# Patient Record
Sex: MALE | Race: BLACK OR AFRICAN AMERICAN | NOT HISPANIC OR LATINO | Employment: OTHER | URBAN - METROPOLITAN AREA
[De-identification: names, ages, dates, MRNs, and addresses within clinical notes are randomized per-mention and may not be internally consistent; named-entity substitution may affect disease eponyms.]

---

## 2017-01-13 ENCOUNTER — HOSPITAL ENCOUNTER (OUTPATIENT)
Dept: RADIOLOGY | Facility: OTHER | Age: 77
Discharge: HOME | End: 2017-01-13
Attending: ANESTHESIOLOGY
Payer: MEDICARE

## 2017-01-13 ENCOUNTER — OFFICE VISIT (OUTPATIENT)
Dept: PAIN MEDICINE | Facility: CLINIC | Age: 77
End: 2017-01-13
Attending: ANESTHESIOLOGY
Payer: MEDICARE

## 2017-01-13 VITALS
HEART RATE: 66 BPM | DIASTOLIC BLOOD PRESSURE: 68 MMHG | HEIGHT: 69 IN | WEIGHT: 200.63 LBS | BODY MASS INDEX: 29.71 KG/M2 | SYSTOLIC BLOOD PRESSURE: 126 MMHG | RESPIRATION RATE: 18 BRPM | TEMPERATURE: 97 F

## 2017-01-13 DIAGNOSIS — M25.562 CHRONIC PAIN OF BOTH KNEES: ICD-10-CM

## 2017-01-13 DIAGNOSIS — G89.29 CHRONIC PAIN OF BOTH KNEES: ICD-10-CM

## 2017-01-13 DIAGNOSIS — M25.561 CHRONIC PAIN OF BOTH KNEES: ICD-10-CM

## 2017-01-13 DIAGNOSIS — M17.0 PRIMARY OSTEOARTHRITIS OF BOTH KNEES: ICD-10-CM

## 2017-01-13 DIAGNOSIS — M17.0 PRIMARY OSTEOARTHRITIS OF BOTH KNEES: Primary | ICD-10-CM

## 2017-01-13 PROCEDURE — 1159F MED LIST DOCD IN RCRD: CPT | Mod: S$GLB,,, | Performed by: ANESTHESIOLOGY

## 2017-01-13 PROCEDURE — 99999 PR PBB SHADOW E&M-EST. PATIENT-LVL III: CPT | Mod: PBBFAC,,, | Performed by: ANESTHESIOLOGY

## 2017-01-13 PROCEDURE — 3074F SYST BP LT 130 MM HG: CPT | Mod: S$GLB,,, | Performed by: ANESTHESIOLOGY

## 2017-01-13 PROCEDURE — 99214 OFFICE O/P EST MOD 30 MIN: CPT | Mod: GC,S$GLB,, | Performed by: ANESTHESIOLOGY

## 2017-01-13 PROCEDURE — 1157F ADVNC CARE PLAN IN RCRD: CPT | Mod: S$GLB,,, | Performed by: ANESTHESIOLOGY

## 2017-01-13 PROCEDURE — 3078F DIAST BP <80 MM HG: CPT | Mod: S$GLB,,, | Performed by: ANESTHESIOLOGY

## 2017-01-13 PROCEDURE — 1160F RVW MEDS BY RX/DR IN RCRD: CPT | Mod: S$GLB,,, | Performed by: ANESTHESIOLOGY

## 2017-01-13 PROCEDURE — 1125F AMNT PAIN NOTED PAIN PRSNT: CPT | Mod: S$GLB,,, | Performed by: ANESTHESIOLOGY

## 2017-01-13 PROCEDURE — 73562 X-RAY EXAM OF KNEE 3: CPT | Mod: 26,RT,, | Performed by: RADIOLOGY

## 2017-01-13 PROCEDURE — 73562 X-RAY EXAM OF KNEE 3: CPT | Mod: 26,LT,, | Performed by: RADIOLOGY

## 2017-01-13 PROCEDURE — 73562 X-RAY EXAM OF KNEE 3: CPT | Mod: 50,TC

## 2017-01-13 NOTE — PROGRESS NOTES
Subjective:       Patient ID: Frandy Mccarthy Jr. is a 76 y.o. male     Chief Complaint: Low-back Pain (3 week follow up) and Knee Pain (new pain)    HPI:     Mr. Mccarthy is a 75 yo male who presents today with Low back pain, leg pain, and knee pain. He has a long-standing history of low back pain, s/p lumbar surgery x3. His current pain has been present since his surgery in April 2013 and is the worst in his bilateral knees. His pain is described in detail below.      Interval History (4/9/2014):  He returns to the office today with his wife. They are both upset because he was not able to start physical therapy. They report that they were told the visits were not authorized so he could not be scheduled. No change in the location or quality of his pain since his most recent visit.    Interval history (6/2/2014):  He returns to the office today again with his wife. He reports improved pain control with physical therapy and with the TENS unit. However, the TENS unit came from the physical therapy unit and was not an in network therefore they had to return at grossly would be charged several $100. They would like for us to order them a TENS unit to have in the house.    Interval history (9/18/14):  Patient returns to clinic for further treatment options of his LBP and bilateral lower extremity pain. Describes pain as LBP that radiates to bilateral lower extremities on the lateral thigh to the knee. Rarely the pain will extend down to the calf. Pain is worse with activity/walking. Reports 5/10 constant dull achy pain that is 10/10 at its worst. Patient reports that he has completed physical therapy and has been using the TENs unit. The TENs unit only provides minimal and temporary relief. Celebrex and anaprox were dc'd by the patients cardiologist.    Interval history (11/18/14):  Mr. Mccarthy presents to clinic s/p left and right L3-5 MB RFA. Reports 80% relief of previous sxs, 100% relilef of leg pain. Continues to c/o  pain b/l in low back, however this is located inferior to his flank.  This pain is worse with prolonged standing.  He does not continue to do PT exercises at home. Continues to take Gabapentin.  Pt has been seen in the clinic before by Dr. Carlson, however pt is new to me.     Interval History (10/05/2016):  PmHx lumbar surgery x3, previous RFA of MB L3-L5 with good results presents today with recurrent back pain radiating to the mid anterior thigh.  Pain onset has been gradual over the last few months, aching, worse on right, relieved by rest.  Associated symptoms include complaints of weakness.  Pain is constant, 10/10 at worst severity and 3/10 at least.      No current PT or exercise regimen (limited by pain).  He is not currently taking any anticoagulation other than ASA 81 mg daily.  Current pain medications include tramadol and percocet managed by [facility].  His gabapentin was recently decreased by endocrinology to 300 mg tid.      Interval History (12/8/2016):  He returns today for follow up.  He reports that the RFA was helpful for his back pain.  He is now having a different pain.  This pain is a shooting pain from his buttocks into his feet bilaterally.  This is associated with numbness and tingling.  No saddle anesthesia, bladder or bowel incontinence.    Interval History (1/13/2017):  He returns today for follow up.  He reports that the caudal MARCOS has been helpful for his back pain, today 3/10, but especially for the radicular symptoms. Now, he reports he has constant, BL knee pain that is worse with walking and stairs and decreases with sitting or lying down. He reports his legs occasionally feel week, but he denies falls. He reports that wearing a compression brace helps, but he feels that the ones he's tried are too tight. He would like to try one that properly fits him.   He does report after lying down for a bit a night, both of his calves have tingling.     Physical Therapy: He completed a 12 week  course last year; however, this made his pain worse.     Non-pharmacologic Treatment: Sitting makes his pain better.      · TENS? Not tried.     Pain Medications:       · Currently taking: Tramadol 50 mg 3 times daily as needed (helpful); gabapentin 300 mg 3 times daily, Cymbalta 30 mg BID      · Has tried in the past: Percocet 5/325 mg, Celebrex 200 mg daily. Naproxen 550 mg twice daily.     · Has not tried: TCAs, SSRIs     Blood thinners: None     Interventional Therapies:   · Bilateral LMBB: Positive  · Right L3-5 RFA 10/2014: Excellent relief until summer 2016  · Left L3-5 RFA 10/2014: Excellent relief until summer 2016  · Right L3-5 Cooled RFA 10/2016:  Good relief of back pain  · Left L3-5 Cooled RFA 11/2016:  Good relief of back pain  · Caudal MARCOS 12/20/16; good relief of radicular symptoms with good relief of back pain     Relevant Surgeries: L5-S1 fusion in 2005 followed by a subsequent L3-5 fusion in 2012 revised in 2013 with a complication of a postoperative seroma in 2012.     Affecting sleep? Yes     Affecting daily activities? Yes     Depressive symptoms? No      · SI/HI? No     Work status: Retired    Pain Scales  Best: 2/10  Worst: 10/10  Usually: 2/10  Today: 2/10    Low-back Pain   This is a recurrent problem. The current episode started more than 1 year ago. The problem occurs constantly. The problem has been rapidly improving since onset. The pain is present in the lumbar spine. The pain does not radiate. The quality of the pain is described as aching. The pain is at a severity of 2/10. The pain is the same all the time. Associated symptoms include leg pain. He has tried injection treatment and NSAIDs for the symptoms. The treatment provided significant relief. Physical therapy was ineffective.  Leg Pain    The pain is present in the right hip, right knee, right ankle, right foot, right toes, left knee, left ankle, left foot and left toes. This is a recurrent problem. The problem occurs  constantly. The problem has been gradually worsening. The quality of the pain is described as aching. The pain is at a severity of 5/10. Associated symptoms include stiffness. The symptoms are aggravated by walking and activity. He has tried oral narcotics and NSAIDs for the symptoms. The treatment provided no relief. Physical therapy was not tried.    Review of Systems   Cardiovascular:        Hypertension    Musculoskeletal: Positive for back pain and stiffness.   All other systems reviewed and are negative.      Past Medical History   Diagnosis Date    Arthritis     Diabetes mellitus, type 2     Hyperlipidemia     Hypertension     Thyroid disease      parathyroid       Past Surgical History   Procedure Laterality Date    Spine surgery      Hernia repair      Back surgery       x 4    Fracture surgery       right thigh       Review of patient's allergies indicates:  No Known Allergies    Current Outpatient Prescriptions   Medication Sig Dispense Refill    amlodipine (NORVASC) 10 MG tablet Take 10 mg by mouth once daily.       aspirin (ECOTRIN) 81 MG EC tablet Take 81 mg by mouth once daily.      BD INSULIN SYRINGE ULTRA-FINE 1 mL 31 gauge x 5/16 Syrg USE AS DIRECTED  0    cyanocobalamin (VITAMIN B-12) 1000 MCG tablet Take 100 mcg by mouth once daily.      duloxetine (CYMBALTA) 60 MG capsule Take 30 mg by mouth 2 (two) times daily.   99    fenofibrate 160 MG Tab Take 1 tablet (160 mg total) by mouth once daily. 90 tablet 1    folic acid (FOLVITE) 800 MCG Tab Take 800 mcg by mouth once daily.      gabapentin (NEURONTIN) 300 MG capsule TAKE 2 CAPSULES BY MOUTH 3 TIMES A DAY (Patient taking differently: TAKE 1 CAPSULES BY MOUTH 3 TIMES A DAY) 180 capsule 5    LEVEMIR 100 unit/mL injection 58 Units every evening.       losartan (COZAAR) 50 MG tablet ONCE A DAY  3    metoprolol tartrate (LOPRESSOR) 50 MG tablet Take 50 mg by mouth once daily.  3    nifedipine (ADALAT CC) 30 MG TbSR Take 30 mg by  "mouth every morning.  5    NOVOLOG FLEXPEN 100 unit/mL InPn pen 10 Units 3 (three) times daily with meals.       oxycodone-acetaminophen (PERCOCET)  mg per tablet Take 1 tablet by mouth 3 (three) times daily as needed.  0    simvastatin (ZOCOR) 5 MG tablet Take 5 mg by mouth every evening.      tramadol (ULTRAM) 50 mg tablet 50 mg every 4 (four) hours as needed.       VITAMIN D2 50,000 unit capsule TAKE ON TAB BY MOUTH ONCE A WEEK FOR 8WKS THEN TAKE ONE TAB BY MOUTH ONCE A MONTH  3     No current facility-administered medications for this visit.        Family History   Problem Relation Age of Onset    Cancer Mother     Diabetes Mother     Diabetes Father     Cancer Father        Social History     Social History    Marital status:      Spouse name: N/A    Number of children: N/A    Years of education: N/A     Occupational History    Not on file.     Social History Main Topics    Smoking status: Former Smoker     Quit date: 2/26/1973    Smokeless tobacco: Never Used    Alcohol use Yes      Comment: occasional    Drug use: No    Sexual activity: Yes     Partners: Female     Other Topics Concern    Not on file     Social History Narrative         Objective:       Vitals:    01/13/17 1258   BP: 126/68   Pulse: 66   Resp: 18   Temp: 97.4 °F (36.3 °C)   TempSrc: Oral   Weight: 91 kg (200 lb 9.9 oz)   Height: 5' 9" (1.753 m)   PainSc:   2   PainLoc: Back       Physical Exam  GEN:  Well developed, well nourished.  No acute distress. No pain behavior.  HEENT:  No trauma.  Mucous membranes moist.  Nares patent bilaterally.  PSYCH: Normal affect. Thought content appropriate.  CHEST:  Breathing symmetric.  No audible wheezing.  ABD: Soft, non-tender, non-distended.  SKIN:  Warm, pink, dry.  No rash on exposed areas.    EXT:  No cyanosis, clubbing, or edema.  No color change or changes in nail or hair growth. Full ROM at knees passively and actively. Negative anterior drawer test BL. No patellar " pain to palpation or patellar slipping.  NEURO/MUSCULOSKELETAL:  Fully alert, oriented, and appropriate. Speech normal saima. No cranial nerve deficits.   Gait: Antalgic.     Motor Strength: 5/5 motor strength throughout lower extremities.   Sensory: No sensory deficit in the lower extremities to light touch.   No clonus or spasticity.  L-Spine:  Decreased ROM with pain on flexion, extension. Mildly positive facet loading bilaterally.  Negative SLR bilaterally.    SI Joint/Hip: Negative KRYSTIAN bilaterally.  Negative FADIR bilaterally.  TTP over lumbar paraspinals, bilateral SI joints, hips, piriformis muscles, or GTB.        Imaging:      MRI lumbar spine  April 2013    Impression:  There is a posterior fusion of L3-L5, similar to the comparison exam in March 2012. There is a fluid collection posterior to the L3-L5 levels, also present on the March 15, 2012 exam. This is compatible with a postoperative seroma or pseudomeningocele.    At L1-L2, there is a moderate broad based disc bulge with mild to moderate narrowing of the central spinal canal, similar to March 15, 2012.    At L5-S1, there is a mild broad-based disc bulge/fibrosis with mild bilateral facet hypertrophy resulting in a moderate narrowing of the central spinal canal and narrowing of both lateral recesses, similar to March 15, 2012. There is moderate to severe bilateral neural foraminal narrowing, similar to March 15, 2012.    There are bilateral renal cortical cysts, also present in March 2012, incompletely visualized on this exam.      Assessment:     Mr. Mccarthy is a 73 yo male who presents today with Low back pain, leg pain, and BL knee pain.     1. Primary osteoarthritis of both knees    2. Chronic pain of both knees            Plan:     Frandy was seen today for low-back pain and knee pain.    Diagnoses and all orders for this visit:    Primary osteoarthritis of both knees  -     X-Ray Knee 3 View Bilateral; Future    Chronic pain of both knees  -      X-Ray Knee 3 View Bilateral; Future      His pain is consistent with the above.    We discussed the assessment and recommendations.  All available images were reviewed. We discussed the disease process, prognosis, treatment plan, and risks and benefits. The patient is aware of the risks and benefits of the medications being prescribed, common side effects, and proper usage. The following is the plan we agreed on:     1. Schedule BL 3-view knee xrays   2. He is receiving tramadol and Percocet from Dr. Forte.  Ideally, the goal would be to taper this as tolerated.  3. Continue Gabapentin. No refill needed  4. RTC in 1 week to see Amandeep for knee brace fitting and perform BL knee joint steroid injections    The above plan and management options were discussed at length with patient. Patient is in agreement with the above and verbalized understanding. It will be communicated with the consulting physician via electronic record, fax, or mail    Cornelia Corona MD, PGY-3  [facility]      Review of Systems   Cardiovascular:        Hypertension    Musculoskeletal: Positive for back pain and stiffness.   All other systems reviewed and are negative.    Ortho/SPM Exam

## 2017-01-13 NOTE — MR AVS SNAPSHOT
[facility]  [address]  [place] [address]  Phone: [phone]  Fax: [phone]                  Frandy Mccarthy Jr.   2017 1:00 PM   Office Visit    Description:  Male : 1940   Provider:  Makeda Herring MD   Department:  [facility]           Reason for Visit     Low-back Pain     Knee Pain           Diagnoses this Visit        Comments    Primary osteoarthritis of both knees    -  Primary     Chronic pain of both knees                To Do List           Future Appointments        Provider Department Dept Phone    2017 8:30 AM Makeda Herring MD [facility] [phone]      Goals (5 Years of Data)     None      [facility]     [facility] -  Assistance  Registered nurses in the [facility] provide clinical advisement, health education, appointment booking, and other advisory services.  Call for this free service at [phone].             Medications           Message regarding Medications     Verify the changes and/or additions to your medication regime listed below are the same as discussed with your clinician today.  If any of these changes or additions are incorrect, please notify your healthcare provider.             Verify that the below list of medications is an accurate representation of the medications you are currently taking.  If none reported, the list may be blank. If incorrect, please contact your healthcare provider. Carry this list with you in case of emergency.           Current Medications     amlodipine (NORVASC) 10 MG tablet Take 10 mg by mouth once daily.     aspirin (ECOTRIN) 81 MG EC tablet Take 81 mg by mouth once daily.    BD INSULIN SYRINGE ULTRA-FINE 1 mL 31 gauge x 5/16 Syrg USE AS DIRECTED    cyanocobalamin (VITAMIN B-12) 1000 MCG tablet Take 100 mcg by mouth once daily.    duloxetine (CYMBALTA) 60 MG capsule Take 30 mg by mouth 2 (two) times daily.      "fenofibrate 160 MG Tab Take 1 tablet (160 mg total) by mouth once daily.    folic acid (FOLVITE) 800 MCG Tab Take 800 mcg by mouth once daily.    gabapentin (NEURONTIN) 300 MG capsule TAKE 2 CAPSULES BY MOUTH 3 TIMES A DAY    LEVEMIR 100 unit/mL injection 58 Units every evening.     losartan (COZAAR) 50 MG tablet ONCE A DAY    metoprolol tartrate (LOPRESSOR) 50 MG tablet Take 50 mg by mouth once daily.    nifedipine (ADALAT CC) 30 MG TbSR Take 30 mg by mouth every morning.    NOVOLOG FLEXPEN 100 unit/mL InPn pen 10 Units 3 (three) times daily with meals.     oxycodone-acetaminophen (PERCOCET)  mg per tablet Take 1 tablet by mouth 3 (three) times daily as needed.    simvastatin (ZOCOR) 5 MG tablet Take 5 mg by mouth every evening.    tramadol (ULTRAM) 50 mg tablet 50 mg every 4 (four) hours as needed.     VITAMIN D2 50,000 unit capsule TAKE ON TAB BY MOUTH ONCE A WEEK FOR 8WKS THEN TAKE ONE TAB BY MOUTH ONCE A MONTH           Clinical Reference Information           Vital Signs - Last Recorded  Most recent update: 1/13/2017  1:05 PM by Tamera Dominguez MA    BP Pulse Temp Resp Ht Wt    126/68 66 97.4 °F (36.3 °C) (Oral) 18 5' 9" (1.753 m) 91 kg (200 lb 9.9 oz)    BMI                29.63 kg/m2          Blood Pressure          Most Recent Value    BP  126/68      Allergies as of 1/13/2017     No Known Allergies      Immunizations Administered on Date of Encounter - 1/13/2017     None      Orders Placed During Today's Visit     Future Labs/Procedures Expected by Expires    X-Ray Knee 3 View Bilateral  1/13/2017 1/13/2018      [facility] Sign-Up     Activating your [facility] account is as easy as 1-2-3!     1) Visit [email or web address], select Sign Up Now, enter this activation code and your date of birth, then select Next.  Activation code not generated  Current Patient Portal Status: Account disabled      2) Create a username and password to use when you visit [facility] in the future and select a security question in " case you lose your password and select Next.    3) Enter your e-mail address and click Sign Up!    Additional Information  If you have questions, please e-mail [email or web address] or call [phone] to talk to our [facility] staff. Remember, [facility] is NOT to be used for urgent needs. For medical emergencies, dial 911.

## 2017-01-25 ENCOUNTER — PROCEDURE VISIT (OUTPATIENT)
Dept: PAIN MEDICINE | Facility: CLINIC | Age: 77
End: 2017-01-25
Attending: ANESTHESIOLOGY
Payer: MEDICARE

## 2017-01-25 VITALS
BODY MASS INDEX: 29.52 KG/M2 | DIASTOLIC BLOOD PRESSURE: 74 MMHG | WEIGHT: 199.31 LBS | TEMPERATURE: 98 F | SYSTOLIC BLOOD PRESSURE: 140 MMHG | HEIGHT: 69 IN | HEART RATE: 72 BPM | RESPIRATION RATE: 18 BRPM

## 2017-01-25 DIAGNOSIS — G89.29 CHRONIC PAIN OF BOTH KNEES: ICD-10-CM

## 2017-01-25 DIAGNOSIS — M25.562 CHRONIC PAIN OF BOTH KNEES: ICD-10-CM

## 2017-01-25 DIAGNOSIS — M25.561 CHRONIC PAIN OF BOTH KNEES: ICD-10-CM

## 2017-01-25 DIAGNOSIS — M17.0 PRIMARY OSTEOARTHRITIS OF BOTH KNEES: Primary | ICD-10-CM

## 2017-01-25 PROCEDURE — 99499 UNLISTED E&M SERVICE: CPT | Mod: S$GLB,,, | Performed by: ANESTHESIOLOGY

## 2017-01-25 NOTE — PROGRESS NOTES
Subjective:       Patient ID: Frandy Mccarthy Jr. is a 76 y.o. male     Chief Complaint: Procedure (bilateral knee injections)    HPI:     Mr. Mccarthy is a 73 yo male who presents today with Low back pain, leg pain, and knee pain. He has a long-standing history of low back pain, s/p lumbar surgery x3. His current pain has been present since his surgery in April 2013 and is the worst in his bilateral knees. His pain is described in detail below.      Interval History (4/9/2014):  He returns to the office today with his wife. They are both upset because he was not able to start physical therapy. They report that they were told the visits were not authorized so he could not be scheduled. No change in the location or quality of his pain since his most recent visit.    Interval history (6/2/2014):  He returns to the office today again with his wife. He reports improved pain control with physical therapy and with the TENS unit. However, the TENS unit came from the physical therapy unit and was not an in network therefore they had to return at grossly would be charged several $100. They would like for us to order them a TENS unit to have in the house.    Interval history (9/18/14):  Patient returns to clinic for further treatment options of his LBP and bilateral lower extremity pain. Describes pain as LBP that radiates to bilateral lower extremities on the lateral thigh to the knee. Rarely the pain will extend down to the calf. Pain is worse with activity/walking. Reports 5/10 constant dull achy pain that is 10/10 at its worst. Patient reports that he has completed physical therapy and has been using the TENs unit. The TENs unit only provides minimal and temporary relief. Celebrex and anaprox were dc'd by the patients cardiologist.    Interval history (11/18/14):  Mr. Mccarthy presents to clinic s/p left and right L3-5 MB RFA. Reports 80% relief of previous sxs, 100% relilef of leg pain. Continues to c/o pain b/l in low back,  however this is located inferior to his flank.  This pain is worse with prolonged standing.  He does not continue to do PT exercises at home. Continues to take Gabapentin.  Pt has been seen in the clinic before by Dr. Carlson, however pt is new to me.     Interval History (10/05/2016):  PmHx lumbar surgery x3, previous RFA of MB L3-L5 with good results presents today with recurrent back pain radiating to the mid anterior thigh.  Pain onset has been gradual over the last few months, aching, worse on right, relieved by rest.  Associated symptoms include complaints of weakness.  Pain is constant, 10/10 at worst severity and 3/10 at least.      No current PT or exercise regimen (limited by pain).  He is not currently taking any anticoagulation other than ASA 81 mg daily.  Current pain medications include tramadol and percocet managed by [facility].  His gabapentin was recently decreased by endocrinology to 300 mg tid.      Interval History (12/8/2016):  He returns today for follow up.  He reports that the RFA was helpful for his back pain.  He is now having a different pain.  This pain is a shooting pain from his buttocks into his feet bilaterally.  This is associated with numbness and tingling.  No saddle anesthesia, bladder or bowel incontinence.    Interval History (1/13/2017):  He returns today for follow up.  He reports that the caudal MARCOS has been helpful for his back pain, today 3/10, but especially for the radicular symptoms. Now, he reports he has constant, BL knee pain that is worse with walking and stairs and decreases with sitting or lying down. He reports his legs occasionally feel week, but he denies falls. He reports that wearing a compression brace helps, but he feels that the ones he's tried are too tight. He would like to try one that properly fits him.   He does report after lying down for a bit a night, both of his calves have tingling.     Interval History (1/25/2017):  He returns today for follow up.  He  reports that, for the past wee, his knees have not been hurting.  He thinks he would like to hold off on the knee injections and see how the braces help.    Physical Therapy: He completed a 12 week course last year; however, this made his pain worse.     Non-pharmacologic Treatment: Sitting makes his pain better.      · TENS? Not tried.     Pain Medications:       · Currently taking: Tramadol 50 mg 3 times daily as needed (helpful); gabapentin 300 mg 3 times daily, Cymbalta 30 mg BID      · Has tried in the past: Percocet 5/325 mg, Celebrex 200 mg daily. Naproxen 550 mg twice daily.     · Has not tried: TCAs, SSRIs     Blood thinners: None     Interventional Therapies:   · Bilateral LMBB: Positive  · Right L3-5 RFA 10/2014: Excellent relief until summer 2016  · Left L3-5 RFA 10/2014: Excellent relief until summer 2016  · Right L3-5 Cooled RFA 10/2016:  Good relief of back pain  · Left L3-5 Cooled RFA 11/2016:  Good relief of back pain  · Caudal MARCOS 12/20/16; good relief of radicular symptoms with good relief of back pain     Relevant Surgeries: L5-S1 fusion in 2005 followed by a subsequent L3-5 fusion in 2012 revised in 2013 with a complication of a postoperative seroma in 2012.     Affecting sleep? Yes     Affecting daily activities? Yes     Depressive symptoms? No      · SI/HI? No     Work status: Retired    Pain Scales  Best: 2/10  Worst: 10/10  Usually: 2/10  Today: 2/10    Low-back Pain   This is a recurrent problem. The current episode started more than 1 year ago. The problem occurs constantly. The problem has been rapidly improving since onset. The pain is present in the lumbar spine. The pain does not radiate. The quality of the pain is described as aching. The pain is at a severity of 2/10. The pain is the same all the time. Associated symptoms include leg pain. He has tried injection treatment and NSAIDs for the symptoms. The treatment provided significant relief. Physical therapy was ineffective.  Leg  Pain    The pain is present in the right hip, right knee, right ankle, right foot, right toes, left knee, left ankle, left foot and left toes. This is a recurrent problem. The problem occurs constantly. The problem has been gradually worsening. The quality of the pain is described as aching. The pain is at a severity of 5/10. Associated symptoms include stiffness. The symptoms are aggravated by walking and activity. He has tried oral narcotics and NSAIDs for the symptoms. The treatment provided no relief. Physical therapy was not tried.    Review of Systems   Cardiovascular:        Hypertension    Musculoskeletal: Positive for back pain and stiffness.   All other systems reviewed and are negative.      Past Medical History   Diagnosis Date    Arthritis     Diabetes mellitus, type 2     Hyperlipidemia     Hypertension     Thyroid disease      parathyroid       Past Surgical History   Procedure Laterality Date    Spine surgery      Hernia repair      Back surgery       x 4    Fracture surgery       right thigh       Review of patient's allergies indicates:  No Known Allergies    Current Outpatient Prescriptions   Medication Sig Dispense Refill    amlodipine (NORVASC) 10 MG tablet Take 10 mg by mouth once daily.       aspirin (ECOTRIN) 81 MG EC tablet Take 81 mg by mouth once daily.      BD INSULIN SYRINGE ULTRA-FINE 1 mL 31 gauge x 5/16 Syrg USE AS DIRECTED  0    cyanocobalamin (VITAMIN B-12) 1000 MCG tablet Take 100 mcg by mouth once daily.      duloxetine (CYMBALTA) 60 MG capsule Take 30 mg by mouth 2 (two) times daily.   99    fenofibrate 160 MG Tab Take 1 tablet (160 mg total) by mouth once daily. 90 tablet 1    folic acid (FOLVITE) 800 MCG Tab Take 800 mcg by mouth once daily.      gabapentin (NEURONTIN) 300 MG capsule TAKE 2 CAPSULES BY MOUTH 3 TIMES A DAY (Patient taking differently: TAKE 1 CAPSULES BY MOUTH 3 TIMES A DAY) 180 capsule 5    LEVEMIR 100 unit/mL injection 58 Units every evening.   "     losartan (COZAAR) 50 MG tablet ONCE A DAY  3    metoprolol tartrate (LOPRESSOR) 50 MG tablet Take 50 mg by mouth once daily.  3    nifedipine (ADALAT CC) 30 MG TbSR Take 30 mg by mouth every morning.  5    NOVOLOG FLEXPEN 100 unit/mL InPn pen 10 Units 3 (three) times daily with meals.       oxycodone-acetaminophen (PERCOCET)  mg per tablet Take 1 tablet by mouth 3 (three) times daily as needed.  0    simvastatin (ZOCOR) 5 MG tablet Take 5 mg by mouth every evening.      tramadol (ULTRAM) 50 mg tablet 50 mg every 4 (four) hours as needed.       VITAMIN D2 50,000 unit capsule TAKE ON TAB BY MOUTH ONCE A WEEK FOR 8WKS THEN TAKE ONE TAB BY MOUTH ONCE A MONTH  3     No current facility-administered medications for this visit.        Family History   Problem Relation Age of Onset    Cancer Mother     Diabetes Mother     Diabetes Father     Cancer Father        Social History     Social History    Marital status:      Spouse name: N/A    Number of children: N/A    Years of education: N/A     Occupational History    Not on file.     Social History Main Topics    Smoking status: Former Smoker     Quit date: 2/26/1973    Smokeless tobacco: Never Used    Alcohol use Yes      Comment: occasional    Drug use: No    Sexual activity: Yes     Partners: Female     Other Topics Concern    Not on file     Social History Narrative         Objective:       Vitals:    01/25/17 0852   BP: (!) 140/74   Pulse: 72   Resp: 18   Temp: 98.2 °F (36.8 °C)   TempSrc: Oral   Weight: 90.4 kg (199 lb 4.7 oz)   Height: 5' 9" (1.753 m)   PainSc:   4   PainLoc: Knee       Physical Exam  GEN:  Well developed, well nourished.  No acute distress. No pain behavior.  HEENT:  No trauma.  Mucous membranes moist.  Nares patent bilaterally.  PSYCH: Normal affect. Thought content appropriate.  CHEST:  Breathing symmetric.  No audible wheezing.  ABD: Soft, non-tender, non-distended.  SKIN:  Warm, pink, dry.  No rash on " exposed areas.    EXT:  No cyanosis, clubbing, or edema.  No color change or changes in nail or hair growth.   NEURO/MUSCULOSKELETAL:  Fully alert, oriented, and appropriate. Speech normal saima. No cranial nerve deficits.   Gait: Antalgic.    Previous physical exam:  Full ROM at knees passively and actively. Negative anterior drawer test BL. No patellar pain to palpation or patellar slipping.  Motor Strength: 5/5 motor strength throughout lower extremities.   Sensory: No sensory deficit in the lower extremities to light touch.   No clonus or spasticity.  L-Spine:  Decreased ROM with pain on flexion, extension. Mildly positive facet loading bilaterally.  Negative SLR bilaterally.    SI Joint/Hip: Negative KRYSTIAN bilaterally.  Negative FADIR bilaterally.  TTP over lumbar paraspinals, bilateral SI joints, hips, piriformis muscles, or GTB.        Imaging:      MRI lumbar spine  April 2013    Impression:  There is a posterior fusion of L3-L5, similar to the comparison exam in March 2012. There is a fluid collection posterior to the L3-L5 levels, also present on the March 15, 2012 exam. This is compatible with a postoperative seroma or pseudomeningocele.    At L1-L2, there is a moderate broad based disc bulge with mild to moderate narrowing of the central spinal canal, similar to March 15, 2012.    At L5-S1, there is a mild broad-based disc bulge/fibrosis with mild bilateral facet hypertrophy resulting in a moderate narrowing of the central spinal canal and narrowing of both lateral recesses, similar to March 15, 2012. There is moderate to severe bilateral neural foraminal narrowing, similar to March 15, 2012.    There are bilateral renal cortical cysts, also present in March 2012, incompletely visualized on this exam.      Assessment:     Mr. Mccarthy is a 75 yo male who presents today with Low back pain, leg pain, and BL knee pain.     1. Primary osteoarthritis of both knees    2. Chronic pain of both knees             Plan:     Frandy was seen today for procedure.    Diagnoses and all orders for this visit:    Primary osteoarthritis of both knees    Chronic pain of both knees    His pain is consistent with the above.    We discussed the assessment and recommendations.  All available images were reviewed. We discussed the disease process, prognosis, treatment plan, and risks and benefits. The patient is aware of the risks and benefits of the medications being prescribed, common side effects, and proper usage. The following is the plan we agreed on:     1. He will see [facility] for brace fitting today.    2. He is receiving tramadol and Percocet from Dr. Forte.  Ideally, the goal would be to taper this as tolerated.  3. Continue Gabapentin. No refill needed  4. RTC as needed for BL knee joint steroid injections or any other concern    The above plan and management options were discussed at length with patient. Patient is in agreement with the above and verbalized understanding. It will be communicated with the consulting physician via electronic record, fax, or mail      Review of Systems   Cardiovascular:        Hypertension    Musculoskeletal: Positive for back pain and stiffness.   All other systems reviewed and are negative.    Ortho/SPM Exam

## 2017-05-15 PROBLEM — R60.0 LOCALIZED EDEMA: Status: ACTIVE | Noted: 2017-05-15

## 2017-06-06 RX ORDER — GABAPENTIN 300 MG/1
CAPSULE ORAL
Qty: 180 CAPSULE | Refills: 3 | Status: SHIPPED | OUTPATIENT
Start: 2017-06-06 | End: 2018-03-12 | Stop reason: SDUPTHER

## 2017-07-03 ENCOUNTER — OFFICE VISIT (OUTPATIENT)
Dept: PAIN MEDICINE | Facility: CLINIC | Age: 77
End: 2017-07-03
Attending: ANESTHESIOLOGY
Payer: MEDICARE

## 2017-07-03 VITALS
HEART RATE: 74 BPM | TEMPERATURE: 99 F | BODY MASS INDEX: 29.26 KG/M2 | DIASTOLIC BLOOD PRESSURE: 79 MMHG | SYSTOLIC BLOOD PRESSURE: 155 MMHG | WEIGHT: 197.56 LBS | HEIGHT: 69 IN

## 2017-07-03 DIAGNOSIS — Z98.1 S/P LUMBAR SPINAL FUSION: ICD-10-CM

## 2017-07-03 DIAGNOSIS — M47.816 FACET ARTHRITIS OF LUMBAR REGION: Primary | ICD-10-CM

## 2017-07-03 DIAGNOSIS — G89.29 CHRONIC PAIN OF BOTH KNEES: ICD-10-CM

## 2017-07-03 DIAGNOSIS — M51.36 DDD (DEGENERATIVE DISC DISEASE), LUMBAR: ICD-10-CM

## 2017-07-03 DIAGNOSIS — M25.561 CHRONIC PAIN OF BOTH KNEES: ICD-10-CM

## 2017-07-03 DIAGNOSIS — M25.562 CHRONIC PAIN OF BOTH KNEES: ICD-10-CM

## 2017-07-03 DIAGNOSIS — M17.0 PRIMARY OSTEOARTHRITIS OF BOTH KNEES: ICD-10-CM

## 2017-07-03 DIAGNOSIS — M96.1 POSTLAMINECTOMY SYNDROME OF LUMBAR REGION: ICD-10-CM

## 2017-07-03 PROCEDURE — 99214 OFFICE O/P EST MOD 30 MIN: CPT | Mod: GC,S$GLB,, | Performed by: ANESTHESIOLOGY

## 2017-07-03 PROCEDURE — 1125F AMNT PAIN NOTED PAIN PRSNT: CPT | Mod: S$GLB,,, | Performed by: ANESTHESIOLOGY

## 2017-07-03 PROCEDURE — 1159F MED LIST DOCD IN RCRD: CPT | Mod: S$GLB,,, | Performed by: ANESTHESIOLOGY

## 2017-07-03 PROCEDURE — 99999 PR PBB SHADOW E&M-EST. PATIENT-LVL III: CPT | Mod: PBBFAC,,, | Performed by: ANESTHESIOLOGY

## 2017-07-03 NOTE — PROGRESS NOTES
Subjective:       Patient ID: Frandy Mccarthy Jr. is a 77 y.o. male     Chief Complaint: No chief complaint on file.    HPI:  Mr. Mccarthy is a 73 yo male who presents today with Low back pain, leg pain, and knee pain. He has a long-standing history of low back pain, s/p lumbar surgery x3. His current pain has been present since his surgery in April 2013 and is the worst in his bilateral knees. His pain is described in detail below.    Interval History (4/9/2014):  He returns to the office today with his wife. They are both upset because he was not able to start physical therapy. They report that they were told the visits were not authorized so he could not be scheduled. No change in the location or quality of his pain since his most recent visit.    Interval history (6/2/2014):  He returns to the office today again with his wife. He reports improved pain control with physical therapy and with the TENS unit. However, the TENS unit came from the physical therapy unit and was not an in network therefore they had to return at grossly would be charged several $100. They would like for us to order them a TENS unit to have in the house.    Interval history (9/18/14):  Patient returns to clinic for further treatment options of his LBP and bilateral lower extremity pain. Describes pain as LBP that radiates to bilateral lower extremities on the lateral thigh to the knee. Rarely the pain will extend down to the calf. Pain is worse with activity/walking. Reports 5/10 constant dull achy pain that is 10/10 at its worst. Patient reports that he has completed physical therapy and has been using the TENs unit. The TENs unit only provides minimal and temporary relief. Celebrex and anaprox were dc'd by the patients cardiologist.    Interval history (11/18/14):  Mr. Mccarthy presents to clinic s/p left and right L3-5 MB RFA. Reports 80% relief of previous sxs, 100% relief of leg pain. Continues to c/o pain b/l in low back, however this is  located inferior to his flank.  This pain is worse with prolonged standing.  He does not continue to do PT exercises at home. Continues to take Gabapentin.  Pt has been seen in the clinic before by Dr. Carlson, however pt is new to me.     Interval History (10/05/2016):  PmHx lumbar surgery x3, previous RFA of MB L3-L5 with good results presents today with recurrent back pain radiating to the mid anterior thigh.  Pain onset has been gradual over the last few months, aching, worse on right, relieved by rest.  Associated symptoms include complaints of weakness.  Pain is constant, 10/10 at worst severity and 3/10 at least.      No current PT or exercise regimen (limited by pain).  He is not currently taking any anticoagulation other than ASA 81 mg daily.  Current pain medications include tramadol and percocet managed by [facility].  His gabapentin was recently decreased by endocrinology to 300 mg tid.      Interval History (12/8/2016):  He returns today for follow up.  He reports that the RFA was helpful for his back pain.  He is now having a different pain.  This pain is a shooting pain from his buttocks into his feet bilaterally.  This is associated with numbness and tingling.  No saddle anesthesia, bladder or bowel incontinence.    Interval History (1/13/2017):  He returns today for follow up.  He reports that the caudal MARCOS has been helpful for his back pain, today 3/10, but especially for the radicular symptoms. Now, he reports he has constant, BL knee pain that is worse with walking and stairs and decreases with sitting or lying down. He reports his legs occasionally feel week, but he denies falls. He reports that wearing a compression brace helps, but he feels that the ones he's tried are too tight. He would like to try one that properly fits him.     He does report after lying down for a bit a night, both of his calves have tingling.     Interval History (1/25/2017):  He returns today for follow up.  He reports that,  for the past wee, his knees have not been hurting.  He thinks he would like to hold off on the knee injections and see how the braces help.    Interval History (7/3/2017):  He returns today for follow up.  He reports that he is having bad back pain today. He report great relief from past Caudal MARCOS but only for 2-3 weeks. He states the pain is the same pain as previously, before the injection, but worse today. It is most when walking and standing. He describes the pain tingling and radiating from lower back when standing and down posterior thighs BL to to calf and moving to anterior leg and to both his feet in the bottoms and sides of feet. He states his back pain is worse than his BL leg pain and that he received most relief from RFA procedures at past visits. He states his pain medications where not working so he stopped using them consistently. He has noticed leg weakness, but no falls. He denies fevers, night sweats, B/B changes, or perineal anesthesia.     Physical Therapy: He completed a 12 week course since 2013; however, this made his pain worse.     Non-pharmacologic Treatment: Sitting makes his pain better.      · TENS? Not tried.     Pain Medications:       · Currently taking: Tramadol 50 mg 3 times daily as needed (helpful); gabapentin 300 mg 3 times daily, Cymbalta 30 mg BID      · Has tried in the past: Percocet 5/325 mg, Celebrex 200 mg daily. Naproxen 550 mg twice daily.     · Has not tried: TCAs, SSRIs     Blood thinners: None     Interventional Therapies:   · Bilateral LMBB: Positive  · Right L3-5 RFA 10/2014: Excellent relief until summer 2016  · Left L3-5 RFA 10/2014: Excellent relief until summer 2016  · Right L3-5 Cooled RFA 10/2016:  Good relief of back pain  · Left L3-5 Cooled RFA 11/2016:  Good relief of back pain  · Caudal MARCOS 12/20/16; good relief of radicular symptoms with good relief of back pain     Relevant Surgeries: L5-S1 fusion in 2005 followed by a subsequent L3-5 fusion in 2012  revised in 2013 with a complication of a postoperative seroma in 2012.     Affecting sleep? Yes     Affecting daily activities? Yes     Depressive symptoms? No      · SI/HI? No     Work status: Retired    Pain Scales  Best: 2/10  Worst: 10/10  Usually: 2/10  Today: 2/10    Low-back Pain   This is a recurrent problem. The current episode started more than 1 year ago. The problem occurs constantly. The problem has been rapidly improving since onset. The pain is present in the lumbar spine. The pain does not radiate. The quality of the pain is described as aching. The pain is at a severity of 2/10. The pain is the same all the time. Associated symptoms include leg pain. He has tried injection treatment and NSAIDs for the symptoms. The treatment provided significant relief. Physical therapy was ineffective.  Leg Pain    The pain is present in the right hip, right knee, right ankle, right foot, right toes, left knee, left ankle, left foot and left toes. This is a recurrent problem. The problem occurs constantly. The problem has been gradually worsening. The quality of the pain is described as aching. The pain is at a severity of 5/10. Associated symptoms include stiffness. The symptoms are aggravated by walking and activity. He has tried oral narcotics and NSAIDs for the symptoms. The treatment provided no relief. Physical therapy was not tried.    Review of Systems   Cardiovascular:        Hypertension    Musculoskeletal: Positive for back pain and stiffness.   All other systems reviewed and are negative.      Past Medical History:   Diagnosis Date    Arthritis     Diabetes mellitus, type 2     Hyperlipidemia     Hypertension     Thyroid disease     parathyroid       Past Surgical History:   Procedure Laterality Date    BACK SURGERY      x 4    FRACTURE SURGERY      right thigh    HERNIA REPAIR      SPINE SURGERY         Review of patient's allergies indicates:  No Known Allergies    Current Outpatient  Prescriptions   Medication Sig Dispense Refill    amlodipine (NORVASC) 5 MG tablet Take 1 tablet (5 mg total) by mouth once daily. 90 tablet 3    aspirin (ECOTRIN) 81 MG EC tablet Take 81 mg by mouth once daily.      BD INSULIN SYRINGE ULTRA-FINE 1 mL 31 gauge x 5/16 Syrg USE AS DIRECTED  0    cyanocobalamin (VITAMIN B-12) 1000 MCG tablet Take 100 mcg by mouth once daily.      duloxetine (CYMBALTA) 60 MG capsule Take 30 mg by mouth 2 (two) times daily.   99    fenofibrate 160 MG Tab TAKE 1 TABLET (160 MG TOTAL) BY MOUTH ONCE DAILY. 90 tablet 1    folic acid (FOLVITE) 800 MCG Tab Take 800 mcg by mouth once daily.      gabapentin (NEURONTIN) 300 MG capsule TAKE 2 CAPSULES BY MOUTH 3 TIMES A  capsule 3    LEVEMIR 100 unit/mL injection 58 Units every evening.       losartan (COZAAR) 50 MG tablet ONCE A DAY  3    metoprolol tartrate (LOPRESSOR) 50 MG tablet Take 50 mg by mouth once daily.  3    NOVOLOG FLEXPEN 100 unit/mL InPn pen 10 Units 3 (three) times daily with meals.       oxycodone-acetaminophen (PERCOCET)  mg per tablet Take 1 tablet by mouth 3 (three) times daily as needed.  0    simvastatin (ZOCOR) 5 MG tablet Take 5 mg by mouth every evening.      tramadol (ULTRAM) 50 mg tablet 50 mg every 4 (four) hours as needed.       VITAMIN D2 50,000 unit capsule TAKE ON TAB BY MOUTH ONCE A WEEK FOR 8WKS THEN TAKE ONE TAB BY MOUTH ONCE A MONTH  3     No current facility-administered medications for this visit.        Family History   Problem Relation Age of Onset    Cancer Mother     Diabetes Mother     Diabetes Father     Cancer Father        Social History     Social History    Marital status:      Spouse name: N/A    Number of children: N/A    Years of education: N/A     Occupational History    Not on file.     Social History Main Topics    Smoking status: Former Smoker     Quit date: 2/26/1973    Smokeless tobacco: Never Used    Alcohol use Yes      Comment: occasional  "   Drug use: No    Sexual activity: Yes     Partners: Female     Other Topics Concern    Not on file     Social History Narrative    No narrative on file         Objective:       Vitals:    07/03/17 0710   BP: (!) 155/79   Pulse: 74   Temp: 98.9 °F (37.2 °C)   Weight: 89.6 kg (197 lb 8.5 oz)   Height: 5' 9" (1.753 m)   PainSc: 10-Worst pain ever       Physical Exam  GEN:  Well developed, well nourished.  No acute distress. No pain behavior.  HEENT:  No trauma.  Mucous membranes moist.  Nares patent bilaterally.  PSYCH: Normal affect. Thought content appropriate.  CHEST:  Breathing symmetric.  No audible wheezing.  ABD: Soft, non-tender, non-distended.  SKIN:  Warm, pink, dry.  No rash on exposed areas.    EXT:  No cyanosis, clubbing, or edema.  No color change or changes in nail or hair growth.   NEURO/MUSCULOSKELETAL:  Fully alert, oriented, and appropriate. Speech normal saima. No cranial nerve deficits.   Gait: Antalgic.  L-Spine: Decreased ROM in lumbar spine extension. Positive facet loading bilaterally.  Negative SLR bilaterally.    Motor Strength: 4/5 in BL HF, 4/5 in BL KE, 5/5 in rest of LE myotomes.    Sensory: No sensory deficit in the lower extremities to light touch.   No clonus or spasticity. Neg Babinski  SI Joint/Hip: Negative KRYSTIAN, negative Gaenslen's bilaterally.  Negative FADIR bilaterally.  TTP over lumbar paraspinals, with No TTP of bilateral SI joints, hips, piriformis muscles, or GTB.    2+ reflexes in BLEs      Imaging:      MRI lumbar spine  April 2013    Impression:  There is a posterior fusion of L3-L5, similar to the comparison exam in March 2012. There is a fluid collection posterior to the L3-L5 levels, also present on the March 15, 2012 exam. This is compatible with a postoperative seroma or pseudomeningocele.    At L1-L2, there is a moderate broad based disc bulge with mild to moderate narrowing of the central spinal canal, similar to March 15, 2012.    At L5-S1, there is a " mild broad-based disc bulge/fibrosis with mild bilateral facet hypertrophy resulting in a moderate narrowing of the central spinal canal and narrowing of both lateral recesses, similar to March 15, 2012. There is moderate to severe bilateral neural foraminal narrowing, similar to March 15, 2012.    There are bilateral renal cortical cysts, also present in March 2012, incompletely visualized on this exam.    BL knee Xray 1/13/17:  Degenerative changes which are more pronounced on the right than on the left with moderate narrowing of the medial compartment of the right femorotibial joint resulting in mild genu varus deformity.  Prominent chondrocalcinosis noted bilaterally.  Small bilateral suprapatellar joint effusions are suspected.      Assessment:    Mr. Mccarthy is a 73 yo male who presents today with Low back pain and leg pain    1. Facet arthritis of lumbar region    2. DDD (degenerative disc disease), lumbar    3. S/P lumbar spinal fusion    4. Postlaminectomy syndrome of lumbar region    5. Primary osteoarthritis of both knees    6. Chronic pain of both knees            Plan:     Frandy was seen today for leg pain.    Diagnoses and all orders for this visit:    Facet arthritis of lumbar region    DDD (degenerative disc disease), lumbar    S/P lumbar spinal fusion    Postlaminectomy syndrome of lumbar region    Primary osteoarthritis of both knees    Chronic pain of both knees      His pain is consistent with the above.    We discussed the assessment and recommendations.  All available images were reviewed. We discussed the disease process, prognosis, treatment plan, and risks and benefits. The patient is aware of the risks and benefits of the medications being prescribed, common side effects, and proper usage. The following is the plan we agreed on:     1. Schedule repeat Left L3-L5 RFA, cooled, with the right side 1 week later.  2. He will continue to see Neurology for medication management, receiving tramadol  and Percocet from Dr. Forte with goal to taper as tolerated.  He would like to stay over there because they check on his hardware.  3. Continue Gabapentin. No refill needed  4. Consider SCS in the future.  5.     I have seen the patient with the resident physician.  I have performed my own history and physical exam and we have come up with the above plan.  The patient is in agreement with our plan.    Greater than 25 minutes spent in total in todays visit with the patient, with more than half that time direct face to face counseling and education with the patient today. We discussed the disease process, prognosis, treatment plan, and risks and benefits.    The above plan and management options were discussed at length with patient. Patient is in agreement with the above and verbalized understanding. It will be communicated with the consulting physician via electronic record, fax, or mail      Review of Systems   Cardiovascular:        Hypertension    Musculoskeletal: Positive for back pain and stiffness.   All other systems reviewed and are negative.    Ortho/SPM Exam

## 2018-01-19 ENCOUNTER — OFFICE VISIT (OUTPATIENT)
Dept: CARDIOLOGY | Facility: CLINIC | Age: 78
End: 2018-01-19
Payer: MEDICARE

## 2018-01-19 VITALS
HEIGHT: 69 IN | HEART RATE: 75 BPM | WEIGHT: 191 LBS | SYSTOLIC BLOOD PRESSURE: 110 MMHG | BODY MASS INDEX: 28.29 KG/M2 | DIASTOLIC BLOOD PRESSURE: 70 MMHG

## 2018-01-19 DIAGNOSIS — I10 ESSENTIAL HYPERTENSION: Primary | ICD-10-CM

## 2018-01-19 DIAGNOSIS — R60.0 LOCALIZED EDEMA: ICD-10-CM

## 2018-01-19 DIAGNOSIS — Z79.4 CONTROLLED TYPE 2 DIABETES MELLITUS WITH DIABETIC AUTONOMIC NEUROPATHY, WITH LONG-TERM CURRENT USE OF INSULIN: ICD-10-CM

## 2018-01-19 DIAGNOSIS — E78.5 HYPERLIPIDEMIA, UNSPECIFIED HYPERLIPIDEMIA TYPE: ICD-10-CM

## 2018-01-19 DIAGNOSIS — E11.43 CONTROLLED TYPE 2 DIABETES MELLITUS WITH DIABETIC AUTONOMIC NEUROPATHY, WITH LONG-TERM CURRENT USE OF INSULIN: ICD-10-CM

## 2018-01-19 PROCEDURE — 99213 OFFICE O/P EST LOW 20 MIN: CPT | Mod: S$GLB,,, | Performed by: INTERNAL MEDICINE

## 2018-01-19 PROCEDURE — 93000 ELECTROCARDIOGRAM COMPLETE: CPT | Mod: S$GLB,,, | Performed by: INTERNAL MEDICINE

## 2018-01-19 RX ORDER — INSULIN ASPART 100 [IU]/ML
10 INJECTION, SOLUTION INTRAVENOUS; SUBCUTANEOUS 3 TIMES DAILY
COMMUNITY
Start: 2018-01-05 | End: 2019-08-02

## 2018-01-19 RX ORDER — SUB-Q INSULIN DEVICE, 40 UNIT
EACH MISCELLANEOUS
COMMUNITY
Start: 2018-01-11 | End: 2018-04-23

## 2018-01-19 RX ORDER — PRAMIPEXOLE DIHYDROCHLORIDE 0.25 MG/1
TABLET ORAL NIGHTLY
COMMUNITY
Start: 2018-01-12

## 2018-01-19 NOTE — PROGRESS NOTES
Subjective:      Patient ID: Frandy Mccarthy Jr. is a 77 y.o. male.    Chief Complaint: Follow-up (HTN)    HPI:   Since May 2017 pt had 2 back surgeries at [facility] by Dr Forte complicated by MRSA.  Pt took vancomycin through a PICC and is still on doxycycline.   Pt also had parathyroid surgery.      Review of Systems   Cardiovascular: Positive for leg swelling (Dr Caicedo prescribed HCTZ yesterday). Negative for chest pain, claudication, dyspnea on exertion, irregular heartbeat, near-syncope, orthopnea, palpitations and syncope.      Sugars OK.  FBS 79 this AM  Past Medical History:   Diagnosis Date    Arthritis     Diabetes mellitus, type 2     Hyperlipidemia     Hypertension     Thyroid disease 06/2017    parathyroid removed        Past Surgical History:   Procedure Laterality Date    BACK SURGERY      x 4    FRACTURE SURGERY      right thigh    HERNIA REPAIR      SPINE SURGERY         Family History   Problem Relation Age of Onset    Cancer Mother     Diabetes Mother     Diabetes Father     Cancer Father        Social History     Social History    Marital status:      Spouse name: N/A    Number of children: N/A    Years of education: N/A     Social History Main Topics    Smoking status: Former Smoker     Quit date: 2/26/1973    Smokeless tobacco: Never Used    Alcohol use Yes      Comment: occasional    Drug use: No    Sexual activity: Yes     Partners: Female     Other Topics Concern    None     Social History Narrative    None       Current Outpatient Prescriptions on File Prior to Visit   Medication Sig Dispense Refill    amlodipine (NORVASC) 5 MG tablet Take 1 tablet (5 mg total) by mouth once daily. 90 tablet 3    aspirin (ECOTRIN) 81 MG EC tablet Take 81 mg by mouth once daily.      BD INSULIN SYRINGE ULTRA-FINE 1 mL 31 gauge x 5/16 Syrg USE AS DIRECTED  0    cyanocobalamin (VITAMIN B-12) 1000 MCG tablet Take 100 mcg by mouth once daily.      duloxetine (CYMBALTA) 60 MG  "capsule Take 30 mg by mouth 2 (two) times daily.   99    fenofibrate 160 MG Tab TAKE 1 TABLET (160 MG TOTAL) BY MOUTH ONCE DAILY. 90 tablet 1    folic acid (FOLVITE) 800 MCG Tab Take 800 mcg by mouth once daily.      gabapentin (NEURONTIN) 300 MG capsule TAKE 2 CAPSULES BY MOUTH 3 TIMES A  capsule 3    losartan (COZAAR) 50 MG tablet ONCE A DAY  3    metoprolol tartrate (LOPRESSOR) 50 MG tablet Take 50 mg by mouth once daily.  3    oxycodone-acetaminophen (PERCOCET)  mg per tablet Take 1 tablet by mouth 3 (three) times daily as needed.  0    simvastatin (ZOCOR) 5 MG tablet Take 5 mg by mouth every evening.      tramadol (ULTRAM) 50 mg tablet 50 mg every 4 (four) hours as needed.       [DISCONTINUED] LEVEMIR 100 unit/mL injection 58 Units every evening.       [DISCONTINUED] NOVOLOG FLEXPEN 100 unit/mL InPn pen 10 Units 3 (three) times daily with meals.       [DISCONTINUED] VITAMIN D2 50,000 unit capsule TAKE ON TAB BY MOUTH ONCE A WEEK FOR 8WKS THEN TAKE ONE TAB BY MOUTH ONCE A MONTH  3     No current facility-administered medications on file prior to visit.        Review of patient's allergies indicates:  No Known Allergies  Objective:     Vitals:    01/19/18 1020   BP: 110/70   BP Location: Right arm   Patient Position: Sitting   BP Method: Large (Manual)   Pulse: 75   Weight: 86.6 kg (191 lb)   Height: 5' 9" (1.753 m)        Physical Exam   Constitutional: He is oriented to person, place, and time. He appears well-developed and well-nourished. No distress.   Eyes: No scleral icterus.   Neck: No JVD present. Carotid bruit is not present.   Cardiovascular: Regular rhythm and normal heart sounds.  Exam reveals no gallop and no friction rub.    No murmur heard.  Pulmonary/Chest: Effort normal and breath sounds normal. No respiratory distress.   Musculoskeletal: He exhibits no edema.   Neurological: He is alert and oriented to person, place, and time.   Skin: Skin is warm and dry. He is not " diaphoretic.   Psychiatric: He has a normal mood and affect. His behavior is normal. Judgment and thought content normal.   Vitals reviewed.     Wt down 8 lbs.  ECG: NSR, WNL  Assessment:     1. Essential hypertension    2. Hyperlipidemia, unspecified hyperlipidemia type    3. Localized edema    4. Controlled type 2 diabetes mellitus with diabetic autonomic neuropathy, with long-term current use of insulin      Plan:   Frandy was seen today for follow-up.    Diagnoses and all orders for this visit:    Essential hypertension  -     EKG 12-lead    Hyperlipidemia, unspecified hyperlipidemia type    Localized edema    Controlled type 2 diabetes mellitus with diabetic autonomic neuropathy, with long-term current use of insulin     Continue same medical regimen.  Return to clinic in 6 months with ECG.  F/u with Dr Caicedo in a few weeks who does  Follow-up in about 6 months (around 7/19/2018).

## 2018-03-12 ENCOUNTER — OFFICE VISIT (OUTPATIENT)
Dept: PAIN MEDICINE | Facility: CLINIC | Age: 78
End: 2018-03-12
Attending: ANESTHESIOLOGY
Payer: MEDICARE

## 2018-03-12 VITALS
DIASTOLIC BLOOD PRESSURE: 52 MMHG | WEIGHT: 196.19 LBS | HEART RATE: 73 BPM | BODY MASS INDEX: 29.06 KG/M2 | HEIGHT: 69 IN | TEMPERATURE: 98 F | SYSTOLIC BLOOD PRESSURE: 122 MMHG | RESPIRATION RATE: 20 BRPM

## 2018-03-12 DIAGNOSIS — M96.1 POSTLAMINECTOMY SYNDROME OF LUMBAR REGION: ICD-10-CM

## 2018-03-12 DIAGNOSIS — M54.16 LUMBAR RADICULOPATHY, CHRONIC: ICD-10-CM

## 2018-03-12 DIAGNOSIS — M47.816 FACET ARTHRITIS OF LUMBAR REGION: ICD-10-CM

## 2018-03-12 DIAGNOSIS — Z98.1 S/P LUMBAR SPINAL FUSION: ICD-10-CM

## 2018-03-12 DIAGNOSIS — Z53.09: ICD-10-CM

## 2018-03-12 DIAGNOSIS — Z86.14 HISTORY OF MRSA INFECTION: ICD-10-CM

## 2018-03-12 DIAGNOSIS — M51.36 DDD (DEGENERATIVE DISC DISEASE), LUMBAR: Primary | ICD-10-CM

## 2018-03-12 PROCEDURE — 99214 OFFICE O/P EST MOD 30 MIN: CPT | Mod: S$GLB,,, | Performed by: ANESTHESIOLOGY

## 2018-03-12 PROCEDURE — 3074F SYST BP LT 130 MM HG: CPT | Mod: CPTII,S$GLB,, | Performed by: ANESTHESIOLOGY

## 2018-03-12 PROCEDURE — 3078F DIAST BP <80 MM HG: CPT | Mod: CPTII,S$GLB,, | Performed by: ANESTHESIOLOGY

## 2018-03-12 PROCEDURE — 99999 PR PBB SHADOW E&M-EST. PATIENT-LVL III: CPT | Mod: PBBFAC,,, | Performed by: ANESTHESIOLOGY

## 2018-03-12 RX ORDER — CEPHALEXIN 500 MG/1
500 CAPSULE ORAL 4 TIMES DAILY
Qty: 20 CAPSULE | Refills: 0 | Status: SHIPPED | OUTPATIENT
Start: 2018-03-12 | End: 2018-03-17

## 2018-03-12 RX ORDER — TRAMADOL HYDROCHLORIDE 50 MG/1
50 TABLET ORAL 3 TIMES DAILY PRN
Qty: 90 TABLET | Refills: 2 | Status: SHIPPED | OUTPATIENT
Start: 2018-03-12 | End: 2018-06-30 | Stop reason: SDUPTHER

## 2018-03-12 RX ORDER — GABAPENTIN 300 MG/1
600 CAPSULE ORAL 3 TIMES DAILY
Qty: 540 CAPSULE | Refills: 3 | Status: SHIPPED | OUTPATIENT
Start: 2018-03-12 | End: 2018-10-09 | Stop reason: SDUPTHER

## 2018-03-12 NOTE — PROGRESS NOTES
Subjective:       Patient ID: Frandy Mccarthy Jr. is a 78 y.o. male     Chief Complaint: Back Pain and Leg Pain    HPI:  Mr. Mccarthy is a 75 yo male who presents today with Low back pain, leg pain, and knee pain. He has a long-standing history of low back pain, s/p lumbar surgery x3. His current pain has been present since his surgery in April 2013 and is the worst in his bilateral knees. His pain is described in detail below.    Interval History (4/9/2014):  He returns to the office today with his wife. They are both upset because he was not able to start physical therapy. They report that they were told the visits were not authorized so he could not be scheduled. No change in the location or quality of his pain since his most recent visit.    Interval history (6/2/2014):  He returns to the office today again with his wife. He reports improved pain control with physical therapy and with the TENS unit. However, the TENS unit came from the physical therapy unit and was not an in network therefore they had to return at grossly would be charged several $100. They would like for us to order them a TENS unit to have in the house.    Interval history (9/18/14):  Patient returns to clinic for further treatment options of his LBP and bilateral lower extremity pain. Describes pain as LBP that radiates to bilateral lower extremities on the lateral thigh to the knee. Rarely the pain will extend down to the calf. Pain is worse with activity/walking. Reports 5/10 constant dull achy pain that is 10/10 at its worst. Patient reports that he has completed physical therapy and has been using the TENs unit. The TENs unit only provides minimal and temporary relief. Celebrex and anaprox were dc'd by the patients cardiologist.    Interval history (11/18/14):  Mr. Mccarthy presents to clinic s/p left and right L3-5 MB RFA. Reports 80% relief of previous sxs, 100% relief of leg pain. Continues to c/o pain b/l in low back, however this is  located inferior to his flank.  This pain is worse with prolonged standing.  He does not continue to do PT exercises at home. Continues to take Gabapentin.  Pt has been seen in the clinic before by Dr. Calrson, however pt is new to me.     Interval History (10/05/2016):  PmHx lumbar surgery x3, previous RFA of MB L3-L5 with good results presents today with recurrent back pain radiating to the mid anterior thigh.  Pain onset has been gradual over the last few months, aching, worse on right, relieved by rest.  Associated symptoms include complaints of weakness.  Pain is constant, 10/10 at worst severity and 3/10 at least.      No current PT or exercise regimen (limited by pain).  He is not currently taking any anticoagulation other than ASA 81 mg daily.  Current pain medications include tramadol and percocet managed by NSGY.  His gabapentin was recently decreased by endocrinology to 300 mg tid.      Interval History (12/8/2016):  He returns today for follow up.  He reports that the RFA was helpful for his back pain.  He is now having a different pain.  This pain is a shooting pain from his buttocks into his feet bilaterally.  This is associated with numbness and tingling.  No saddle anesthesia, bladder or bowel incontinence.    Interval History (1/13/2017):  He returns today for follow up.  He reports that the caudal MARCOS has been helpful for his back pain, today 3/10, but especially for the radicular symptoms. Now, he reports he has constant, BL knee pain that is worse with walking and stairs and decreases with sitting or lying down. He reports his legs occasionally feel week, but he denies falls. He reports that wearing a compression brace helps, but he feels that the ones he's tried are too tight. He would like to try one that properly fits him.     He does report after lying down for a bit a night, both of his calves have tingling.     Interval History (1/25/2017):  He returns today for follow up.  He reports that,  for the past wee, his knees have not been hurting.  He thinks he would like to hold off on the knee injections and see how the braces help.    Interval History (7/3/2017):  He returns today for follow up.  He reports that he is having bad back pain today. He report great relief from past Caudal MARCOS but only for 2-3 weeks. He states the pain is the same pain as previously, before the injection, but worse today. It is most when walking and standing. He describes the pain tingling and radiating from lower back when standing and down posterior thighs BL to to calf and moving to anterior leg and to both his feet in the bottoms and sides of feet. He states his back pain is worse than his BL leg pain and that he received most relief from RFA procedures at past visits. He states his pain medications where not working so he stopped using them consistently. He has noticed leg weakness, but no falls. He denies fevers, night sweats, B/B changes, or perineal anesthesia.     Interval History (3/12/2018):  He returns today for follow up.  He reports that he is now status post revision of lumbar fusion and extension to L1 with Dr. Forte.  His postoperative course was Located by an infection requiring revision and long-term IV antibiotics.  He is now been off of antibiotics since March 6, 2018.  He reports he continues to have low back pain that was initially better after the surgery, but has returned mildly.  His primary complaint is a shooting pain into his legs.  This is previously relieved with caudal epidurals.  Tramadol 3 times daily as needed has been helpful for the pain.  He continues to take gabapentin and Cymbalta    Physical Therapy: He completed a 12 week course since 2013; however, this made his pain worse.     Non-pharmacologic Treatment: Sitting makes his pain better.      · TENS? Not tried.     Pain Medications:       · Currently taking: Tramadol 50 mg 3 times daily as needed (helpful); gabapentin 600 mg 3 times  daily, Cymbalta 30 mg BID      · Has tried in the past: Percocet 5/325 mg, Celebrex 200 mg daily. Naproxen 550 mg twice daily.     · Has not tried: TCAs, SSRIs     Blood thinners: None     Interventional Therapies:   · Bilateral LMBB: Positive  · Right L3-5 RFA 10/2014: Excellent relief until summer 2016  · Left L3-5 RFA 10/2014: Excellent relief until summer 2016  · Right L3-5 Cooled RFA 10/2016:  Good relief of back pain  · Left L3-5 Cooled RFA 11/2016:  Good relief of back pain  · Caudal MARCOS 12/20/16; good relief of radicular symptoms with good relief of back pain     Relevant Surgeries:   · L5-S1 fusion in 2005 followed by a subsequent L3-5 fusion in 2012 revised in 2013 with a complication of a postoperative seroma in 2012.  · L1-L5 posterior instrumented spinal fusion revision/extension 8/2017     Affecting sleep? Yes     Affecting daily activities? Yes     Depressive symptoms? No      · SI/HI? No     Work status: Retired    Pain Scales  Best: 4/10  Worst: 10/10  Usually: 8/10  Today: 5/10    Low-back Pain   This is a recurrent problem. The current episode started more than 1 year ago. The problem occurs constantly. The problem has been rapidly improving since onset. The pain is present in the lumbar spine. The pain does not radiate. The quality of the pain is described as aching. The pain is at a severity of 2/10. The pain is the same all the time. Associated symptoms include leg pain. He has tried injection treatment and NSAIDs for the symptoms. The treatment provided significant relief. Physical therapy was ineffective.  Leg Pain    The pain is present in the right hip, right knee, right ankle, right foot, right toes, left knee, left ankle, left foot and left toes. This is a recurrent problem. The problem occurs constantly. The problem has been gradually worsening. The quality of the pain is described as aching. The pain is at a severity of 5/10. Associated symptoms include stiffness. The symptoms are  aggravated by walking and activity. He has tried oral narcotics and NSAIDs for the symptoms. The treatment provided no relief. Physical therapy was not tried.  Back Pain   Associated symptoms include leg pain.     Review of Systems   Cardiovascular:        Hypertension    Musculoskeletal: Positive for back pain and stiffness.   All other systems reviewed and are negative.      Past Medical History:   Diagnosis Date    Arthritis     Diabetes mellitus, type 2     Hyperlipidemia     Hypertension     Thyroid disease 06/2017    parathyroid removed       Past Surgical History:   Procedure Laterality Date    BACK SURGERY      x 4    FRACTURE SURGERY      right thigh    HERNIA REPAIR      SPINE SURGERY         Review of patient's allergies indicates:  No Known Allergies    Current Outpatient Prescriptions   Medication Sig Dispense Refill    amlodipine (NORVASC) 5 MG tablet Take 1 tablet (5 mg total) by mouth once daily. 90 tablet 3    aspirin (ECOTRIN) 81 MG EC tablet Take 81 mg by mouth once daily.      BD INSULIN SYRINGE ULTRA-FINE 1 mL 31 gauge x 5/16 Syrg USE AS DIRECTED  0    cephALEXin (KEFLEX) 500 MG capsule Take 1 capsule (500 mg total) by mouth 4 (four) times daily. 20 capsule 0    cyanocobalamin (VITAMIN B-12) 1000 MCG tablet Take 100 mcg by mouth once daily.      duloxetine (CYMBALTA) 60 MG capsule Take 30 mg by mouth 2 (two) times daily.   99    fenofibrate 160 MG Tab TAKE 1 TABLET (160 MG TOTAL) BY MOUTH ONCE DAILY. 90 tablet 1    folic acid (FOLVITE) 800 MCG Tab Take 800 mcg by mouth once daily.      gabapentin (NEURONTIN) 300 MG capsule Take 2 capsules (600 mg total) by mouth 3 (three) times daily. 540 capsule 3    losartan (COZAAR) 50 MG tablet ONCE A DAY  3    metoprolol tartrate (LOPRESSOR) 50 MG tablet Take 50 mg by mouth once daily.  3    NOVOLOG 100 unit/mL injection 10 Units 3 (three) times daily.       oxycodone-acetaminophen (PERCOCET)  mg per tablet Take 1 tablet by mouth  "3 (three) times daily as needed.  0    pramipexole (MIRAPEX) 0.25 MG tablet Take by mouth every evening.       simvastatin (ZOCOR) 5 MG tablet Take 5 mg by mouth every evening.      traMADol (ULTRAM) 50 mg tablet Take 1 tablet (50 mg total) by mouth 3 (three) times daily as needed. 90 tablet 2    VGO 20 Isabel        No current facility-administered medications for this visit.        Family History   Problem Relation Age of Onset    Cancer Mother     Diabetes Mother     Diabetes Father     Cancer Father        Social History     Social History    Marital status:      Spouse name: N/A    Number of children: N/A    Years of education: N/A     Occupational History    Not on file.     Social History Main Topics    Smoking status: Former Smoker     Quit date: 2/26/1973    Smokeless tobacco: Never Used    Alcohol use Yes      Comment: occasional    Drug use: No    Sexual activity: Yes     Partners: Female     Other Topics Concern    Not on file     Social History Narrative    No narrative on file         Objective:       Vitals:    03/12/18 1104   BP: (!) 122/52   Pulse: 73   Resp: 20   Temp: 97.6 °F (36.4 °C)   TempSrc: Oral   Weight: 89 kg (196 lb 3.4 oz)   Height: 5' 9" (1.753 m)   PainSc:   5   PainLoc: Back       Physical Exam  GEN:  Well developed, well nourished.  No acute distress. No pain behavior.  HEENT:  No trauma.  Mucous membranes moist.  Nares patent bilaterally.  PSYCH: Normal affect. Thought content appropriate.  CHEST:  Breathing symmetric.  No audible wheezing.  ABD: Soft, non-tender, non-distended.  SKIN:  Warm, pink, dry.  No rash on exposed areas.    EXT:  No cyanosis, clubbing, or edema.  No color change or changes in nail or hair growth.   NEURO/MUSCULOSKELETAL:  Fully alert, oriented, and appropriate. Speech normal saima. No cranial nerve deficits.   Gait: Antalgic.  L-Spine: Decreased ROM in lumbar spine extension.  Minimally positive facet loading on the right.  " Negative SLR bilaterally.    Motor Strength: 4/5 in BL HF, 4/5 in BL KE, 5/5 in rest of LE myotomes.    Sensory: No sensory deficit in the lower extremities to light touch.   No clonus or spasticity. Neg Babinski  SI Joint/Hip: Negative KRYSTIAN, negative Gaenslen's bilaterally.  Negative FADIR bilaterally.  TTP over lumbar paraspinals, with No TTP of bilateral SI joints, hips, piriformis muscles, or GTB.    2+ reflexes in BLEs      Imaging:      MRI lumbar spine  April 2013    Impression:  There is a posterior fusion of L3-L5, similar to the comparison exam in March 2012. There is a fluid collection posterior to the L3-L5 levels, also present on the March 15, 2012 exam. This is compatible with a postoperative seroma or pseudomeningocele.    At L1-L2, there is a moderate broad based disc bulge with mild to moderate narrowing of the central spinal canal, similar to March 15, 2012.    At L5-S1, there is a mild broad-based disc bulge/fibrosis with mild bilateral facet hypertrophy resulting in a moderate narrowing of the central spinal canal and narrowing of both lateral recesses, similar to March 15, 2012. There is moderate to severe bilateral neural foraminal narrowing, similar to March 15, 2012.    There are bilateral renal cortical cysts, also present in March 2012, incompletely visualized on this exam.    BL knee Xray 1/13/17:  Degenerative changes which are more pronounced on the right than on the left with moderate narrowing of the medial compartment of the right femorotibial joint resulting in mild genu varus deformity.  Prominent chondrocalcinosis noted bilaterally.  Small bilateral suprapatellar joint effusions are suspected.      Assessment:    Mr. Mccarthy is a 75 yo male who presents today with Low back pain and leg pain    1. DDD (degenerative disc disease), lumbar    2. Facet arthritis of lumbar region    3. S/P lumbar spinal fusion    4. Postlaminectomy syndrome of lumbar region    5. Lumbar radiculopathy,  chronic    6. Surgical contraindication to deep vein thrombosis (DVT) prophylaxis    7. History of MRSA infection            Plan:     Frandy was seen today for back pain and leg pain.    Diagnoses and all orders for this visit:    DDD (degenerative disc disease), lumbar  -     gabapentin (NEURONTIN) 300 MG capsule; Take 2 capsules (600 mg total) by mouth 3 (three) times daily.  -     traMADol (ULTRAM) 50 mg tablet; Take 1 tablet (50 mg total) by mouth 3 (three) times daily as needed.    Facet arthritis of lumbar region  -     gabapentin (NEURONTIN) 300 MG capsule; Take 2 capsules (600 mg total) by mouth 3 (three) times daily.  -     traMADol (ULTRAM) 50 mg tablet; Take 1 tablet (50 mg total) by mouth 3 (three) times daily as needed.    S/P lumbar spinal fusion  -     gabapentin (NEURONTIN) 300 MG capsule; Take 2 capsules (600 mg total) by mouth 3 (three) times daily.  -     traMADol (ULTRAM) 50 mg tablet; Take 1 tablet (50 mg total) by mouth 3 (three) times daily as needed.    Postlaminectomy syndrome of lumbar region  -     gabapentin (NEURONTIN) 300 MG capsule; Take 2 capsules (600 mg total) by mouth 3 (three) times daily.  -     traMADol (ULTRAM) 50 mg tablet; Take 1 tablet (50 mg total) by mouth 3 (three) times daily as needed.    Lumbar radiculopathy, chronic  -     gabapentin (NEURONTIN) 300 MG capsule; Take 2 capsules (600 mg total) by mouth 3 (three) times daily.  -     traMADol (ULTRAM) 50 mg tablet; Take 1 tablet (50 mg total) by mouth 3 (three) times daily as needed.    Surgical contraindication to deep vein thrombosis (DVT) prophylaxis    History of MRSA infection  -     cephALEXin (KEFLEX) 500 MG capsule; Take 1 capsule (500 mg total) by mouth 4 (four) times daily.      His pain is consistent with the above.    We discussed the assessment and recommendations.  All available images were reviewed. We discussed the disease process, prognosis, treatment plan, and risks and benefits. The patient is aware  of the risks and benefits of the medications being prescribed, common side effects, and proper usage. The following is the plan we agreed on:     1. Schedule for repeat caudal epidural.  I will give him a short course of Keflex following this given his recent MRSA infection  2. Continue tramadol 50 mg 3 times daily as needed.  I will refill this as he is not likely to be continuing to see Dr. Forte at this point  3. Refill on gabapentin 600 mg 3 times daily  4. Consider SCS in the future.  5. RTC for above    Greater than 25 minutes spent in total in todays visit with the patient, with more than half that time direct face to face counseling and education with the patient today. We discussed the disease process, prognosis, treatment plan, and risks and benefits.    The above plan and management options were discussed at length with patient. Patient is in agreement with the above and verbalized understanding. It will be communicated with the consulting physician via electronic record, fax, or mail      Review of Systems   Cardiovascular:        Hypertension    Musculoskeletal: Positive for back pain and stiffness.   All other systems reviewed and are negative.    Ortho/SPM Exam

## 2018-03-23 ENCOUNTER — SURGERY (OUTPATIENT)
Age: 78
End: 2018-03-23

## 2018-03-23 ENCOUNTER — HOSPITAL ENCOUNTER (OUTPATIENT)
Facility: OTHER | Age: 78
Discharge: HOME | End: 2018-03-23
Attending: ANESTHESIOLOGY | Admitting: ANESTHESIOLOGY
Payer: MEDICARE

## 2018-03-23 VITALS
HEIGHT: 69 IN | BODY MASS INDEX: 28.44 KG/M2 | RESPIRATION RATE: 18 BRPM | WEIGHT: 192 LBS | HEART RATE: 67 BPM | SYSTOLIC BLOOD PRESSURE: 141 MMHG | DIASTOLIC BLOOD PRESSURE: 65 MMHG | TEMPERATURE: 99 F | OXYGEN SATURATION: 93 %

## 2018-03-23 DIAGNOSIS — G89.29 CHRONIC PAIN: ICD-10-CM

## 2018-03-23 DIAGNOSIS — M47.817 DJD (DEGENERATIVE JOINT DISEASE), LUMBOSACRAL: Primary | ICD-10-CM

## 2018-03-23 LAB — POCT GLUCOSE: 291 MG/DL (ref 70–110)

## 2018-03-23 PROCEDURE — 62323 NJX INTERLAMINAR LMBR/SAC: CPT | Performed by: ANESTHESIOLOGY

## 2018-03-23 PROCEDURE — 25500020 PHARM REV CODE 255: Performed by: ANESTHESIOLOGY

## 2018-03-23 PROCEDURE — 63600175 PHARM REV CODE 636 W HCPCS: Performed by: ANESTHESIOLOGY

## 2018-03-23 PROCEDURE — 25000003 PHARM REV CODE 250: Performed by: ANESTHESIOLOGY

## 2018-03-23 PROCEDURE — 82947 ASSAY GLUCOSE BLOOD QUANT: CPT | Performed by: ANESTHESIOLOGY

## 2018-03-23 PROCEDURE — 62323 NJX INTERLAMINAR LMBR/SAC: CPT | Mod: ,,, | Performed by: ANESTHESIOLOGY

## 2018-03-23 RX ORDER — BUPIVACAINE HYDROCHLORIDE 2.5 MG/ML
INJECTION, SOLUTION EPIDURAL; INFILTRATION; INTRACAUDAL
Status: DISCONTINUED | OUTPATIENT
Start: 2018-03-23 | End: 2018-03-23 | Stop reason: HOSPADM

## 2018-03-23 RX ORDER — SODIUM CHLORIDE 9 MG/ML
500 INJECTION, SOLUTION INTRAVENOUS CONTINUOUS
Status: DISCONTINUED | OUTPATIENT
Start: 2018-03-23 | End: 2018-03-23 | Stop reason: HOSPADM

## 2018-03-23 RX ORDER — METHYLPREDNISOLONE ACETATE 80 MG/ML
INJECTION, SUSPENSION INTRA-ARTICULAR; INTRALESIONAL; INTRAMUSCULAR; SOFT TISSUE
Status: DISCONTINUED | OUTPATIENT
Start: 2018-03-23 | End: 2018-03-23 | Stop reason: HOSPADM

## 2018-03-23 RX ORDER — ALPRAZOLAM 0.5 MG/1
0.5 TABLET, ORALLY DISINTEGRATING ORAL
Status: DISCONTINUED | OUTPATIENT
Start: 2018-03-23 | End: 2018-03-23 | Stop reason: HOSPADM

## 2018-03-23 RX ORDER — LIDOCAINE HYDROCHLORIDE 10 MG/ML
INJECTION INFILTRATION; PERINEURAL
Status: DISCONTINUED | OUTPATIENT
Start: 2018-03-23 | End: 2018-03-23 | Stop reason: HOSPADM

## 2018-03-23 RX ADMIN — METHYLPREDNISOLONE ACETATE 80 MG: 80 INJECTION, SUSPENSION INTRA-ARTICULAR; INTRALESIONAL; INTRAMUSCULAR; SOFT TISSUE at 09:03

## 2018-03-23 RX ADMIN — BUPIVACAINE HYDROCHLORIDE 10 ML: 2.5 INJECTION, SOLUTION EPIDURAL; INFILTRATION; INTRACAUDAL; PERINEURAL at 09:03

## 2018-03-23 RX ADMIN — IOHEXOL 5 ML: 300 INJECTION, SOLUTION INTRAVENOUS at 09:03

## 2018-03-23 RX ADMIN — SODIUM BICARBONATE 5 ML: 0.2 INJECTION, SOLUTION INTRAVENOUS at 09:03

## 2018-03-23 RX ADMIN — ALPRAZOLAM 0.5 MG: 0.5 TABLET, ORALLY DISINTEGRATING ORAL at 08:03

## 2018-03-23 RX ADMIN — LIDOCAINE HYDROCHLORIDE 10 ML: 10 INJECTION, SOLUTION INFILTRATION; PERINEURAL at 09:03

## 2018-03-23 NOTE — OP NOTE
Date of procedure: 03/23/2018    Procedure: Caudal Epidural Steroid Injection/ volumetric lysis of adhesions    Referring Provider: None    Pre-op diagnosis: Lumbar radiculopathy [M54.16]    Post-op diagnosis: Lumbar radiculopathy [M54.16]     Physician: Dr. Makeda Herring     Assistant:  Dr. Dick    Anesthestia: local    EBL: None    Specimens: None    All medications, allergies, and relevant histories were reviewed. No recent antibiotics or infections.  A time-out was taken to verify the correct patient, procedure, laterality, and appropriate medications/allergies.    Fluoroscopically Guided Caudal Epidural Steroid Injection:   The procedure risks, benefits, and possible complications were discussed including infection, nerve injury, paresis, bleeding, tissue injury, and they agreed to proceed. NIBP, pulse rate, and O2 saturation monitored throughout the procedure.     Patient was placed in the prone position with pelvis elevated. Caudal area was prepped with Chlorhexidine x 3. Sterile drape was placed over the site and sterile precautions observed throughout the procedure. The sacral hiatus was identified using fluoroscopy in the AP position.   Lidocaine 1% was injected at the entry site.  An 20-gauge Tuohy needle was introduced through the sacral hiatus to the caudal epidural space using fluoroscopic guidance in the lateral view.  An Epi-Med catheter was then threaded up to the L4/5 interspace. Aspiration was negative of CSF and blood. 2 cc of Omnipaque 300 demonstrated epidural spread. 2 cc of Omnipaque 300 demonstrated epidural spread. 4cc of 0.125% bupivacaine and 1cc of 80 mg in Depo-Medrol was injected at a intermittent high rate in 1 cc aliqiots rate after negative aspiration. The needle and catheter were then removed. Band-Aid dressing was applied over the site.     Patient tolerated the procedure well without any complications.     The patient was transferred back to the recovery area and then  discharged home with a responsible adult.    Future Management:   If helpful, can repeat as needed.    Follow up with my clinic in 3 weeks or sooner if needed    I certify that I provided the above services.  I was present for the entire procedure, which was performed by myself with the assistance of the resident physician.  There were no parts of the procedure that were performed not by myself or without my direct supervision.  .

## 2018-03-23 NOTE — PLAN OF CARE
PATIENT TOLERATED PROCEDURE WELL. PT COMPLAINS OF 2 /10 PAIN. ASSISTED PATIENT UP FOR FIRST TIME. STEADY ON FEET AND DISCHARGE INSTRUCTIONS GIVEN.

## 2018-03-23 NOTE — DISCHARGE INSTRUCTIONS
Home Care Instructions Pain Management:    1. DIET:   You may resume your normal diet today.   2. BATHING:   You may shower with luke warm water.  3. DRESSING:   You may remove your bandage today.   4. ACTIVITY LEVEL:   You may resume your normal activities 24 hrs after your procedure.  5. MEDICATIONS:   You may resume your normal medications today.   6. SPECIAL INSTRUCTIONS:   No heat to the injection site for 24 hrs including, bath or shower, heating pad, moist heat, or hot tubs.    Use ice pack to injection site for any pain or discomfort.  Apply ice packs for 20 minute intervals as needed.   If you have received any sedatives by mouth today you may not drive for 12 hours.    If you have received any sedation through your IV, you may not drive for 24 hrs.     PLEASE CALL YOUR DOCTOR IF:  1. Redness or swelling around the injection site.  2. Fever of 101 degrees  3. Drainage (pus) from the injection site.  4. For any continuous bleeding (some dried blood over the incision is normal.)    FOR EMERGENCIES:   If any unusual problems or difficulties occur during clinic hours, call [phone] or [phone].     Thank you for allowing us to care for you today. You may receive a survey about the care we provided. Your feedback is valuable and helps us provide excellent care throughout the community.

## 2018-04-23 ENCOUNTER — OFFICE VISIT (OUTPATIENT)
Dept: PAIN MEDICINE | Facility: CLINIC | Age: 78
End: 2018-04-23
Attending: ANESTHESIOLOGY
Payer: MEDICARE

## 2018-04-23 VITALS
DIASTOLIC BLOOD PRESSURE: 62 MMHG | SYSTOLIC BLOOD PRESSURE: 142 MMHG | BODY MASS INDEX: 29.39 KG/M2 | TEMPERATURE: 98 F | HEIGHT: 69 IN | RESPIRATION RATE: 18 BRPM | WEIGHT: 198.44 LBS | HEART RATE: 73 BPM

## 2018-04-23 DIAGNOSIS — M51.36 DDD (DEGENERATIVE DISC DISEASE), LUMBAR: ICD-10-CM

## 2018-04-23 DIAGNOSIS — M47.816 LUMBAR FACET ARTHROPATHY: ICD-10-CM

## 2018-04-23 DIAGNOSIS — M17.0 PRIMARY OSTEOARTHRITIS OF BOTH KNEES: ICD-10-CM

## 2018-04-23 DIAGNOSIS — M47.817 DJD (DEGENERATIVE JOINT DISEASE), LUMBOSACRAL: ICD-10-CM

## 2018-04-23 DIAGNOSIS — G89.4 CHRONIC PAIN DISORDER: Primary | ICD-10-CM

## 2018-04-23 DIAGNOSIS — M96.1 POSTLAMINECTOMY SYNDROME OF LUMBAR REGION: ICD-10-CM

## 2018-04-23 DIAGNOSIS — Z98.1 S/P LUMBAR SPINAL FUSION: ICD-10-CM

## 2018-04-23 PROCEDURE — 3077F SYST BP >= 140 MM HG: CPT | Mod: CPTII,S$GLB,, | Performed by: ANESTHESIOLOGY

## 2018-04-23 PROCEDURE — 99214 OFFICE O/P EST MOD 30 MIN: CPT | Mod: GC,S$GLB,, | Performed by: ANESTHESIOLOGY

## 2018-04-23 PROCEDURE — 99999 PR PBB SHADOW E&M-EST. PATIENT-LVL V: CPT | Mod: PBBFAC,,, | Performed by: ANESTHESIOLOGY

## 2018-04-23 PROCEDURE — 3078F DIAST BP <80 MM HG: CPT | Mod: CPTII,S$GLB,, | Performed by: ANESTHESIOLOGY

## 2018-04-23 NOTE — PROGRESS NOTES
Subjective:       Patient ID: Frandy Mccarthy Jr. is a 78 y.o. male     Chief Complaint: Back Pain    HPI:  Mr. Mccarthy is a 73 yo male who presents today with Low back pain, leg pain, and knee pain. He has a long-standing history of low back pain, s/p lumbar surgery x3. His current pain has been present since his surgery in April 2013 and is the worst in his bilateral knees. His pain is described in detail below.    Interval History (4/9/2014):  He returns to the office today with his wife. They are both upset because he was not able to start physical therapy. They report that they were told the visits were not authorized so he could not be scheduled. No change in the location or quality of his pain since his most recent visit.    Interval history (6/2/2014):  He returns to the office today again with his wife. He reports improved pain control with physical therapy and with the TENS unit. However, the TENS unit came from the physical therapy unit and was not an in network therefore they had to return at grossly would be charged several $100. They would like for us to order them a TENS unit to have in the house.    Interval history (9/18/14):  Patient returns to clinic for further treatment options of his LBP and bilateral lower extremity pain. Describes pain as LBP that radiates to bilateral lower extremities on the lateral thigh to the knee. Rarely the pain will extend down to the calf. Pain is worse with activity/walking. Reports 5/10 constant dull achy pain that is 10/10 at its worst. Patient reports that he has completed physical therapy and has been using the TENs unit. The TENs unit only provides minimal and temporary relief. Celebrex and anaprox were dc'd by the patients cardiologist.    Interval history (11/18/14):  Mr. Mccarthy presents to clinic s/p left and right L3-5 MB RFA. Reports 80% relief of previous sxs, 100% relief of leg pain. Continues to c/o pain b/l in low back, however this is located inferior to  his flank.  This pain is worse with prolonged standing.  He does not continue to do PT exercises at home. Continues to take Gabapentin.  Pt has been seen in the clinic before by Dr. Carlson, however pt is new to me.     Interval History (10/05/2016):  PmHx lumbar surgery x3, previous RFA of MB L3-L5 with good results presents today with recurrent back pain radiating to the mid anterior thigh.  Pain onset has been gradual over the last few months, aching, worse on right, relieved by rest.  Associated symptoms include complaints of weakness.  Pain is constant, 10/10 at worst severity and 3/10 at least.      No current PT or exercise regimen (limited by pain).  He is not currently taking any anticoagulation other than ASA 81 mg daily.  Current pain medications include tramadol and percocet managed by NSGY.  His gabapentin was recently decreased by endocrinology to 300 mg tid.      Interval History (12/8/2016):  He returns today for follow up.  He reports that the RFA was helpful for his back pain.  He is now having a different pain.  This pain is a shooting pain from his buttocks into his feet bilaterally.  This is associated with numbness and tingling.  No saddle anesthesia, bladder or bowel incontinence.    Interval History (1/13/2017):  He returns today for follow up.  He reports that the caudal MARCOS has been helpful for his back pain, today 3/10, but especially for the radicular symptoms. Now, he reports he has constant, BL knee pain that is worse with walking and stairs and decreases with sitting or lying down. He reports his legs occasionally feel week, but he denies falls. He reports that wearing a compression brace helps, but he feels that the ones he's tried are too tight. He would like to try one that properly fits him.     He does report after lying down for a bit a night, both of his calves have tingling.     Interval History (1/25/2017):  He returns today for follow up.  He reports that, for the past wee,  his knees have not been hurting.  He thinks he would like to hold off on the knee injections and see how the braces help.    Interval History (7/3/2017):  He returns today for follow up.  He reports that he is having bad back pain today. He report great relief from past Caudal MARCOS but only for 2-3 weeks. He states the pain is the same pain as previously, before the injection, but worse today. It is most when walking and standing. He describes the pain tingling and radiating from lower back when standing and down posterior thighs BL to to calf and moving to anterior leg and to both his feet in the bottoms and sides of feet. He states his back pain is worse than his BL leg pain and that he received most relief from RFA procedures at past visits. He states his pain medications where not working so he stopped using them consistently. He has noticed leg weakness, but no falls. He denies fevers, night sweats, B/B changes, or perineal anesthesia.     Interval History (3/12/2018):  He returns today for follow up.  He reports that he is now status post revision of lumbar fusion and extension to L1 with Dr. Forte.  His postoperative course was Located by an infection requiring revision and long-term IV antibiotics.  He is now been off of antibiotics since March 6, 2018.  He reports he continues to have low back pain that was initially better after the surgery, but has returned mildly.  His primary complaint is a shooting pain into his legs.  This is previously relieved with caudal epidurals.  Tramadol 3 times daily as needed has been helpful for the pain.  He continues to take gabapentin and Cymbalta    Interval History (4/23/2018):  Mr Mccarthy returns to clinic for follow up.  He is s/p caudal MARCOS 03.23.18 that he reports only transient relief (1-2 days) from.  Currently he states his pain is in his lower back, with the worst component radiating down in the legs.  He is currently taking tramadol tid, oxycodone approximately  bid, cymbalta, and gabapentin 600 mg BID.  He was told to stop the evening dose while he started Mirapex.        Physical Therapy: He completed a 12 week course since 2013; however, this made his pain worse.     Non-pharmacologic Treatment: Sitting makes his pain better.      · TENS? Not tried.     Pain Medications:       · Currently taking: Tramadol 50 mg 3 times daily as needed (helpful); gabapentin 600 mg 2 times daily, Cymbalta 30 mg BID      · Has tried in the past: Percocet 5/325 mg, Celebrex 200 mg daily. Naproxen 550 mg twice daily.     · Has not tried: TCAs, SSRIs     Blood thinners: None     Interventional Therapies:   · Caudal MARCOS 03.23.18; transient relief, 1-2 days  · Bilateral LMBB: Positive  · Right L3-5 RFA 10/2014: Excellent relief until summer 2016  · Left L3-5 RFA 10/2014: Excellent relief until summer 2016  · Right L3-5 Cooled RFA 10/2016:  Good relief of back pain  · Left L3-5 Cooled RFA 11/2016:  Good relief of back pain  · Caudal MARCOS 12/20/16; good relief of radicular symptoms with good relief of back pain  · 3/2018: Caudal MARCOS: limited benefit     Relevant Surgeries:   · L5-S1 fusion in 2005 followed by a subsequent L3-5 fusion in 2012 revised in 2013 with a complication of a postoperative seroma in 2012.  · L1-L5 posterior instrumented spinal fusion revision/extension 8/2017     Affecting sleep? Yes     Affecting daily activities? Yes     Depressive symptoms? No      · SI/HI? No     Work status: Retired    Pain Scales  Best: 3/10  Worst: 10/10  Usually: 7/10  Today: 5/10    Back Pain   Associated symptoms include leg pain.   Leg Pain    The pain is present in the right hip, right knee, right ankle, right foot, right toes, left knee, left ankle, left foot and left toes. This is a recurrent problem. The problem occurs constantly. The problem has been gradually worsening. The quality of the pain is described as aching. The pain is at a severity of 5/10. Associated symptoms include stiffness.  The symptoms are aggravated by walking and activity. He has tried oral narcotics and NSAIDs for the symptoms. The treatment provided no relief. Physical therapy was not tried.  Low-back Pain   This is a recurrent problem. The current episode started more than 1 year ago. The problem occurs constantly. The problem has been rapidly improving since onset. The pain is present in the lumbar spine. The pain does not radiate. The quality of the pain is described as aching. The pain is at a severity of 2/10. The pain is the same all the time. Associated symptoms include leg pain. He has tried injection treatment and NSAIDs for the symptoms. The treatment provided significant relief. Physical therapy was ineffective.    Review of Systems   Cardiovascular:        Hypertension    Musculoskeletal: Positive for back pain and stiffness.   All other systems reviewed and are negative.      Past Medical History:   Diagnosis Date    Arthritis     Diabetes mellitus, type 2     Hyperlipidemia     Hypertension     Thyroid disease 06/2017    parathyroid removed       Past Surgical History:   Procedure Laterality Date    BACK SURGERY      x 4    FRACTURE SURGERY      right thigh    HERNIA REPAIR      SPINE SURGERY         Review of patient's allergies indicates:  No Known Allergies    Current Outpatient Prescriptions   Medication Sig Dispense Refill    amlodipine (NORVASC) 5 MG tablet Take 1 tablet (5 mg total) by mouth once daily. 90 tablet 3    aspirin (ECOTRIN) 81 MG EC tablet Take 81 mg by mouth once daily.      BD INSULIN SYRINGE ULTRA-FINE 1 mL 31 gauge x 5/16 Syrg USE AS DIRECTED  0    cyanocobalamin (VITAMIN B-12) 1000 MCG tablet Take 100 mcg by mouth once daily.      duloxetine (CYMBALTA) 60 MG capsule Take 30 mg by mouth 2 (two) times daily.   99    fenofibrate 160 MG Tab TAKE 1 TABLET (160 MG TOTAL) BY MOUTH ONCE DAILY. 90 tablet 1    folic acid (FOLVITE) 800 MCG Tab Take 800 mcg by mouth once daily.       "gabapentin (NEURONTIN) 300 MG capsule Take 2 capsules (600 mg total) by mouth 3 (three) times daily. 540 capsule 3    losartan (COZAAR) 50 MG tablet ONCE A DAY  3    metoprolol tartrate (LOPRESSOR) 50 MG tablet Take 50 mg by mouth once daily.  3    NOVOLOG 100 unit/mL injection 10 Units 3 (three) times daily.       pramipexole (MIRAPEX) 0.25 MG tablet Take by mouth every evening.       simvastatin (ZOCOR) 5 MG tablet Take 5 mg by mouth every evening.      traMADol (ULTRAM) 50 mg tablet Take 1 tablet (50 mg total) by mouth 3 (three) times daily as needed. 90 tablet 2     No current facility-administered medications for this visit.        Family History   Problem Relation Age of Onset    Cancer Mother     Diabetes Mother     Diabetes Father     Cancer Father        Social History     Social History    Marital status:      Spouse name: N/A    Number of children: N/A    Years of education: N/A     Occupational History    Not on file.     Social History Main Topics    Smoking status: Former Smoker     Quit date: 2/26/1973    Smokeless tobacco: Never Used    Alcohol use Yes      Comment: occasional    Drug use: No    Sexual activity: Yes     Partners: Female     Other Topics Concern    Not on file     Social History Narrative    No narrative on file         Objective:       Vitals:    04/23/18 1531   BP: (!) 142/62   Pulse: 73   Resp: 18   Temp: 97.8 °F (36.6 °C)   TempSrc: Oral   Weight: 90 kg (198 lb 6.6 oz)   Height: 5' 9" (1.753 m)   PainSc: 10-Worst pain ever   PainLoc: Back       Physical Exam  GEN:  Well developed, well nourished.  No acute distress. No pain behavior.  HEENT:  No trauma.  Mucous membranes moist.  Nares patent bilaterally.  PSYCH: Normal affect. Thought content appropriate.  CHEST:  Breathing symmetric.  No audible wheezing.  ABD: Soft, non-tender, non-distended.  SKIN:  Warm, pink, dry.  No rash on exposed areas.    EXT:  No cyanosis, clubbing, or edema.  No color change " or changes in nail or hair growth.   NEURO/MUSCULOSKELETAL:  Fully alert, oriented, and appropriate. Speech normal saima. No cranial nerve deficits.   Gait: Antalgic.  L-Spine: Decreased ROM in lumbar spine extension.  Minimally positive facet loading on the right.  Negative SLR bilaterally.    Motor Strength: 4/5 in BL HF, 4/5 in BL KE, 5/5 in rest of LE myotomes.    Sensory: No sensory deficit in the lower extremities to light touch.   No clonus or spasticity. Neg Babinski  SI Joint/Hip: Negative KRYSTIAN, negative Gaenslen's bilaterally.  Negative FADIR bilaterally.  TTP over lumbar paraspinals, with No TTP of bilateral SI joints, hips, piriformis muscles.  GTB on right mildly TTP.    2+ reflexes in BLEs      Imaging:      MRI lumbar spine  April 2013    Impression:  There is a posterior fusion of L3-L5, similar to the comparison exam in March 2012. There is a fluid collection posterior to the L3-L5 levels, also present on the March 15, 2012 exam. This is compatible with a postoperative seroma or pseudomeningocele.    At L1-L2, there is a moderate broad based disc bulge with mild to moderate narrowing of the central spinal canal, similar to March 15, 2012.    At L5-S1, there is a mild broad-based disc bulge/fibrosis with mild bilateral facet hypertrophy resulting in a moderate narrowing of the central spinal canal and narrowing of both lateral recesses, similar to March 15, 2012. There is moderate to severe bilateral neural foraminal narrowing, similar to March 15, 2012.    There are bilateral renal cortical cysts, also present in March 2012, incompletely visualized on this exam.     knee Xray 1/13/17:  Degenerative changes which are more pronounced on the right than on the left with moderate narrowing of the medial compartment of the right femorotibial joint resulting in mild genu varus deformity.  Prominent chondrocalcinosis noted bilaterally.  Small bilateral suprapatellar joint effusions are  suspected.      Assessment:    Mr. Mccarthy is a 73 yo male who presents today with Low back pain and leg pain    1. Chronic pain disorder    2. DDD (degenerative disc disease), lumbar    3. S/P lumbar spinal fusion    4. Postlaminectomy syndrome of lumbar region    5. Lumbar facet arthropathy    6. Primary osteoarthritis of both knees    7. DJD (degenerative joint disease), lumbosacral            Plan:     Frandy was seen today for back pain.    Diagnoses and all orders for this visit:    Chronic pain disorder  -     [facility]  -     Ambulatory Referral to Physical Therapy  -     Ambulatory Referral to Occupational Therapy  -     Ambulatory referral to Psychiatry    DDD (degenerative disc disease), lumbar  -     Ambulatory referral to [facility]  -     Ambulatory Referral to Physical Therapy  -     Ambulatory Referral to Occupational Therapy  -     Ambulatory referral to Psychiatry    S/P lumbar spinal fusion  -     Ambulatory referral to [facility]  -     Ambulatory Referral to Physical Therapy  -     Ambulatory Referral to Occupational Therapy  -     Ambulatory referral to Psychiatry    Postlaminectomy syndrome of lumbar region  -     Ambulatory referral to [facility]  -     Ambulatory Referral to Physical Therapy  -     Ambulatory Referral to Occupational Therapy  -     Ambulatory referral to Psychiatry    Lumbar facet arthropathy  -     Ambulatory referral to [facility]  -     Ambulatory Referral to Physical Therapy  -     Ambulatory Referral to Occupational Therapy  -     Ambulatory referral to Psychiatry    Primary osteoarthritis of both knees  -     [facility] referral to [facility]  -     Ambulatory Referral to Physical Therapy  -     Ambulatory Referral to Occupational Therapy  -     Ambulatory referral to Psychiatry    DJD (degenerative joint disease), lumbosacral  -      [facility] referral to [facility]  -     Ambulatory Referral to Physical Therapy  -     Ambulatory Referral to Occupational Therapy  -     Ambulatory referral to Psychiatry      His pain is consistent with the above.    We discussed the assessment and recommendations.  All available images were reviewed. We discussed the disease process, prognosis, treatment plan, and risks and benefits. The patient is aware of the risks and benefits of the medications being prescribed, common side effects, and proper usage. The following is the plan we agreed on:     1. We discussed our options.  Given his infection, I do not feel comfortable with an implantable device at this time.  2. I do not see the benefit in repeating an injection at this point.  3. Continue tramadol 50 mg 3 times daily as needed.  I will refill this as he is not likely to be continuing to see Dr. Forte at this point  4. Increase gabapentin 600 mg back to 3 times daily  5. We discussed FRP.  He would like to be screened for the GAP.  6. RTC for above    Gagan Almanzar MD  Anesthesiology  [phone]   PGY-IV    Greater than 25 minutes spent in total in todays visit with the patient, with more than half that time direct face to face counseling and education with the patient today. We discussed the disease process, prognosis, treatment plan, and risks and benefits.    I have seen the patient with the resident physician.  I have performed my own history and physical exam and we have come up with the above plan.  The patient is in agreement with our plan.      Review of Systems   Cardiovascular:        Hypertension    Musculoskeletal: Positive for back pain and stiffness.   All other systems reviewed and are negative.    Ortho/SPM Exam

## 2018-04-25 ENCOUNTER — TELEPHONE (OUTPATIENT)
Dept: PAIN MEDICINE | Facility: OTHER | Age: 78
End: 2018-04-25

## 2018-04-25 NOTE — TELEPHONE ENCOUNTER
skp with pt he said he is not interested in this 8 hr program. I ask if we can get him to come in for the Gap analysis. 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 Pt declined the FRP program.

## 2018-05-08 ENCOUNTER — TELEPHONE (OUTPATIENT)
Dept: PAIN MEDICINE | Facility: CLINIC | Age: 78
End: 2018-05-08

## 2018-05-08 NOTE — TELEPHONE ENCOUNTER
Staff contacted daquan at [facility], no answer, left voicemail with staff contact information to discuss her message further..

## 2018-05-08 NOTE — TELEPHONE ENCOUNTER
----- Message from Tamera Dominguez MA sent at 5/1/2018  4:01 PM CDT -----  Message   Received: Today   Message Contents   Patricia STRONG Staff  Caller: Unspecified (Today, 11:14 AM)         Name of Who is Calling: [place] [id number]       What is the request in detail: Needs to clarify and order for PT/OT.       Can the clinic reply by [facility]:   No       What Number to Call Back if not in [facility]: [phone]

## 2018-06-30 DIAGNOSIS — M54.16 LUMBAR RADICULOPATHY, CHRONIC: ICD-10-CM

## 2018-06-30 DIAGNOSIS — Z98.1 S/P LUMBAR SPINAL FUSION: ICD-10-CM

## 2018-06-30 DIAGNOSIS — M51.36 DDD (DEGENERATIVE DISC DISEASE), LUMBAR: ICD-10-CM

## 2018-06-30 DIAGNOSIS — M96.1 POSTLAMINECTOMY SYNDROME OF LUMBAR REGION: ICD-10-CM

## 2018-06-30 DIAGNOSIS — M47.816 FACET ARTHRITIS OF LUMBAR REGION: ICD-10-CM

## 2018-07-05 RX ORDER — TRAMADOL HYDROCHLORIDE 50 MG/1
50 TABLET ORAL 3 TIMES DAILY PRN
Qty: 90 TABLET | Refills: 2 | Status: SHIPPED | OUTPATIENT
Start: 2018-07-05 | End: 2018-10-03

## 2018-07-07 DIAGNOSIS — M51.36 DDD (DEGENERATIVE DISC DISEASE), LUMBAR: ICD-10-CM

## 2018-07-07 DIAGNOSIS — M54.16 LUMBAR RADICULOPATHY, CHRONIC: ICD-10-CM

## 2018-07-07 DIAGNOSIS — Z98.1 S/P LUMBAR SPINAL FUSION: ICD-10-CM

## 2018-07-07 DIAGNOSIS — M47.816 FACET ARTHRITIS OF LUMBAR REGION: ICD-10-CM

## 2018-07-07 DIAGNOSIS — M96.1 POSTLAMINECTOMY SYNDROME OF LUMBAR REGION: ICD-10-CM

## 2018-07-09 RX ORDER — TRAMADOL HYDROCHLORIDE 50 MG/1
50 TABLET ORAL 3 TIMES DAILY PRN
Qty: 90 TABLET | Refills: 2 | OUTPATIENT
Start: 2018-07-09 | End: 2018-10-07

## 2018-07-20 ENCOUNTER — OFFICE VISIT (OUTPATIENT)
Dept: CARDIOLOGY | Facility: CLINIC | Age: 78
End: 2018-07-20
Payer: MEDICARE

## 2018-07-20 VITALS
WEIGHT: 203.5 LBS | HEIGHT: 69 IN | SYSTOLIC BLOOD PRESSURE: 134 MMHG | HEART RATE: 68 BPM | BODY MASS INDEX: 30.14 KG/M2 | DIASTOLIC BLOOD PRESSURE: 69 MMHG

## 2018-07-20 DIAGNOSIS — R60.0 LOCALIZED EDEMA: ICD-10-CM

## 2018-07-20 DIAGNOSIS — E11.43 CONTROLLED TYPE 2 DIABETES MELLITUS WITH DIABETIC AUTONOMIC NEUROPATHY, WITH LONG-TERM CURRENT USE OF INSULIN: ICD-10-CM

## 2018-07-20 DIAGNOSIS — Z79.4 CONTROLLED TYPE 2 DIABETES MELLITUS WITH DIABETIC AUTONOMIC NEUROPATHY, WITH LONG-TERM CURRENT USE OF INSULIN: ICD-10-CM

## 2018-07-20 DIAGNOSIS — I10 ESSENTIAL HYPERTENSION: Primary | ICD-10-CM

## 2018-07-20 DIAGNOSIS — E78.5 HYPERLIPIDEMIA, UNSPECIFIED HYPERLIPIDEMIA TYPE: ICD-10-CM

## 2018-07-20 PROCEDURE — 3078F DIAST BP <80 MM HG: CPT | Mod: CPTII,S$GLB,, | Performed by: INTERNAL MEDICINE

## 2018-07-20 PROCEDURE — 99213 OFFICE O/P EST LOW 20 MIN: CPT | Mod: S$GLB,,, | Performed by: INTERNAL MEDICINE

## 2018-07-20 PROCEDURE — 93000 ELECTROCARDIOGRAM COMPLETE: CPT | Mod: S$GLB,,, | Performed by: INTERNAL MEDICINE

## 2018-07-20 PROCEDURE — 3075F SYST BP GE 130 - 139MM HG: CPT | Mod: CPTII,S$GLB,, | Performed by: INTERNAL MEDICINE

## 2018-07-20 RX ORDER — OXYCODONE AND ACETAMINOPHEN 5; 325 MG/1; MG/1
TABLET ORAL
Refills: 0 | Status: ON HOLD | COMMUNITY
Start: 2018-05-31 | End: 2022-11-09 | Stop reason: HOSPADM

## 2018-07-20 RX ORDER — ACETAMINOPHEN 500 MG
1 TABLET ORAL DAILY
Refills: 6 | COMMUNITY
Start: 2018-07-02 | End: 2022-10-18

## 2018-07-20 RX ORDER — SUB-Q INSULIN DEVICE, 40 UNIT
30 EACH MISCELLANEOUS NIGHTLY
COMMUNITY
Start: 2018-07-18 | End: 2019-08-02

## 2018-07-20 RX ORDER — HYDROCHLOROTHIAZIDE 12.5 MG/1
12.5 TABLET ORAL DAILY
Refills: 5 | COMMUNITY
Start: 2018-07-07 | End: 2019-02-01

## 2018-07-20 NOTE — PROGRESS NOTES
Subjective:      Patient ID: Frandy Mccarthy Jr. is a 78 y.o. male.    Chief Complaint: Follow-up (Hypertension)    HPI:  Walks around the yard.  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 Feet chronically swell L>R.    Review of Systems   Cardiovascular: Positive for leg swelling. Negative for chest pain, claudication, dyspnea on exertion, irregular heartbeat, near-syncope, orthopnea, palpitations and syncope.      Both lower extremities hurt all the time which has always been attributed to chronic spine issues.     Pt had carotid ultrasound ordered by Ankita Fairbanks with Dr Espino.  Pt also had a [facility] at [facility]  Past Medical History:   Diagnosis Date    Arthritis     Diabetes mellitus, type 2     Hyperlipidemia     Hypertension     Thyroid disease 06/2017    parathyroid removed        Past Surgical History:   Procedure Laterality Date    BACK SURGERY      x 4    FRACTURE SURGERY      right thigh    HERNIA REPAIR      SPINE SURGERY         Family History   Problem Relation Age of Onset    Cancer Mother     Diabetes Mother     Diabetes Father     Cancer Father        Social History     Social History    Marital status:      Spouse name: N/A    Number of children: N/A    Years of education: N/A     Social History Main Topics    Smoking status: Former Smoker     Quit date: 2/26/1973    Smokeless tobacco: Never Used    Alcohol use Yes      Comment: occasional    Drug use: No    Sexual activity: Yes     Partners: Female     Other Topics Concern    None     Social History Narrative    None       Current Outpatient Prescriptions on File Prior to Visit   Medication Sig Dispense Refill    amLODIPine (NORVASC) 5 MG tablet TAKE 1 TABLET (5 MG TOTAL) BY MOUTH ONCE DAILY. 90 tablet 3    aspirin (ECOTRIN) 81 MG EC tablet Take 81 mg by mouth once daily.      cyanocobalamin (VITAMIN B-12) 1000 MCG tablet Take 100 mcg by mouth once daily.      duloxetine (CYMBALTA) 60 MG capsule Take 30 mg by mouth 2 (two)  "times daily.   99    fenofibrate 160 MG Tab TAKE 1 TABLET (160 MG TOTAL) BY MOUTH ONCE DAILY. 90 tablet 1    folic acid (FOLVITE) 800 MCG Tab Take 800 mcg by mouth once daily.      gabapentin (NEURONTIN) 300 MG capsule Take 2 capsules (600 mg total) by mouth 3 (three) times daily. 540 capsule 3    losartan (COZAAR) 50 MG tablet ONCE A DAY  3    metoprolol tartrate (LOPRESSOR) 50 MG tablet Take 50 mg by mouth once daily.  3    NOVOLOG 100 unit/mL injection 10 Units 3 (three) times daily.       pramipexole (MIRAPEX) 0.25 MG tablet Take by mouth every evening.       simvastatin (ZOCOR) 5 MG tablet Take 5 mg by mouth every evening.      traMADol (ULTRAM) 50 mg tablet TAKE 1 TABLET (50 MG TOTAL) BY MOUTH 3 (THREE) TIMES DAILY AS NEEDED. 90 tablet 2    [DISCONTINUED] BD INSULIN SYRINGE ULTRA-FINE 1 mL 31 gauge x 5/16 Syrg USE AS DIRECTED  0     No current facility-administered medications on file prior to visit.        Review of patient's allergies indicates:  No Known Allergies  Objective:     Vitals:    07/20/18 0907   BP: 134/69   BP Location: Left arm   Patient Position: Sitting   BP Method: Medium (Automatic)   Pulse: 68   Weight: 92.3 kg (203 lb 8 oz)   Height: 5' 9" (1.753 m)        Physical Exam   Constitutional: He is oriented to person, place, and time. He appears well-developed and well-nourished. No distress.   Eyes: No scleral icterus.   Neck: No JVD present. Carotid bruit is not present.   Cardiovascular: Regular rhythm.  Exam reveals no gallop and no friction rub.    Murmur (II/VI systolic) heard.  Pulmonary/Chest: Effort normal and breath sounds normal. No respiratory distress.   Musculoskeletal: He exhibits edema (trace pitting edema bilaterally).   Neurological: He is alert and oriented to person, place, and time.   Skin: Skin is warm and dry. He is not diaphoretic.   Psychiatric: He has a normal mood and affect. His behavior is normal. Judgment and thought content normal.   Vitals reviewed.   "   Wt up 5 lbs    ECG: NSR, WNL  Assessment:     1. Essential hypertension    2. Hyperlipidemia, unspecified hyperlipidemia type    3. Controlled type 2 diabetes mellitus with diabetic autonomic neuropathy, with long-term current use of insulin    4. Localized edema    edema due to Mirapex and amlodipine  Plan:   Frandy was seen today for follow-up.    Diagnoses and all orders for this visit:    Essential hypertension  -     EKG 12-lead    Hyperlipidemia, unspecified hyperlipidemia type    Controlled type 2 diabetes mellitus with diabetic autonomic neuropathy, with long-term current use of insulin    Localized edema     Same meds  F/u with Dr Caicedo and Dr Espino      Follow-up in about 6 months (around 1/20/2019).

## 2018-10-09 DIAGNOSIS — M96.1 POSTLAMINECTOMY SYNDROME OF LUMBAR REGION: ICD-10-CM

## 2018-10-09 DIAGNOSIS — M54.16 LUMBAR RADICULOPATHY, CHRONIC: ICD-10-CM

## 2018-10-09 DIAGNOSIS — Z98.1 S/P LUMBAR SPINAL FUSION: ICD-10-CM

## 2018-10-09 DIAGNOSIS — E78.5 HLD (HYPERLIPIDEMIA): ICD-10-CM

## 2018-10-09 DIAGNOSIS — M51.36 DDD (DEGENERATIVE DISC DISEASE), LUMBAR: ICD-10-CM

## 2018-10-09 DIAGNOSIS — M47.816 FACET ARTHRITIS OF LUMBAR REGION: ICD-10-CM

## 2018-10-09 RX ORDER — GABAPENTIN 300 MG/1
600 CAPSULE ORAL 3 TIMES DAILY
Qty: 540 CAPSULE | Refills: 3 | Status: SHIPPED | OUTPATIENT
Start: 2018-10-09 | End: 2020-02-14

## 2018-10-09 NOTE — TELEPHONE ENCOUNTER
----- Message from Selam Jara sent at 10/9/2018  8:25 AM CDT -----  Contact: [facility]  Can the clinic reply in [facility]: no      Please refill the medication(s) listed below. The patient can be reached at this phone number  once it is called into the pharmacy.      Medication #1gabapentin (NEURONTIN) 300 MG capsule      Medication #2      Preferred Pharmacy:[phone] phone [phone] fax

## 2018-10-11 DIAGNOSIS — M54.16 LUMBAR RADICULOPATHY, CHRONIC: ICD-10-CM

## 2018-10-11 DIAGNOSIS — M96.1 POSTLAMINECTOMY SYNDROME OF LUMBAR REGION: ICD-10-CM

## 2018-10-11 DIAGNOSIS — M51.36 DDD (DEGENERATIVE DISC DISEASE), LUMBAR: ICD-10-CM

## 2018-10-11 DIAGNOSIS — M47.816 FACET ARTHRITIS OF LUMBAR REGION: ICD-10-CM

## 2018-10-11 DIAGNOSIS — Z98.1 S/P LUMBAR SPINAL FUSION: ICD-10-CM

## 2018-10-12 RX ORDER — TRAMADOL HYDROCHLORIDE 50 MG/1
50 TABLET ORAL 3 TIMES DAILY PRN
Qty: 90 TABLET | Refills: 2 | OUTPATIENT
Start: 2018-10-12 | End: 2019-01-10

## 2018-10-12 NOTE — TELEPHONE ENCOUNTER
Staff contacted the patient to inform his that at this time an office visit is required prior to getting any additional refills.    Patient did not answer therefore staff left a detailed voice message informing the patient of the above information as well as instructed the patient to give our office a call at [phone] to get scheduled.

## 2018-10-12 NOTE — TELEPHONE ENCOUNTER
He has not been seen in almost 6 months.  Please see the can come in for an office visit with Sol or Ashley.  Thank you

## 2019-02-01 ENCOUNTER — OFFICE VISIT (OUTPATIENT)
Dept: CARDIOLOGY | Facility: CLINIC | Age: 79
End: 2019-02-01
Payer: MEDICARE

## 2019-02-01 VITALS
DIASTOLIC BLOOD PRESSURE: 64 MMHG | HEART RATE: 78 BPM | WEIGHT: 209 LBS | BODY MASS INDEX: 30.96 KG/M2 | SYSTOLIC BLOOD PRESSURE: 120 MMHG | HEIGHT: 69 IN

## 2019-02-01 DIAGNOSIS — E11.43 CONTROLLED TYPE 2 DIABETES MELLITUS WITH DIABETIC AUTONOMIC NEUROPATHY, WITH LONG-TERM CURRENT USE OF INSULIN: ICD-10-CM

## 2019-02-01 DIAGNOSIS — R60.0 LOCALIZED EDEMA: ICD-10-CM

## 2019-02-01 DIAGNOSIS — R94.31 NONSPECIFIC ABNORMAL ELECTROCARDIOGRAM (ECG) (EKG): ICD-10-CM

## 2019-02-01 DIAGNOSIS — E78.00 PURE HYPERCHOLESTEROLEMIA: ICD-10-CM

## 2019-02-01 DIAGNOSIS — Z79.4 CONTROLLED TYPE 2 DIABETES MELLITUS WITH DIABETIC AUTONOMIC NEUROPATHY, WITH LONG-TERM CURRENT USE OF INSULIN: ICD-10-CM

## 2019-02-01 DIAGNOSIS — I10 ESSENTIAL HYPERTENSION: Primary | ICD-10-CM

## 2019-02-01 PROCEDURE — 1101F PR PT FALLS ASSESS DOC 0-1 FALLS W/OUT INJ PAST YR: ICD-10-PCS | Mod: CPTII,S$GLB,, | Performed by: INTERNAL MEDICINE

## 2019-02-01 PROCEDURE — 99214 OFFICE O/P EST MOD 30 MIN: CPT | Mod: S$GLB,,, | Performed by: INTERNAL MEDICINE

## 2019-02-01 PROCEDURE — 3078F DIAST BP <80 MM HG: CPT | Mod: CPTII,S$GLB,, | Performed by: INTERNAL MEDICINE

## 2019-02-01 PROCEDURE — 99214 PR OFFICE/OUTPT VISIT, EST, LEVL IV, 30-39 MIN: ICD-10-PCS | Mod: S$GLB,,, | Performed by: INTERNAL MEDICINE

## 2019-02-01 PROCEDURE — 3078F PR MOST RECENT DIASTOLIC BLOOD PRESSURE < 80 MM HG: ICD-10-PCS | Mod: CPTII,S$GLB,, | Performed by: INTERNAL MEDICINE

## 2019-02-01 PROCEDURE — 3074F PR MOST RECENT SYSTOLIC BLOOD PRESSURE < 130 MM HG: ICD-10-PCS | Mod: CPTII,S$GLB,, | Performed by: INTERNAL MEDICINE

## 2019-02-01 PROCEDURE — 93000 ELECTROCARDIOGRAM COMPLETE: CPT | Mod: S$GLB,,, | Performed by: INTERNAL MEDICINE

## 2019-02-01 PROCEDURE — 3074F SYST BP LT 130 MM HG: CPT | Mod: CPTII,S$GLB,, | Performed by: INTERNAL MEDICINE

## 2019-02-01 PROCEDURE — 1101F PT FALLS ASSESS-DOCD LE1/YR: CPT | Mod: CPTII,S$GLB,, | Performed by: INTERNAL MEDICINE

## 2019-02-01 PROCEDURE — 93000 EKG 12-LEAD: ICD-10-PCS | Mod: S$GLB,,, | Performed by: INTERNAL MEDICINE

## 2019-02-01 RX ORDER — CARVEDILOL 3.12 MG/1
TABLET ORAL 2 TIMES DAILY
COMMUNITY
Start: 2019-01-17 | End: 2019-08-02

## 2019-02-01 RX ORDER — SIMVASTATIN 10 MG/1
TABLET, FILM COATED ORAL
COMMUNITY
Start: 2019-01-17 | End: 2020-06-26

## 2019-02-01 RX ORDER — FUROSEMIDE 20 MG/1
TABLET ORAL
COMMUNITY
Start: 2019-01-25 | End: 2021-12-02

## 2019-02-01 RX ORDER — DULOXETIN HYDROCHLORIDE 30 MG/1
30 CAPSULE, DELAYED RELEASE ORAL DAILY
COMMUNITY
Start: 2019-01-17

## 2019-02-01 RX ORDER — METOPROLOL SUCCINATE 100 MG/1
100 TABLET, EXTENDED RELEASE ORAL DAILY
COMMUNITY
Start: 2019-01-25 | End: 2021-12-02

## 2019-02-01 RX ORDER — IRBESARTAN 150 MG/1
150 TABLET ORAL DAILY
COMMUNITY
Start: 2019-01-25 | End: 2022-10-18

## 2019-02-01 RX ORDER — TRAMADOL HYDROCHLORIDE 50 MG/1
TABLET ORAL
COMMUNITY
Start: 2019-01-17 | End: 2022-10-17

## 2019-02-01 NOTE — PROGRESS NOTES
"  Subjective:      Patient ID: Frandy Mccarthy Jr. is a 78 y.o. male.    Chief Complaint: Follow-up (Hypertension)    HPI:  Both legs hurt below the knees and both feet chronically swell LLE>RLE.  "My legs bother me when I walk and I have restless legs at night"    Dr Espino added carvedilol, increased the metoprolol, discontinued the HCTZ and substituted ffurosemide, and switched the losartan to irbesartan.      Review of Systems   Cardiovascular: Positive for leg swelling. Negative for chest pain, claudication, dyspnea on exertion, irregular heartbeat, near-syncope, orthopnea, palpitations and syncope.        Past Medical History:   Diagnosis Date    Arthritis     Diabetes mellitus, type 2     Hyperlipidemia     Hypertension     Thyroid disease 06/2017    parathyroid removed        Past Surgical History:   Procedure Laterality Date    BACK SURGERY      x 4    BLOCK-NERVE-MEDIAL BRANCH-LUMBAR Bilateral 9/30/2014    [facility]    FRACTURE SURGERY      right thigh    HERNIA REPAIR      INJECTION-STEROID-EPIDURAL-CAUDAL N/A 3/23/2018    [facility]    INJECTION-STEROID-EPIDURAL-CAUDAL N/A 12/20/2016    [facility]    RADIOFREQUENCY THERMOCOAGULATION (RFTC)-NERVE-MEDIAN BRANCH-LUMBAR Left 10/28/2014    [facility]    RADIOFREQUENCY THERMOCOAGULATION (RFTC)-NERVE-MEDIAN BRANCH-LUMBAR Right 10/15/2014    [facility]    RADIOFREQUENCY THERMOCOAGULATION (RFTC)-NERVE-MEDIAN BRANCH-LUMBAR/Cooled Left 11/8/2016    [facility]    RADIOFREQUENCY THERMOCOAGULATION (RFTC)-NERVE-MEDIAN BRANCH-LUMBAR/Cooled Right 10/25/2016    [facility]    RELEASE-FINGER-TRIGGER / LONG AND RING FINGER Left 2/23/2015    [facility] "    SPINE SURGERY         Family History   Problem Relation Age of Onset    Cancer Mother     Diabetes Mother     Diabetes Father     Cancer Father        Social History     Socioeconomic History    Marital status:      Spouse name: Not on file    Number of children: Not on file    Years of education: Not on file    Highest education level: Not on file   Social Needs    Financial resource strain: Not on file    Food insecurity - worry: Not on file    Food insecurity - inability: Not on file    Transportation needs - medical: Not on file    Transportation needs - non-medical: Not on file   Occupational History    Not on file   Tobacco Use    Smoking status: Former Smoker     Last attempt to quit: 1973     Years since quittin.9    Smokeless tobacco: Never Used   Substance and Sexual Activity    Alcohol use: Yes     Comment: occasional    Drug use: No    Sexual activity: Yes     Partners: Female   Other Topics Concern    Not on file   Social History Narrative    Not on file       Current Outpatient Medications on File Prior to Visit   Medication Sig Dispense Refill    amLODIPine (NORVASC) 5 MG tablet TAKE 1 TABLET (5 MG TOTAL) BY MOUTH ONCE DAILY. 90 tablet 3    aspirin (ECOTRIN) 81 MG EC tablet Take 81 mg by mouth once daily.      carvedilol (COREG) 3.125 MG tablet 2 (two) times daily.      cholecalciferol, vitamin D3, 2,000 unit Cap Take 1 capsule by mouth once daily.  6    cyanocobalamin (VITAMIN B-12) 1000 MCG tablet Take 100 mcg by mouth once daily.      DULoxetine (CYMBALTA) 30 MG capsule Take 30 mg by mouth once daily.       duloxetine (CYMBALTA) 60 MG capsule Take 60 mg by mouth once daily.   99    fenofibrate 160 MG Tab TAKE 1 TABLET (160 MG TOTAL) BY MOUTH ONCE DAILY. 90 tablet 1    folic acid (FOLVITE) 800 MCG Tab Take 800 mcg by mouth once daily.      furosemide (LASIX) 20 MG tablet       gabapentin (NEURONTIN) 300 MG capsule Take 2 capsules (600 mg total) by  "mouth 3 (three) times daily. 540 capsule 3    irbesartan (AVAPRO) 150 MG tablet       metoprolol succinate (TOPROL-XL) 100 MG 24 hr tablet       NOVOLOG 100 unit/mL injection 10 Units 3 (three) times daily.       oxyCODONE-acetaminophen (PERCOCET) 5-325 mg per tablet TAKE 1 TABLET BY MOUTH ONCE A DAY AS NEEDED FOR PAIN  0    pramipexole (MIRAPEX) 0.25 MG tablet Take by mouth every evening.       simvastatin (ZOCOR) 10 MG tablet       traMADol (ULTRAM) 50 mg tablet       VGO 20 Isabel 20 Units every evening.      [DISCONTINUED] hydroCHLOROthiazide (HYDRODIURIL) 12.5 MG Tab Take 12.5 mg by mouth once daily.  5    [DISCONTINUED] losartan (COZAAR) 50 MG tablet ONCE A DAY  3    [DISCONTINUED] metoprolol tartrate (LOPRESSOR) 50 MG tablet Take 100 mg by mouth once daily.   3    [DISCONTINUED] simvastatin (ZOCOR) 5 MG tablet Take 5 mg by mouth every evening.       No current facility-administered medications on file prior to visit.        Review of patient's allergies indicates:  No Known Allergies  Objective:     Vitals:    02/01/19 1027 02/01/19 1121   BP: (!) 145/72 120/64   BP Location: Right arm    Patient Position: Sitting    BP Method: Large (Automatic)    Pulse: 78    Weight: 94.8 kg (209 lb)    Height: 5' 9" (1.753 m)         Physical Exam   Constitutional: He is oriented to person, place, and time. He appears well-developed and well-nourished. No distress.   Eyes: No scleral icterus.   Neck: No JVD present. Carotid bruit is not present.   Cardiovascular: Regular rhythm and normal heart sounds. Exam reveals no gallop and no friction rub.   No murmur heard.  Pulmonary/Chest: Effort normal and breath sounds normal. No respiratory distress.   Musculoskeletal: He exhibits edema (one plus edema LLE and trace edema RLE).   Neurological: He is alert and oriented to person, place, and time.   Skin: Skin is warm and dry. He is not diaphoretic.   Psychiatric: He has a normal mood and affect. His behavior is normal. " Judgment and thought content normal.   Vitals reviewed.     Wt up 17 lbs over past year.    ECG: NSR, left axis deviation, inverted T waves I and AVL    Note stress echo in 2015 was normal  Assessment:     1. Essential hypertension    2. Nonspecific abnormal electrocardiogram (ECG) (EKG)    3. Pure hypercholesterolemia    4. Controlled type 2 diabetes mellitus with diabetic autonomic neuropathy, with long-term current use of insulin    5. Localized edema      Plan:   Frandy was seen today for follow-up.    Diagnoses and all orders for this visit:    Essential hypertension    Nonspecific abnormal electrocardiogram (ECG) (EKG)    Pure hypercholesterolemia    Controlled type 2 diabetes mellitus with diabetic autonomic neuropathy, with long-term current use of insulin    Localized edema    Other orders  -     IN OFFICE EKG 12-LEAD (to Muse)       Pt has an appt with Dr Espino in 6 weeks who does labs.    Pt also has a PSA test ordered by Dr Caicedo    Amlodipine and mirapex can be contributing to the edema    The leg pain is due to peripheral neuropathy    HBP is well controlled on current regimen    ADA diet reinforced    Discussed trial off mirapex to see if edema improves and just to use the gabapentin for the restless legs    Discussed possibly holding the amlodipine if edema persists and increasing the irbesartan or adding hydralazine.    Pt will discuss with Dr Espino and Dr Caicedo (who sees pt for CKD III).    Follow-up in about 6 months (around 8/1/2019).

## 2019-02-04 DIAGNOSIS — E78.5 HLD (HYPERLIPIDEMIA): ICD-10-CM

## 2019-02-04 RX ORDER — FENOFIBRATE 160 MG/1
160 TABLET ORAL DAILY
Qty: 90 TABLET | Refills: 3 | Status: SHIPPED | OUTPATIENT
Start: 2019-02-04 | End: 2020-12-14 | Stop reason: SDUPTHER

## 2019-08-02 ENCOUNTER — OFFICE VISIT (OUTPATIENT)
Dept: CARDIOLOGY | Facility: CLINIC | Age: 79
End: 2019-08-02
Payer: MEDICARE

## 2019-08-02 VITALS
BODY MASS INDEX: 29.92 KG/M2 | DIASTOLIC BLOOD PRESSURE: 66 MMHG | HEART RATE: 72 BPM | SYSTOLIC BLOOD PRESSURE: 125 MMHG | HEIGHT: 69 IN | WEIGHT: 202 LBS

## 2019-08-02 DIAGNOSIS — R60.0 LOCALIZED EDEMA: ICD-10-CM

## 2019-08-02 DIAGNOSIS — I10 ESSENTIAL HYPERTENSION: Primary | ICD-10-CM

## 2019-08-02 DIAGNOSIS — E78.00 PURE HYPERCHOLESTEROLEMIA: ICD-10-CM

## 2019-08-02 DIAGNOSIS — Z79.4 CONTROLLED TYPE 2 DIABETES MELLITUS WITH DIABETIC AUTONOMIC NEUROPATHY, WITH LONG-TERM CURRENT USE OF INSULIN: ICD-10-CM

## 2019-08-02 DIAGNOSIS — R94.31 NONSPECIFIC ABNORMAL ELECTROCARDIOGRAM (ECG) (EKG): ICD-10-CM

## 2019-08-02 DIAGNOSIS — E11.43 CONTROLLED TYPE 2 DIABETES MELLITUS WITH DIABETIC AUTONOMIC NEUROPATHY, WITH LONG-TERM CURRENT USE OF INSULIN: ICD-10-CM

## 2019-08-02 PROCEDURE — 93000 EKG 12-LEAD: ICD-10-PCS | Mod: S$GLB,,, | Performed by: INTERNAL MEDICINE

## 2019-08-02 PROCEDURE — 1101F PR PT FALLS ASSESS DOC 0-1 FALLS W/OUT INJ PAST YR: ICD-10-PCS | Mod: CPTII,S$GLB,, | Performed by: INTERNAL MEDICINE

## 2019-08-02 PROCEDURE — 93000 ELECTROCARDIOGRAM COMPLETE: CPT | Mod: S$GLB,,, | Performed by: INTERNAL MEDICINE

## 2019-08-02 PROCEDURE — 3078F DIAST BP <80 MM HG: CPT | Mod: CPTII,S$GLB,, | Performed by: INTERNAL MEDICINE

## 2019-08-02 PROCEDURE — 3078F PR MOST RECENT DIASTOLIC BLOOD PRESSURE < 80 MM HG: ICD-10-PCS | Mod: CPTII,S$GLB,, | Performed by: INTERNAL MEDICINE

## 2019-08-02 PROCEDURE — 3074F PR MOST RECENT SYSTOLIC BLOOD PRESSURE < 130 MM HG: ICD-10-PCS | Mod: CPTII,S$GLB,, | Performed by: INTERNAL MEDICINE

## 2019-08-02 PROCEDURE — 99214 OFFICE O/P EST MOD 30 MIN: CPT | Mod: S$GLB,,, | Performed by: INTERNAL MEDICINE

## 2019-08-02 PROCEDURE — 1101F PT FALLS ASSESS-DOCD LE1/YR: CPT | Mod: CPTII,S$GLB,, | Performed by: INTERNAL MEDICINE

## 2019-08-02 PROCEDURE — 3074F SYST BP LT 130 MM HG: CPT | Mod: CPTII,S$GLB,, | Performed by: INTERNAL MEDICINE

## 2019-08-02 PROCEDURE — 99214 PR OFFICE/OUTPT VISIT, EST, LEVL IV, 30-39 MIN: ICD-10-PCS | Mod: S$GLB,,, | Performed by: INTERNAL MEDICINE

## 2019-08-02 RX ORDER — SUB-Q INSULIN DEVICE, 40 UNIT
EACH MISCELLANEOUS
Refills: 6 | COMMUNITY
Start: 2019-07-16

## 2019-08-02 RX ORDER — MULTIVIT WITH MINERALS/HERBS
1 TABLET ORAL DAILY
COMMUNITY
End: 2021-12-02

## 2019-08-02 NOTE — PROGRESS NOTES
"  Subjective:      Patient ID: Frandy Mccarthy Jr. is a 79 y.o. male.    Chief Complaint: Follow-up (Hypertension)    HPI:  Sugars are good.    Legs are better.    Restless legs are controled    "Somedays I can walk far with my walker."    Some shortness of breath with exertion.    Review of Systems   Cardiovascular: Positive for dyspnea on exertion and leg swelling. Negative for chest pain, claudication, irregular heartbeat, near-syncope, orthopnea, palpitations and syncope.      Pt recalls Lexiscan Cardiolite stress test at [facility] in 2017.    My chart indicates stress echo was normal in 2015    Past Medical History:   Diagnosis Date    Arthritis     Diabetes mellitus, type 2     Hyperlipidemia     Hypertension     Thyroid disease 06/2017    parathyroid removed        Past Surgical History:   Procedure Laterality Date    BACK SURGERY      x 4    BLOCK-NERVE-MEDIAL BRANCH-LUMBAR Bilateral 9/30/2014    [facility]    FRACTURE SURGERY      right thigh    HERNIA REPAIR      INJECTION-STEROID-EPIDURAL-CAUDAL N/A 3/23/2018    [facility]    INJECTION-STEROID-EPIDURAL-CAUDAL N/A 12/20/2016    [facility]    RADIOFREQUENCY THERMOCOAGULATION (RFTC)-NERVE-MEDIAN BRANCH-LUMBAR Left 10/28/2014    [facility]    RADIOFREQUENCY THERMOCOAGULATION (RFTC)-NERVE-MEDIAN BRANCH-LUMBAR Right 10/15/2014    [facility]    RADIOFREQUENCY THERMOCOAGULATION (RFTC)-NERVE-MEDIAN BRANCH-LUMBAR/Cooled Left 11/8/2016    [facility]    RADIOFREQUENCY THERMOCOAGULATION (RFTC)-NERVE-MEDIAN BRANCH-LUMBAR/Cooled Right 10/25/2016    [facility]    RELEASE-FINGER-TRIGGER / LONG AND RING FINGER Left 2/23/2015    [facility]    SPINE SURGERY "         Family History   Problem Relation Age of Onset    Cancer Mother     Diabetes Mother     Diabetes Father     Cancer Father        Social History     Socioeconomic History    Marital status:      Spouse name: Not on file    Number of children: Not on file    Years of education: Not on file    Highest education level: Not on file   Occupational History    Not on file   Social Needs    Financial resource strain: Not on file    Food insecurity:     Worry: Not on file     Inability: Not on file    Transportation needs:     Medical: Not on file     Non-medical: Not on file   Tobacco Use    Smoking status: Former Smoker     Last attempt to quit: 1973     Years since quittin.4    Smokeless tobacco: Never Used   Substance and Sexual Activity    Alcohol use: Yes     Comment: occasional    Drug use: No    Sexual activity: Yes     Partners: Female   Lifestyle    Physical activity:     Days per week: Not on file     Minutes per session: Not on file    Stress: Not on file   Relationships    Social connections:     Talks on phone: Not on file     Gets together: Not on file     Attends Baptism service: Not on file     Active member of club or organization: Not on file     Attends meetings of clubs or organizations: Not on file     Relationship status: Not on file   Other Topics Concern    Not on file   Social History Narrative    Not on file       Current Outpatient Medications on File Prior to Visit   Medication Sig Dispense Refill    amLODIPine (NORVASC) 5 MG tablet TAKE 1 TABLET (5 MG TOTAL) BY MOUTH ONCE DAILY. 90 tablet 3    aspirin (ECOTRIN) 81 MG EC tablet Take 81 mg by mouth once daily.      b complex vitamins tablet Take 1 tablet by mouth once daily.      cholecalciferol, vitamin D3, 2,000 unit Cap Take 1 capsule by mouth once daily.  6    DULoxetine (CYMBALTA) 30 MG capsule Take 30 mg by mouth once daily.       duloxetine (CYMBALTA) 60 MG capsule Take 60 mg by mouth  "once daily.   99    fenofibrate 160 MG Tab Take 1 tablet (160 mg total) by mouth once daily. 90 tablet 3    folic acid (FOLVITE) 800 MCG Tab Take 800 mcg by mouth once daily.      furosemide (LASIX) 20 MG tablet       gabapentin (NEURONTIN) 300 MG capsule Take 2 capsules (600 mg total) by mouth 3 (three) times daily. 540 capsule 3    irbesartan (AVAPRO) 150 MG tablet       metoprolol succinate (TOPROL-XL) 100 MG 24 hr tablet       oxyCODONE-acetaminophen (PERCOCET) 5-325 mg per tablet TAKE 1 TABLET BY MOUTH ONCE A DAY AS NEEDED FOR PAIN  0    pramipexole (MIRAPEX) 0.25 MG tablet Take by mouth every evening.       simvastatin (ZOCOR) 10 MG tablet       traMADol (ULTRAM) 50 mg tablet       V-GO 30 Isabel AS DIRECTED , APPLY 1 V-GO DEVICE DAILY  6    [DISCONTINUED] carvedilol (COREG) 3.125 MG tablet 2 (two) times daily.      [DISCONTINUED] cyanocobalamin (VITAMIN B-12) 1000 MCG tablet Take 100 mcg by mouth once daily.      [DISCONTINUED] NOVOLOG 100 unit/mL injection 10 Units 3 (three) times daily.       [DISCONTINUED] VGO 20 Isabel 30 Units every evening.        No current facility-administered medications on file prior to visit.        Review of patient's allergies indicates:  No Known Allergies  Objective:     Vitals:    08/02/19 1022   BP: 125/66   BP Location: Left arm   Patient Position: Sitting   BP Method: Large (Automatic)   Pulse: 72   Weight: 91.6 kg (202 lb)   Height: 5' 9" (1.753 m)        Physical Exam   Constitutional: He is oriented to person, place, and time. He appears well-developed and well-nourished. No distress.   Eyes: No scleral icterus.   Neck: No JVD present. Carotid bruit is not present.   Cardiovascular: Regular rhythm and normal heart sounds. Exam reveals no gallop and no friction rub.   No murmur heard.  Pulmonary/Chest: Effort normal and breath sounds normal. No respiratory distress.   Musculoskeletal: He exhibits edema (trace).   Neurological: He is alert and oriented to " person, place, and time.   Skin: Skin is warm and dry. He is not diaphoretic.   Psychiatric: He has a normal mood and affect. His behavior is normal. Judgment and thought content normal.   Vitals reviewed.     ECG: NSR, leftward axis, abnormal R wave progression in precordial leads, unchanged    Wt down 7 lbs  Assessment:     1. Essential hypertension    2. Pure hypercholesterolemia    3. Nonspecific abnormal electrocardiogram (ECG) (EKG)    4. Controlled type 2 diabetes mellitus with diabetic autonomic neuropathy, with long-term current use of insulin    5. Localized edema      Plan:   Frandy was seen today for follow-up.    Diagnoses and all orders for this visit:    Essential hypertension    Pure hypercholesterolemia    Nonspecific abnormal electrocardiogram (ECG) (EKG)    Controlled type 2 diabetes mellitus with diabetic autonomic neuropathy, with long-term current use of insulin    Localized edema    Other orders  -     IN OFFICE EKG 12-LEAD (to Muse)     Discussed potentially repeating stress test but pt reports the shortness of breath is chronic and stable so will hold off.    The edema is a side effect of Requip and amlodipine    Same meds    Follow up in about 6 months (around 2/2/2020).

## 2019-10-02 ENCOUNTER — OFFICE VISIT (OUTPATIENT)
Dept: PAIN MEDICINE | Facility: CLINIC | Age: 79
End: 2019-10-02
Attending: ANESTHESIOLOGY
Payer: MEDICARE

## 2019-10-02 VITALS
TEMPERATURE: 98 F | RESPIRATION RATE: 18 BRPM | HEIGHT: 69 IN | BODY MASS INDEX: 31.02 KG/M2 | DIASTOLIC BLOOD PRESSURE: 67 MMHG | HEART RATE: 83 BPM | WEIGHT: 209.44 LBS | SYSTOLIC BLOOD PRESSURE: 129 MMHG

## 2019-10-02 DIAGNOSIS — Z98.1 S/P LUMBAR SPINAL FUSION: ICD-10-CM

## 2019-10-02 DIAGNOSIS — G89.29 CHRONIC PAIN OF BOTH KNEES: ICD-10-CM

## 2019-10-02 DIAGNOSIS — M96.1 POSTLAMINECTOMY SYNDROME OF LUMBAR REGION: ICD-10-CM

## 2019-10-02 DIAGNOSIS — Z86.14 HISTORY OF MRSA INFECTION: ICD-10-CM

## 2019-10-02 DIAGNOSIS — M51.36 DDD (DEGENERATIVE DISC DISEASE), LUMBAR: ICD-10-CM

## 2019-10-02 DIAGNOSIS — M47.816 LUMBAR FACET ARTHROPATHY: ICD-10-CM

## 2019-10-02 DIAGNOSIS — M25.561 CHRONIC PAIN OF BOTH KNEES: ICD-10-CM

## 2019-10-02 DIAGNOSIS — M25.562 CHRONIC PAIN OF BOTH KNEES: ICD-10-CM

## 2019-10-02 DIAGNOSIS — M17.0 PRIMARY OSTEOARTHRITIS OF BOTH KNEES: ICD-10-CM

## 2019-10-02 DIAGNOSIS — G89.4 CHRONIC PAIN DISORDER: Primary | ICD-10-CM

## 2019-10-02 PROCEDURE — 3074F PR MOST RECENT SYSTOLIC BLOOD PRESSURE < 130 MM HG: ICD-10-PCS | Mod: CPTII,S$GLB,, | Performed by: ANESTHESIOLOGY

## 2019-10-02 PROCEDURE — 20610 DRAIN/INJ JOINT/BURSA W/O US: CPT | Mod: 50,GC,S$GLB, | Performed by: ANESTHESIOLOGY

## 2019-10-02 PROCEDURE — 99999 PR PBB SHADOW E&M-EST. PATIENT-LVL III: CPT | Mod: PBBFAC,,, | Performed by: ANESTHESIOLOGY

## 2019-10-02 PROCEDURE — 1101F PR PT FALLS ASSESS DOC 0-1 FALLS W/OUT INJ PAST YR: ICD-10-PCS | Mod: CPTII,S$GLB,, | Performed by: ANESTHESIOLOGY

## 2019-10-02 PROCEDURE — 3078F DIAST BP <80 MM HG: CPT | Mod: CPTII,S$GLB,, | Performed by: ANESTHESIOLOGY

## 2019-10-02 PROCEDURE — 3074F SYST BP LT 130 MM HG: CPT | Mod: CPTII,S$GLB,, | Performed by: ANESTHESIOLOGY

## 2019-10-02 PROCEDURE — 99214 PR OFFICE/OUTPT VISIT, EST, LEVL IV, 30-39 MIN: ICD-10-PCS | Mod: 25,GC,S$GLB, | Performed by: ANESTHESIOLOGY

## 2019-10-02 PROCEDURE — 99999 PR PBB SHADOW E&M-EST. PATIENT-LVL III: ICD-10-PCS | Mod: PBBFAC,,, | Performed by: ANESTHESIOLOGY

## 2019-10-02 PROCEDURE — 1101F PT FALLS ASSESS-DOCD LE1/YR: CPT | Mod: CPTII,S$GLB,, | Performed by: ANESTHESIOLOGY

## 2019-10-02 PROCEDURE — 99214 OFFICE O/P EST MOD 30 MIN: CPT | Mod: 25,GC,S$GLB, | Performed by: ANESTHESIOLOGY

## 2019-10-02 PROCEDURE — 20610 PR DRAIN/INJECT LARGE JOINT/BURSA: ICD-10-PCS | Mod: 50,GC,S$GLB, | Performed by: ANESTHESIOLOGY

## 2019-10-02 PROCEDURE — 3078F PR MOST RECENT DIASTOLIC BLOOD PRESSURE < 80 MM HG: ICD-10-PCS | Mod: CPTII,S$GLB,, | Performed by: ANESTHESIOLOGY

## 2019-10-02 RX ORDER — BETAMETHASONE SODIUM PHOSPHATE AND BETAMETHASONE ACETATE 3; 3 MG/ML; MG/ML
12 INJECTION, SUSPENSION INTRA-ARTICULAR; INTRALESIONAL; INTRAMUSCULAR; SOFT TISSUE
Status: COMPLETED | OUTPATIENT
Start: 2019-10-02 | End: 2019-10-02

## 2019-10-02 RX ORDER — LIDOCAINE HYDROCHLORIDE 10 MG/ML
3 INJECTION INFILTRATION; PERINEURAL
Status: COMPLETED | OUTPATIENT
Start: 2019-10-02 | End: 2019-10-02

## 2019-10-02 RX ORDER — BUPIVACAINE HYDROCHLORIDE 2.5 MG/ML
6 INJECTION, SOLUTION EPIDURAL; INFILTRATION; INTRACAUDAL
Status: COMPLETED | OUTPATIENT
Start: 2019-10-02 | End: 2019-10-02

## 2019-10-02 RX ADMIN — BETAMETHASONE SODIUM PHOSPHATE AND BETAMETHASONE ACETATE 12 MG: 3; 3 INJECTION, SUSPENSION INTRA-ARTICULAR; INTRALESIONAL; INTRAMUSCULAR; SOFT TISSUE at 08:10

## 2019-10-02 RX ADMIN — BUPIVACAINE HYDROCHLORIDE 15 MG: 2.5 INJECTION, SOLUTION EPIDURAL; INFILTRATION; INTRACAUDAL at 08:10

## 2019-10-02 RX ADMIN — LIDOCAINE HYDROCHLORIDE 3 ML: 10 INJECTION INFILTRATION; PERINEURAL at 08:10

## 2019-10-02 NOTE — PROGRESS NOTES
Subjective:       Patient ID: Frandy Mccarthy Jr. is a 79 y.o. male     Chief Complaint: No chief complaint on file.    HPI:  Mr. Mccarthy is a 73 yo male who presents today with Low back pain, leg pain, and knee pain. He has a long-standing history of low back pain, s/p lumbar surgery x3. His current pain has been present since his surgery in April 2013 and is the worst in his bilateral knees. His pain is described in detail below.    Interval History (4/9/2014):  He returns to the office today with his wife. They are both upset because he was not able to start physical therapy. They report that they were told the visits were not authorized so he could not be scheduled. No change in the location or quality of his pain since his most recent visit.    Interval history (6/2/2014):  He returns to the office today again with his wife. He reports improved pain control with physical therapy and with the TENS unit. However, the TENS unit came from the physical therapy unit and was not an in network therefore they had to return at grossly would be charged several $100. They would like for us to order them a TENS unit to have in the house.    Interval history (9/18/14):  Patient returns to clinic for further treatment options of his LBP and bilateral lower extremity pain. Describes pain as LBP that radiates to bilateral lower extremities on the lateral thigh to the knee. Rarely the pain will extend down to the calf. Pain is worse with activity/walking. Reports 5/10 constant dull achy pain that is 10/10 at its worst. Patient reports that he has completed physical therapy and has been using the TENs unit. The TENs unit only provides minimal and temporary relief. Celebrex and anaprox were dc'd by the patients cardiologist.    Interval history (11/18/14):  Mr. Mccarthy presents to clinic s/p left and right L3-5 MB RFA. Reports 80% relief of previous sxs, 100% relief of leg pain. Continues to c/o pain b/l in low back, however this is  located inferior to his flank.  This pain is worse with prolonged standing.  He does not continue to do PT exercises at home. Continues to take Gabapentin.  Pt has been seen in the clinic before by Dr. Carlson, however pt is new to me.     Interval History (10/05/2016):  PmHx lumbar surgery x3, previous RFA of MB L3-L5 with good results presents today with recurrent back pain radiating to the mid anterior thigh.  Pain onset has been gradual over the last few months, aching, worse on right, relieved by rest.  Associated symptoms include complaints of weakness.  Pain is constant, 10/10 at worst severity and 3/10 at least.      No current PT or exercise regimen (limited by pain).  He is not currently taking any anticoagulation other than ASA 81 mg daily.  Current pain medications include tramadol and percocet managed by NSGY.  His gabapentin was recently decreased by endocrinology to 300 mg tid.      Interval History (12/8/2016):  He returns today for follow up.  He reports that the RFA was helpful for his back pain.  He is now having a different pain.  This pain is a shooting pain from his buttocks into his feet bilaterally.  This is associated with numbness and tingling.  No saddle anesthesia, bladder or bowel incontinence.    Interval History (1/13/2017):  He returns today for follow up.  He reports that the caudal MARCOS has been helpful for his back pain, today 3/10, but especially for the radicular symptoms. Now, he reports he has constant, BL knee pain that is worse with walking and stairs and decreases with sitting or lying down. He reports his legs occasionally feel week, but he denies falls. He reports that wearing a compression brace helps, but he feels that the ones he's tried are too tight. He would like to try one that properly fits him.     He does report after lying down for a bit a night, both of his calves have tingling.     Interval History (1/25/2017):  He returns today for follow up.  He reports that,  for the past week, his knees have not been hurting.  He thinks he would like to hold off on the knee injections and see how the braces help.    Interval History (7/3/2017):  He returns today for follow up.  He reports that he is having bad back pain today. He report great relief from past Caudal MARCOS but only for 2-3 weeks. He states the pain is the same pain as previously, before the injection, but worse today. It is most when walking and standing. He describes the pain tingling and radiating from lower back when standing and down posterior thighs BL to to calf and moving to anterior leg and to both his feet in the bottoms and sides of feet. He states his back pain is worse than his BL leg pain and that he received most relief from RFA procedures at past visits. He states his pain medications where not working so he stopped using them consistently. He has noticed leg weakness, but no falls. He denies fevers, night sweats, B/B changes, or perineal anesthesia.     Interval History (3/12/2018):  He returns today for follow up.  He reports that he is now status post revision of lumbar fusion and extension to L1 with Dr. Forte.  His postoperative course was Located by an infection requiring revision and long-term IV antibiotics.  He is now been off of antibiotics since March 6, 2018.  He reports he continues to have low back pain that was initially better after the surgery, but has returned mildly.  His primary complaint is a shooting pain into his legs.  This is previously relieved with caudal epidurals.  Tramadol 3 times daily as needed has been helpful for the pain.  He continues to take gabapentin and Cymbalta    Interval History (4/23/2018):  Mr Mccarthy returns to clinic for follow up.  He is s/p caudal MARCOS 03.23.18 that he reports only transient relief (1-2 days) from.  Currently he states his pain is in his lower back, with the worst component radiating down in the legs.  He is currently taking tramadol tid,  oxycodone approximately bid, cymbalta, and gabapentin 600 mg BID.  He was told to stop the evening dose while he started Mirapex.      Interval History (10/02/2019):  He returns today for follow up.  He reports that his knee pain, R>L, is the worse to where he would like to focus today.  His low back pain is stable.  Pain is 0/10 at rest. Gets up to 9/10 with walking. Pain is worse with activity and with weather changes,.   Copper fit brace helps a little; wears during the day.  The hinged braces are too uncomfortable to wear.  No heat or ice packs.  Some swelling over the right patella.  No recent falls or injuries.     Physical Therapy: He completed a 12 week course since 2013; however, this made his pain worse.     Non-pharmacologic Treatment: Sitting makes his pain better.      · TENS? Not tried.     Pain Medications:       · Currently taking: Tramadol 50 mg 3 times daily as needed (helpful); gabapentin 600 mg 2 times daily, Cymbalta 30 mg BID      · Has tried in the past: Percocet 5/325 mg, Celebrex 200 mg daily. Naproxen 550 mg twice daily.     · Has not tried: TCAs, SSRIs     Blood thinners: None     Interventional Therapies:   · Caudal MARCOS 03.23.18; transient relief, 1-2 days  · Bilateral LMBB: Positive  · Right L3-5 RFA 10/2014: Excellent relief until summer 2016  · Left L3-5 RFA 10/2014: Excellent relief until summer 2016  · Right L3-5 Cooled RFA 10/2016:  Good relief of back pain  · Left L3-5 Cooled RFA 11/2016:  Good relief of back pain  · Caudal MARCOS 12/20/16; good relief of radicular symptoms with good relief of back pain  · 3/2018: Caudal MARCOS: limited benefit     Relevant Surgeries:   · L5-S1 fusion in 2005 followed by a subsequent L3-5 fusion in 2012 revised in 2013 with a complication of a postoperative seroma in 2012.  · L1-L5 posterior instrumented spinal fusion revision/extension 8/2017     Affecting sleep? Yes     Affecting daily activities? Yes     Depressive symptoms? No      · SI/HI?  No     Work status: Retired    GENERAL:  No weight loss, malaise or fevers.  HEENT:   No recent changes in vision or hearing  NECK:  Negative for lumps, no difficulty with swallowing.  RESPIRATORY:  Negative for cough, wheezing or shortness of breath, patient denies any recent URI.  CARDIOVASCULAR:  Negative for chest pain, leg swelling or palpitations.  GI:  Negative for abdominal discomfort, blood in stools or black stools or change in bowel habits.  MUSCULOSKELETAL:  See HPI.  SKIN:  Negative for lesions, rash, and itching.  PSYCH:  No mood disorder or recent psychosocial stressors.    HEMATOLOGY/LYMPHOLOGY:  Negative for prolonged bleeding, bruising easily or swollen nodes.    ENDO: No history of diabetes or thyroid dysfunction  NEURO:   No history of headaches, syncope, paralysis, seizures or tremors.  All other reviewed and negative other than HPI.        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 90 tablet 3    aspirin (ECOTRIN) 81 MG EC tablet Take 81 mg by mouth once daily.      b complex vitamins tablet Take 1 tablet by mouth once daily.      cholecalciferol, vitamin D3, 2,000 unit Cap Take 1 capsule by mouth once daily.  6    DULoxetine (CYMBALTA) 30 MG capsule Take 30 mg by mouth once daily.       duloxetine (CYMBALTA) 60 MG capsule Take 60 mg by mouth once daily.   99    fenofibrate 160 MG Tab Take 1 tablet (160 mg total) by mouth once daily. 90 tablet 3    folic acid (FOLVITE) 800 MCG Tab Take  800 mcg by mouth once daily.      furosemide (LASIX) 20 MG tablet       gabapentin (NEURONTIN) 300 MG capsule Take 2 capsules (600 mg total) by mouth 3 (three) times daily. 540 capsule 3    irbesartan (AVAPRO) 150 MG tablet       metoprolol succinate (TOPROL-XL) 100 MG 24 hr tablet       oxyCODONE-acetaminophen (PERCOCET) 5-325 mg per tablet TAKE 1 TABLET BY MOUTH ONCE A DAY AS NEEDED FOR PAIN  0    pramipexole (MIRAPEX) 0.25 MG tablet Take by mouth every evening.       simvastatin (ZOCOR) 10 MG tablet       traMADol (ULTRAM) 50 mg tablet       V-GO 30 Isabel AS DIRECTED , APPLY 1 V-GO DEVICE DAILY  6     No current facility-administered medications for this visit.        Family History   Problem Relation Age of Onset    Cancer Mother     Diabetes Mother     Diabetes Father     Cancer Father        Social History     Socioeconomic History    Marital status:      Spouse name: Not on file    Number of children: Not on file    Years of education: Not on file    Highest education level: Not on file   Occupational History    Not on file   Social Needs    Financial resource strain: Not on file    Food insecurity:     Worry: Not on file     Inability: Not on file    Transportation needs:     Medical: Not on file     Non-medical: Not on file   Tobacco Use    Smoking status: Former Smoker     Last attempt to quit: 1973     Years since quittin.6    Smokeless tobacco: Never Used   Substance and Sexual Activity    Alcohol use: Yes     Comment: occasional    Drug use: No    Sexual activity: Yes     Partners: Female   Lifestyle    Physical activity:     Days per week: Not on file     Minutes per session: Not on file    Stress: Not on file   Relationships    Social connections:     Talks on phone: Not on file     Gets together: Not on file     Attends Temple service: Not on file     Active member of club or organization: Not on file     Attends meetings of clubs or organizations: Not  "on file     Relationship status: Not on file   Other Topics Concern    Not on file   Social History Narrative    Not on file         Objective:       Vitals:    10/02/19 0732   BP: 129/67   Pulse: 83   Resp: 18   Temp: 98.2 °F (36.8 °C)   TempSrc: Oral   Weight: 95 kg (209 lb 7 oz)   Height: 5' 9" (1.753 m)   PainSc:   5       GEN:  Well developed, well nourished.  No acute distress. No pain behavior.  HEENT:  No trauma.  Mucous membranes moist.  Nares patent bilaterally.  PSYCH: Normal affect. Thought content appropriate.  CHEST:  Breathing symmetric.  No audible wheezing.  ABD: Soft, non-tender, non-distended.  SKIN:  Warm, pink, dry.  No rash on exposed areas.    EXT:  No cyanosis, clubbing, or edema.  No color change or changes in nail or hair growth.   NEURO/MUSCULOSKELETAL:  Fully alert, oriented, and appropriate. Speech normal saima. No cranial nerve deficits.   Gait: Antalgic.    Right knee  Inspection:  A fluid collection is noted anterior to the patella.  This is a well circumscribed fluid collection that is nontender. No erythema  ROM:  Decreased to extension  Palpation: TTP over medial and lateral joint space.  Positive crepitus.  No patellar tendon tenderness   No pes anserine tenderness.  No patellofemoral tendon tenderness.  No instability on exam.    Left knee  Inspection: No erythema, swelling, or redness  ROM:  Normal  Palpation:  Mild over joint space.  Positive crepitus.  Negative patellar tendon tenderness   Negative pes anserine tenderness.  Negative patellofemoral tendon tenderness.  No instability on exam.      Previous physical exam:  L-Spine: Decreased ROM in lumbar spine extension.  Minimally positive facet loading on the right.  Negative SLR bilaterally.    Motor Strength: 4/5 in BL HF, 4/5 in BL KE, 5/5 in rest of LE myotomes.    Sensory: No sensory deficit in the lower extremities to light touch.   No clonus or spasticity. Neg Babinski  SI Joint/Hip: Negative KRYSTIAN, negative " Gaenslen's bilaterally.  Negative FADIR bilaterally.  TTP over lumbar paraspinals, with No TTP of bilateral SI joints, hips, piriformis muscles.  GTB on right mildly TTP.    2+ reflexes in BLEs      Imaging:      MRI lumbar spine  April 2013    Impression:  There is a posterior fusion of L3-L5, similar to the comparison exam in March 2012. There is a fluid collection posterior to the L3-L5 levels, also present on the March 15, 2012 exam. This is compatible with a postoperative seroma or pseudomeningocele.    At L1-L2, there is a moderate broad based disc bulge with mild to moderate narrowing of the central spinal canal, similar to March 15, 2012.    At L5-S1, there is a mild broad-based disc bulge/fibrosis with mild bilateral facet hypertrophy resulting in a moderate narrowing of the central spinal canal and narrowing of both lateral recesses, similar to March 15, 2012. There is moderate to severe bilateral neural foraminal narrowing, similar to March 15, 2012.    There are bilateral renal cortical cysts, also present in March 2012, incompletely visualized on this exam.    Narrative     AP, lateral, and sunrise views of both knees were obtained.  The bones are intact.  There is no evidence for acute fracture or bone destruction.  There is prominent chondrocalcinosis identified at the femorotibial joints bilaterally.  There appears to be moderate narrowing of the medial compartment of the right femorotibial joint resulting in mild genu varus deformity.  There is mild spurring of the tibial spines with small osteophytes at the femorotibial and patellofemoral joints.  Small suprapatellar joint effusions are suspected bilaterally.      Impression       Degenerative changes which are more pronounced on the right than on the left with moderate narrowing of the medial compartment of the right femorotibial joint resulting in mild genu varus deformity.  Prominent chondrocalcinosis noted bilaterally.  Small bilateral  suprapatellar joint effusions are suspected.      Electronically signed by: ALBERT MAY MD  Date: 01/13/17  Time: 15:10          Assessment:    Mr. Mccarthy is a 75 yo male who presents today with Low back pain and leg pain    1. Chronic pain disorder    2. Chronic pain of both knees    3. Primary osteoarthritis of both knees    4. DDD (degenerative disc disease), lumbar    5. S/P lumbar spinal fusion    6. Postlaminectomy syndrome of lumbar region    7. Lumbar facet arthropathy    8. History of MRSA infection            Plan:     Diagnoses and all orders for this visit:    Chronic pain disorder  -     bupivacaine (PF) 0.25% (2.5 mg/ml) injection 15 mg  -     betamethasone acetate-betamethasone sodium phosphate injection 12 mg  -     lidocaine HCL 10 mg/ml (1%) injection 3 mL    Chronic pain of both knees    Primary osteoarthritis of both knees    DDD (degenerative disc disease), lumbar    S/P lumbar spinal fusion    Postlaminectomy syndrome of lumbar region    Lumbar facet arthropathy    History of MRSA infection      His pain is consistent with the above.    We discussed the assessment and recommendations.  All available images were reviewed. We discussed the disease process, prognosis, treatment plan, and risks and benefits. The patient is aware of the risks and benefits of the medications being prescribed, common side effects, and proper usage. The following is the plan we agreed on:     1. Bilateral knee injection today as below. The procedure was explained in detail, including risks, benefits, and alternatives.  All questions answered.  Consent obtained today.  1. Can consider radiofrequency ablation if needed  2. Continue treatment with Dr. Forte's office for his low back pain  3. Continue Cymbalta  4. Continue gabapentin  5. RTC in 6-8 weeks or sooner if needed    Date of Procedure: 10/02/2019    Procedure:  Bilateral knee intra-articular injection    Pre-op diagnosis:  Bilateral knee pain    Post-op  diagnosis: Same     Physician: Dr. Makeda Herring     Assistant: Dr. Moran    Anesthestia: Local    EBL: None    Specimens: None    All medications, allergies, and relevant histories were reviewed. No recent antibiotics or infections.  A time-out was taken to verify the correct patient, procedure, laterality, and appropriate medications/allergies.    Intra-articular knee injection:  Bilateral    Pt was advised, consented and had questions answered prior to proceeding.  Pt was placed in the sitting position with the right knee flexed.  Sterile prep over the knee.  Palpation was used to located the knee joint space. A 27g Needle was used to administer 2cc of bicarbonated 1% lidocaine at needle insertion site.  A 22G 1.5 inch needle was advanced from the lateral angle below the patella into the anterior portion of the knee joint.  A mixture of 3 cc of 0.25% bupivacaine with 6 mg betamethasone was injected after negative aspiration.  There was good spread throughout the joint under ultrasound. Needle was removed and a bandage was applied.    The procedure was then repeated in an identical manner on the left.    The patient tolerated the procedure well and was discharged in excellent condition.  No complications noted.     I certify that I provided the above services.  I was present for the entire procedure, which was performed by myself with the assistance of the resident physician.  There were no parts of the procedure that were performed not by myself or without my direct supervision.    I have seen the patient with the resident physician.  I have performed my own history and physical exam and we have come up with the above plan.  The patient is in agreement with our plan. I agree with the above note which I have edited where appropriate.     Makeda Herring MD  10/02/2019

## 2019-11-11 ENCOUNTER — TELEPHONE (OUTPATIENT)
Dept: PAIN MEDICINE | Facility: CLINIC | Age: 79
End: 2019-11-11

## 2019-11-11 NOTE — TELEPHONE ENCOUNTER
LVM advising patient that appointment with Dr. Herring is scheduled in [place]. Patient lives in [place]. Provided contact information if patient would like to reschedule.

## 2020-02-14 ENCOUNTER — OFFICE VISIT (OUTPATIENT)
Dept: CARDIOLOGY | Facility: CLINIC | Age: 80
End: 2020-02-14
Payer: MEDICARE

## 2020-02-14 VITALS
HEART RATE: 73 BPM | HEIGHT: 69 IN | DIASTOLIC BLOOD PRESSURE: 64 MMHG | BODY MASS INDEX: 31.1 KG/M2 | WEIGHT: 210 LBS | SYSTOLIC BLOOD PRESSURE: 131 MMHG | OXYGEN SATURATION: 94 %

## 2020-02-14 DIAGNOSIS — R94.31 NONSPECIFIC ABNORMAL ELECTROCARDIOGRAM (ECG) (EKG): Primary | ICD-10-CM

## 2020-02-14 DIAGNOSIS — E11.43 CONTROLLED TYPE 2 DIABETES MELLITUS WITH DIABETIC AUTONOMIC NEUROPATHY, WITH LONG-TERM CURRENT USE OF INSULIN: ICD-10-CM

## 2020-02-14 DIAGNOSIS — I10 ESSENTIAL HYPERTENSION: ICD-10-CM

## 2020-02-14 DIAGNOSIS — Z79.4 CONTROLLED TYPE 2 DIABETES MELLITUS WITH DIABETIC AUTONOMIC NEUROPATHY, WITH LONG-TERM CURRENT USE OF INSULIN: ICD-10-CM

## 2020-02-14 DIAGNOSIS — R60.0 LOCALIZED EDEMA: ICD-10-CM

## 2020-02-14 DIAGNOSIS — E78.00 PURE HYPERCHOLESTEROLEMIA: ICD-10-CM

## 2020-02-14 PROCEDURE — 93000 ELECTROCARDIOGRAM COMPLETE: CPT | Mod: S$GLB,,, | Performed by: INTERNAL MEDICINE

## 2020-02-14 PROCEDURE — 3075F SYST BP GE 130 - 139MM HG: CPT | Mod: CPTII,S$GLB,, | Performed by: INTERNAL MEDICINE

## 2020-02-14 PROCEDURE — 99213 OFFICE O/P EST LOW 20 MIN: CPT | Mod: 25,S$GLB,, | Performed by: INTERNAL MEDICINE

## 2020-02-14 PROCEDURE — 1159F MED LIST DOCD IN RCRD: CPT | Mod: S$GLB,,, | Performed by: INTERNAL MEDICINE

## 2020-02-14 PROCEDURE — 3078F DIAST BP <80 MM HG: CPT | Mod: CPTII,S$GLB,, | Performed by: INTERNAL MEDICINE

## 2020-02-14 PROCEDURE — 1101F PT FALLS ASSESS-DOCD LE1/YR: CPT | Mod: CPTII,S$GLB,, | Performed by: INTERNAL MEDICINE

## 2020-02-14 PROCEDURE — 99213 PR OFFICE/OUTPT VISIT, EST, LEVL III, 20-29 MIN: ICD-10-PCS | Mod: 25,S$GLB,, | Performed by: INTERNAL MEDICINE

## 2020-02-14 PROCEDURE — 1159F PR MEDICATION LIST DOCUMENTED IN MEDICAL RECORD: ICD-10-PCS | Mod: S$GLB,,, | Performed by: INTERNAL MEDICINE

## 2020-02-14 PROCEDURE — 93000 EKG 12-LEAD: ICD-10-PCS | Mod: S$GLB,,, | Performed by: INTERNAL MEDICINE

## 2020-02-14 PROCEDURE — 3078F PR MOST RECENT DIASTOLIC BLOOD PRESSURE < 80 MM HG: ICD-10-PCS | Mod: CPTII,S$GLB,, | Performed by: INTERNAL MEDICINE

## 2020-02-14 PROCEDURE — 1101F PR PT FALLS ASSESS DOC 0-1 FALLS W/OUT INJ PAST YR: ICD-10-PCS | Mod: CPTII,S$GLB,, | Performed by: INTERNAL MEDICINE

## 2020-02-14 PROCEDURE — 3075F PR MOST RECENT SYSTOLIC BLOOD PRESS GE 130-139MM HG: ICD-10-PCS | Mod: CPTII,S$GLB,, | Performed by: INTERNAL MEDICINE

## 2020-02-14 NOTE — PROGRESS NOTES
Subjective:      Patient ID: Frandy Mccarthy Jr. is a 79 y.o. male.    Chief Complaint: Follow-up    HPI:  Feels well.  Walks around the block with walker.  Pt has neuropathy in legs and chronic low back pain following back surgery    Review of Systems   Cardiovascular: Positive for dyspnea on exertion (chronic, stable) and leg swelling (chrnic, improved with support stockings). Negative for chest pain, claudication, irregular heartbeat, near-syncope, orthopnea, palpitations and syncope.        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        Past Surgical History:   Procedure Laterality Date    BACK SURGERY      x 4    FRACTURE SURGERY      right thigh    HERNIA REPAIR      SPINE SURGERY         Family History   Problem Relation Age of Onset    Cancer Mother     Diabetes Mother     Diabetes Father     Cancer Father        Social History     Socioeconomic History    Marital status:      Spouse name: Not on file    Number of children: Not on file    Years of education: Not on file    Highest education level: Not on file   Occupational History    Not on file   Social Needs    Financial resource strain: Not on file    Food insecurity:     Worry: Not on file     Inability: Not on file    Transportation needs:     Medical: Not on file     Non-medical: Not on file   Tobacco Use    Smoking status: Former Smoker     Last attempt to quit: 1973     Years since quittin.9    Smokeless tobacco: Never Used   Substance and Sexual Activity    Alcohol use: Yes     Comment: occasional    Drug use: No    Sexual activity: Yes     Partners: Female   Lifestyle    Physical activity:     Days per week: Not on file     Minutes per session: Not on file    Stress: Not on file   Relationships    Social connections:     Talks on phone: Not on file     Gets together: Not on  "file     Attends [employer] service: Not on file     Active member of club or organization: Not on file     Attends meetings of clubs or organizations: Not on file     Relationship status: Not on file   Other Topics Concern    Not on file   Social History Narrative    Not on file       Current Outpatient Medications on File Prior to Visit   Medication Sig Dispense Refill    amLODIPine (NORVASC) 5 MG tablet TAKE 1 TABLET (5 MG TOTAL) BY MOUTH ONCE DAILY. 90 tablet 3    b complex vitamins tablet Take 1 tablet by mouth once daily.      cholecalciferol, vitamin D3, 2,000 unit Cap Take 1 capsule by mouth once daily.  6    DULoxetine (CYMBALTA) 30 MG capsule Take 30 mg by mouth once daily.       duloxetine (CYMBALTA) 60 MG capsule Take 60 mg by mouth once daily.   99    fenofibrate 160 MG Tab Take 1 tablet (160 mg total) by mouth once daily. 90 tablet 3    folic acid (FOLVITE) 800 MCG Tab Take 800 mcg by mouth once daily.      furosemide (LASIX) 20 MG tablet       gabapentin (NEURONTIN) 300 MG capsule Take 2 capsules (600 mg total) by mouth 3 (three) times daily. 540 capsule 3    irbesartan (AVAPRO) 150 MG tablet       metoprolol succinate (TOPROL-XL) 100 MG 24 hr tablet       oxyCODONE-acetaminophen (PERCOCET) 5-325 mg per tablet TAKE 1 TABLET BY MOUTH ONCE A DAY AS NEEDED FOR PAIN  0    pramipexole (MIRAPEX) 0.25 MG tablet Take by mouth every evening.       simvastatin (ZOCOR) 10 MG tablet       traMADol (ULTRAM) 50 mg tablet       V-GO 30 Isabel AS DIRECTED , APPLY 1 V-GO DEVICE DAILY  6    [DISCONTINUED] aspirin (ECOTRIN) 81 MG EC tablet Take 81 mg by mouth once daily.       No current facility-administered medications on file prior to visit.        Review of patient's allergies indicates:  No Known Allergies  Objective:     Vitals:    02/14/20 1043   BP: 131/64   BP Location: Left arm   Patient Position: Sitting   Pulse: 73   SpO2: (!) 94%   Weight: 95.3 kg (210 lb)   Height: 5' 9" (1.753 m)    "     Physical Exam   Constitutional: He is oriented to person, place, and time. He appears well-developed and well-nourished. No distress.   Eyes: No scleral icterus.   Neck: No JVD present. Carotid bruit is not present.   Cardiovascular: Regular rhythm and normal heart sounds. Exam reveals no gallop and no friction rub.   No murmur heard.  Pulmonary/Chest: Effort normal and breath sounds normal. No respiratory distress.   Musculoskeletal: He exhibits edema (trace bilateral pedal edema).   Neurological: He is alert and oriented to person, place, and time.   Skin: Skin is warm and dry. He is not diaphoretic.   Psychiatric: He has a normal mood and affect. His behavior is normal. Judgment and thought content normal.   Vitals reviewed.     ECG: NSR, poor R wave progression in precordial leads (possible anterior scar), unchanged      Carotid ultrasound 2018 at [facility]    CXR in 2017 at [facility]    Assessment:     1. Nonspecific abnormal electrocardiogram (ECG) (EKG)    2. Pure hypercholesterolemia    3. Essential hypertension    4. Controlled type 2 diabetes mellitus with diabetic autonomic neuropathy, with long-term current use of insulin    5. Localized edema      Plan:   Frandy was seen today for follow-up.    Diagnoses and all orders for this visit:    Nonspecific abnormal electrocardiogram (ECG) (EKG)  -     IN OFFICE EKG 12-LEAD (to [facility])    Pure hypercholesterolemia    Essential hypertension  -     IN OFFICE EKG 12-LEAD (to [place])    Controlled type 2 diabetes mellitus with diabetic autonomic neuropathy, with long-term current use of insulin    Localized edema     Same meds    Edema due to amlodipine and pramipexole    Abnormal ECG of uncertain significance.  Stress echo in 2015 and Cardiolite stress test at [facility] in 2017 [place]    RTC 6 months    F/u with Dr Caicedo who checks labs    F/u with Dr Segura and ZIGGY Pulido for DM  (pt is on novalog and metformin)    Follow up in about 6 months (around 8/14/2020).

## 2020-04-01 ENCOUNTER — OFFICE VISIT (OUTPATIENT)
Dept: URGENT CARE | Facility: CLINIC | Age: 80
End: 2020-04-01
Payer: MEDICARE

## 2020-04-01 VITALS
RESPIRATION RATE: 18 BRPM | HEART RATE: 73 BPM | BODY MASS INDEX: 31.1 KG/M2 | DIASTOLIC BLOOD PRESSURE: 62 MMHG | OXYGEN SATURATION: 95 % | HEIGHT: 69 IN | SYSTOLIC BLOOD PRESSURE: 122 MMHG | TEMPERATURE: 98 F | WEIGHT: 210 LBS
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
## 2020-04-01 DIAGNOSIS — S63.501A SPRAIN OF RIGHT WRIST, INITIAL ENCOUNTER: Primary | ICD-10-CM

## 2020-04-01 PROCEDURE — 73130 X-RAY EXAM OF HAND: CPT | Mod: RT,S$GLB,, | Performed by: RADIOLOGY

## 2020-04-01 PROCEDURE — 99203 OFFICE O/P NEW LOW 30 MIN: CPT | Mod: S$GLB,,, | Performed by: NURSE PRACTITIONER

## 2020-04-01 PROCEDURE — 73130 XR HAND COMPLETE 3 VIEW RIGHT: ICD-10-PCS | Mod: RT,S$GLB,, | Performed by: RADIOLOGY

## 2020-04-01 PROCEDURE — 73110 XR WRIST COMPLETE 3 VIEWS RIGHT: ICD-10-PCS | Mod: RT,S$GLB,, | Performed by: RADIOLOGY

## 2020-04-01 PROCEDURE — 99203 PR OFFICE/OUTPT VISIT, NEW, LEVL III, 30-44 MIN: ICD-10-PCS | Mod: S$GLB,,, | Performed by: NURSE PRACTITIONER

## 2020-04-01 PROCEDURE — 73110 X-RAY EXAM OF WRIST: CPT | Mod: RT,S$GLB,, | Performed by: RADIOLOGY

## 2020-04-01 RX ORDER — SUB-Q INSULIN DEVICE, 40 UNIT
EACH MISCELLANEOUS
COMMUNITY
Start: 2020-03-21 | End: 2022-10-17

## 2020-04-01 RX ORDER — INSULIN ASPART 100 [IU]/ML
INJECTION, SUSPENSION SUBCUTANEOUS
COMMUNITY
End: 2020-06-26

## 2020-04-01 NOTE — PATIENT INSTRUCTIONS
Wrist brace as directed.  Written prescription to any medical supply store OR otc wrist brace is fine.  Rest.  Elevation to reduce swelling.  Ice to the area every 2-4 hrs for 20 mins at a time to reduce swelling.  Tylenol arthritis as needed for pain, take otc as directed.  Follow up with PCP or Ortho if symptoms persist.  Go to the ER or return here for any rapidly worsening symptoms.  Wrist Sprain  A sprain is an injury to the ligaments or capsule that holds a joint together. There are no broken bones. Most sprains take about 3 to 6 weeks to heal. If it a severe sprain where the ligament is completely torn, it can take months to recover.     Most wrist sprains are treated with a splint, wrist brace, or elastic wrap for support. Severe sprains may require surgery.  Home care  · Keep your arm elevated to reduce pain and swelling. This is very important during the first 48 hours.  · Apply an ice pack over the injured area for 15 to 20 minutes every 3 to 6 hours. You should do this for the first 24 to 48 hours. You can make an ice pack by filling a plastic bag that seals at the top with ice cubes and then wrapping it with a thin towel. Continue to use ice packs for relief of pain and swelling as needed. As the ice melts, be careful to avoid getting your wrap, splint, or cast wet. After 48 hours, apply heat (warm shower or warm bath) for 15 to 20 minutes several times a day, or alternate ice and heat.   · You may use over-the-counter pain medicine to control pain, unless another pain medicine was prescribed. If you have chronic liver or kidney disease or ever had a stomach ulcer or GI bleeding, talk with your doctor before using these medicines.  · If you were given a splint or brace, wear it for the time advised by your doctor.  Follow-up care  Follow up with your healthcare provider as advised. Any X-rays you had today dont show any broken bones, breaks, or fractures. Sometimes fractures dont show up on the first  X-ray. Bruises and sprains can sometimes hurt as much as a fracture. These injuries can take time to heal completely. If your symptoms dont improve or they get worse, talk with your doctor. You may need a repeat X-ray. If X-rays were taken, you will be told of any new findings that may affect your care.  When to seek medical advice  Call your healthcare provider right away if any of these occur:  · Pain or swelling increases  · Fingers or hand becomes cold, blue, numb, or tingly  Date Last Reviewed: 11/20/2015  © 9035-4571 [employer]. [address] [place] [address], [place] [address]. All rights reserved. This information is not intended as a substitute for professional medical care. Always follow your healthcare professional's instructions.

## 2020-04-01 NOTE — PROGRESS NOTES
"Subjective:       Patient ID: Frandy Mccarthy Jr. is a 80 y.o. male.    Vitals:  height is 5' 9" (1.753 m) and weight is 95.3 kg (210 lb). His tympanic temperature is 97.6 °F (36.4 °C). His blood pressure is 122/62 and his pulse is 73. His respiration is 18 and oxygen saturation is 95%.     Chief Complaint: Wrist Pain    Patient presents with pain and swelling to right wrist.  Yesterday he was getting out of his truck and accidentally tripped and fell with his right wrist outstretched bracing his fall onto sidewalk.  Pain to right wrist and     Wrist Pain    The pain is present in the right wrist. This is a new problem. The current episode started yesterday. The problem occurs constantly. The pain is at a severity of 9/10. Pertinent negatives include no fever. The symptoms are aggravated by activity. He has tried nothing for the symptoms. His past medical history is significant for diabetes and rheumatoid arthritis.       Constitution: Negative for chills, fatigue and fever.   HENT: Negative for congestion and sore throat.    Neck: Negative for painful lymph nodes.   Cardiovascular: Negative for chest pain and leg swelling.   Eyes: Negative for double vision and blurred vision.   Respiratory: Negative for cough and shortness of breath.    Gastrointestinal: Negative for nausea, vomiting and diarrhea.   Genitourinary: Negative for dysuria, frequency and urgency.   Musculoskeletal: Positive for joint pain and joint swelling. Negative for muscle cramps and muscle ache.   Skin: Negative for color change, pale, rash and erythema.   Allergic/Immunologic: Negative for seasonal allergies.   Neurological: Negative for dizziness, history of vertigo, light-headedness, passing out and headaches.   Hematologic/Lymphatic: Negative for swollen lymph nodes, easy bruising/bleeding and history of blood clots. Does not bruise/bleed easily.   Psychiatric/Behavioral: Negative for nervous/anxious, sleep disturbance and depression. The " patient is not nervous/anxious.        Objective:      Physical Exam   Constitutional: He is oriented to person, place, and time. He appears well-developed and well-nourished. No distress.   HENT:   Head: Normocephalic and atraumatic. Head is without abrasion, without contusion and without laceration.   Right Ear: External ear normal.   Left Ear: External ear normal.   Nose: Nose normal.   Mouth/Throat: Oropharynx is clear and moist and mucous membranes are normal.   Eyes: Pupils are equal, round, and reactive to light. Conjunctivae, EOM and lids are normal.   Neck: Trachea normal, full passive range of motion without pain and phonation normal. Neck supple.   Cardiovascular: Normal rate, regular rhythm, normal heart sounds and intact distal pulses.   Pulmonary/Chest: Effort normal and breath sounds normal. No stridor. No respiratory distress.   Musculoskeletal:        Right wrist: He exhibits decreased range of motion, tenderness and swelling. He exhibits no bony tenderness, no effusion, no crepitus, no deformity and no laceration.        Right hand: He exhibits tenderness. He exhibits normal range of motion, no bony tenderness, normal two-point discrimination, normal capillary refill, no deformity, no laceration and no swelling. Normal sensation noted. Decreased sensation is not present in the ulnar distribution, is not present in the medial distribution and is not present in the radial distribution. Normal strength noted.   Neurological: He is alert and oriented to person, place, and time.   Skin: Skin is warm, dry, intact, not diaphoretic and no rash. Capillary refill takes less than 2 seconds. abrasion, burn, bruising, erythema and ecchymosis  Psychiatric: He has a normal mood and affect. His speech is normal and behavior is normal. Judgment and thought content normal. Cognition and memory are normal.   Nursing note and vitals reviewed.  X-ray Wrist Complete Right    Result Date: 4/1/2020  EXAMINATION: XR WRIST  COMPLETE 3 VIEWS RIGHT CLINICAL HISTORY: Unspecified fall, initial encounter FINDINGS: Three hands: There is DJD and TFCC calcification.  No fracture dislocation bone destruction seen.  No trauma seen. Electronically signed by: Efra Wright MD Date:    04/01/2020 Time:    14:14    Xr Hand Complete 3 View Right    Result Date: 4/1/2020  EXAMINATION: XR HAND COMPLETE 3 VIEW RIGHT CLINICAL HISTORY: Unspecified fall, initial encounter FINDINGS: Three views: There is TFCC calcification.  There is mild DJD.  No fracture, dislocation or bone destruction seen.  No trauma seen. Electronically signed by: Efra Wright MD Date:    04/01/2020 Time:    14:14      Assessment:       1. Sprain of right wrist, initial encounter    2. Fall, initial encounter        Plan:       No wrist brace available in clinic.    Written rx for ortho supply store or otc wrist brace.   No fracture seen on xray.  Sprain of right wrist, initial encounter  -     Cancel: WRIST BRACE FOR HOME USE    Fall, initial encounter  -     X-Ray Wrist Complete Right; Future; Expected date: 04/01/2020  -     XR HAND COMPLETE 3 VIEW RIGHT; Future; Expected date: 04/01/2020      Patient Instructions   Wrist brace as directed.  Written prescription to any medical supply store OR otc wrist brace is fine.  Rest.  Elevation to reduce swelling.  Ice to the area every 2-4 hrs for 20 mins at a time to reduce swelling.  Tylenol arthritis as needed for pain, take otc as directed.  Follow up with PCP or Ortho if symptoms persist.  Go to the ER or return here for any rapidly worsening symptoms.  Wrist Sprain  A sprain is an injury to the ligaments or capsule that holds a joint together. There are no broken bones. Most sprains take about 3 to 6 weeks to heal. If it a severe sprain where the ligament is completely torn, it can take months to recover.     Most wrist sprains are treated with a splint, wrist brace, or elastic wrap for support. Severe sprains may require  surgery.  Home care  · Keep your arm elevated to reduce pain and swelling. This is very important during the first 48 hours.  · Apply an ice pack over the injured area for 15 to 20 minutes every 3 to 6 hours. You should do this for the first 24 to 48 hours. You can make an ice pack by filling a plastic bag that seals at the top with ice cubes and then wrapping it with a thin towel. Continue to use ice packs for relief of pain and swelling as needed. As the ice melts, be careful to avoid getting your wrap, splint, or cast wet. After 48 hours, apply heat (warm shower or warm bath) for 15 to 20 minutes several times a day, or alternate ice and heat.   · You may use over-the-counter pain medicine to control pain, unless another pain medicine was prescribed. If you have chronic liver or kidney disease or ever had a stomach ulcer or GI bleeding, talk with your doctor before using these medicines.  · If you were given a splint or brace, wear it for the time advised by your doctor.  Follow-up care  Follow up with your healthcare provider as advised. Any X-rays you had today dont show any broken bones, breaks, or fractures. Sometimes fractures dont show up on the first X-ray. Bruises and sprains can sometimes hurt as much as a fracture. These injuries can take time to heal completely. If your symptoms dont improve or they get worse, talk with your doctor. You may need a repeat X-ray. If X-rays were taken, you will be told of any new findings that may affect your care.  When to seek medical advice  Call your healthcare provider right away if any of these occur:  · Pain or swelling increases  · Fingers or hand becomes cold, blue, numb, or tingly  Date Last Reviewed: 11/20/2015  © 8125-6866 [employer]. [address] [place] [address], [place] [address]. All rights reserved. This information is not intended as a substitute for professional medical care. Always follow your healthcare professional's  instructions.

## 2020-06-26 ENCOUNTER — OFFICE VISIT (OUTPATIENT)
Dept: CARDIOLOGY | Facility: CLINIC | Age: 80
End: 2020-06-26
Payer: MEDICARE

## 2020-06-26 VITALS
WEIGHT: 202 LBS | OXYGEN SATURATION: 95 % | SYSTOLIC BLOOD PRESSURE: 136 MMHG | HEART RATE: 73 BPM | DIASTOLIC BLOOD PRESSURE: 70 MMHG | BODY MASS INDEX: 29.83 KG/M2

## 2020-06-26 DIAGNOSIS — R09.89 RIGHT CAROTID BRUIT: ICD-10-CM

## 2020-06-26 DIAGNOSIS — Z01.810 PRE-OPERATIVE CARDIOVASCULAR EXAMINATION: Primary | ICD-10-CM

## 2020-06-26 DIAGNOSIS — I10 ESSENTIAL HYPERTENSION: ICD-10-CM

## 2020-06-26 DIAGNOSIS — R94.31 NONSPECIFIC ABNORMAL ELECTROCARDIOGRAM (ECG) (EKG): ICD-10-CM

## 2020-06-26 PROCEDURE — 99215 OFFICE O/P EST HI 40 MIN: CPT | Mod: S$GLB,,, | Performed by: INTERNAL MEDICINE

## 2020-06-26 PROCEDURE — 3075F SYST BP GE 130 - 139MM HG: CPT | Mod: CPTII,S$GLB,, | Performed by: INTERNAL MEDICINE

## 2020-06-26 PROCEDURE — 99999 PR PBB SHADOW E&M-EST. PATIENT-LVL V: CPT | Mod: PBBFAC,,, | Performed by: INTERNAL MEDICINE

## 2020-06-26 PROCEDURE — 99215 PR OFFICE/OUTPT VISIT, EST, LEVL V, 40-54 MIN: ICD-10-PCS | Mod: S$GLB,,, | Performed by: INTERNAL MEDICINE

## 2020-06-26 PROCEDURE — 1101F PR PT FALLS ASSESS DOC 0-1 FALLS W/OUT INJ PAST YR: ICD-10-PCS | Mod: CPTII,S$GLB,, | Performed by: INTERNAL MEDICINE

## 2020-06-26 PROCEDURE — 1101F PT FALLS ASSESS-DOCD LE1/YR: CPT | Mod: CPTII,S$GLB,, | Performed by: INTERNAL MEDICINE

## 2020-06-26 PROCEDURE — 3078F DIAST BP <80 MM HG: CPT | Mod: CPTII,S$GLB,, | Performed by: INTERNAL MEDICINE

## 2020-06-26 PROCEDURE — 99999 PR PBB SHADOW E&M-EST. PATIENT-LVL V: ICD-10-PCS | Mod: PBBFAC,,, | Performed by: INTERNAL MEDICINE

## 2020-06-26 PROCEDURE — 3075F PR MOST RECENT SYSTOLIC BLOOD PRESS GE 130-139MM HG: ICD-10-PCS | Mod: CPTII,S$GLB,, | Performed by: INTERNAL MEDICINE

## 2020-06-26 PROCEDURE — 1126F AMNT PAIN NOTED NONE PRSNT: CPT | Mod: S$GLB,,, | Performed by: INTERNAL MEDICINE

## 2020-06-26 PROCEDURE — 1159F MED LIST DOCD IN RCRD: CPT | Mod: S$GLB,,, | Performed by: INTERNAL MEDICINE

## 2020-06-26 PROCEDURE — 1159F PR MEDICATION LIST DOCUMENTED IN MEDICAL RECORD: ICD-10-PCS | Mod: S$GLB,,, | Performed by: INTERNAL MEDICINE

## 2020-06-26 PROCEDURE — 3078F PR MOST RECENT DIASTOLIC BLOOD PRESSURE < 80 MM HG: ICD-10-PCS | Mod: CPTII,S$GLB,, | Performed by: INTERNAL MEDICINE

## 2020-06-26 PROCEDURE — 1126F PR PAIN SEVERITY QUANTIFIED, NO PAIN PRESENT: ICD-10-PCS | Mod: S$GLB,,, | Performed by: INTERNAL MEDICINE

## 2020-06-26 RX ORDER — SIMVASTATIN 20 MG/1
20 TABLET, FILM COATED ORAL DAILY
COMMUNITY
Start: 2020-06-06

## 2020-06-26 RX ORDER — INSULIN LISPRO 100 [IU]/ML
INJECTION, SOLUTION INTRAVENOUS; SUBCUTANEOUS
Status: ON HOLD | COMMUNITY
Start: 2020-05-30 | End: 2022-11-07 | Stop reason: HOSPADM

## 2020-06-26 RX ORDER — TAMSULOSIN HYDROCHLORIDE 0.4 MG/1
0.4 CAPSULE ORAL DAILY
COMMUNITY

## 2020-06-26 NOTE — PROGRESS NOTES
[facility]    Chief Complaint  Chief Complaint   Patient presents with    Pre-op Exam       HPI:    Former patient of Dr. Cho presents today for preoperative evaluation.  He is scheduled to have a right knee replacement by Dr. Doty at [facility].  He reports that he has been stable.  His activities are very limited because of his knee and back pain.  He walks short distances.  He has stable exertional dyspnea when he walks.  He has no associated chest discomfort.  Last surgery was on his back about 3 years ago, he had no perioperative problems.    Medications  Current Outpatient Medications   Medication Sig Dispense Refill    b complex vitamins tablet Take 1 tablet by mouth once daily.      cholecalciferol, vitamin D3, 2,000 unit Cap Take 1 capsule by mouth once daily.  6    DULoxetine (CYMBALTA) 30 MG capsule Take 30 mg by mouth once daily.       duloxetine (CYMBALTA) 60 MG capsule Take 60 mg by mouth once daily.   99    fenofibrate 160 MG Tab Take 1 tablet (160 mg total) by mouth once daily. 90 tablet 3    folic acid (FOLVITE) 800 MCG Tab Take 800 mcg by mouth once daily.      furosemide (LASIX) 20 MG tablet       HUMALOG U-100 INSULIN 100 unit/mL injection TAKE AS DIRECTED  HUMALOG / VIA V GO 40 / 6 CLICKS DAY      irbesartan (AVAPRO) 150 MG tablet Take 150 mg by mouth once daily.       metoprolol succinate (TOPROL-XL) 100 MG 24 hr tablet Take 100 mg by mouth once daily.       oxyCODONE-acetaminophen (PERCOCET) 5-325 mg per tablet TAKE 1 TABLET BY MOUTH ONCE A DAY AS NEEDED FOR PAIN  0    pramipexole (MIRAPEX) 0.25 MG tablet Take by mouth every evening.       simvastatin (ZOCOR) 20 MG tablet Take 20 mg by mouth once daily.      tamsulosin (FLOMAX) 0.4 mg Cap Take 0.4 mg by mouth once daily.      traMADol (ULTRAM) 50 mg tablet       V-GO 30 Isabel AS DIRECTED , APPLY 1 V-GO DEVICE DAILY  6    V-GO 40 Isabel APPLY ONE VGO40 DAILY      amLODIPine (NORVASC) 5 MG tablet TAKE 1 TABLET  (5 MG TOTAL) BY MOUTH ONCE DAILY. 90 tablet 3    gabapentin (NEURONTIN) 300 MG capsule Take 2 capsules (600 mg total) by mouth 3 (three) times daily. 540 capsule 3     No current facility-administered medications for this visit.       Prior to Admission medications    Medication Sig Start Date End Date Taking? Authorizing Provider   b complex vitamins tablet Take 1 tablet by mouth once daily.   Yes Historical Provider, MD   cholecalciferol, vitamin D3, 2,000 unit Cap Take 1 capsule by mouth once daily. 7/2/18  Yes Historical Provider, MD   DULoxetine (CYMBALTA) 30 MG capsule Take 30 mg by mouth once daily.  1/17/19  Yes Historical Provider, MD   duloxetine (CYMBALTA) 60 MG capsule Take 60 mg by mouth once daily.  5/24/16  Yes Historical Provider, MD   fenofibrate 160 MG Tab Take 1 tablet (160 mg total) by mouth once daily. 2/4/19  Yes Marcos Cho MD   folic acid (FOLVITE) 800 MCG Tab Take 800 mcg by mouth once daily.   Yes Historical Provider, MD   furosemide (LASIX) 20 MG tablet  1/25/19  Yes Historical Provider, MD   HUMALOG U-100 INSULIN 100 unit/mL injection TAKE AS DIRECTED  HUMALOG / VIA V GO 40 / 6 CLICKS DAY 5/30/20  Yes Historical Provider, MD   irbesartan (AVAPRO) 150 MG tablet Take 150 mg by mouth once daily.  1/25/19  Yes Historical Provider, MD   metoprolol succinate (TOPROL-XL) 100 MG 24 hr tablet Take 100 mg by mouth once daily.  1/25/19  Yes Historical Provider, MD   oxyCODONE-acetaminophen (PERCOCET) 5-325 mg per tablet TAKE 1 TABLET BY MOUTH ONCE A DAY AS NEEDED FOR PAIN 5/31/18  Yes Historical Provider, MD   pramipexole (MIRAPEX) 0.25 MG tablet Take by mouth every evening.  1/12/18  Yes Historical Provider, MD   simvastatin (ZOCOR) 20 MG tablet Take 20 mg by mouth once daily. 6/6/20  Yes Historical Provider, MD   tamsulosin (FLOMAX) 0.4 mg Cap Take 0.4 mg by mouth once daily.   Yes Historical Provider, MD   traMADol (ULTRAM) 50 mg tablet  1/17/19  Yes Historical Provider, MD   V-GO 30  Isabel AS DIRECTED , APPLY 1 V-GO DEVICE DAILY 19  Yes Historical Provider, MD   V-GO 40 Isabel APPLY ONE VGO40 DAILY 3/21/20  Yes Historical Provider, MD   amLODIPine (NORVASC) 5 MG tablet TAKE 1 TABLET (5 MG TOTAL) BY MOUTH ONCE DAILY. 18  Marcos Cho MD   gabapentin (NEURONTIN) 300 MG capsule Take 2 capsules (600 mg total) by mouth 3 (three) times daily. 10/9/18 2/14/20  Makeda Herring MD   insulin aspart protamine-insulin aspart (NOVOLOG 70/30) 100 unit/mL (70-30) InPn pen Inject into the skin 2 (two) times daily before meals.  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     Past Surgical History:   Procedure Laterality Date    BACK SURGERY      x 4    FRACTURE SURGERY      right thigh    HERNIA REPAIR      SPINE SURGERY       Social History     Socioeconomic History    Marital status:      Spouse name: Not on file    Number of children: Not on file    Years of education: Not on file    Highest education level: Not on file   Occupational History    Not on file   Social Needs    Financial resource strain: Not on file    Food insecurity     Worry: Not on file     Inability: Not on file    Transportation needs     Medical: Not on file     Non-medical: Not on file   Tobacco Use    Smoking status: Former Smoker     Quit date: 1973     Years since quittin.3    Smokeless tobacco: Never Used   Substance and Sexual Activity    Alcohol use: Yes     Comment: occasional    Drug use: No    Sexual activity: Yes     Partners: Female   Lifestyle    Physical activity     Days per week: Not on file     Minutes per session: Not on file    Stress: Not on file   Relationships    Social connections     Talks on phone: Not  on file     Gets together: Not on file     Attends Jew service: Not on file     Active member of club or organization: Not on file     Attends meetings of clubs or organizations: Not on file     Relationship status: Not on file   Other Topics Concern    Not on file   Social History Narrative    Not on file       Allergies  Review of patient's allergies indicates:  No Known Allergies    Review of Systems   Review of Systems   Constitution: Negative for malaise/fatigue, weight gain and weight loss.   Eyes: Negative for visual disturbance.   Cardiovascular: Positive for dyspnea on exertion and leg swelling (Chronic ankle edema is unchanged.). Negative for chest pain, claudication, cyanosis, irregular heartbeat, near-syncope, orthopnea, palpitations, paroxysmal nocturnal dyspnea and syncope.   Respiratory: Negative for cough, hemoptysis, shortness of breath, sleep disturbances due to breathing and wheezing.    Hematologic/Lymphatic: Negative for bleeding problem. Does not bruise/bleed easily.   Skin: Negative for poor wound healing.   Musculoskeletal: Positive for back pain and joint pain. Negative for muscle cramps and myalgias.   Gastrointestinal: Negative for abdominal pain, anorexia, diarrhea, heartburn, hematemesis, hematochezia, melena, nausea and vomiting.   Genitourinary: Negative for hematuria and nocturia.   Neurological: Negative for excessive daytime sleepiness, dizziness, focal weakness, light-headedness and weakness.       Physical Exam  Vitals:    06/26/20 0907   BP: 136/70   Pulse: 73     Wt Readings from Last 1 Encounters:   06/26/20 91.6 kg (202 lb)     Physical Exam   Constitutional: He is oriented to person, place, and time. He is cooperative.  Non-toxic appearance. No distress.   HENT:   Head: Normocephalic and atraumatic.   Eyes: Conjunctivae are normal. No scleral icterus.   Neck: Neck supple. No hepatojugular reflux and no JVD present. No tracheal deviation present. No thyromegaly  present.   Cardiovascular: Normal rate, regular rhythm, S1 normal and S2 normal.  No extrasystoles are present. PMI is not displaced. Exam reveals gallop and S4. Exam reveals no S3.   No murmur heard.  Pulses:       Carotid pulses are 2+ on the right side with bruit and 2+ on the left side.       Radial pulses are 2+ on the right side and 2+ on the left side.   Pulmonary/Chest: No accessory muscle usage. No respiratory distress. He has no decreased breath sounds. He has no wheezes. He has no rhonchi. He has no rales.   Abdominal: Soft. Bowel sounds are normal. He exhibits no pulsatile liver, no abdominal bruit and no pulsatile midline mass. There is no splenomegaly or hepatomegaly. There is no abdominal tenderness.   Musculoskeletal:         General: Edema (Trace bilateral ankle edema.) present. No tenderness or deformity.   Neurological: He is alert and oriented to person, place, and time. He has normal strength. No cranial nerve deficit or sensory deficit.   Skin: Skin is warm, dry and intact. He is not diaphoretic. No cyanosis. No pallor. Nails show no clubbing.   Psychiatric: He has a normal mood and affect. His speech is normal and behavior is normal.       Labs  No visits with results within 6 Month(s) from this visit.   Latest known visit with results is:   Admission on 03/23/2018, Discharged on 03/23/2018   Component Date Value Ref Range Status    POCT Glucose 03/23/2018 291* 70 - 110 mg/dL Final       Imaging  No results found.    Assessment  1. Pre-operative cardiovascular examination  - Nuclear Stress - Cardiology Interpreted; Future    2. Right carotid bruit  New  - CV Ultrasound Bilateral Doppler Carotid; Future    3. Essential hypertension  Controlled      Plan and Discussion    The patient has diabetes and a low functional capacity with exertional symptoms.  Needs a preoperative stress test.  Provided that is negative for ischemia, he should be an acceptable intermediate risk candidate for the  planned procedure and no additional workup would be indicated.    Follow Up  Follow up in about 6 months (around 12/26/2020).      Lonny Corona MD

## 2020-07-02 ENCOUNTER — HOSPITAL ENCOUNTER (OUTPATIENT)
Dept: CARDIOLOGY | Facility: OTHER | Age: 80
Discharge: HOME | End: 2020-07-02
Attending: INTERNAL MEDICINE
Payer: MEDICARE

## 2020-07-02 ENCOUNTER — HOSPITAL ENCOUNTER (OUTPATIENT)
Dept: RADIOLOGY | Facility: OTHER | Age: 80
Discharge: HOME | End: 2020-07-02
Attending: INTERNAL MEDICINE
Payer: MEDICARE

## 2020-07-02 VITALS
HEIGHT: 69 IN | BODY MASS INDEX: 29.92 KG/M2 | DIASTOLIC BLOOD PRESSURE: 68 MMHG | WEIGHT: 202 LBS | HEART RATE: 66 BPM | SYSTOLIC BLOOD PRESSURE: 136 MMHG

## 2020-07-02 DIAGNOSIS — R09.89 RIGHT CAROTID BRUIT: ICD-10-CM

## 2020-07-02 DIAGNOSIS — Z01.810 PRE-OPERATIVE CARDIOVASCULAR EXAMINATION: ICD-10-CM

## 2020-07-02 DIAGNOSIS — R94.31 NONSPECIFIC ABNORMAL ELECTROCARDIOGRAM (ECG) (EKG): ICD-10-CM

## 2020-07-02 LAB
CV STRESS BASE HR: 66 BPM
DIASTOLIC BLOOD PRESSURE: 68 MMHG
LEFT CCA DIST DIAS: 20 CM/S
LEFT CCA DIST SYS: 97 CM/S
LEFT CCA PROX DIAS: 15 CM/S
LEFT CCA PROX SYS: 116 CM/S
LEFT ECA SYS: 230 CM/S
LEFT ICA DIST DIAS: 24 CM/S
LEFT ICA DIST SYS: 61 CM/S
LEFT ICA MID DIAS: 21 CM/S
LEFT ICA MID SYS: 63 CM/S
LEFT ICA PROX DIAS: 19 CM/S
LEFT ICA PROX SYS: 76 CM/S
LEFT VERTEBRAL DIAS: 10 CM/S
LEFT VERTEBRAL SYS: 42 CM/S
NUC REST EJECTION FRACTION: 72
NUC STRESS EJECTION FRACTION: 79 %
OHS CV CAROTID RIGHT ICA EDV HIGHEST: 40
OHS CV CAROTID ULTRASOUND LEFT ICA/CCA RATIO: 0.66
OHS CV CAROTID ULTRASOUND RIGHT ICA/CCA RATIO: 1.73
OHS CV CPX 85 PERCENT MAX PREDICTED HEART RATE MALE: 118
OHS CV CPX MAX PREDICTED HEART RATE: 139
OHS CV CPX PATIENT IS FEMALE: 0
OHS CV CPX PATIENT IS MALE: 1
OHS CV CPX PEAK DIASTOLIC BLOOD PRESSURE: 57 MMHG
OHS CV CPX PEAK HEAR RATE: 70 BPM
OHS CV CPX PEAK RATE PRESSURE PRODUCT: 8190
OHS CV CPX PEAK SYSTOLIC BLOOD PRESSURE: 117 MMHG
OHS CV CPX PERCENT MAX PREDICTED HEART RATE ACHIEVED: 50
OHS CV CPX RATE PRESSURE PRODUCT PRESENTING: 8976
OHS CV PV CAROTID LEFT HIGHEST CCA: 116
OHS CV PV CAROTID LEFT HIGHEST ICA: 76
OHS CV PV CAROTID RIGHT HIGHEST CCA: 85
OHS CV PV CAROTID RIGHT HIGHEST ICA: 147
OHS CV US CAROTID LEFT HIGHEST EDV: 24
RIGHT CCA DIST DIAS: 16 CM/S
RIGHT CCA DIST SYS: 85 CM/S
RIGHT CCA PROX DIAS: 9 CM/S
RIGHT CCA PROX SYS: 83 CM/S
RIGHT ECA SYS: 109 CM/S
RIGHT ICA DIST DIAS: 23 CM/S
RIGHT ICA DIST SYS: 95 CM/S
RIGHT ICA MID DIAS: 26 CM/S
RIGHT ICA MID SYS: 117 CM/S
RIGHT ICA PROX DIAS: 40 CM/S
RIGHT ICA PROX SYS: 147 CM/S
RIGHT VERTEBRAL DIAS: 14 CM/S
RIGHT VERTEBRAL SYS: 63 CM/S
SYSTOLIC BLOOD PRESSURE: 136 MMHG

## 2020-07-02 PROCEDURE — 93016 STRESS TEST WITH MYOCARDIAL PERFUSION (CUPID ONLY): ICD-10-PCS | Mod: ,,, | Performed by: INTERNAL MEDICINE

## 2020-07-02 PROCEDURE — 93016 CV STRESS TEST SUPVJ ONLY: CPT | Mod: ,,, | Performed by: INTERNAL MEDICINE

## 2020-07-02 PROCEDURE — 78452 STRESS TEST WITH MYOCARDIAL PERFUSION (CUPID ONLY): ICD-10-PCS | Mod: 26,,, | Performed by: INTERNAL MEDICINE

## 2020-07-02 PROCEDURE — 93018 STRESS TEST WITH MYOCARDIAL PERFUSION (CUPID ONLY): ICD-10-PCS | Mod: ,,, | Performed by: INTERNAL MEDICINE

## 2020-07-02 PROCEDURE — 93880 EXTRACRANIAL BILAT STUDY: CPT | Mod: 26,,, | Performed by: INTERNAL MEDICINE

## 2020-07-02 PROCEDURE — 78452 HT MUSCLE IMAGE SPECT MULT: CPT | Mod: 26,,, | Performed by: INTERNAL MEDICINE

## 2020-07-02 PROCEDURE — 93880 EXTRACRANIAL BILAT STUDY: CPT

## 2020-07-02 PROCEDURE — 93880 CV US DOPPLER CAROTID (CUPID ONLY): ICD-10-PCS | Mod: 26,,, | Performed by: INTERNAL MEDICINE

## 2020-07-02 PROCEDURE — A9502 TC99M TETROFOSMIN: HCPCS

## 2020-07-02 PROCEDURE — 93018 CV STRESS TEST I&R ONLY: CPT | Mod: ,,, | Performed by: INTERNAL MEDICINE

## 2020-07-06 ENCOUNTER — OUTSIDE PLACE OF SERVICE (OUTPATIENT)
Dept: CARDIOLOGY | Facility: CLINIC | Age: 80
End: 2020-07-06
Payer: MEDICARE

## 2020-07-06 PROCEDURE — 99214 OFFICE O/P EST MOD 30 MIN: CPT | Mod: ,,, | Performed by: INTERNAL MEDICINE

## 2020-07-06 PROCEDURE — 99214 PR OFFICE/OUTPT VISIT, EST, LEVL IV, 30-39 MIN: ICD-10-PCS | Mod: ,,, | Performed by: INTERNAL MEDICINE

## 2020-12-14 DIAGNOSIS — E78.5 HLD (HYPERLIPIDEMIA): ICD-10-CM

## 2020-12-14 RX ORDER — FENOFIBRATE 160 MG/1
160 TABLET ORAL DAILY
Qty: 90 TABLET | Refills: 3 | Status: SHIPPED | OUTPATIENT
Start: 2020-12-14 | End: 2021-11-23 | Stop reason: SDUPTHER

## 2021-03-06 ENCOUNTER — OFFICE VISIT (OUTPATIENT)
Dept: URGENT CARE | Facility: CLINIC | Age: 81
End: 2021-03-06
Payer: MEDICARE

## 2021-03-06 VITALS
HEIGHT: 69 IN | WEIGHT: 202 LBS | DIASTOLIC BLOOD PRESSURE: 54 MMHG | HEART RATE: 77 BPM | SYSTOLIC BLOOD PRESSURE: 141 MMHG | RESPIRATION RATE: 16 BRPM | BODY MASS INDEX: 29.92 KG/M2 | OXYGEN SATURATION: 97 % | TEMPERATURE: 98 F

## 2021-03-06 DIAGNOSIS — H60.501 ACUTE OTITIS EXTERNA OF RIGHT EAR, UNSPECIFIED TYPE: Primary | ICD-10-CM

## 2021-03-06 PROCEDURE — 99214 PR OFFICE/OUTPT VISIT, EST, LEVL IV, 30-39 MIN: ICD-10-PCS | Mod: S$GLB,,, | Performed by: PHYSICIAN ASSISTANT

## 2021-03-06 PROCEDURE — 99214 OFFICE O/P EST MOD 30 MIN: CPT | Mod: S$GLB,,, | Performed by: PHYSICIAN ASSISTANT

## 2021-03-06 RX ORDER — NEOMYCIN SULFATE, POLYMYXIN B SULFATE, HYDROCORTISONE 3.5; 10000; 1 MG/ML; [USP'U]/ML; MG/ML
4 SOLUTION/ DROPS AURICULAR (OTIC) 3 TIMES DAILY
Qty: 10 ML | Refills: 0 | Status: SHIPPED | OUTPATIENT
Start: 2021-03-06 | End: 2021-03-16

## 2021-03-06 RX ORDER — METFORMIN HYDROCHLORIDE 500 MG/1
500 TABLET ORAL 2 TIMES DAILY WITH MEALS
COMMUNITY
Start: 2020-12-02 | End: 2022-10-18

## 2021-03-06 RX ORDER — METOPROLOL TARTRATE 100 MG/1
100 TABLET ORAL
COMMUNITY
Start: 2020-09-04

## 2021-05-15 ENCOUNTER — NURSE TRIAGE (OUTPATIENT)
Dept: ADMINISTRATIVE | Facility: CLINIC | Age: 81
End: 2021-05-15

## 2021-11-23 DIAGNOSIS — E78.5 HLD (HYPERLIPIDEMIA): ICD-10-CM

## 2021-11-23 RX ORDER — FENOFIBRATE 160 MG/1
160 TABLET ORAL DAILY
Qty: 90 TABLET | Refills: 3 | Status: SHIPPED | OUTPATIENT
Start: 2021-11-23 | End: 2022-07-05 | Stop reason: SDUPTHER

## 2021-12-02 ENCOUNTER — OFFICE VISIT (OUTPATIENT)
Dept: CARDIOLOGY | Facility: CLINIC | Age: 81
End: 2021-12-02
Payer: MEDICARE

## 2021-12-02 VITALS
SYSTOLIC BLOOD PRESSURE: 116 MMHG | OXYGEN SATURATION: 97 % | HEART RATE: 68 BPM | WEIGHT: 197.13 LBS | DIASTOLIC BLOOD PRESSURE: 64 MMHG | BODY MASS INDEX: 29.11 KG/M2

## 2021-12-02 DIAGNOSIS — N18.30 CONTROLLED TYPE 2 DIABETES MELLITUS WITH STAGE 3 CHRONIC KIDNEY DISEASE, WITH LONG-TERM CURRENT USE OF INSULIN: ICD-10-CM

## 2021-12-02 DIAGNOSIS — I10 ESSENTIAL HYPERTENSION: ICD-10-CM

## 2021-12-02 DIAGNOSIS — E78.00 PURE HYPERCHOLESTEROLEMIA: ICD-10-CM

## 2021-12-02 DIAGNOSIS — Z79.4 CONTROLLED TYPE 2 DIABETES MELLITUS WITH STAGE 3 CHRONIC KIDNEY DISEASE, WITH LONG-TERM CURRENT USE OF INSULIN: ICD-10-CM

## 2021-12-02 DIAGNOSIS — I65.21 STENOSIS OF RIGHT CAROTID ARTERY: Primary | ICD-10-CM

## 2021-12-02 DIAGNOSIS — E11.22 CONTROLLED TYPE 2 DIABETES MELLITUS WITH STAGE 3 CHRONIC KIDNEY DISEASE, WITH LONG-TERM CURRENT USE OF INSULIN: ICD-10-CM

## 2021-12-02 PROBLEM — I77.9 CAROTID ARTERIAL DISEASE: Status: ACTIVE | Noted: 2021-12-02

## 2021-12-02 PROCEDURE — 99999 PR PBB SHADOW E&M-EST. PATIENT-LVL III: CPT | Mod: PBBFAC,,, | Performed by: INTERNAL MEDICINE

## 2021-12-02 PROCEDURE — 99999 PR PBB SHADOW E&M-EST. PATIENT-LVL III: ICD-10-PCS | Mod: PBBFAC,,, | Performed by: INTERNAL MEDICINE

## 2021-12-02 PROCEDURE — 99214 OFFICE O/P EST MOD 30 MIN: CPT | Mod: S$GLB,,, | Performed by: INTERNAL MEDICINE

## 2021-12-02 PROCEDURE — 99214 PR OFFICE/OUTPT VISIT, EST, LEVL IV, 30-39 MIN: ICD-10-PCS | Mod: S$GLB,,, | Performed by: INTERNAL MEDICINE

## 2021-12-02 RX ORDER — GABAPENTIN 800 MG/1
800 TABLET ORAL 4 TIMES DAILY
COMMUNITY
Start: 2021-11-27 | End: 2022-10-17

## 2021-12-02 RX ORDER — FUROSEMIDE 40 MG/1
40 TABLET ORAL 2 TIMES DAILY
COMMUNITY
Start: 2021-11-27

## 2022-01-07 ENCOUNTER — HOSPITAL ENCOUNTER (OUTPATIENT)
Dept: CARDIOLOGY | Facility: OTHER | Age: 82
Discharge: HOME | End: 2022-01-07
Attending: INTERNAL MEDICINE
Payer: MEDICARE

## 2022-01-07 DIAGNOSIS — I65.21 STENOSIS OF RIGHT CAROTID ARTERY: ICD-10-CM

## 2022-01-07 LAB
LEFT ARM DIASTOLIC BLOOD PRESSURE: 66 MMHG
LEFT ARM SYSTOLIC BLOOD PRESSURE: 146 MMHG
LEFT CBA DIAS: 9 CM/S
LEFT CBA SYS: 55 CM/S
LEFT CCA DIST DIAS: 10 CM/S
LEFT CCA DIST SYS: 74 CM/S
LEFT CCA MID DIAS: 13 CM/S
LEFT CCA MID SYS: 92 CM/S
LEFT CCA PROX DIAS: 13 CM/S
LEFT CCA PROX SYS: 92 CM/S
LEFT ECA DIAS: 11 CM/S
LEFT ECA SYS: 113 CM/S
LEFT ICA DIST DIAS: 25 CM/S
LEFT ICA DIST SYS: 93 CM/S
LEFT ICA MID DIAS: 21 CM/S
LEFT ICA MID SYS: 84 CM/S
LEFT ICA PROX DIAS: 18 CM/S
LEFT ICA PROX SYS: 67 CM/S
LEFT VERTEBRAL DIAS: 12 CM/S
LEFT VERTEBRAL SYS: 46 CM/S
OHS CV CAROTID RIGHT ICA EDV HIGHEST: 40
OHS CV CAROTID ULTRASOUND LEFT ICA/CCA RATIO: 1.26
OHS CV CAROTID ULTRASOUND RIGHT ICA/CCA RATIO: 2.03
OHS CV PV CAROTID LEFT HIGHEST CCA: 92
OHS CV PV CAROTID LEFT HIGHEST ICA: 93
OHS CV PV CAROTID RIGHT HIGHEST CCA: 88
OHS CV PV CAROTID RIGHT HIGHEST ICA: 179
OHS CV US CAROTID LEFT HIGHEST EDV: 25
RIGHT ARM DIASTOLIC BLOOD PRESSURE: 77 MMHG
RIGHT ARM SYSTOLIC BLOOD PRESSURE: 177 MMHG
RIGHT CBA DIAS: 36 CM/S
RIGHT CBA SYS: 150 CM/S
RIGHT CCA DIST DIAS: 15 CM/S
RIGHT CCA DIST SYS: 88 CM/S
RIGHT CCA MID DIAS: 11 CM/S
RIGHT CCA MID SYS: 80 CM/S
RIGHT CCA PROX DIAS: 14 CM/S
RIGHT CCA PROX SYS: 81 CM/S
RIGHT ECA DIAS: 8 CM/S
RIGHT ECA SYS: 94 CM/S
RIGHT ICA DIST DIAS: 26 CM/S
RIGHT ICA DIST SYS: 138 CM/S
RIGHT ICA MID DIAS: 39 CM/S
RIGHT ICA MID SYS: 179 CM/S
RIGHT ICA PROX DIAS: 40 CM/S
RIGHT ICA PROX SYS: 169 CM/S
RIGHT VERTEBRAL DIAS: 10 CM/S
RIGHT VERTEBRAL SYS: 43 CM/S

## 2022-01-07 PROCEDURE — 93880 EXTRACRANIAL BILAT STUDY: CPT | Mod: 26,,, | Performed by: INTERNAL MEDICINE

## 2022-01-07 PROCEDURE — 93880 EXTRACRANIAL BILAT STUDY: CPT

## 2022-01-07 PROCEDURE — 93880 CV US DOPPLER CAROTID (CUPID ONLY): ICD-10-PCS | Mod: 26,,, | Performed by: INTERNAL MEDICINE

## 2022-05-12 ENCOUNTER — IMMUNIZATION (OUTPATIENT)
Dept: INTERNAL MEDICINE | Facility: CLINIC | Age: 82
End: 2022-05-12
Payer: MEDICARE

## 2022-05-12 DIAGNOSIS — Z23 NEED FOR VACCINATION: Primary | ICD-10-CM

## 2022-05-12 PROCEDURE — 91305 COVID-19, MRNA, LNP-S, PF, 30 MCG/0.3 ML DOSE VACCINE (PFIZER): CPT | Mod: PBBFAC | Performed by: INTERNAL MEDICINE

## 2022-05-30 ENCOUNTER — TELEPHONE (OUTPATIENT)
Dept: SPORTS MEDICINE | Facility: CLINIC | Age: 82
End: 2022-05-30
Payer: MEDICARE

## 2022-05-30 ENCOUNTER — OFFICE VISIT (OUTPATIENT)
Dept: URGENT CARE | Facility: CLINIC | Age: 82
End: 2022-05-30
Payer: MEDICARE

## 2022-05-30 VITALS
RESPIRATION RATE: 20 BRPM | BODY MASS INDEX: 29.18 KG/M2 | OXYGEN SATURATION: 95 % | TEMPERATURE: 98 F | HEART RATE: 61 BPM | HEIGHT: 69 IN | WEIGHT: 197 LBS | SYSTOLIC BLOOD PRESSURE: 110 MMHG | DIASTOLIC BLOOD PRESSURE: 56 MMHG

## 2022-05-30 DIAGNOSIS — M70.22 OLECRANON BURSITIS OF LEFT ELBOW: Primary | ICD-10-CM

## 2022-05-30 PROCEDURE — 3074F PR MOST RECENT SYSTOLIC BLOOD PRESSURE < 130 MM HG: ICD-10-PCS | Mod: CPTII,S$GLB,, | Performed by: EMERGENCY MEDICINE

## 2022-05-30 PROCEDURE — 1125F AMNT PAIN NOTED PAIN PRSNT: CPT | Mod: CPTII,S$GLB,, | Performed by: EMERGENCY MEDICINE

## 2022-05-30 PROCEDURE — 1159F PR MEDICATION LIST DOCUMENTED IN MEDICAL RECORD: ICD-10-PCS | Mod: CPTII,S$GLB,, | Performed by: EMERGENCY MEDICINE

## 2022-05-30 PROCEDURE — 99214 PR OFFICE/OUTPT VISIT, EST, LEVL IV, 30-39 MIN: ICD-10-PCS | Mod: S$GLB,,, | Performed by: EMERGENCY MEDICINE

## 2022-05-30 PROCEDURE — 1125F PR PAIN SEVERITY QUANTIFIED, PAIN PRESENT: ICD-10-PCS | Mod: CPTII,S$GLB,, | Performed by: EMERGENCY MEDICINE

## 2022-05-30 PROCEDURE — 1159F MED LIST DOCD IN RCRD: CPT | Mod: CPTII,S$GLB,, | Performed by: EMERGENCY MEDICINE

## 2022-05-30 PROCEDURE — 3074F SYST BP LT 130 MM HG: CPT | Mod: CPTII,S$GLB,, | Performed by: EMERGENCY MEDICINE

## 2022-05-30 PROCEDURE — 3078F PR MOST RECENT DIASTOLIC BLOOD PRESSURE < 80 MM HG: ICD-10-PCS | Mod: CPTII,S$GLB,, | Performed by: EMERGENCY MEDICINE

## 2022-05-30 PROCEDURE — 1160F RVW MEDS BY RX/DR IN RCRD: CPT | Mod: CPTII,S$GLB,, | Performed by: EMERGENCY MEDICINE

## 2022-05-30 PROCEDURE — 99214 OFFICE O/P EST MOD 30 MIN: CPT | Mod: S$GLB,,, | Performed by: EMERGENCY MEDICINE

## 2022-05-30 PROCEDURE — 3078F DIAST BP <80 MM HG: CPT | Mod: CPTII,S$GLB,, | Performed by: EMERGENCY MEDICINE

## 2022-05-30 PROCEDURE — 1160F PR REVIEW ALL MEDS BY PRESCRIBER/CLIN PHARMACIST DOCUMENTED: ICD-10-PCS | Mod: CPTII,S$GLB,, | Performed by: EMERGENCY MEDICINE

## 2022-05-30 NOTE — PATIENT INSTRUCTIONS
Patient Education       Follow up with    Orthopedist   in 10-14 days if not improved [phone]       Olecranon Bursitis Discharge Instructions   About this topic   A bursa is a small, fluid-filled sac. It acts as a cushion between your bone and tendon. A tendon is a thick band that attaches your muscle to the bone. Bursae help the tendons glide and let your joints move easier. The pointed part of your elbow is the olecranon. In between the olecranon and the loose skin at the back of the elbow is the olecranon bursa. This bursa can get swollen and hurt. This called olecranon bursitis.  What care is needed at home?   Ask your doctor what you need to do when you go home. Make sure you ask questions if you do not understand what the doctor says. This way you will know what you need to do.  Rest. Allow your injury to heal before you start using your arm and elbow more.  Place an ice pack or a bag of frozen peas wrapped in a towel over the painful part. Never put ice right on the skin. Do not leave the ice on more than 10 to 15 minutes at a time.  Prop your arm on pillows to help with swelling.  Pad the elbow to keep pressure off the bursa  Braces or supports to keep your elbow in a good position  Stretching and strengthening exercises  What follow-up care is needed?   Your doctor may ask you to make visits to the office to check on your progress. Be sure to keep these visits. Your doctor may send you to physical therapy to help you heal faster.  What drugs may be needed?   The doctor may order drugs to:  Help with pain and swelling  Fight an infection  The doctor may give you a shot of an anti-inflammatory drug called a corticosteroid. This will help with swelling. Talk with your doctor about the risks of this shot.  Will physical activity be limited?   You may need to rest your elbow for a while. You should not do physical activity that makes your health problem worse. Talk to your doctor if you work out or play sports.  You may not be able to do those things until your health problem gets better.  What can be done to prevent this health problem?   Do not lay on or put weight on your elbow for a long period of time.  Use pads on your elbows. This is very important if you have a job where you have to crawl in tight spaces or lean on your elbows. Some of these are plumbers, electricians, miners, or heating and air conditioning workers.  Do not lean the tip of your elbow on a table for long periods of time. Students often do this when studying.  Limit your elbow movements. Painters can get this problem by bending and straightening the elbow over and over again.  Take breaks often when doing things that use repeat movements.  Stay active and work out to keep your muscles strong and flexible.  When do I need to call the doctor?   Signs of infection. These include a fever of 100.4°F (38°C) or higher, chills, or increased redness, warmth, or swelling.  Pain or swelling gets worse  Health problem is not better or you are feeling worse  Teach Back: Helping You Understand   The Teach Back Method helps you understand the information we are giving you. After you talk with the staff, tell them in your own words what you learned. This helps to make sure the staff has described each thing clearly. It also helps to explain things that may have been confusing. Before going home, make sure you can do these:  I can tell you about my condition.  I can tell you what may help ease my pain.  I can tell you what I will do if I have more pain or swelling.  Where can I learn more?   American Academy of Orthopedic Surgeons  http://orthoinfo.aaos.org/topic.cfm?wacuh=i37816   Last Reviewed Date   2020-08-26  Consumer Information Use and Disclaimer   This information is not specific medical advice and does not replace information you receive from your health care provider. This is only a brief summary of general information. It does NOT include all information  about conditions, illnesses, injuries, tests, procedures, treatments, therapies, discharge instructions or life-style choices that may apply to you. You must talk with your health care provider for complete information about your health and treatment options. This information should not be used to decide whether or not to accept your health care providers advice, instructions or recommendations. Only your health care provider has the knowledge and training to provide advice that is right for you.  Copyright   Copyright © 2021 [employer] and its affiliates and/or licensors. All rights reserved.

## 2022-05-30 NOTE — PROGRESS NOTES
"Subjective:       Patient ID: Frandy Mccarthy Jr. is a 82 y.o. male.    Vitals:  height is 5' 9" (1.753 m) and weight is 89.4 kg (197 lb). His temperature is 98.2 °F (36.8 °C). His blood pressure is 110/56 (abnormal) and his pulse is 61. His respiration is 20 and oxygen saturation is 95%.     Chief Complaint: Joint Swelling (ELBOW LT )    Edema  This is a new problem. The current episode started in the past 7 days (3 DAYS NOW ). The problem occurs constantly. The problem has been gradually worsening. Associated symptoms include joint swelling. Pertinent negatives include no abdominal pain, chills, congestion, coughing, fatigue, fever, nausea, rash, sore throat or vomiting. Nothing aggravates the symptoms. He has tried nothing for the symptoms.       Constitution: Negative for chills, fatigue and fever.   HENT: Negative for ear pain, congestion and sore throat.    Respiratory: Negative for cough and asthma.    Gastrointestinal: Negative for abdominal pain, nausea, vomiting and diarrhea.   Genitourinary: Negative for dysuria and frequency.   Musculoskeletal: Positive for joint swelling.   Skin: Negative for rash, wound and erythema.   Allergic/Immunologic: Negative for asthma.       Objective:      Physical Exam   Constitutional: He is oriented to person, place, and time. He appears well-developed.   HENT:   Head: Normocephalic and atraumatic. Head is without abrasion, without contusion and without laceration.   Ears:   Right Ear: External ear normal.   Left Ear: External ear normal.   Oropharyngeal exam not performed due to risk of viral transmission during global pandemic-- risks outweigh benefits of exam          Comments: Oropharyngeal exam not performed due to risk of viral transmission during global pandemic-- risks outweigh benefits of exam      Eyes: Conjunctivae, EOM and lids are normal. Pupils are equal, round, and reactive to light.   Neck: Trachea normal and phonation normal. Neck supple.   Cardiovascular: " Normal rate, regular rhythm and normal heart sounds.   Pulmonary/Chest: Effort normal and breath sounds normal. No stridor. No respiratory distress.   Musculoskeletal: Normal range of motion.         General: Normal range of motion.      Left elbow: He exhibits swelling. He exhibits normal range of motion, no effusion, no deformity and no laceration. Tenderness found. No radial head, no medial epicondyle, no lateral epicondyle and no olecranon process tenderness noted.      Comments: Patient has swelling to the left olecranon bursa measuring approximately 3 x 4 cm there is no erythema no induration there is no bony tenderness or deformity    Neuro-vascularly intact distal to extremity  Sensation and motor function completely intact  Less than 2 sec capillary refill present  Palpable 2+ pulse in the radial     Neurological: He is alert and oriented to person, place, and time.   Skin: Skin is warm, dry, intact and no rash. Capillary refill takes less than 2 seconds. No abrasion, No burn, No bruising, No erythema and No ecchymosis   Psychiatric: His speech is normal and behavior is normal. Judgment and thought content normal.   Nursing note and vitals reviewed.        Assessment:       1. Olecranon bursitis of left elbow          Plan:         Olecranon bursitis of left elbow  -     Ambulatory referral/consult to Orthopedics                 Patient Instructions   Patient Education       Follow up with    Orthopedist   in 10-14 days if not improved [phone]       Olecranon Bursitis Discharge Instructions   About this topic   A bursa is a small, fluid-filled sac. It acts as a cushion between your bone and tendon. A tendon is a thick band that attaches your muscle to the bone. Bursae help the tendons glide and let your joints move easier. The pointed part of your elbow is the olecranon. In between the olecranon and the loose skin at the back of the elbow is the olecranon bursa. This bursa can get swollen and hurt. This  called olecranon bursitis.  What care is needed at home?   · Ask your doctor what you need to do when you go home. Make sure you ask questions if you do not understand what the doctor says. This way you will know what you need to do.  · Rest. Allow your injury to heal before you start using your arm and elbow more.  · Place an ice pack or a bag of frozen peas wrapped in a towel over the painful part. Never put ice right on the skin. Do not leave the ice on more than 10 to 15 minutes at a time.  · Prop your arm on pillows to help with swelling.  · Pad the elbow to keep pressure off the bursa  · Braces or supports to keep your elbow in a good position  · Stretching and strengthening exercises  What follow-up care is needed?   Your doctor may ask you to make visits to the office to check on your progress. Be sure to keep these visits. Your doctor may send you to physical therapy to help you heal faster.  What drugs may be needed?   The doctor may order drugs to:  · Help with pain and swelling  · Fight an infection  The doctor may give you a shot of an anti-inflammatory drug called a corticosteroid. This will help with swelling. Talk with your doctor about the risks of this shot.  Will physical activity be limited?   You may need to rest your elbow for a while. You should not do physical activity that makes your health problem worse. Talk to your doctor if you work out or play sports. You may not be able to do those things until your health problem gets better.  What can be done to prevent this health problem?   · Do not lay on or put weight on your elbow for a long period of time.  · Use pads on your elbows. This is very important if you have a job where you have to crawl in tight spaces or lean on your elbows. Some of these are plumbers, electricians, miners, or heating and air conditioning workers.  · Do not lean the tip of your elbow on a table for long periods of time. Students often do this when studying.  · Limit  your elbow movements. Painters can get this problem by bending and straightening the elbow over and over again.  · Take breaks often when doing things that use repeat movements.  · Stay active and work out to keep your muscles strong and flexible.  When do I need to call the doctor?   · Signs of infection. These include a fever of 100.4°F (38°C) or higher, chills, or increased redness, warmth, or swelling.  · Pain or swelling gets worse  · Health problem is not better or you are feeling worse  Teach Back: Helping You Understand   The Teach Back Method helps you understand the information we are giving you. After you talk with the staff, tell them in your own words what you learned. This helps to make sure the staff has described each thing clearly. It also helps to explain things that may have been confusing. Before going home, make sure you can do these:  · I can tell you about my condition.  · I can tell you what may help ease my pain.  · I can tell you what I will do if I have more pain or swelling.  Where can I learn more?   American Academy of Orthopedic Surgeons  http://orthoinfo.aaos.org/topic.cfm?pnnsi=r77439   Last Reviewed Date   2020-08-26  Consumer Information Use and Disclaimer   This information is not specific medical advice and does not replace information you receive from your health care provider. This is only a brief summary of general information. It does NOT include all information about conditions, illnesses, injuries, tests, procedures, treatments, therapies, discharge instructions or life-style choices that may apply to you. You must talk with your health care provider for complete information about your health and treatment options. This information should not be used to decide whether or not to accept your health care providers advice, instructions or recommendations. Only your health care provider has the knowledge and training to provide advice that is right for you.  Copyright    Copyright © 2021 [employer] and its affiliates and/or licensors. All rights reserved.

## 2022-06-09 ENCOUNTER — TELEPHONE (OUTPATIENT)
Dept: PAIN MEDICINE | Facility: CLINIC | Age: 82
End: 2022-06-09
Payer: MEDICARE

## 2022-06-09 NOTE — TELEPHONE ENCOUNTER
----- Message from Mena Draper sent at 6/9/2022  8:42 AM CDT -----  Contact: Neha Shari (Spouse)  Type: Call Back      Who called: Neha Mccarthy (Spouse)      What is the request in detail:Neha Mccarthy (Spouse) is requesting a call back. Pt wife would like to schedule an appointment for sever back pain. She states that was a previous pt of Herring. It would not let me schedule as a NP. Please advise.       Can the clinic reply by [facility]? No      Would the patient rather a call back or a response via [facility]? Call back       Best call back number: [phone]      Additional Information:

## 2022-06-09 NOTE — TELEPHONE ENCOUNTER
Staff left a voicemail for patient and his spouse in regards to his message about scheduling an appointment with a Pain Physician due to Dr. Herring no longer working with [facility].        Staff informed patient to call back to be schedule with an NP Sol Mohr, Zafar Epstein, or Ashley Cast as a follow up to be evaluated and discuss which available provider would be best to continue his care with Pain Management.

## 2022-06-13 DIAGNOSIS — R52 PAIN: Primary | ICD-10-CM

## 2022-06-14 ENCOUNTER — TELEPHONE (OUTPATIENT)
Dept: PAIN MEDICINE | Facility: CLINIC | Age: 82
End: 2022-06-14
Payer: MEDICARE

## 2022-06-14 ENCOUNTER — TELEPHONE (OUTPATIENT)
Dept: ORTHOPEDICS | Facility: CLINIC | Age: 82
End: 2022-06-14
Payer: MEDICARE

## 2022-06-14 NOTE — TELEPHONE ENCOUNTER
This message is for patient in regards to his/her appointment 06/15/22 at 9:30a       [facility] Policy: For the safety of all patients and staff members.     Patient Visitor policy: During this visit we're asking all patients to only have one visitor over the age of 18yrs old to accompany to be seen by Siobhan Laguerre MD. If patient do not required assistance with their visit, we're asking all visitors to remain outside the waiting area.    Upon arriving to your schedule appointment; patients are required to wear a face mask, if patient do not have a face mask one will be provided entering the facility. If you have any questions or concerns please contact [phone]

## 2022-06-14 NOTE — TELEPHONE ENCOUNTER
Spoke with Mrs Mccarthy and she stated pt was seen yesterday at [facility] for his elbow.  Mrs Mccarthy requested appt be canceled for 6/16/22.

## 2022-06-15 ENCOUNTER — PATIENT MESSAGE (OUTPATIENT)
Dept: PAIN MEDICINE | Facility: OTHER | Age: 82
End: 2022-06-15
Payer: MEDICARE

## 2022-06-15 ENCOUNTER — OFFICE VISIT (OUTPATIENT)
Dept: PAIN MEDICINE | Facility: CLINIC | Age: 82
End: 2022-06-15
Attending: ANESTHESIOLOGY
Payer: MEDICARE

## 2022-06-15 ENCOUNTER — HOSPITAL ENCOUNTER (OUTPATIENT)
Dept: RADIOLOGY | Facility: OTHER | Age: 82
Discharge: HOME | End: 2022-06-15
Attending: ANESTHESIOLOGY
Payer: MEDICARE

## 2022-06-15 VITALS
DIASTOLIC BLOOD PRESSURE: 65 MMHG | WEIGHT: 197 LBS | BODY MASS INDEX: 29.18 KG/M2 | RESPIRATION RATE: 18 BRPM | HEART RATE: 69 BPM | SYSTOLIC BLOOD PRESSURE: 137 MMHG | HEIGHT: 69 IN

## 2022-06-15 DIAGNOSIS — M96.1 POST LAMINECTOMY SYNDROME: ICD-10-CM

## 2022-06-15 DIAGNOSIS — M54.9 DORSALGIA: ICD-10-CM

## 2022-06-15 DIAGNOSIS — M96.1 POSTLAMINECTOMY SYNDROME OF LUMBAR REGION: Primary | ICD-10-CM

## 2022-06-15 DIAGNOSIS — G89.4 CHRONIC PAIN SYNDROME: ICD-10-CM

## 2022-06-15 PROCEDURE — 3078F DIAST BP <80 MM HG: CPT | Mod: CPTII,S$GLB,, | Performed by: ANESTHESIOLOGY

## 2022-06-15 PROCEDURE — 1159F MED LIST DOCD IN RCRD: CPT | Mod: CPTII,S$GLB,, | Performed by: ANESTHESIOLOGY

## 2022-06-15 PROCEDURE — 1101F PT FALLS ASSESS-DOCD LE1/YR: CPT | Mod: CPTII,S$GLB,, | Performed by: ANESTHESIOLOGY

## 2022-06-15 PROCEDURE — 99999 PR PBB SHADOW E&M-EST. PATIENT-LVL IV: CPT | Mod: PBBFAC,,, | Performed by: ANESTHESIOLOGY

## 2022-06-15 PROCEDURE — 3075F SYST BP GE 130 - 139MM HG: CPT | Mod: CPTII,S$GLB,, | Performed by: ANESTHESIOLOGY

## 2022-06-15 PROCEDURE — 72114 X-RAY EXAM L-S SPINE BENDING: CPT | Mod: TC,FY

## 2022-06-15 PROCEDURE — 1125F AMNT PAIN NOTED PAIN PRSNT: CPT | Mod: CPTII,S$GLB,, | Performed by: ANESTHESIOLOGY

## 2022-06-15 PROCEDURE — 99999 PR PBB SHADOW E&M-EST. PATIENT-LVL IV: ICD-10-PCS | Mod: PBBFAC,,, | Performed by: ANESTHESIOLOGY

## 2022-06-15 PROCEDURE — 1125F PR PAIN SEVERITY QUANTIFIED, PAIN PRESENT: ICD-10-PCS | Mod: CPTII,S$GLB,, | Performed by: ANESTHESIOLOGY

## 2022-06-15 PROCEDURE — 3075F PR MOST RECENT SYSTOLIC BLOOD PRESS GE 130-139MM HG: ICD-10-PCS | Mod: CPTII,S$GLB,, | Performed by: ANESTHESIOLOGY

## 2022-06-15 PROCEDURE — 1101F PR PT FALLS ASSESS DOC 0-1 FALLS W/OUT INJ PAST YR: ICD-10-PCS | Mod: CPTII,S$GLB,, | Performed by: ANESTHESIOLOGY

## 2022-06-15 PROCEDURE — 3288F FALL RISK ASSESSMENT DOCD: CPT | Mod: CPTII,S$GLB,, | Performed by: ANESTHESIOLOGY

## 2022-06-15 PROCEDURE — 1160F RVW MEDS BY RX/DR IN RCRD: CPT | Mod: CPTII,S$GLB,, | Performed by: ANESTHESIOLOGY

## 2022-06-15 PROCEDURE — 72114 XR LUMBAR SPINE 5 VIEW WITH FLEX AND EXT: ICD-10-PCS | Mod: 26,,, | Performed by: RADIOLOGY

## 2022-06-15 PROCEDURE — 1160F PR REVIEW ALL MEDS BY PRESCRIBER/CLIN PHARMACIST DOCUMENTED: ICD-10-PCS | Mod: CPTII,S$GLB,, | Performed by: ANESTHESIOLOGY

## 2022-06-15 PROCEDURE — 3288F PR FALLS RISK ASSESSMENT DOCUMENTED: ICD-10-PCS | Mod: CPTII,S$GLB,, | Performed by: ANESTHESIOLOGY

## 2022-06-15 PROCEDURE — 72114 X-RAY EXAM L-S SPINE BENDING: CPT | Mod: 26,,, | Performed by: RADIOLOGY

## 2022-06-15 PROCEDURE — 1159F PR MEDICATION LIST DOCUMENTED IN MEDICAL RECORD: ICD-10-PCS | Mod: CPTII,S$GLB,, | Performed by: ANESTHESIOLOGY

## 2022-06-15 PROCEDURE — 99214 PR OFFICE/OUTPT VISIT, EST, LEVL IV, 30-39 MIN: ICD-10-PCS | Mod: GC,S$GLB,, | Performed by: ANESTHESIOLOGY

## 2022-06-15 PROCEDURE — 99214 OFFICE O/P EST MOD 30 MIN: CPT | Mod: GC,S$GLB,, | Performed by: ANESTHESIOLOGY

## 2022-06-15 PROCEDURE — 3078F PR MOST RECENT DIASTOLIC BLOOD PRESSURE < 80 MM HG: ICD-10-PCS | Mod: CPTII,S$GLB,, | Performed by: ANESTHESIOLOGY

## 2022-06-15 NOTE — PROGRESS NOTES
Chronic Pain - New Consult    Referring Physician: No ref. provider found    Chief Complaint:   Chief Complaint   Patient presents with    Back Pain    Arm Pain    Elbow Pain        SUBJECTIVE:    Frandy Mccarthy Jr. presents to the clinic for the evaluation of low back pain. Previously seen by Dr. Herring, most recently in 2018. The pain started many years ago. Has had multiple lumbar surgeries with Dr. Forte at [facility]. Follows with Dr. Forte every 3 months. Symptoms have been unchanged.The pain is located in the low back area and radiates to the bilateral posterior thighs.  The pain is described as aching, sharp, stabbing and electric and is rated as 7/10. The pain is rated with a score of  5/10 on the BEST day and a score of 10/10 on the WORST day.  Symptoms interfere with daily activity and sleeping. The pain is exacerbated by Standing, Laying, Bending, Walking and Extension.  The pain is mitigated by medications and hot showers. He reports spending 20 hours per day in chair with legs up. The patient reports 2 hour blocks of sleep multiple times a day. Patient reports he is unable to sleep for greater than 2 hours at a time because the pain walks him up.  Of note patient saw Dr. Forte at [facility] who took XRs of lumbar spine and surgical site. Patient reports that surgeon informed him hardware is loose but there is no need for surgical interventions at this time. Surgery indicated only for pain relief. Patient is here today for evaluation of nonsurgical interventions for back pain.      Patient denies night fever/night sweats, urinary incontinence, bowel incontinence, significant weight loss and significant motor weakness.      Pain Disability Index Review:  Last 3 PDI Scores 6/15/2022 10/2/2019 4/23/2018   Pain Disability Index (PDI) 35 4 30       Pain Medications:  Tramadol 50mg tid prn, not taking  Gabapentin 600mg bid, not taking  Lyrica 150mg  cymbalata 30mg  bid  percoet 5mg, Dr. Forte @ [facility]  celebrex 200mg daily  Naproxen 550mg bid    Opioid Contract: not applicable     report:  Reviewed and consistent with medication use as prescribed.    Pain Procedures:  · Caudal MARCOS 18; transient relief, 1-2 days  · Bilateral LMBB: Positive  · Right L3-5 RFA 10/2014: Excellent relief until summer 2016  · Left L3-5 RFA 10/2014: Excellent relief until summer 2016  · Right L3-5 Cooled RFA 10/2016:  Good relief of back pain  · Left L3-5 Cooled RFA 2016:  Good relief of back pain  · Caudal MARCOS 16; good relief of radicular symptoms with good relief of back pain  · 3/2018: Caudal MARCOS: limited benefit      Physical Therapy/Home Exercise: yes  He completed a 12 week course since ; however, this made his pain worse.  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     Past Surgical History:   Procedure Laterality Date    BACK SURGERY      x 4    FRACTURE SURGERY      right thigh    HERNIA REPAIR      SPINE SURGERY      TOTAL KNEE ARTHROPLASTY Left 2020     Social History     Socioeconomic History    Marital status:    Tobacco Use    Smoking status: Former Smoker     Quit date: 1973     Years since quittin.3    Smokeless tobacco: Never Used   Substance and Sexual Activity    Alcohol use: Yes     Comment: occasional    Drug use: No    Sexual activity: Yes     Partners: Female     Family History   Problem Relation Age of Onset    Cancer Mother     Diabetes Mother     Diabetes Father     Cancer Father        Review of patient's allergies indicates:  No Known Allergies    Current Outpatient Medications   Medication Sig    DULoxetine (CYMBALTA) 30 MG capsule Take 30 mg by mouth once daily.     fenofibrate 160 MG Tab Take 1 tablet (160 mg total) by mouth once daily.    furosemide (LASIX)  40 MG tablet Take 40 mg by mouth 2 (two) times daily.    HUMALOG U-100 INSULIN 100 unit/mL injection TAKE AS DIRECTED  HUMALOG / VIA V GO 40 / 6 CLICKS DAY    irbesartan (AVAPRO) 150 MG tablet Take 150 mg by mouth once daily.     metoprolol tartrate (LOPRESSOR) 100 MG tablet Take 100 mg by mouth.    oxyCODONE-acetaminophen (PERCOCET) 5-325 mg per tablet TAKE 1 TABLET BY MOUTH ONCE A DAY AS NEEDED FOR PAIN    pramipexole (MIRAPEX) 0.25 MG tablet Take by mouth every evening.     simvastatin (ZOCOR) 20 MG tablet Take 20 mg by mouth once daily.    tamsulosin (FLOMAX) 0.4 mg Cap Take 0.4 mg by mouth once daily.    V-GO 30 Isabel AS DIRECTED , APPLY 1 V-GO DEVICE DAILY    V-GO 40 Isabel APPLY ONE VGO40 DAILY    cholecalciferol, vitamin D3, 2,000 unit Cap Take 1 capsule by mouth once daily.    folic acid (FOLVITE) 800 MCG Tab Take 800 mcg by mouth once daily.    gabapentin (NEURONTIN) 800 MG tablet Take 800 mg by mouth 4 (four) times daily.    metFORMIN (GLUCOPHAGE) 500 MG tablet Take 500 mg by mouth 2 (two) times daily with meals.    traMADol (ULTRAM) 50 mg tablet      No current facility-administered medications for this visit.       REVIEW OF SYSTEMS:    GENERAL:  No weight loss, malaise or fevers.  HEENT:  Negative for frequent or significant headaches.  NECK:  Negative for lumps, goiter, pain and significant neck swelling.  RESPIRATORY:  Negative for cough, wheezing or shortness of breath.  CARDIOVASCULAR:  Negative for chest pain, leg swelling or palpitations.  GI:  Negative for abdominal discomfort, blood in stools or black stools or change in bowel habits.  MUSCULOSKELETAL:  See HPI.  SKIN:  Negative for lesions, rash, and itching.  PSYCH:  Negative for sleep disturbance, mood disorder and recent psychosocial stressors.  HEMATOLOGY/LYMPHOLOGY:  Negative for prolonged bleeding, bruising easily or swollen nodes.  NEURO:   No history of headaches, syncope, paralysis, seizures or tremors.  All other reviewed  "and negative other than HPI.    OBJECTIVE:    /65 (BP Location: Right arm, Patient Position: Sitting, BP Method: Medium (Automatic))   Pulse 69   Resp 18   Ht 5' 9" (1.753 m)   Wt 89.4 kg (197 lb)   BMI 29.09 kg/m²     PHYSICAL EXAMINATION:    General appearance: Well appearing, in no acute distress, alert and oriented x3.  Psych:  Mood and affect appropriate.  Skin: Skin color, texture, turgor normal, no rashes or lesions, in both upper and lower body.  Head/face:  Normocephalic, atraumatic. No palpable lymph nodes.  Neck: No pain to palpation over the cervical paraspinous muscles. Spurling Negative. No pain with neck flexion, extension, or lateral flexion.   Cor: RRR  Pulm: CTA  GI:  Soft and non-tender.  Back: Straight leg raising in the sitting positions is negative to radicular pain. TTP over the lumbar parapsinals, L>>R.  Limited range of motion with pain reproduction   Extremities: Peripheral joint ROM is full and pain free without obvious instability or laxity in all four extremities. No deformities, edema, or skin discoloration. Good capillary refill.  Musculoskeletal: Shoulder, hip, sacroiliac and knee provocative maneuvers are negative. Bilateral upper and lower extremity strength is normal and symmetric.  No atrophy or tone abnormalities are noted.  Neuro: Bilateral upper and lower extremity coordination and muscle stretch reflexes are physiologic and symmetric.  Plantar response are downgoing. No loss of sensation is noted.  Gait: normal.    ASSESSMENT: 82 y.o. year old male with low back pain, consistent with      1. Post laminectomy syndrome     2. Chronic pain syndrome           PLAN:   We discussed with the patient the assessment and recommendations. The following is the plan we agreed on:   - XR Lumbar spine, flexion/extension, today  - Discussed SCS with patient. Patient is amenable to trial. Scheduled for psychiatric evaluation.  - RTC after SCS trial  - Counseled patient regarding the " importance of activity modification and constant sleeping habits.    The above plan and management options were discussed at length with patient. Patient is in agreement with the above and verbalized understanding. It will be communicated with the referring physician via electronic record, fax, or mail.    Perez Krause  06/15/2022     I have personally taken the history and examined this patient and agree with the resident's note as stated above.

## 2022-06-29 ENCOUNTER — PATIENT MESSAGE (OUTPATIENT)
Dept: PSYCHIATRY | Facility: CLINIC | Age: 82
End: 2022-06-29
Payer: MEDICARE

## 2022-07-01 ENCOUNTER — OFFICE VISIT (OUTPATIENT)
Dept: PSYCHIATRY | Facility: CLINIC | Age: 82
End: 2022-07-01
Payer: COMMERCIAL

## 2022-07-01 ENCOUNTER — TELEPHONE (OUTPATIENT)
Dept: PSYCHIATRY | Facility: CLINIC | Age: 82
End: 2022-07-01
Payer: MEDICARE

## 2022-07-01 DIAGNOSIS — G89.29 INSOMNIA SECONDARY TO CHRONIC PAIN: ICD-10-CM

## 2022-07-01 DIAGNOSIS — Z01.818 PREOPERATIVE EVALUATION TO RULE OUT SURGICAL CONTRAINDICATION: Primary | ICD-10-CM

## 2022-07-01 DIAGNOSIS — G47.01 INSOMNIA SECONDARY TO CHRONIC PAIN: ICD-10-CM

## 2022-07-01 DIAGNOSIS — M54.9 DORSALGIA: ICD-10-CM

## 2022-07-01 DIAGNOSIS — M96.1 POST LAMINECTOMY SYNDROME: ICD-10-CM

## 2022-07-01 DIAGNOSIS — G89.4 CHRONIC PAIN SYNDROME: ICD-10-CM

## 2022-07-01 PROCEDURE — 90791 PSYCH DIAGNOSTIC EVALUATION: CPT | Mod: 95,,, | Performed by: PSYCHOLOGIST

## 2022-07-01 PROCEDURE — 90791 PR PSYCHIATRIC DIAGNOSTIC EVALUATION: ICD-10-PCS | Mod: 95,,, | Performed by: PSYCHOLOGIST

## 2022-07-01 NOTE — LETTER
July 1, 2022        [facility] STAFF             [facility]  [address]  [facility] [address]  Phone: [phone]   Patient: Frandy Mccarthy Jr.   MR Number: [id number]   YOB: 1940   Date of Visit: 7/1/2022       Dear  Staff:    Thank you for referring Frandy Mccarthy to me for evaluation. Below are the relevant portions of my assessment and plan of care.    This evaluation revealed NO contraindications to SCS from a psychological perspective.  Overall, Mr. Mccarthy exhibited no absolute psychological contraindications to SCS.   He has no psychiatric history or treatment, and reports no psychiatric problems, major psychosocial stressors, or other problems that would contraindicate surgery or impact his ability to engage in treatment.  He has adequate and appropriate knowledge and expectations regarding surgery, and is motivated and willing to engage in behaviors to achieve successful outcomes with SCS.  He has multiple protective factors that should help him during surgery and recovery.  There are no recommendations for psychological intervention at this time.        If you have questions, please do not hesitate to call me.    Sincerely,      Lisa Coley, PhD

## 2022-07-01 NOTE — TELEPHONE ENCOUNTER
Left a voicemail to remind Mr. Mccarthy of his 10 am appointment as they had not yet signed on to the virtual visit by 10:07 AM.

## 2022-07-01 NOTE — PROGRESS NOTES
Virtual Visit  The patient location is: Home ([place])  The chief complaint leading to consultation is: Presurgical Psychological Evaluation    Visit type: audiovisual    Face to Face time with patient: 45 minutes  90 minutes of total time spent on the encounter, which includes face to face time and non-face to face time preparing to see the patient (eg, review of tests), Obtaining and/or reviewing separately obtained history, Documenting clinical information in the electronic or other health record, Independently interpreting results (not separately reported) and communicating results to the patient/family/caregiver, or Care coordination (not separately reported).     Each patient to whom he or she provides medical services by telemedicine is:  (1) informed of the relationship between the physician and patient and the respective role of any other health care provider with respect to management of the patient; and (2) notified that he or she may decline to receive medical services by telemedicine and may withdraw from such care at any time.    Notes: N/A        Psychiatry Initial Visit (PhD/LCSW)  Presurgical Psychological Evaluation  Psychological Intake    NAME: Frandy Mccarthy Jr.  MRN: [id number]  : 1940    Date:  2022  Site: [facility]   CPT Code: 49171  Clinical status of patient:  Outpatient  Met with:  Patient  Referred by:  Siobhan Laguerre M.D.    Chief complaint/reason for encounter:  Psychological Evaluation prior to surgical implantation of a spinal cord stimulator (SCS) to address chronic pain    Before this evaluation was initiated, the purposes and process of the assessment and the limits of confidentiality were discussed with the patient who expressed understanding of these issues and verbally consented to proceed with the evaluation.    History of present illness:  Mr. Frandy Mccarthy is an 82-year-old male referred for Psychological Evaluation prior to surgical implantation of a  spinal cord stimulator (SCS) to address chronic pain.  He reported he has chronic pain in his lower back.  His pain has been present for many years, but has worsened after his last surgery in the past two to three years.  He has had multiple lumbar surgeries with Dr. Forte at [facility].  He has tried physical therapy, medications, and injections without receiving sufficient relief.  His activities are greatly impacted by pain.  He reports, It doesnt actually get in the way of me living my life, its pain that I live with and tolerate it.  Although he continues to do activities in life, he has pain while engaging in them.    Mr. Mccarthy is now interested in a trial of the SCS.  He was provided with information about SCS and demonstrated adequate comprehension of procedures after a brief explanation.  He understood risks of surgery including bleeding, infection, failure of surgery, misplaced hardware, migration of hardware, need for reoperation, etc.  When asked about potential concerns regarding SCS, he indicated no significant concerns.  His expectations regarding SCS include that it helps me relieve some pain maybe help me sleep a little better.  If SCS is not effective for him he noted he would not consider suicide as an option and would look forward to future treatment options.  His wife will be available to help him during recovery after surgery.    When asked how pain affects his mood, Mr. Mccarthy reported, Yes, somewhat.  Sometimes.  Its aggravating.  Regarding mental health history, he denied past psychiatric treatment and history.  He does not report current psychiatric problems or major psychosocial stressors that would interfere with pain management treatment.  He has good social support, adaptive coping, engagement in recreation, spiritual fabiola, and other protective factors to help him manage stress and pain.  He was pleasant and cooperative in interview and appeared to be in good  spirits.     Relevant medical history:  Postlaminectomy syndrome of lumbar region; Post laminectomy syndrome; Chronic pain syndrome; Dorsalgia    Pain scales:   Current level of pain:  8/10  Worst pain rating:  10/10  Best pain rating:  3/10  PCS:  7 (minimal pain catastrophizing)    Current Health Behaviors:  Compliant with medical regimens and appointments:  Yes  Prescription medication misuse:  No  Exercise:  Yes - walk up and down the stairs and in the backyard  Adequate sleep:  No  Adequate cognitive functioning:  Yes    Current and Past Substance Use/Abuse:  Alcohol: Denied current use; denied history of abuse or dependency.   Drugs: Denied current use; denied history of abuse or dependency.  Tobacco: None.   Caffeine: He drinks three cups of coffee per day.    Past Psychiatric History:  Previous diagnosis:  Denied.  Inpatient treatment:  Denied.  Prior substance abuse treatment:  Denied.  Outpatient treatment:  Denied.  Suicide attempt:  Denied.  Non-suicidal self-injury:  Denied.    Family History:  Psychiatric illness:  Denied.  Substance abuse:  Denied.  Suicide:  Denied.    Trauma History:  Denied.    Psychosocial History and Current Social Situation:     Mr. Mccarthy was born and raised in [place], [place] by his biological mother and father along with an older sister and a younger sister.  He described his childhood as decent I had a good childhood.  He denied childhood trauma, abuse, and neglect.  He did pretty good in school and dropped out in the 11th grade.  He is currently not working.  He denied  service.  He is not on disability and finances are stable.  He has been  to his second wife for 38 years.  He has four children with his first wife.  He currently lives with his wife.  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  Legal history:  He denied history of arrests and convictions.  He denied current involvement in litigation.  Access to guns:  Yes, in a safe  place.    Current Psychiatric Treatment:  Medications:  Denied.  Psychotherapy:  Denied.    Current Psychiatric Symptoms:  Depression:  Denied.  He denied episodes of depressed mood or depression-related anhedonia.  PHQ-8 = 4, minimal depression  Maricruz/Hypomania:  Denied.  He denied periods of elevated mood or abnormally increased energy or goal-directed activity.  Anxiety:    Generalized Anxiety:  Denied.  He denied experiencing excessive, exaggerated anxiety that was unmanageable.  CLIF-7 = 10, mild anxiety  Panic Disorder:  Denied.  OCD:  Denied.  Insomnia:  He has difficulty sleeping because he has to sleep in a chair due to pain. The rods and pins in his back are moving, which causes great pain for him when lying down.  He sleeps on and off through the day and night.  He denied sleep problems prior to pain problems.  Thought dysfunction:  Denied delusions, hallucinations.  Non-suicidal or Suicidal thoughts/behaviors:  Denied.  Personality functioning: He does not display any personality characteristics which would be an impediment to pursuing bariatric surgery.    Current Stress Management:  Current psychosocial stressors:  Im really not stressed out I get a little aggravated if I cant do anything, but I dont really stress out about it and find something else to do.  Report of coping skills:  Coloring, Talking to wife, Working on cars  Recreational activities:  Watching television, Watching sports, Going out to eat  Support system:  Wife, Family  Strengths and liabilities:  Strength: Patient accepts guidance/feedback, Strength: Patient is motivated for change., Strength: Patient has positive support network., Strength: Patient has reasonable judgment., Strength: Patient is stable.      Mental Status Exam:  General appearance:  appears stated age, neatly dressed, well groomed  Speech:  normal rate and tone  Level of cooperation:  cooperative  Thought processes:  logical, goal-directed  Mood:   euthymic  Thought content:  no illusions, no visual hallucinations, no auditory hallucinations, no delusions, no active or passive homicidal thoughts, no active or passive suicidal ideation, no obsessions, no compulsions, no violence  Affect:  appropriate  Orientation:  oriented to person, place, and date  Memory:  Recent memory:  3 of 3 objects after brief delay.    Remote memory - intact  Attention span and concentration:  spelled HOUSE forward only  Fund of general knowledge: 1/2 states that border [place] ([place], [place])  Abstract reasoning:    Similarities: abstract.    Proverbs: abstract.  Judgment and insight: fair  Language:  intact    Diagnostic impressions:    ICD-10-CM ICD-9-CM   1. Preoperative evaluation to rule out surgical contraindication  Z01.818 V72.83   2. Post laminectomy syndrome  M96.1 722.80   3. Chronic pain syndrome  G89.4 338.4   4. Dorsalgia  M54.9 724.5   5. Insomnia secondary to chronic pain  G89.29 338.29    G47.01 327.01         Summary and Recommendations:  Mr. Frandy Mccarthy is a 82-year-old male referred for presurgical psychological evaluation prior to surgical implantation of a spinal cord stimulator (SCS) to address chronic pain.   There are no indications of disabling psychopathology, substance use/abuse, cognitive problems, or disabilities that would prevent understanding and competence with medical treatment.  There are no reports or major psychosocial stressors that would interfere with his engagement in treatment.  There is no evidence of suicidality.   He exhibits high social stability and good social support.  He has good coping strategies to deal with stress and the demands of surgery and recovery.   He has fair knowledge about SCS, appropriate expectations for surgery and recovery, adequate understanding of possible risks of this treatment option, and a high willingness to sustain effort for lifestyle changes and health adaptations required.  He reports adequate compliance with  prior medical regimens.      Mr. Mccarthy did not complete full psychological testing, as the MMPI-3 comparative norms for SCS and spine surgery candidates does not include patients over the age of 80.  He did complete the PHQ-8, CLIF-7, and PCS, which were all within the minimal to mild range for depression, anxiety, and pain catastrophizing respectively.    Overall, Mr. Mccarthy exhibited no absolute psychological contraindications to SCS.   He has no psychiatric history or treatment, and reports no psychiatric problems, major psychosocial stressors, or other problems that would contraindicate surgery or impact his ability to engage in treatment.  He has adequate and appropriate knowledge and expectations regarding surgery, and is motivated and willing to engage in behaviors to achieve successful outcomes with SCS.  He has multiple protective factors that should help him during surgery and recovery.  There are no recommendations for psychological intervention at this time.  This evaluation revealed NO contraindications to SCS from a psychological perspective.            Length of time:   45 minutes                Lisa Coley, PhD  Clinical Psychologist

## 2022-07-02 ENCOUNTER — PATIENT MESSAGE (OUTPATIENT)
Dept: CARDIOLOGY | Facility: CLINIC | Age: 82
End: 2022-07-02
Payer: MEDICARE

## 2022-07-05 DIAGNOSIS — E78.5 HLD (HYPERLIPIDEMIA): ICD-10-CM

## 2022-07-05 RX ORDER — FENOFIBRATE 160 MG/1
160 TABLET ORAL DAILY
Qty: 90 TABLET | Refills: 3 | Status: SHIPPED | OUTPATIENT
Start: 2022-07-05

## 2022-07-11 ENCOUNTER — PATIENT MESSAGE (OUTPATIENT)
Dept: PAIN MEDICINE | Facility: OTHER | Age: 82
End: 2022-07-11
Payer: MEDICARE

## 2022-07-11 DIAGNOSIS — M96.1 POST LAMINECTOMY SYNDROME: ICD-10-CM

## 2022-07-11 DIAGNOSIS — G89.4 CHRONIC PAIN SYNDROME: Primary | ICD-10-CM

## 2022-09-01 DIAGNOSIS — M96.1 POST LAMINECTOMY SYNDROME: ICD-10-CM

## 2022-09-01 DIAGNOSIS — G89.4 CHRONIC PAIN SYNDROME: Primary | ICD-10-CM

## 2022-09-12 ENCOUNTER — PATIENT MESSAGE (OUTPATIENT)
Dept: PAIN MEDICINE | Facility: OTHER | Age: 82
End: 2022-09-12
Payer: MEDICARE

## 2022-09-12 ENCOUNTER — HOSPITAL ENCOUNTER (OUTPATIENT)
Facility: OTHER | Age: 82
Discharge: HOME | End: 2022-09-12
Attending: ANESTHESIOLOGY | Admitting: ANESTHESIOLOGY
Payer: MEDICARE

## 2022-09-12 VITALS
WEIGHT: 202 LBS | OXYGEN SATURATION: 93 % | SYSTOLIC BLOOD PRESSURE: 127 MMHG | BODY MASS INDEX: 29.92 KG/M2 | HEART RATE: 64 BPM | HEIGHT: 69 IN | DIASTOLIC BLOOD PRESSURE: 62 MMHG | TEMPERATURE: 98 F | RESPIRATION RATE: 16 BRPM

## 2022-09-12 DIAGNOSIS — G89.4 CHRONIC PAIN SYNDROME: ICD-10-CM

## 2022-09-12 DIAGNOSIS — G89.29 CHRONIC PAIN: ICD-10-CM

## 2022-09-12 DIAGNOSIS — M96.1 POSTLAMINECTOMY SYNDROME OF LUMBAR REGION: Primary | ICD-10-CM

## 2022-09-12 LAB — POCT GLUCOSE: 130 MG/DL (ref 70–110)

## 2022-09-12 PROCEDURE — 99152 PR MOD CONSCIOUS SEDATION, SAME PHYS, 5+ YRS, FIRST 15 MIN: ICD-10-PCS | Mod: ,,, | Performed by: ANESTHESIOLOGY

## 2022-09-12 PROCEDURE — 82947 ASSAY GLUCOSE BLOOD QUANT: CPT | Performed by: ANESTHESIOLOGY

## 2022-09-12 PROCEDURE — 63650 IMPLANT NEUROELECTRODES: CPT | Mod: ,,, | Performed by: ANESTHESIOLOGY

## 2022-09-12 PROCEDURE — 63600175 PHARM REV CODE 636 W HCPCS: Performed by: ANESTHESIOLOGY

## 2022-09-12 PROCEDURE — 99152 MOD SED SAME PHYS/QHP 5/>YRS: CPT | Performed by: ANESTHESIOLOGY

## 2022-09-12 PROCEDURE — 25000003 PHARM REV CODE 250: Performed by: ANESTHESIOLOGY

## 2022-09-12 PROCEDURE — 63650 IMPLANT NEUROELECTRODES: CPT | Performed by: ANESTHESIOLOGY

## 2022-09-12 PROCEDURE — 99152 MOD SED SAME PHYS/QHP 5/>YRS: CPT | Mod: ,,, | Performed by: ANESTHESIOLOGY

## 2022-09-12 PROCEDURE — 63650 PR PERCUT IMPLNT NEUROELECT,EPIDURAL: ICD-10-PCS | Mod: ,,, | Performed by: ANESTHESIOLOGY

## 2022-09-12 PROCEDURE — C1778 LEAD, NEUROSTIMULATOR: HCPCS | Performed by: ANESTHESIOLOGY

## 2022-09-12 PROCEDURE — 25000003 PHARM REV CODE 250: Performed by: STUDENT IN AN ORGANIZED HEALTH CARE EDUCATION/TRAINING PROGRAM

## 2022-09-12 RX ORDER — BUPIVACAINE HYDROCHLORIDE 2.5 MG/ML
INJECTION, SOLUTION EPIDURAL; INFILTRATION; INTRACAUDAL
Status: DISCONTINUED | OUTPATIENT
Start: 2022-09-12 | End: 2022-09-12 | Stop reason: HOSPADM

## 2022-09-12 RX ORDER — LIDOCAINE HYDROCHLORIDE 20 MG/ML
INJECTION, SOLUTION INFILTRATION; PERINEURAL
Status: DISCONTINUED | OUTPATIENT
Start: 2022-09-12 | End: 2022-09-12 | Stop reason: HOSPADM

## 2022-09-12 RX ORDER — FENTANYL CITRATE 50 UG/ML
INJECTION, SOLUTION INTRAMUSCULAR; INTRAVENOUS
Status: DISCONTINUED | OUTPATIENT
Start: 2022-09-12 | End: 2022-09-12 | Stop reason: HOSPADM

## 2022-09-12 RX ORDER — SODIUM CHLORIDE 9 MG/ML
500 INJECTION, SOLUTION INTRAVENOUS CONTINUOUS
Status: DISCONTINUED | OUTPATIENT
Start: 2022-09-12 | End: 2022-09-12 | Stop reason: HOSPADM

## 2022-09-12 RX ORDER — MIDAZOLAM HYDROCHLORIDE 1 MG/ML
INJECTION INTRAMUSCULAR; INTRAVENOUS
Status: DISCONTINUED | OUTPATIENT
Start: 2022-09-12 | End: 2022-09-12 | Stop reason: HOSPADM

## 2022-09-12 RX ORDER — CEFAZOLIN SODIUM 1 G/3ML
2 INJECTION, POWDER, FOR SOLUTION INTRAMUSCULAR; INTRAVENOUS ONCE
Status: COMPLETED | OUTPATIENT
Start: 2022-09-12 | End: 2022-09-12

## 2022-09-12 RX ADMIN — CEFAZOLIN 2 G: 1 INJECTION, POWDER, FOR SOLUTION INTRAMUSCULAR; INTRAVENOUS at 07:09

## 2022-09-12 NOTE — OP NOTE
Spinal Cord Stimulator Trial Fluoroscopic Guidance    The procedure, risks, benefits, and options were discussed with the patient. There are no contraindications to the procedure. The patent expressed understanding and agreed to the procedure. Informed written consent was obtained prior to the start of the procedure and can be found in the patient's chart.    PATIENT NAME: Frandy Mccarthy Jr.   MRN: [id number]     DATE OF PROCEDURE: 09/12/2022    PROCEDURE:   1) Placement of Octad Electrode by 2   2) Spinal Cord Stimulator Trial     PRE-OP DIAGNOSIS: Chronic pain syndrome [G89.4]  Post laminectomy syndrome [M96.1] Chronic pain syndrome [G89.4]    POST-OP DIAGNOSIS: Chronic pain syndrome [G89.4]  Post laminectomy syndrome [M96.1]    PHYSICIAN: Siobhan Laguerre MD    MEDICATIONS INJECTED: Bupivacaine 0.25%     LOCAL ANESTHETIC INJECTED: Xylocaine 2%     SEDATION: Versed 1mg and Fentanyl 37.5mcg                                                                                                                                                                                     Conscious sedation ordered by M.D. Patient re-evaluation prior to administration of conscious sedation. No changes noted in patient's status from initial evaluation. The patient's vital signs were monitored by RN and patient remained hemodynamically stable throughout the procedure.    Event Time In   Sedation Start 0841   Sedation End 0935       ESTIMATED BLOOD LOSS: None    COMPLICATIONS: None    TECHNIQUE: Time-out was performed to identify the patient and procedure to be performed. The patient was placed in the prone position on the OR table, the area overlying the  lumbar spine was prepped and draped in usual sterile fashion using chlorhexidine. The entry site of the  lumbar spine was identified at the T11/12  L interspace and T10/11 R interspace under fluoroscopy guidance. The entry site was anesthetized with Lidocaine 2% followed by a 22 gauge, 3.5 inch  spinal needle to anesthetize the deeper tissue up to the supraspinous ligament. A 20 gauge, 4.0 inch Tuohy needle was then advanced to contact the right  RT12 lamina.  It was walked off in a superior medial direction until it entered the epidural space using loss of resistance to saline. The spinal cord stimulator (Abott) lead was advanced through the Tuohy needle and directed to rest the tip at the T7 vertebral body.  The same procedure was repeated in detail for the left side to insert a second lead within the epidural space to reside adjacent to the first lead.  R side had to be redone as it was stuck in the needle. We used a coude needle. The patient was allowed to fully awaken from anesthesia.  Testing was carried out by the device representative under the guidance of Siobhan Laguerre MD. The patient reported having sensation in the areas of usual pain. Once the leads were adjusted to by within the proper positioning, needles were withdrawn leaving the leads in place and were then secured to the patients skin using Stay-Fix adhesive bandages and tegaderm. The patients back was cleaned. No specimens collected. The patient tolerated the procedure well.   The left lead was dorsal through the procedure but in last lateral image was not as dorsal. Please note the leads crossed over to the opposite side     The patient was monitored after the procedure in the recovery area. They were given post-procedure and discharge instructions to follow at home. The patient was discharged in a stable condition.        Siobhan Laguerre MD

## 2022-09-12 NOTE — PLAN OF CARE
Patient tolerated procedure well. Patient reports 1/10 pain after procedure. Spouse assisted patient up for first time. Gait steady with assistance. All discharge instructions given.

## 2022-09-12 NOTE — H&P
"HPI  Patient presenting for Procedure(s) (LRB):  TRIAL, SPINAL CORD STIMULATOR TRIAL PEOPLES REP (N/A)     Patient on Anti-coagulation No    No health changes since previous encounter    Past Medical History:   Diagnosis Date    Arthritis     Diabetes mellitus, type 2     Hyperlipidemia     Hypertension     Thyroid disease 06/2017    parathyroid removed     Past Surgical History:   Procedure Laterality Date    BACK SURGERY      x 4    FRACTURE SURGERY      right thigh    HERNIA REPAIR      SPINE SURGERY      TOTAL KNEE ARTHROPLASTY Left 07/06/2020     Review of patient's allergies indicates:  No Known Allergies   Current Facility-Administered Medications   Medication    0.9%  NaCl infusion       PMHx, PSHx, Allergies, Medications reviewed in epic    ROS negative except pain complaints in HPI    OBJECTIVE:    BP (!) 114/57 (BP Location: Right arm, Patient Position: Lying)   Pulse 68   Temp 98.2 °F (36.8 °C) (Oral)   Resp 16   Ht 5' 9" (1.753 m)   Wt 91.6 kg (202 lb)   SpO2 (!) 93%   BMI 29.83 kg/m²     Plan:    Proceed with procedure as planned Procedure(s) (LRB):  TRIAL, SPINAL CORD STIMULATOR TRIAL ABBOTT REP (N/A)    Risks of procedure discussed in detail and all questions and concerns addressed.     Lopez Yan  09/12/2022        Chronic Pain - New Consult     Referring Physician: No ref. provider found     Chief Complaint:       Chief Complaint   Patient presents with    Back Pain    Arm Pain    Elbow Pain                    SUBJECTIVE:     Frandy Mccarthy Jr. presents to the clinic for the evaluation of low back pain. Previously seen by Dr. Herring, most recently in 2018. The pain started many years ago. Has had multiple lumbar surgeries with Dr. Forte at [facility]. Follows with Dr. Forte every 3 months. Symptoms have been unchanged.The pain is located in the low back area and radiates to the bilateral posterior thighs.  The pain is described as aching, sharp, stabbing and electric " and is rated as 7/10. The pain is rated with a score of  5/10 on the BEST day and a score of 10/10 on the WORST day.  Symptoms interfere with daily activity and sleeping. The pain is exacerbated by Standing, Laying, Bending, Walking and Extension.  The pain is mitigated by medications and hot showers. He reports spending 20 hours per day in chair with legs up. The patient reports 2 hour blocks of sleep multiple times a day. Patient reports he is unable to sleep for greater than 2 hours at a time because the pain walks him up.  Of note patient saw Dr. Forte at [facility] who took XRs of lumbar spine and surgical site. Patient reports that surgeon informed him hardware is loose but there is no need for surgical interventions at this time. Surgery indicated only for pain relief. Patient is here today for evaluation of nonsurgical interventions for back pain.        Patient denies night fever/night sweats, urinary incontinence, bowel incontinence, significant weight loss and significant motor weakness.        Pain Disability Index Review:  Last 3 PDI Scores 6/15/2022 10/2/2019 4/23/2018   Pain Disability Index (PDI) 35 4 30         Pain Medications:  Tramadol 50mg tid prn, not taking  Gabapentin 600mg bid, not taking  Lyrica 150mg  cymbalata 30mg bid  percoet 5mg, Dr. Forte @ [facility]  celebrex 200mg daily  Naproxen 550mg bid     Opioid Contract: not applicable      report:  Reviewed and consistent with medication use as prescribed.     Pain Procedures:  Caudal MARCOS 03.23.18; transient relief, 1-2 days  Bilateral LMBB: Positive  Right L3-5 RFA 10/2014: Excellent relief until summer 2016  Left L3-5 RFA 10/2014: Excellent relief until summer 2016  Right L3-5 Cooled RFA 10/2016:  Good relief of back pain  Left L3-5 Cooled RFA 11/2016:  Good relief of back pain  Caudal MARCOS 12/20/16; good relief of radicular symptoms with good relief of back pain  3/2018: Caudal MARCOS: limited benefit        Physical  Therapy/Home Exercise: yes  He completed a 12 week course since ; however, this made his pain worse.  Imaging:             Past Medical History:   Diagnosis Date    Arthritis      Diabetes mellitus, type 2      Hyperlipidemia      Hypertension      Thyroid disease 2017     parathyroid removed            Past Surgical History:   Procedure Laterality Date    BACK SURGERY         x 4    FRACTURE SURGERY         right thigh    HERNIA REPAIR        SPINE SURGERY        TOTAL KNEE ARTHROPLASTY Left 2020      Social History               Socioeconomic History    Marital status:    Tobacco Use    Smoking status: Former Smoker       Quit date: 1973       Years since quittin.3    Smokeless tobacco: Never Used   Substance and Sexual Activity    Alcohol use: Yes       Comment: occasional    Drug use: No    Sexual activity: Yes       Partners: Female               Family History   Problem Relation Age of Onset    Cancer Mother      Diabetes Mother      Diabetes Father      Cancer Father           Review of patient's allergies indicates:  No Known Allergies          Current Outpatient Medications   Medication Sig    DULoxetine (CYMBALTA) 30 MG capsule Take 30 mg by mouth once daily.     fenofibrate 160 MG Tab Take 1 tablet (160 mg total) by mouth once daily.    furosemide (LASIX) 40 MG tablet Take 40 mg by mouth 2 (two) times daily.    HUMALOG U-100 INSULIN 100 unit/mL injection TAKE AS DIRECTED  HUMALOG / VIA V GO 40 / 6 CLICKS DAY    irbesartan (AVAPRO) 150 MG tablet Take 150 mg by mouth once daily.     metoprolol tartrate (LOPRESSOR) 100 MG tablet Take 100 mg by mouth.    oxyCODONE-acetaminophen (PERCOCET) 5-325 mg per tablet TAKE 1 TABLET BY MOUTH ONCE A DAY AS NEEDED FOR PAIN    pramipexole (MIRAPEX) 0.25 MG tablet Take by mouth every evening.     simvastatin (ZOCOR) 20 MG tablet Take 20 mg by mouth once daily.    tamsulosin (FLOMAX) 0.4 mg Cap Take 0.4 mg by mouth once daily.    V-GO 30 Isabel AS  "DIRECTED , APPLY 1 V-GO DEVICE DAILY    V-GO 40 Isabel APPLY ONE VGO40 DAILY    cholecalciferol, vitamin D3, 2,000 unit Cap Take 1 capsule by mouth once daily.    folic acid (FOLVITE) 800 MCG Tab Take 800 mcg by mouth once daily.    gabapentin (NEURONTIN) 800 MG tablet Take 800 mg by mouth 4 (four) times daily.    metFORMIN (GLUCOPHAGE) 500 MG tablet Take 500 mg by mouth 2 (two) times daily with meals.    traMADol (ULTRAM) 50 mg tablet        No current facility-administered medications for this visit.         REVIEW OF SYSTEMS:     GENERAL:  No weight loss, malaise or fevers.  HEENT:  Negative for frequent or significant headaches.  NECK:  Negative for lumps, goiter, pain and significant neck swelling.  RESPIRATORY:  Negative for cough, wheezing or shortness of breath.  CARDIOVASCULAR:  Negative for chest pain, leg swelling or palpitations.  GI:  Negative for abdominal discomfort, blood in stools or black stools or change in bowel habits.  MUSCULOSKELETAL:  See HPI.  SKIN:  Negative for lesions, rash, and itching.  PSYCH:  Negative for sleep disturbance, mood disorder and recent psychosocial stressors.  HEMATOLOGY/LYMPHOLOGY:  Negative for prolonged bleeding, bruising easily or swollen nodes.  NEURO:   No history of headaches, syncope, paralysis, seizures or tremors.  All other reviewed and negative other than HPI.     OBJECTIVE:     /65 (BP Location: Right arm, Patient Position: Sitting, BP Method: Medium (Automatic))   Pulse 69   Resp 18   Ht 5' 9" (1.753 m)   Wt 89.4 kg (197 lb)   BMI 29.09 kg/m²      PHYSICAL EXAMINATION:     General appearance: Well appearing, in no acute distress, alert and oriented x3.  Psych:  Mood and affect appropriate.  Skin: Skin color, texture, turgor normal, no rashes or lesions, in both upper and lower body.  Head/face:  Normocephalic, atraumatic. No palpable lymph nodes.  Neck: No pain to palpation over the cervical paraspinous muscles. Spurling Negative. No pain with neck " flexion, extension, or lateral flexion.   Cor: RRR  Pulm: CTA  GI:  Soft and non-tender.  Back: Straight leg raising in the sitting positions is negative to radicular pain. TTP over the lumbar parapsinals, L>>R.  Limited range of motion with pain reproduction   Extremities: Peripheral joint ROM is full and pain free without obvious instability or laxity in all four extremities. No deformities, edema, or skin discoloration. Good capillary refill.  Musculoskeletal: Shoulder, hip, sacroiliac and knee provocative maneuvers are negative. Bilateral upper and lower extremity strength is normal and symmetric.  No atrophy or tone abnormalities are noted.  Neuro: Bilateral upper and lower extremity coordination and muscle stretch reflexes are physiologic and symmetric.  Plantar response are downgoing. No loss of sensation is noted.  Gait: normal.     ASSESSMENT: 82 y.o. year old male with low back pain, consistent with       1. Post laminectomy syndrome      2. Chronic pain syndrome               PLAN:   We discussed with the patient the assessment and recommendations. The following is the plan we agreed on:   - XR Lumbar spine, flexion/extension, today  - Discussed SCS with patient. Patient is amenable to trial. Scheduled for psychiatric evaluation.  - RTC after SCS trial  - Counseled patient regarding the importance of activity modification and constant sleeping habits.     The above plan and management options were discussed at length with patient. Patient is in agreement with the above and verbalized understanding. It will be communicated with the referring physician via electronic record, fax, or mail.

## 2022-09-12 NOTE — DISCHARGE INSTRUCTIONS
Thank you for allowing us to care for you today. You may receive a survey about the care we provided. Your feedback is valuable and helps us provide excellent care throughout the community.     Home Care Instructions Pain Management:    1. DIET:   You may resume your normal diet today.   2. BATHING:   Sponge baths ONLY. No showers or tub baths with Spinal Cord Stimulator (SCS) in.   3. DRESSING:   Do not remove bandage. Doctor will remove at follow up visit. You can reinforce edges with band aids or skin tape if they start to come up.  4. ACTIVITY LEVEL:   Nothing strenuous with SCS in place. No bending of the back, no lifting anything heavier than a gallon of milk.    5. MEDICATIONS:   You may resume your normal medications today. DO NOT take any blood thinners with SCS in place.    6. SPECIAL INSTRUCTIONS:   No heat or ice  to the injection site while SCS is in place     If you have received any sedation through your IV, you may not drive for 24 hrs.     Please call the PAIN MANAGEMENT office at [phone] or ON CALL pager at [phone] if you experienced any:   1. Redness or swelling around the injection site.  2. Fever of 101 degrees or more  3. Drainage (pus) from the injection site.  4. For any continuous bleeding (some dried blood over the incision is normal.)    FOR EMERGENCIES:   If any unusual problems or difficulties occur during clinic hours, call [phone] or [phone].  Adult Procedural Sedation Instructions    Recovery After Procedural Sedation (Adult)  You have been given medicine by vein to make you sleep during your surgery. This may have included both a pain medicine and sleeping medicine. Most of the effects have worn off. But you may still have some drowsiness for the next 6 to 8 hours.  Home care  Follow these guidelines when you get home:  For the next 8 hours, you should be watched by a responsible adult. This person should make sure your condition is not getting worse.  Don't drink any  alcohol for the next 24 hours.  Don't drive, operate dangerous machinery, or make important business or personal decisions during the next 24 hours.  Note: Your healthcare provider may tell you not to take any medicine by mouth for pain or sleep in the next 4 hours. These medicines may react with the medicines you were given in the hospital. This could cause a much stronger response than usual.  Follow-up care  Follow up with your healthcare provider if you are not alert and back to your usual level of activity within 12 hours.  When to seek medical advice  Call your healthcare provider right away if any of these occur:  Drowsiness gets worse  Weakness or dizziness gets worse  Repeated vomiting  You can't be awakened   Date Last Reviewed: 10/18/2016  © 0885-1921 [employer]. [address] [place] [address], [place] [address]. All rights reserved. This information is not intended as a substitute for professional medical care. Always follow your healthcare professional's instructions.

## 2022-09-12 NOTE — DISCHARGE SUMMARY
Discharge Note  Short Stay      SUMMARY     Admit Date: 9/12/2022    Attending Physician: Siobhan Laguerre      Discharge Physician: Siobhan Laguerre      Discharge Date: 9/12/2022 10:00 AM    Procedure(s) (LRB):  TRIAL, SPINAL CORD STIMULATOR TRIAL ABBOTT REP (N/A)    Final Diagnosis: Chronic pain syndrome [G89.4]  Post laminectomy syndrome [M96.1]    Disposition: Home or self care    Patient Instructions:   Current Discharge Medication List        CONTINUE these medications which have NOT CHANGED    Details   DULoxetine (CYMBALTA) 30 MG capsule Take 30 mg by mouth once daily.       fenofibrate 160 MG Tab Take 1 tablet (160 mg total) by mouth once daily.  Qty: 90 tablet, Refills: 3    Associated Diagnoses: HLD (hyperlipidemia)      metoprolol tartrate (LOPRESSOR) 100 MG tablet Take 100 mg by mouth.      simvastatin (ZOCOR) 20 MG tablet Take 20 mg by mouth once daily.      cholecalciferol, vitamin D3, 2,000 unit Cap Take 1 capsule by mouth once daily.  Refills: 6      folic acid (FOLVITE) 800 MCG Tab Take 800 mcg by mouth once daily.      furosemide (LASIX) 40 MG tablet Take 40 mg by mouth 2 (two) times daily.      gabapentin (NEURONTIN) 800 MG tablet Take 800 mg by mouth 4 (four) times daily.      HUMALOG U-100 INSULIN 100 unit/mL injection TAKE AS DIRECTED  HUMALOG / VIA V GO 40 / 6 CLICKS DAY      irbesartan (AVAPRO) 150 MG tablet Take 150 mg by mouth once daily.       metFORMIN (GLUCOPHAGE) 500 MG tablet Take 500 mg by mouth 2 (two) times daily with meals.      oxyCODONE-acetaminophen (PERCOCET) 5-325 mg per tablet TAKE 1 TABLET BY MOUTH ONCE A DAY AS NEEDED FOR PAIN  Refills: 0      pramipexole (MIRAPEX) 0.25 MG tablet Take by mouth every evening.       tamsulosin (FLOMAX) 0.4 mg Cap Take 0.4 mg by mouth once daily.      traMADol (ULTRAM) 50 mg tablet       V-GO 30 Isabel AS DIRECTED , APPLY 1 V-GO DEVICE DAILY  Refills: 6      V-GO 40 Isabel APPLY ONE VGO40 DAILY                 Discharge Diagnosis: Chronic pain syndrome  [G89.4]  Post laminectomy syndrome [M96.1]  Condition on Discharge: Stable with no complications to procedure   Diet on Discharge: Same as before.  Activity: as per instruction sheet.  Discharge to: Home with a responsible adult.  Follow up: 2-4 weeks       Please call my office or pager at [phone] if experienced any weakness or loss of sensation, fever > 101.5, pain uncontrolled with oral medications, persistent nausea/vomiting/or diarrhea, redness or drainage from the incisions, or any other worrisome concerns. If physician on call was not reached or could not communicate with our office for any reason please go to the nearest emergency department

## 2022-09-16 ENCOUNTER — TELEPHONE (OUTPATIENT)
Dept: PAIN MEDICINE | Facility: CLINIC | Age: 82
End: 2022-09-16
Payer: MEDICARE

## 2022-09-16 NOTE — TELEPHONE ENCOUNTER
This message is for patient in regards to his/her appointment 09/19/22 at 1:20p  With  MAL Arreguin.      For the safety of all patients and staff members. We are requesting during this visit that all patients and visitors to wear required face mask at all times here at [facility].     If you have any questions or concerns please contact [phone]       Pt verbalized understanding and has confirmed appt

## 2022-09-19 ENCOUNTER — OFFICE VISIT (OUTPATIENT)
Dept: PAIN MEDICINE | Facility: CLINIC | Age: 82
End: 2022-09-19
Payer: MEDICARE

## 2022-09-19 ENCOUNTER — HOSPITAL ENCOUNTER (OUTPATIENT)
Dept: RADIOLOGY | Facility: OTHER | Age: 82
Discharge: HOME | End: 2022-09-19
Attending: ANESTHESIOLOGY
Payer: MEDICARE

## 2022-09-19 VITALS
HEIGHT: 69 IN | WEIGHT: 202 LBS | DIASTOLIC BLOOD PRESSURE: 73 MMHG | BODY MASS INDEX: 29.92 KG/M2 | SYSTOLIC BLOOD PRESSURE: 146 MMHG | HEART RATE: 80 BPM

## 2022-09-19 DIAGNOSIS — G89.4 CHRONIC PAIN SYNDROME: ICD-10-CM

## 2022-09-19 DIAGNOSIS — M96.1 POST LAMINECTOMY SYNDROME: ICD-10-CM

## 2022-09-19 DIAGNOSIS — G89.4 CHRONIC PAIN SYNDROME: Primary | ICD-10-CM

## 2022-09-19 PROCEDURE — 72070 X-RAY EXAM THORAC SPINE 2VWS: CPT | Mod: 26,,, | Performed by: RADIOLOGY

## 2022-09-19 PROCEDURE — 99024 POSTOP FOLLOW-UP VISIT: CPT | Mod: S$GLB,,, | Performed by: NURSE PRACTITIONER

## 2022-09-19 PROCEDURE — 99999 PR PBB SHADOW E&M-EST. PATIENT-LVL IV: ICD-10-PCS | Mod: PBBFAC,,, | Performed by: NURSE PRACTITIONER

## 2022-09-19 PROCEDURE — 1125F PR PAIN SEVERITY QUANTIFIED, PAIN PRESENT: ICD-10-PCS | Mod: CPTII,S$GLB,, | Performed by: NURSE PRACTITIONER

## 2022-09-19 PROCEDURE — 3077F PR MOST RECENT SYSTOLIC BLOOD PRESSURE >= 140 MM HG: ICD-10-PCS | Mod: CPTII,S$GLB,, | Performed by: NURSE PRACTITIONER

## 2022-09-19 PROCEDURE — 1159F MED LIST DOCD IN RCRD: CPT | Mod: CPTII,S$GLB,, | Performed by: NURSE PRACTITIONER

## 2022-09-19 PROCEDURE — 72070 X-RAY EXAM THORAC SPINE 2VWS: CPT | Mod: TC,FY

## 2022-09-19 PROCEDURE — 1101F PT FALLS ASSESS-DOCD LE1/YR: CPT | Mod: CPTII,S$GLB,, | Performed by: NURSE PRACTITIONER

## 2022-09-19 PROCEDURE — 1101F PR PT FALLS ASSESS DOC 0-1 FALLS W/OUT INJ PAST YR: ICD-10-PCS | Mod: CPTII,S$GLB,, | Performed by: NURSE PRACTITIONER

## 2022-09-19 PROCEDURE — 99999 PR PBB SHADOW E&M-EST. PATIENT-LVL IV: CPT | Mod: PBBFAC,,, | Performed by: NURSE PRACTITIONER

## 2022-09-19 PROCEDURE — 3077F SYST BP >= 140 MM HG: CPT | Mod: CPTII,S$GLB,, | Performed by: NURSE PRACTITIONER

## 2022-09-19 PROCEDURE — 3078F DIAST BP <80 MM HG: CPT | Mod: CPTII,S$GLB,, | Performed by: NURSE PRACTITIONER

## 2022-09-19 PROCEDURE — 1159F PR MEDICATION LIST DOCUMENTED IN MEDICAL RECORD: ICD-10-PCS | Mod: CPTII,S$GLB,, | Performed by: NURSE PRACTITIONER

## 2022-09-19 PROCEDURE — 1160F RVW MEDS BY RX/DR IN RCRD: CPT | Mod: CPTII,S$GLB,, | Performed by: NURSE PRACTITIONER

## 2022-09-19 PROCEDURE — 1125F AMNT PAIN NOTED PAIN PRSNT: CPT | Mod: CPTII,S$GLB,, | Performed by: NURSE PRACTITIONER

## 2022-09-19 PROCEDURE — 72070 XR THORACIC SPINE AP LATERAL: ICD-10-PCS | Mod: 26,,, | Performed by: RADIOLOGY

## 2022-09-19 PROCEDURE — 3078F PR MOST RECENT DIASTOLIC BLOOD PRESSURE < 80 MM HG: ICD-10-PCS | Mod: CPTII,S$GLB,, | Performed by: NURSE PRACTITIONER

## 2022-09-19 PROCEDURE — 1160F PR REVIEW ALL MEDS BY PRESCRIBER/CLIN PHARMACIST DOCUMENTED: ICD-10-PCS | Mod: CPTII,S$GLB,, | Performed by: NURSE PRACTITIONER

## 2022-09-19 PROCEDURE — 3288F PR FALLS RISK ASSESSMENT DOCUMENTED: ICD-10-PCS | Mod: CPTII,S$GLB,, | Performed by: NURSE PRACTITIONER

## 2022-09-19 PROCEDURE — 99024 PR POST-OP FOLLOW-UP VISIT: ICD-10-PCS | Mod: S$GLB,,, | Performed by: NURSE PRACTITIONER

## 2022-09-19 PROCEDURE — 3288F FALL RISK ASSESSMENT DOCD: CPT | Mod: CPTII,S$GLB,, | Performed by: NURSE PRACTITIONER

## 2022-09-19 NOTE — PROGRESS NOTES
Chronic Pain - Established FU    Referring Physician: No ref. provider found    Chief Complaint:   No chief complaint on file.       SUBJECTIVE:    Interval History 9/19/2022:  Mr Mccarthy presents for follow up s/p Abbott SCS trial. He states approx 85% relief from symptoms. He wishes to proceed with implant. He denies s/s concerning for infection. Denies any newer areas of pain or neurological changes.     Prior Interval History   Frandy Mccarthy Jr. presents to the clinic for the evaluation of low back pain. Previously seen by Dr. Herring, most recently in 2018. The pain started many years ago. Has had multiple lumbar surgeries with Dr. Forte at [facility]. Follows with Dr. Forte every 3 months. Symptoms have been unchanged.The pain is located in the low back area and radiates to the bilateral posterior thighs.  The pain is described as aching, sharp, stabbing and electric  and is rated as 7/10. The pain is rated with a score of  5/10 on the BEST day and a score of 10/10 on the WORST day.  Symptoms interfere with daily activity and sleeping. The pain is exacerbated by Standing, Laying, Bending, Walking and Extension.  The pain is mitigated by medications and hot showers. He reports spending 20 hours per day in chair with legs up. The patient reports 2 hour blocks of sleep multiple times a day. Patient reports he is unable to sleep for greater than 2 hours at a time because the pain walks him up.  Of note patient saw Dr. Forte at [facility] who took XRs of lumbar spine and surgical site. Patient reports that surgeon informed him hardware is loose but there is no need for surgical interventions at this time. Surgery indicated only for pain relief. Patient is here today for evaluation of nonsurgical interventions for back pain.      Patient denies night fever/night sweats, urinary incontinence, bowel incontinence, significant weight loss and significant motor weakness.      Pain  Disability Index Review:  Last 3 PDI Scores 6/15/2022 10/2/2019 2018   Pain Disability Index (PDI) 35 4 30       Pain Medications:  Tramadol 50mg tid prn, not taking  Gabapentin 600mg bid, not taking  Lyrica 150mg  cymbalata 30mg bid  percoet 5mg, Dr. Forte @ [facility]  celebrex 200mg daily  Naproxen 550mg bid    Opioid Contract: not applicable     report:  Reviewed and consistent with medication use as prescribed.    Pain Procedures:  Caudal MARCOS 18; transient relief, 1-2 days  Bilateral LMBB: Positive  Right L3-5 RFA 10/2014: Excellent relief until summer 2016  Left L3-5 RFA 10/2014: Excellent relief until summer 2016  Right L3-5 Cooled RFA 10/2016:  Good relief of back pain  Left L3-5 Cooled RFA 2016:  Good relief of back pain  Caudal MARCOS 16; good relief of radicular symptoms with good relief of back pain  3/2018: Caudal MARCOS: limited benefit      Physical Therapy/Home Exercise: yes  He completed a 12 week course since ; however, this made his pain worse.  Imaging:      Past Medical History:   Diagnosis Date    Arthritis     Diabetes mellitus, type 2     Hyperlipidemia     Hypertension     Thyroid disease 2017    parathyroid removed     Past Surgical History:   Procedure Laterality Date    BACK SURGERY      x 4    FRACTURE SURGERY      right thigh    HERNIA REPAIR      SPINE SURGERY      TOTAL KNEE ARTHROPLASTY Left 2020    TRIAL OF SPINAL CORD NERVE STIMULATOR N/A 2022    Procedure: TRIAL, SPINAL CORD STIMULATOR TRIAL PEOPLES REP;  Surgeon: Siobhan Laguerre MD;  Location: [facility];  Service: Pain Management;  Laterality: N/A;   RESCHEDULE     Social History     Socioeconomic History    Marital status:    Tobacco Use    Smoking status: Former     Types: Cigarettes     Quit date: 1973     Years since quittin.5    Smokeless tobacco: Never   Substance and Sexual Activity    Alcohol use: Yes     Comment: occasional    Drug use: No    Sexual activity:  Yes     Partners: Female     Family History   Problem Relation Age of Onset    Cancer Mother     Diabetes Mother     Diabetes Father     Cancer Father        Review of patient's allergies indicates:  No Known Allergies    Current Outpatient Medications   Medication Sig    DULoxetine (CYMBALTA) 30 MG capsule Take 30 mg by mouth once daily.     fenofibrate 160 MG Tab Take 1 tablet (160 mg total) by mouth once daily.    furosemide (LASIX) 40 MG tablet Take 40 mg by mouth 2 (two) times daily.    HUMALOG U-100 INSULIN 100 unit/mL injection TAKE AS DIRECTED  HUMALOG / VIA V GO 40 / 6 CLICKS DAY    metFORMIN (GLUCOPHAGE) 500 MG tablet Take 500 mg by mouth 2 (two) times daily with meals.    metoprolol tartrate (LOPRESSOR) 100 MG tablet Take 100 mg by mouth.    oxyCODONE-acetaminophen (PERCOCET) 5-325 mg per tablet TAKE 1 TABLET BY MOUTH ONCE A DAY AS NEEDED FOR PAIN    pramipexole (MIRAPEX) 0.25 MG tablet Take by mouth every evening.     simvastatin (ZOCOR) 20 MG tablet Take 20 mg by mouth once daily.    tamsulosin (FLOMAX) 0.4 mg Cap Take 0.4 mg by mouth once daily.    V-GO 30 Isabel AS DIRECTED , APPLY 1 V-GO DEVICE DAILY    cholecalciferol, vitamin D3, 2,000 unit Cap Take 1 capsule by mouth once daily.    folic acid (FOLVITE) 800 MCG Tab Take 800 mcg by mouth once daily.    gabapentin (NEURONTIN) 800 MG tablet Take 800 mg by mouth 4 (four) times daily.    irbesartan (AVAPRO) 150 MG tablet Take 150 mg by mouth once daily.     traMADol (ULTRAM) 50 mg tablet     V-GO 40 Iasbel APPLY ONE VGO40 DAILY     No current facility-administered medications for this visit.       REVIEW OF SYSTEMS:    GENERAL:  No weight loss, malaise or fevers.  HEENT:  Negative for frequent or significant headaches.  NECK:  Negative for lumps, goiter, pain and significant neck swelling.  RESPIRATORY:  Negative for cough, wheezing or shortness of breath.  CARDIOVASCULAR:  Negative for chest pain, leg swelling or palpitations.  GI:  Negative for abdominal  "discomfort, blood in stools or black stools or change in bowel habits.  MUSCULOSKELETAL:  See HPI.  SKIN:  Negative for lesions, rash, and itching.  PSYCH:  Negative for sleep disturbance, mood disorder and recent psychosocial stressors.  HEMATOLOGY/LYMPHOLOGY:  Negative for prolonged bleeding, bruising easily or swollen nodes.  NEURO:   No history of headaches, syncope, paralysis, seizures or tremors.  All other reviewed and negative other than HPI.    OBJECTIVE:    BP (!) 146/73   Pulse 80   Ht 5' 9" (1.753 m)   Wt 91.6 kg (202 lb)   BMI 29.83 kg/m²     PHYSICAL EXAMINATION:  Voice normal.  Normal affect without evidence of distress.  Skin: SCS site without drainage or signs of infection.  Leads removed without difficulty with tips intact.  Area cleaned with alcohol and Bacitracin applied.    ASSESSMENT: 82 y.o. year old male with low back pain, consistent with      No diagnosis found.        PLAN:   We discussed with the patient the assessment and recommendations. The following is the plan we agreed on:   - S/p Abbott SCS trial. 85% relief and wishes to proceed with SCS implant  - Leads removed, tips intact, no s/s of infection.   - Will refer to Dr Dalton per Dr Laguerre recommendation for implant   - Counseled patient regarding the importance of activity modification and constant sleeping habits.  - RTC PRN  The above plan and management options were discussed at length with patient. Patient is in agreement with the above and verbalized understanding. It will be communicated with the referring physician via electronic record, fax, or mail.    Zafar Epstein  09/19/2022         "

## 2022-09-22 ENCOUNTER — OFFICE VISIT (OUTPATIENT)
Dept: NEUROSURGERY | Facility: CLINIC | Age: 82
End: 2022-09-22
Payer: MEDICARE

## 2022-09-22 VITALS — DIASTOLIC BLOOD PRESSURE: 63 MMHG | SYSTOLIC BLOOD PRESSURE: 121 MMHG | HEART RATE: 81 BPM

## 2022-09-22 DIAGNOSIS — M96.1 POST LAMINECTOMY SYNDROME: Primary | ICD-10-CM

## 2022-09-22 DIAGNOSIS — G89.4 CHRONIC PAIN SYNDROME: ICD-10-CM

## 2022-09-22 DIAGNOSIS — Z01.818 PRE-OP TESTING: ICD-10-CM

## 2022-09-22 DIAGNOSIS — M96.1 POST LAMINECTOMY SYNDROME: ICD-10-CM

## 2022-09-22 PROCEDURE — 99999 PR PBB SHADOW E&M-EST. PATIENT-LVL III: CPT | Mod: PBBFAC,,, | Performed by: NEUROLOGICAL SURGERY

## 2022-09-22 PROCEDURE — 3288F PR FALLS RISK ASSESSMENT DOCUMENTED: ICD-10-PCS | Mod: CPTII,S$GLB,, | Performed by: NEUROLOGICAL SURGERY

## 2022-09-22 PROCEDURE — 1159F MED LIST DOCD IN RCRD: CPT | Mod: CPTII,S$GLB,, | Performed by: NEUROLOGICAL SURGERY

## 2022-09-22 PROCEDURE — 1125F PR PAIN SEVERITY QUANTIFIED, PAIN PRESENT: ICD-10-PCS | Mod: CPTII,S$GLB,, | Performed by: NEUROLOGICAL SURGERY

## 2022-09-22 PROCEDURE — 3074F PR MOST RECENT SYSTOLIC BLOOD PRESSURE < 130 MM HG: ICD-10-PCS | Mod: CPTII,S$GLB,, | Performed by: NEUROLOGICAL SURGERY

## 2022-09-22 PROCEDURE — 1125F AMNT PAIN NOTED PAIN PRSNT: CPT | Mod: CPTII,S$GLB,, | Performed by: NEUROLOGICAL SURGERY

## 2022-09-22 PROCEDURE — 3074F SYST BP LT 130 MM HG: CPT | Mod: CPTII,S$GLB,, | Performed by: NEUROLOGICAL SURGERY

## 2022-09-22 PROCEDURE — 3078F PR MOST RECENT DIASTOLIC BLOOD PRESSURE < 80 MM HG: ICD-10-PCS | Mod: CPTII,S$GLB,, | Performed by: NEUROLOGICAL SURGERY

## 2022-09-22 PROCEDURE — 1159F PR MEDICATION LIST DOCUMENTED IN MEDICAL RECORD: ICD-10-PCS | Mod: CPTII,S$GLB,, | Performed by: NEUROLOGICAL SURGERY

## 2022-09-22 PROCEDURE — 1101F PR PT FALLS ASSESS DOC 0-1 FALLS W/OUT INJ PAST YR: ICD-10-PCS | Mod: CPTII,S$GLB,, | Performed by: NEUROLOGICAL SURGERY

## 2022-09-22 PROCEDURE — 99204 OFFICE O/P NEW MOD 45 MIN: CPT | Mod: 57,S$GLB,, | Performed by: NEUROLOGICAL SURGERY

## 2022-09-22 PROCEDURE — 3288F FALL RISK ASSESSMENT DOCD: CPT | Mod: CPTII,S$GLB,, | Performed by: NEUROLOGICAL SURGERY

## 2022-09-22 PROCEDURE — 99204 PR OFFICE/OUTPT VISIT, NEW, LEVL IV, 45-59 MIN: ICD-10-PCS | Mod: 57,S$GLB,, | Performed by: NEUROLOGICAL SURGERY

## 2022-09-22 PROCEDURE — 1101F PT FALLS ASSESS-DOCD LE1/YR: CPT | Mod: CPTII,S$GLB,, | Performed by: NEUROLOGICAL SURGERY

## 2022-09-22 PROCEDURE — 3078F DIAST BP <80 MM HG: CPT | Mod: CPTII,S$GLB,, | Performed by: NEUROLOGICAL SURGERY

## 2022-09-22 PROCEDURE — 99999 PR PBB SHADOW E&M-EST. PATIENT-LVL III: ICD-10-PCS | Mod: PBBFAC,,, | Performed by: NEUROLOGICAL SURGERY

## 2022-09-22 RX ORDER — LANOLIN ALCOHOL/MO/W.PET/CERES
100 CREAM (GRAM) TOPICAL
COMMUNITY

## 2022-09-22 RX ORDER — PREGABALIN 225 MG/1
225 CAPSULE ORAL 2 TIMES DAILY
Status: ON HOLD | COMMUNITY
Start: 2022-07-26 | End: 2022-11-09

## 2022-09-22 RX ORDER — MIRABEGRON 25 MG/1
25 TABLET, FILM COATED, EXTENDED RELEASE ORAL DAILY
Status: ON HOLD | COMMUNITY
End: 2022-11-01 | Stop reason: HOSPADM

## 2022-09-22 RX ORDER — MUPIROCIN 20 MG/G
OINTMENT TOPICAL 2 TIMES DAILY
Qty: 1 EACH | Refills: 0 | Status: ON HOLD | OUTPATIENT
Start: 2022-10-21 | End: 2022-10-26 | Stop reason: HOSPADM

## 2022-09-22 RX ORDER — FINASTERIDE 5 MG/1
5 TABLET, FILM COATED ORAL
COMMUNITY
Start: 2022-02-25

## 2022-09-22 RX ORDER — OXYBUTYNIN CHLORIDE 10 MG/1
10 TABLET, EXTENDED RELEASE ORAL DAILY
Status: ON HOLD | COMMUNITY
Start: 2022-08-01 | End: 2022-11-01 | Stop reason: HOSPADM

## 2022-09-22 NOTE — PROGRESS NOTES
Neurosurgery  History & Physical    SUBJECTIVE:     Chief Complaint: chronic pain, postlaminectomy syndrome     History of Present Illness:  Frandy Mccarthy Jr. is a 82 y.o. male referred by Dr. Laguerre for consideration of laminectomy for spinal cord stimulator placement after successful trial with Abbott. The patient reports that, at baseline, he has pain in the lower back and down both legs. The pain is 8/10 in his legs, 8/10 in the back, and 8/10 in the arms. He has no neck pain.     The pain is constant. It has been present for 7 years. It is made worse by standing and walking. It improves with sitting and leaning on a shopping cart. There has been no change in bowel/bladder habits. He has tried physical therapy and injections in the past. He has had 5 previous neck and back surgeries.     He experienced 100% improvement in both back and leg pain with the trial. Goal mid T7-T8. He also endorses some arm improvement with his trial.     He has multiple medical problems, DM, HTN, CKDIII, scoliosis.     The patient denies any bleeding or anesthetic complications with any previous surgery. He had MRSA with a previous back surgery, which required HHIV antibiotics.     Review of patient's allergies indicates:  No Known Allergies    Current Outpatient Medications   Medication Sig Dispense Refill    cholecalciferol, vitamin D3, 2,000 unit Cap Take 1 capsule by mouth once daily.  6    DULoxetine (CYMBALTA) 30 MG capsule Take 30 mg by mouth once daily.       fenofibrate 160 MG Tab Take 1 tablet (160 mg total) by mouth once daily. 90 tablet 3    folic acid (FOLVITE) 800 MCG Tab Take 800 mcg by mouth once daily.      furosemide (LASIX) 40 MG tablet Take 40 mg by mouth 2 (two) times daily.      gabapentin (NEURONTIN) 800 MG tablet Take 800 mg by mouth 4 (four) times daily.      HUMALOG U-100 INSULIN 100 unit/mL injection TAKE AS DIRECTED  HUMALOG / VIA V GO 40 / 6 CLICKS DAY      irbesartan (AVAPRO) 150 MG tablet Take 150 mg  by mouth once daily.       metFORMIN (GLUCOPHAGE) 500 MG tablet Take 500 mg by mouth 2 (two) times daily with meals.      metoprolol tartrate (LOPRESSOR) 100 MG tablet Take 100 mg by mouth.      oxyCODONE-acetaminophen (PERCOCET) 5-325 mg per tablet TAKE 1 TABLET BY MOUTH ONCE A DAY AS NEEDED FOR PAIN  0    pramipexole (MIRAPEX) 0.25 MG tablet Take by mouth every evening.       simvastatin (ZOCOR) 20 MG tablet Take 20 mg by mouth once daily.      tamsulosin (FLOMAX) 0.4 mg Cap Take 0.4 mg by mouth once daily.      traMADol (ULTRAM) 50 mg tablet       V-GO 30 Isabel AS DIRECTED , APPLY 1 V-GO DEVICE DAILY  6    V-GO 40 Isabel APPLY ONE VGO40 DAILY       No current facility-administered medications for this visit.       Past Medical History:   Diagnosis Date    Arthritis     Diabetes mellitus, type 2     Hyperlipidemia     Hypertension     Thyroid disease 2017    parathyroid removed     Past Surgical History:   Procedure Laterality Date    BACK SURGERY      x 4    FRACTURE SURGERY      right thigh    HERNIA REPAIR      SPINE SURGERY      TOTAL KNEE ARTHROPLASTY Left 2020    TRIAL OF SPINAL CORD NERVE STIMULATOR N/A 2022    Procedure: TRIAL, SPINAL CORD STIMULATOR TRIAL PEOPLES REP;  Surgeon: Siobhan Laguerre MD;  Location: [facility];  Service: Pain Management;  Laterality: N/A;   RESCHEDULE     Family History       Problem Relation (Age of Onset)    Cancer Mother, Father    Diabetes Mother, Father          Social History     Socioeconomic History    Marital status:    Tobacco Use    Smoking status: Former     Types: Cigarettes     Quit date: 1973     Years since quittin.6    Smokeless tobacco: Never   Substance and Sexual Activity    Alcohol use: Yes     Comment: occasional    Drug use: No    Sexual activity: Yes     Partners: Female       Review of Systems   Constitutional:  Negative for fever.   HENT:  Negative for nosebleeds.    Eyes:  Negative for visual disturbance.   Respiratory:   Positive for cough.    Cardiovascular:  Negative for chest pain.   Musculoskeletal:  Positive for back pain and gait problem.   Hematological:  Does not bruise/bleed easily.     OBJECTIVE:     Vital Signs     There is no height or weight on file to calculate BMI.      Physical Exam:    Constitutional: He appears well-developed and well-nourished. No distress.     Eyes: EOM are normal.     Abdominal: Soft.     Skin:   Back incision well healed      Psych/Behavior: He is alert. He is oriented to person, place, and time.     Musculoskeletal:      Comments: No focal weakness, but generally 4/5       Neurological:        Coordination: He has normal finger to nose coordination.     Pulmonary: nonlabored respirations     Hematologic: no bruising noted     Diagnostic Results:  Xrays reviewed   No neural element imaging available    ASSESSMENT/PLAN:   Frandy Mccarthy Jr. is a 82 y.o. male who is referred by Dr. Laguerre and JACK Epstein for SCS placement after successful trial. He reports 100% improvement with his trial.     Hs has no preference for sidedness for generator. He will require Vancomycin for prophylaxis given his history of MRSA.     I will need to see MRI of the thoracic spine prior to surgery to ensure there is no cord compression at the proposed levels of paddle placement. He will require sedation for the exam.     We had a pleasant discussion about the risks, benefits, and alternatives to surgery. Risks include, but are not limited to, bleeding, pain, infection, scarring, need for further/repeat procedure, death, paralysis, damage to bowel/bladder/sexual function, stroke/damage to major blood vessels, leak of cerebrospinal fluid, failure to improve, and hardware-related complications. The patient expressed understanding and is eager to proceed. Informed consent was obtained. We also discussed that this is an implanted device, which means that infection from elsewhere in the body can seed the device, requiring  its removal, so it is important that he treat all infections promptly.      He will need to see Dr. Spain for medical pre-optimization and clearance. He will need to be off all AC/AP agents for one week before surgery.     We will tentatively plan for implantation on 10/26/22 at the [facility] location.     I have encouraged them to contact the clinic in interim with any questions, concerns, or adverse clinical change.

## 2022-09-23 ENCOUNTER — PATIENT MESSAGE (OUTPATIENT)
Dept: NEUROSURGERY | Facility: CLINIC | Age: 82
End: 2022-09-23
Payer: MEDICARE

## 2022-09-26 ENCOUNTER — TELEPHONE (OUTPATIENT)
Dept: NEUROSURGERY | Facility: CLINIC | Age: 82
End: 2022-09-26
Payer: MEDICARE

## 2022-09-26 RX ORDER — LORAZEPAM 0.5 MG/1
0.5 TABLET ORAL ONCE
Qty: 1 TABLET | Refills: 0 | Status: SHIPPED | OUTPATIENT
Start: 2022-09-26 | End: 2022-10-18

## 2022-09-26 NOTE — TELEPHONE ENCOUNTER
Pt was checking on prescriptions. Prescriptions are in and ready    ----- Message from Mary Rhoades sent at 9/26/2022 11:46 AM CDT -----  Contact: pt/[phone]  Type:  Patient  Call    Who Called:pt  Would the patient rather a call back or a response via [facility]? Call   Best Call Back Number:call   Additional Information: pt wife  have  questions  regarding  his  MRI that is  schedule on  10/01/2022 please  return  call @  [phone] ask for  Neha

## 2022-09-27 NOTE — PATIENT INSTRUCTIONS
Please use the Bactroban ointment twice daily in your nose for 5 days leading up to surgery. (You may use a Q-tip to apply a little bit of ointment inside each nostril.)    Please use the Hibiclens soap every other day in the shower for the 5 days before your surgery. For example, if surgery is on Monday, use the Hibiclens when you shower on Thursday, Saturday, and Monday morning.     We are asking you to do this to help reduce the chance of having an infection associated with surgery. Thank you!     Please hold all blood thinners for one week prior to surgery

## 2022-09-28 ENCOUNTER — TELEPHONE (OUTPATIENT)
Dept: NEUROSURGERY | Facility: CLINIC | Age: 82
End: 2022-09-28
Payer: MEDICARE

## 2022-10-01 ENCOUNTER — HOSPITAL ENCOUNTER (OUTPATIENT)
Dept: RADIOLOGY | Facility: OTHER | Age: 82
Discharge: HOME | End: 2022-10-01
Attending: NEUROLOGICAL SURGERY
Payer: MEDICARE

## 2022-10-01 DIAGNOSIS — M96.1 POST LAMINECTOMY SYNDROME: ICD-10-CM

## 2022-10-01 PROCEDURE — 72146 MRI CHEST SPINE W/O DYE: CPT | Mod: TC

## 2022-10-01 PROCEDURE — 72146 MRI THORACIC SPINE WITHOUT CONTRAST: ICD-10-PCS | Mod: 26,,, | Performed by: RADIOLOGY

## 2022-10-01 PROCEDURE — 72146 MRI CHEST SPINE W/O DYE: CPT | Mod: 26,,, | Performed by: RADIOLOGY

## 2022-10-09 ENCOUNTER — PATIENT MESSAGE (OUTPATIENT)
Dept: SURGERY | Facility: OTHER | Age: 82
End: 2022-10-09
Payer: MEDICARE

## 2022-10-11 ENCOUNTER — TELEPHONE (OUTPATIENT)
Dept: NEUROSURGERY | Facility: CLINIC | Age: 82
End: 2022-10-11
Payer: MEDICARE

## 2022-10-11 NOTE — TELEPHONE ENCOUNTER
Spoke to pts wife, she would like to do preop in one visit at [facility] if possible due to husbands mobility issues. Will speak with Dr. Dalton tomorrow and call wife back with answer.

## 2022-10-13 ENCOUNTER — TELEPHONE (OUTPATIENT)
Dept: NEUROSURGERY | Facility: CLINIC | Age: 82
End: 2022-10-13
Payer: MEDICARE

## 2022-10-13 NOTE — TELEPHONE ENCOUNTER
Called pts wife back regarding whether pt could do both portions of preop at [facility]  After speaking with Dr. Dalton this morning, pt will need to keep both preop appointments, one with [facility] and the other at [facility].  Pt verbally understood

## 2022-10-17 PROBLEM — N18.32 STAGE 3B CHRONIC KIDNEY DISEASE: Status: ACTIVE | Noted: 2020-12-19

## 2022-10-17 PROBLEM — E55.9 VITAMIN D DEFICIENCY: Status: ACTIVE | Noted: 2022-10-17

## 2022-10-17 PROBLEM — R35.0 BENIGN PROSTATIC HYPERPLASIA WITH URINARY FREQUENCY: Status: ACTIVE | Noted: 2020-10-06

## 2022-10-17 PROBLEM — Z86.39 HISTORY OF HYPERPARATHYROIDISM: Status: ACTIVE | Noted: 2020-10-06

## 2022-10-17 PROBLEM — N40.1 BENIGN PROSTATIC HYPERPLASIA WITH URINARY FREQUENCY: Status: ACTIVE | Noted: 2020-10-06

## 2022-10-17 PROBLEM — E11.42 DIABETIC POLYNEUROPATHY ASSOCIATED WITH TYPE 2 DIABETES MELLITUS: Status: ACTIVE | Noted: 2020-10-06

## 2022-10-17 NOTE — ASSESSMENT & PLAN NOTE
Current /67  today.    Taking: lopressor  Patient reports home BP readings of: 120-130/ 82Keeping a healthy weight/BMI can help with better BP control    Lifestyle changes to reduce systolic BP:  exercise 30 minutes per day,  5 days per week or 150 minutes weekly; sodium reduction and avoidance of high salt foods such as processed meats, frozen meals and  fast foods.     BP acceptable for surgery. I recommend monitoring BP during perioperative period as uncontrolled pain can elevate blood pressure.

## 2022-10-17 NOTE — ASSESSMENT & PLAN NOTE
DM x  40   years.   Currently takes:   Insulin pump and Dexcom continuous glucose monitor  Most recent A1c is 7.5 .       Denies peripheral neuropathy.  Reports visual changes.    Maintain healthy weight. Exercise at least 150 minutes weekly. Encouraged diet rich in nutrients such as fruits, vegetables, and whole grains; reduce sugar intake from cakes, candy, and sugared drinks.    Micro - Retinopathy, Nephropathy   Macro- Carotid, Coronary , Peripheral disease

## 2022-10-17 NOTE — ASSESSMENT & PLAN NOTE
Statin Fenofibrate    Carotid US1/7/22       Right Carotid The highest right ICA velocity divided by the right distal CCA velocity is 2.03 .    Velocities are elevated in the right internal carotid artery consistent with a 50-69% stenosis..   Left Carotid The left mid common carotid artery is visualized.     The left carotid bulb artery is visualized.     The left distal internal carotid artery has 0-19% stenosis.     There is acceleration in the left external carotid artery.     The left vertebral artery is visualized.   The left vertebral artery is associated with anterograde flow.     The highest left ICA velocity divided by the left distal CCA velocity is 1.26.

## 2022-10-18 ENCOUNTER — HOSPITAL ENCOUNTER (OUTPATIENT)
Dept: CARDIOLOGY | Facility: CLINIC | Age: 82
Discharge: HOME | End: 2022-10-18
Payer: MEDICARE

## 2022-10-18 ENCOUNTER — LAB VISIT (OUTPATIENT)
Dept: LAB | Facility: HOSPITAL | Age: 82
End: 2022-10-18
Payer: MEDICARE

## 2022-10-18 ENCOUNTER — TELEPHONE (OUTPATIENT)
Dept: PREADMISSION TESTING | Facility: HOSPITAL | Age: 82
End: 2022-10-18
Payer: MEDICARE

## 2022-10-18 ENCOUNTER — PATIENT MESSAGE (OUTPATIENT)
Dept: SURGERY | Facility: OTHER | Age: 82
End: 2022-10-18
Payer: MEDICARE

## 2022-10-18 ENCOUNTER — TELEPHONE (OUTPATIENT)
Dept: NEUROSURGERY | Facility: CLINIC | Age: 82
End: 2022-10-18
Payer: MEDICARE

## 2022-10-18 ENCOUNTER — OFFICE VISIT (OUTPATIENT)
Dept: INTERNAL MEDICINE | Facility: CLINIC | Age: 82
End: 2022-10-18
Payer: MEDICARE

## 2022-10-18 VITALS
BODY MASS INDEX: 30.66 KG/M2 | OXYGEN SATURATION: 96 % | SYSTOLIC BLOOD PRESSURE: 144 MMHG | WEIGHT: 207 LBS | TEMPERATURE: 98 F | HEART RATE: 65 BPM | HEIGHT: 69 IN | DIASTOLIC BLOOD PRESSURE: 67 MMHG

## 2022-10-18 DIAGNOSIS — Z79.4 CONTROLLED TYPE 2 DIABETES MELLITUS WITH DIABETIC AUTONOMIC NEUROPATHY, WITH LONG-TERM CURRENT USE OF INSULIN: ICD-10-CM

## 2022-10-18 DIAGNOSIS — E78.00 PURE HYPERCHOLESTEROLEMIA: ICD-10-CM

## 2022-10-18 DIAGNOSIS — R35.0 BENIGN PROSTATIC HYPERPLASIA WITH URINARY FREQUENCY: ICD-10-CM

## 2022-10-18 DIAGNOSIS — N40.1 BENIGN PROSTATIC HYPERPLASIA WITH URINARY FREQUENCY: ICD-10-CM

## 2022-10-18 DIAGNOSIS — R94.31 ABNORMAL EKG: ICD-10-CM

## 2022-10-18 DIAGNOSIS — E11.43 CONTROLLED TYPE 2 DIABETES MELLITUS WITH DIABETIC AUTONOMIC NEUROPATHY, WITH LONG-TERM CURRENT USE OF INSULIN: ICD-10-CM

## 2022-10-18 DIAGNOSIS — D64.9 ANEMIA, UNSPECIFIED TYPE: ICD-10-CM

## 2022-10-18 DIAGNOSIS — I10 ESSENTIAL HYPERTENSION: ICD-10-CM

## 2022-10-18 DIAGNOSIS — N18.4 CKD (CHRONIC KIDNEY DISEASE) STAGE 4, GFR 15-29 ML/MIN: ICD-10-CM

## 2022-10-18 DIAGNOSIS — E87.5 HYPERKALEMIA: ICD-10-CM

## 2022-10-18 DIAGNOSIS — I65.21 STENOSIS OF RIGHT CAROTID ARTERY: ICD-10-CM

## 2022-10-18 DIAGNOSIS — R60.0 LOCALIZED EDEMA: ICD-10-CM

## 2022-10-18 PROBLEM — Z86.39 HISTORY OF HYPERPARATHYROIDISM: Status: RESOLVED | Noted: 2020-10-06 | Resolved: 2022-10-18

## 2022-10-18 PROBLEM — M17.11 ARTHRITIS OF KNEE, RIGHT: Status: ACTIVE | Noted: 2020-07-06

## 2022-10-18 LAB
ALBUMIN SERPL BCP-MCNC: 3.8 G/DL (ref 3.5–5.2)
ALP SERPL-CCNC: 61 U/L (ref 55–135)
ALT SERPL W/O P-5'-P-CCNC: 11 U/L (ref 10–44)
ANION GAP SERPL CALC-SCNC: 11 MMOL/L (ref 8–16)
AST SERPL-CCNC: 14 U/L (ref 10–40)
BASOPHILS # BLD AUTO: 0.02 K/UL (ref 0–0.2)
BASOPHILS NFR BLD: 0.3 % (ref 0–1.9)
BILIRUB SERPL-MCNC: 0.3 MG/DL (ref 0.1–1)
BUN SERPL-MCNC: 51 MG/DL (ref 8–23)
CALCIUM SERPL-MCNC: 10.2 MG/DL (ref 8.7–10.5)
CHLORIDE SERPL-SCNC: 100 MMOL/L (ref 95–110)
CO2 SERPL-SCNC: 30 MMOL/L (ref 23–29)
CREAT SERPL-MCNC: 2.6 MG/DL (ref 0.5–1.4)
DIFFERENTIAL METHOD: ABNORMAL
EOSINOPHIL # BLD AUTO: 0.3 K/UL (ref 0–0.5)
EOSINOPHIL NFR BLD: 4.7 % (ref 0–8)
ERYTHROCYTE [DISTWIDTH] IN BLOOD BY AUTOMATED COUNT: 14.6 % (ref 11.5–14.5)
EST. GFR  (NO RACE VARIABLE): 23.7 ML/MIN/1.73 M^2
ESTIMATED AVG GLUCOSE: 169 MG/DL (ref 68–131)
GLUCOSE SERPL-MCNC: 137 MG/DL (ref 70–110)
HBA1C MFR BLD: 7.5 % (ref 4–5.6)
HCT VFR BLD AUTO: 37.2 % (ref 40–54)
HGB BLD-MCNC: 11.6 G/DL (ref 14–18)
IMM GRANULOCYTES # BLD AUTO: 0.02 K/UL (ref 0–0.04)
IMM GRANULOCYTES NFR BLD AUTO: 0.3 % (ref 0–0.5)
LYMPHOCYTES # BLD AUTO: 0.6 K/UL (ref 1–4.8)
LYMPHOCYTES NFR BLD: 9 % (ref 18–48)
MCH RBC QN AUTO: 30.1 PG (ref 27–31)
MCHC RBC AUTO-ENTMCNC: 31.2 G/DL (ref 32–36)
MCV RBC AUTO: 97 FL (ref 82–98)
MONOCYTES # BLD AUTO: 0.5 K/UL (ref 0.3–1)
MONOCYTES NFR BLD: 7.3 % (ref 4–15)
NEUTROPHILS # BLD AUTO: 5.2 K/UL (ref 1.8–7.7)
NEUTROPHILS NFR BLD: 78.4 % (ref 38–73)
NRBC BLD-RTO: 0 /100 WBC
PLATELET # BLD AUTO: 198 K/UL (ref 150–450)
PMV BLD AUTO: 11.1 FL (ref 9.2–12.9)
POTASSIUM SERPL-SCNC: 5.3 MMOL/L (ref 3.5–5.1)
PROT SERPL-MCNC: 8.1 G/DL (ref 6–8.4)
RBC # BLD AUTO: 3.85 M/UL (ref 4.6–6.2)
SODIUM SERPL-SCNC: 141 MMOL/L (ref 136–145)
WBC # BLD AUTO: 6.59 K/UL (ref 3.9–12.7)

## 2022-10-18 PROCEDURE — 80053 COMPREHEN METABOLIC PANEL: CPT | Performed by: NURSE PRACTITIONER

## 2022-10-18 PROCEDURE — 1159F PR MEDICATION LIST DOCUMENTED IN MEDICAL RECORD: ICD-10-PCS | Mod: CPTII,S$GLB,, | Performed by: NURSE PRACTITIONER

## 2022-10-18 PROCEDURE — 83036 HEMOGLOBIN GLYCOSYLATED A1C: CPT | Performed by: NURSE PRACTITIONER

## 2022-10-18 PROCEDURE — 93005 ELECTROCARDIOGRAM TRACING: CPT | Mod: S$GLB,,, | Performed by: NURSE PRACTITIONER

## 2022-10-18 PROCEDURE — 3078F PR MOST RECENT DIASTOLIC BLOOD PRESSURE < 80 MM HG: ICD-10-PCS | Mod: CPTII,S$GLB,, | Performed by: NURSE PRACTITIONER

## 2022-10-18 PROCEDURE — 93005 EKG 12-LEAD: ICD-10-PCS | Mod: S$GLB,,, | Performed by: NURSE PRACTITIONER

## 2022-10-18 PROCEDURE — 99999 PR PBB SHADOW E&M-EST. PATIENT-LVL IV: ICD-10-PCS | Mod: PBBFAC,,, | Performed by: NURSE PRACTITIONER

## 2022-10-18 PROCEDURE — 85025 COMPLETE CBC W/AUTO DIFF WBC: CPT | Performed by: NURSE PRACTITIONER

## 2022-10-18 PROCEDURE — 3077F PR MOST RECENT SYSTOLIC BLOOD PRESSURE >= 140 MM HG: ICD-10-PCS | Mod: CPTII,S$GLB,, | Performed by: NURSE PRACTITIONER

## 2022-10-18 PROCEDURE — 36415 COLL VENOUS BLD VENIPUNCTURE: CPT | Performed by: NURSE PRACTITIONER

## 2022-10-18 PROCEDURE — 3077F SYST BP >= 140 MM HG: CPT | Mod: CPTII,S$GLB,, | Performed by: NURSE PRACTITIONER

## 2022-10-18 PROCEDURE — 99999 PR PBB SHADOW E&M-EST. PATIENT-LVL IV: CPT | Mod: PBBFAC,,, | Performed by: NURSE PRACTITIONER

## 2022-10-18 PROCEDURE — 99204 PR OFFICE/OUTPT VISIT, NEW, LEVL IV, 45-59 MIN: ICD-10-PCS | Mod: S$GLB,,, | Performed by: NURSE PRACTITIONER

## 2022-10-18 PROCEDURE — 1159F MED LIST DOCD IN RCRD: CPT | Mod: CPTII,S$GLB,, | Performed by: NURSE PRACTITIONER

## 2022-10-18 PROCEDURE — 93010 EKG 12-LEAD: ICD-10-PCS | Mod: S$GLB,,, | Performed by: INTERNAL MEDICINE

## 2022-10-18 PROCEDURE — 99204 OFFICE O/P NEW MOD 45 MIN: CPT | Mod: S$GLB,,, | Performed by: NURSE PRACTITIONER

## 2022-10-18 PROCEDURE — 93010 ELECTROCARDIOGRAM REPORT: CPT | Mod: S$GLB,,, | Performed by: INTERNAL MEDICINE

## 2022-10-18 PROCEDURE — 1160F PR REVIEW ALL MEDS BY PRESCRIBER/CLIN PHARMACIST DOCUMENTED: ICD-10-PCS | Mod: CPTII,S$GLB,, | Performed by: NURSE PRACTITIONER

## 2022-10-18 PROCEDURE — 3078F DIAST BP <80 MM HG: CPT | Mod: CPTII,S$GLB,, | Performed by: NURSE PRACTITIONER

## 2022-10-18 PROCEDURE — 1160F RVW MEDS BY RX/DR IN RCRD: CPT | Mod: CPTII,S$GLB,, | Performed by: NURSE PRACTITIONER

## 2022-10-18 NOTE — ASSESSMENT & PLAN NOTE
10/11/2022 Frandy SAM Mccarthy Jr. 1940    Preoperative cardiac risk stratification has been requested prior to implantation of neurostimulator.  A copy of the patient's most recent evaluation is available in Epic.  At that time, his cardiac status was stable.  In summary, the patient is considered to be at no increased cardiovascular risk for the planned procedure.  No additional cardiac testing is indicated.  No special perioperative cardiovascular monitoring should be required.  Please contact me if you need any further information.    Best regards,    Lonny Corona MD      EKG 10/18/22     Vent. Rate : 057 BPM     Atrial Rate : 057 BPM      P-R Int : 190 ms          QRS Dur : 098 ms       QT Int : 402 ms       P-R-T Axes : 009 -08 053 degrees      QTc Int : 391 ms     Sinus bradycardia   Possible Inferior infarct ,age undetermined   Anterior infarct (cited on or before 02-AUG-2019)   Abnormal ECG   When compared with ECG of 14-FEB-2020 11:51,   The axis Shifted left   Possible Inferior infarct is now Present   Confirmed by Ros WHITING, Kit ([id number]) on 10/18/2022 11:41:01 AM     Referred By: FROILAN HARRIS           Confirmed By:Kit Sommer MD     Specimen Collected: 10/18/22 11:26 Last Resulted: 10/18/22 11:41

## 2022-10-18 NOTE — TELEPHONE ENCOUNTER
Spoke to patient's wife, she is over at [facility] and is going to go to 's office to schedule Cardiac Clearance appt for her . She is going to call me once scheduled.

## 2022-10-18 NOTE — OUTPATIENT SUBJECTIVE & OBJECTIVE
Outpatient Subjective & Objective      Chief Complaint: Preoperative evaulation, perioperative medical management, and complication reduction plan.     Functional Capacity:  Able to climb a flight of stairs without CP SOB or Syncope.  Able to meet 4 METs       Anesthesia issues: None    Difficulty mouth opening: none    Steroid use in the last 12 months: none     Dental Issues: dentures top    Family anesthesia difficulty: None known     Family Hx of Thrombosis none known    Past Medical History:   Diagnosis Date    Arthritis     Diabetes mellitus, type 2     Disorder of kidney and ureter     History of hyperparathyroidism     History of hyperparathyroidism 10/6/2020    Formatting of this note might be different from the original. 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    Hyperlipidemia     Hypertension     Thyroid disease 06/2017    parathyroid removed         Past Medical History Pertinent Negatives:   Diagnosis Date Noted    Allergy 04/01/2020    Anemia 10/18/2022    Anxiety 10/18/2022    Asthma 10/18/2022    Atrial fibrillation 10/18/2022    CHF (congestive heart failure) 10/18/2022    COPD (chronic obstructive pulmonary disease) 10/18/2022    Coronary artery disease 10/18/2022    Deep vein thrombosis 10/18/2022    Depression 10/18/2022    Encounter for blood transfusion 10/18/2022    GERD (gastroesophageal reflux disease) 10/18/2022    Heart murmur 10/18/2022    Myocardial infarction 10/18/2022    Pulmonary embolism 10/18/2022    Seizures 10/18/2022    Stroke 10/18/2022         Past Surgical History:   Procedure Laterality Date    BACK SURGERY      x 4    FRACTURE SURGERY      right thigh    HERNIA REPAIR      SPINE SURGERY      TOTAL KNEE ARTHROPLASTY Right 07/06/2020    TRIAL OF SPINAL CORD NERVE STIMULATOR N/A 09/12/2022    Procedure: TRIAL, SPINAL CORD STIMULATOR TRIAL PEOPLES REP;  Surgeon: Siobhan Laguerre MD;  [facility];  Service: Pain Management;  Laterality: N/A;  7/29 RESCHEDULE       Review of  "Systems   Constitutional:  Negative for activity change, appetite change, chills, diaphoresis, fatigue, fever and unexpected weight change.   HENT:  Negative for congestion, dental problem, drooling, trouble swallowing and voice change.    Eyes:  Negative for photophobia and visual disturbance.        No acute visual changes   Respiratory:  Negative for apnea, cough, choking, chest tightness, shortness of breath, wheezing and stridor.         STOP bang  Score 3    High risk GIANNI   Cardiovascular:  Negative for chest pain, palpitations and leg swelling.   Gastrointestinal:  Negative for abdominal pain, blood in stool, constipation, diarrhea, nausea and vomiting.        No FLD, Hepatitis, Cirrhosis  No BRB or black tarry stool   Genitourinary:  Negative for difficulty urinating, dysuria, frequency, hematuria and urgency.        Nocturia- 1   Musculoskeletal:  Positive for arthralgias, back pain, gait problem, neck pain and neck stiffness. Negative for myalgias.   Neurological:  Positive for tremors, weakness and numbness. Negative for dizziness, seizures, syncope, light-headedness and headaches.        Left leg getting numb   Psychiatric/Behavioral:  Negative for suicidal ideas. The patient is not nervous/anxious.       VITALS  Visit Vitals  BP (!) 144/67 (BP Location: Left arm, Patient Position: Sitting)   Pulse 65   Temp 97.9 °F (36.6 °C) (Oral)   Ht 5' 9" (1.753 m)   Wt 93.9 kg (207 lb)   SpO2 96%   BMI 30.57 kg/m²        Physical Exam  Constitutional:       General: He is not in acute distress.     Appearance: He is well-developed. He is not diaphoretic.   HENT:      Head: Normocephalic.      Right Ear: Hearing normal.      Left Ear: Hearing normal.      Nose: Nose normal.      Mouth/Throat:      Lips: Pink.   Eyes:      General: Lids are normal.      Conjunctiva/sclera: Conjunctivae normal.      Pupils: Pupils are equal, round, and reactive to light.   Neck:      Vascular: No carotid bruit.      Trachea: Trachea " and phonation normal.   Cardiovascular:      Rate and Rhythm: Normal rate and regular rhythm.      Pulses:           Carotid pulses are 2+ on the right side and 2+ on the left side.       Radial pulses are 2+ on the right side and 2+ on the left side.        Posterior tibial pulses are 2+ on the right side and 2+ on the left side.      Heart sounds: Normal heart sounds. No murmur heard.    No friction rub. No gallop.   Pulmonary:      Effort: Pulmonary effort is normal.      Breath sounds: Normal breath sounds.   Abdominal:      General: Abdomen is protuberant. Bowel sounds are normal. There is no distension.      Palpations: Abdomen is soft.      Tenderness: There is no abdominal tenderness.   Musculoskeletal:         General: No tenderness or deformity. Normal range of motion.      Cervical back: Normal range of motion.      Right lower le+ Edema present.      Left lower le+ Edema present.   Lymphadenopathy:      Head:      Right side of head: No submental, submandibular, tonsillar, preauricular, posterior auricular or occipital adenopathy.      Left side of head: No submental, submandibular, tonsillar, preauricular, posterior auricular or occipital adenopathy.      Cervical:      Right cervical: No superficial cervical adenopathy.     Left cervical: No superficial cervical adenopathy.   Skin:     General: Skin is warm and dry.      Capillary Refill: Capillary refill takes 2 to 3 seconds.      Coloration: Skin is not pale.      Findings: No erythema or rash.   Neurological:      Mental Status: He is alert and oriented to person, place, and time.      Motor: No abnormal muscle tone.      Coordination: Coordination normal.   Psychiatric:         Behavior: Behavior is cooperative.        Significant Labs:  Lab Results   Component Value Date    HGBA1C 6.5 (H) 2022       Diagnostic Studies: No relevant studies.    EKG:   Results for orders placed or performed in visit on 20   IN OFFICE EKG 12-LEAD  (to [facility])    Collection Time: 02/14/20 11:51 AM    Narrative    Test Reason : R94.31,I10,I10,    Vent. Rate : 067 BPM     Atrial Rate : 067 BPM     P-R Int : 170 ms          QRS Dur : 094 ms      QT Int : 394 ms       P-R-T Axes : 018 124 047 degrees     QTc Int : 416 ms    Normal sinus rhythm  Right axis deviation  Anterior infarct (cited on or before 02-AUG-2019) is possible  Abnormal ECG  When compared with ECG of 02-AUG-2019 10:26,  Questionable change in The axis  Confirmed by Marcos Cho MD ([id number]) on 2/14/2020 4:13:52 PM    Referred By:             Confirmed By:Marcos Cho MD       2D ECHO:  TTE:  No results found for this or any previous visit.    ALTAGRACIA:  No results found for this or any previous visit.     Imaging   Active Cardiac Conditions: None      Revised Cardiac Risk Index   High -Risk Surgery  Intraperitoneal; Intrathoracic; suprainguinal vascular Yes- + 1 No- 0   History of Ischemic Heart Disease   (Hx of MI/positive exercise test/current chest pain due to ischemia/use of nitrate therapy/EKG with pathological Q waves) Yes- + 1 No- 0   History of CHF  (Pulmonary edema/bilateral rales or S3 gallop/PND/CXR showing pulmonary vascular redistribution) Yes- + 1 No- 0   History of CVA   (Prior stroke or TIA) Yes- + 1 No- 0   Pre-operative treatment with insulin Yes- + 1 No- 0   Pre-operative creatinine > 2mg/dl Yes- + 1 No- 0   Total:      Risk Status:  Estimated risk of cardiac complications after non-cardiac surgery using the Revised Cardiac Risk Index for Preoperative risk is 3.9 %      ARISCAT (Canet) risk index: Low: 1.6% risk of post-op pulmonary complications.    American Society of Anesthesiologists Physical Status classification (ASA): 3         No further cardiac workup needed prior to surgery.    Outpatient Subjective & Objective

## 2022-10-18 NOTE — PATIENT INSTRUCTIONS
Preventive perioperative care    Thromboembolic prophylaxis:  His risk factors for thrombosis include obesity, surgical procedure, age, and reduced mobility.I suggest  thromboembolic prophylaxis ( mechanical/pharmacological, weighing the risk benefits of pharmacological agent use considering [place] procedural bleeding )  during the perioperative period.I suggested being active in the post operative period.      Postoperative pulmonary complication prophylaxis-Risk factors for post operative pulmonary complications include age over 65 years, surgery lasting over 3 hours, and ASA class >2- I suggest incentive spirometry use, early ambulation, and pain control so as to avoid diaphragmatic splinting  Brush teeth twice per day, oral rinses, sleep with the head of the bed up 30 degrees     Renal complication prophylaxis-Risk factors for renal complications include pre-existing renal disease, age, diabetes mellitus, and hypertension . I suggest keeping him well hydrated and avoidance/ minimizing the use of  NSAID's,DELGADO 2 Inhibitors ,IV contrast if possible in the perioperative period.I suggested drinking 2 litre's of water a day      Surgical site Infection Prophylaxis-I  suggest appropriate antibiotic for Prophylaxis against Surgical site infections Shower with Hibiclens in the night before surgery and the morning of surgery     Delirium prophylaxis-Risk factors - Advanced Age - I suggest avoidance / minimizing the use of  Benzodiazepines ( unless the patient has been taking it on a regular basis ),Anticholinergic medication,Antihistamines ( like  Benadryl).I suggest minimizing the use of opioid medication and use of IV tylenol,if it is appropriate. I suggest using the lowest possible dose of opioids for the shortest duration possible in the perioperative period. I suggest to Keep shades/blinds open during the day, lights off and shades closed at night to encourage normal sleep/wake cycle.I encourage the presence of the  family member with the patient at all times, if at all possible as mental status changes can be picked up early by the family members and they help with reorientation. I encouraged the presence of family to help with orientation in the perioperative period. Benadryl avoidance suggested      In view of BPH the patient  is at risk of postoperative urinary retention.  I suggest avoidance / minimizing the of  Benzodiazepines,Anticholinergic medication,antihistamines ( Benadryl) , if possible in the perioperative period. I suggest using the minimum possible use of opioids for the minimum period of time in the perioperative period. Benadryl avoidance suggested      This visit was focused on Preoperative evaluation, Perioperative Medical management, complication reduction plans. I suggest that the patient follows up with primary care or relevant sub specialists for ongoing health care.    I appreciate the opportunity to be involved in this patients care. Please feel free to contact me if there were any questions about this consultation.    Patient is pending optimization

## 2022-10-18 NOTE — HPI
This is a 82 y.o. male  who presents today for a preoperative evaluation in preparation for a Neurosurgery  procedure.  Scheduled for  10/26/22  Surgery is indicated for Neurostimulator spinal cord.   Patient is new to me.  Details of current problem: The duration of problem is since his first spinal surgeries- It has been present for 7 years. He has had 5 previous neck and back surgeries.      Reports symptoms of constant  pain, leg weakness, buckling, poor balance. There has been no change in bowel/bladder habits.  Aggravating factors include: sitting,  standing and walking. Unable to lie down due to pain, Has not slept in a bed in 2 years- uses recliner  Relieving factors are  improves with sitting and bending forward, hot shower  Treated with   He has tried surgery,  physical therapy and injections in the past.    Reports pain: 8/10  The history has been obtained by speaking with the patient and reviewing the electronic medical record and/or outside health information. Significant health conditions for the perioperative period are discussed below in assessment and plan.     Patient reports current health status to be Fair.  Reports right shoulder pain at this visit.  Denies any new symptoms before surgery.

## 2022-10-18 NOTE — DISCHARGE INSTRUCTIONS
Your surgery has been scheduled for:_________10/26/22_________________________________    You should report to:  ____[facility], located on the [place] side of the first floor of the           [facility] ([phone])  ____[facility], located on the [facility] side of the            Second floor of the [facility] ([phone])  ____3rd Floor [facility] located on the [facility] side of the [facility] [phone]  ____[facility]: located at [address] [place] [address]. Check in on the first floor of the hospital.  Please Note   Tell your doctor if you take Aspirin, products containing Aspirin, herbal medications  or blood thinners, such as Coumadin, Ticlid, or Plavix.  (Consult your provider regarding holding or stopping before surgery).  Arrange for someone to drive you home following surgery.  You will not be allowed to leave the surgical facility alone or drive yourself home following sedation and anesthesia.  Before Surgery  Stop taking all herbal medications 7 days prior to surgery  No Motrin/Advil (Ibuprofen)  7 day before surgery  No Aleve (Naproxen) 7 days before surgery  Stop Taking Asprin, products containing Asprin __7___days before surgery  No Goody's/BC  Powder 7 days before surgery  Refrain from drinking alcoholic beverages for 24 hours before and after surgery  Stop or limit smoking at least 24 hours prior to surgery.  You may take Tylenol for pain  Night before Surgery  Do not eat or drink after midnight  Take a shower or bath (shower is recommended).  Bathe with Hibiclens soap or an antibacterial soap from the neck down.  If not supplied by your surgeon, hibiclens soap will need to be purchased over the counter in pharmacy.  Rinse soap off thoroughly.  Shampoo your hair with your regular shampoo  The Day of Surgery  Take another bath or shower with hibiclens or  any antibacterial soap, to reduce the chance of infection.  Take heart and blood pressure medications with a small sip of water, as advised by the perioperative team.  Do not take fluid pills  You may brush your teeth and rinse your mouth, but do not swall any additional water.   Do not apply perfumes, powder, body lotions or deodorant on the day of surgery.  Nail polish should be removed.  Do not wear makeup or moisturizer  Wear comfortable clothes, such as a button front shirt and loose fitting pants.  Leave all jewelry, including body piercings, and valuables at home.    Bring any devices you will neeed after surgery such as crutches or canes.  If you have sleep apnea, please bring your CPAP machine  In the event that your physical condition changes including the onset of a cold or respiratory illness, or if you have to delay or cancel your surgery, please notify your surgeon.

## 2022-10-18 NOTE — ASSESSMENT & PLAN NOTE
Edema on left leg worse than right- not new, stable over time    Wear compression socks for edema  On salt restriction  Unable to elevate legs due to back pain

## 2022-10-18 NOTE — TELEPHONE ENCOUNTER
Pts wife messaged inquiring as to whether they needed to go to the second preop appt as she thinks that everything was looked over at [facility] preop. Advised per berkley that they did not have to go to the appt tomorrow. She will speak to anesthesia - will have that part done by phone.

## 2022-10-18 NOTE — OUTPATIENT SUBJECTIVE & OBJECTIVE
Outpatient Subjective & Objective      Chief Complaint: Preoperative evaulation, perioperative medical management, and complication reduction plan.     Functional Capacity:  Able to climb a flight of stairs without CP SOB or Syncope.  Able to meet 4 METs       Anesthesia issues: None    Difficulty mouth opening: none    Steroid use in the last 12 months: none     Dental Issues: none    Family anesthesia difficulty: None     Family Hx of Thrombosis none    Past Medical History:   Diagnosis Date    Arthritis     Diabetes mellitus, type 2     Hyperlipidemia     Hypertension     Thyroid disease 06/2017    parathyroid removed       Past Surgical History:   Procedure Laterality Date    BACK SURGERY      x 4    FRACTURE SURGERY      right thigh    HERNIA REPAIR      SPINE SURGERY      TOTAL KNEE ARTHROPLASTY Left 07/06/2020    TRIAL OF SPINAL CORD NERVE STIMULATOR N/A 9/12/2022    Procedure: TRIAL, SPINAL CORD STIMULATOR TRIAL PEOPLES REP;  Surgeon: Siobhan Laguerre MD;  Location: [facility];  Service: Pain Management;  Laterality: N/A;  7/29 RESCHEDULE       Review of Systems     VITALS  There were no vitals taken for this visit.       Physical Exam     Significant Labs:  Lab Results   Component Value Date    HGBA1C 6.5 (H) 01/17/2022       Diagnostic Studies: No relevant studies.    EKG:   Results for orders placed or performed in visit on 02/14/20   IN OFFICE EKG 12-LEAD (to [place])    Collection Time: 02/14/20 11:51 AM    Narrative    Test Reason : R94.31,I10,I10,    Vent. Rate : 067 BPM     Atrial Rate : 067 BPM     P-R Int : 170 ms          QRS Dur : 094 ms      QT Int : 394 ms       P-R-T Axes : 018 124 047 degrees     QTc Int : 416 ms    Normal sinus rhythm  Right axis deviation  Anterior infarct (cited on or before 02-AUG-2019) is possible  Abnormal ECG  When compared with ECG of 02-AUG-2019 10:26,  Questionable change in The axis  Confirmed by Marcos Cho MD ([id number]) on 2/14/2020 4:13:52 PM    Referred By:              Confirmed By:Marcos Cho MD       2D ECHO:  TTE:  No results found for this or any previous visit.    ALTAGRACIA:  No results found for this or any previous visit.     Imaging     Active Cardiac Conditions: None      Revised Cardiac Risk Index   High -Risk Surgery  Intraperitoneal; Intrathoracic; suprainguinal vascular Yes- + 1 No- 0   History of Ischemic Heart Disease   (Hx of MI/positive exercise test/current chest pain due to ischemia/use of nitrate therapy/EKG with pathological Q waves) Yes- + 1 No- 0   History of CHF  (Pulmonary edema/bilateral rales or S3 gallop/PND/CXR showing pulmonary vascular redistribution) Yes- + 1 No- 0   History of CVA   (Prior stroke or TIA) Yes- + 1 No- 0   Pre-operative treatment with insulin Yes- + 1 No- 0   Pre-operative creatinine > 2mg/dl Yes- + 1 No- 0   Total:      Risk Status:  Estimated risk of cardiac complications after non-cardiac surgery using the Revised Cardiac Risk Index for Preoperative risk is {pocrevisedcardiacindex:19844}      ARISCAT (Canet) risk index: {poccanetchoice:64927}    American Society of Anesthesiologists Physical Status classification (ASA): 3           No further cardiac workup needed prior to surgery.    Outpatient Subjective & Objective

## 2022-10-18 NOTE — ASSESSMENT & PLAN NOTE
BUN 51 CR 2.6 GFR 23.7  labs were faxed to Dr. Mann's office for review- patients PCP/Nephrologist  Stages of CKD discussed  Deleterious effects NSAID's , Beneficial effects of Hydration discussed   Tylenol as needed for pain   I suggest monitoring renal function, in put and out put status [place]-operatively. I  suggest avoiding nephrotoxic medication including NSAIDs, COX2 inhibitors, intravenous contrast agent,avoiding hypotension to prevent further renal impairment.

## 2022-10-18 NOTE — PROGRESS NOTES
[facility]  [facility] Note    Patient Name: Frandy Mccarthy Jr.  MRN: [id number]  Date of Evaluation- 10/25/2022  PCP- Jose Mann MD    Future cases for Frandy Mccarthy Jr. [[id number]]       Case ID Status Date Time [place] Procedure Provider Location    [id number] [facility] 10/26/2022 11:00  Insertion, Neurostimulator, Spinal Cord Daphnie Dalton MD [[id number]] [facility] OR            HPI:  This is a 82 y.o. male  who presents today for a preoperative evaluation in preparation for a Neurosurgery  procedure.  Scheduled for  10/26/22  Surgery is indicated for Neurostimulator spinal cord.   Patient is new to me.  Details of current problem: The duration of problem is since his first spinal surgeries- It has been present for 7 years. He has had 5 previous neck and back surgeries.      Reports symptoms of constant  pain, leg weakness, buckling, poor balance. There has been no change in bowel/bladder habits.  Aggravating factors include: sitting,  standing and walking. Unable to lie down due to pain, Has not slept in a bed in 2 years- uses recliner  Relieving factors are  improves with sitting and bending forward, hot shower  Treated with   He has tried surgery,  physical therapy and injections in the past.    Reports pain: 8/10  The history has been obtained by speaking with the patient and reviewing the electronic medical record and/or outside health information. Significant health conditions for the perioperative period are discussed below in assessment and plan.     Patient reports current health status to be Fair.  Reports right shoulder pain at this visit.  Denies any new symptoms before surgery.       Subjective/ Objective:     Chief Complaint: Preoperative evaulation, perioperative medical management, and complication reduction plan.     Functional Capacity:  Able to climb a flight of stairs without CP SOB or Syncope.  Able to meet 4 METs       Anesthesia issues: None    Difficulty mouth opening:  none    Steroid use in the last 12 months: none     Dental Issues: dentures top    Family anesthesia difficulty: None known     Family Hx of Thrombosis none known    Past Medical History:   Diagnosis Date    Arthritis     Diabetes mellitus, type 2     Disorder of kidney and ureter     History of hyperparathyroidism     History of hyperparathyroidism 10/6/2020    Formatting of this note might be different from the original. 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    Hyperlipidemia     Hypertension     Thyroid disease 06/2017    parathyroid removed         Past Medical History Pertinent Negatives:   Diagnosis Date Noted    Allergy 04/01/2020    Anemia 10/18/2022    Anxiety 10/18/2022    Asthma 10/18/2022    Atrial fibrillation 10/18/2022    CHF (congestive heart failure) 10/18/2022    COPD (chronic obstructive pulmonary disease) 10/18/2022    Coronary artery disease 10/18/2022    Deep vein thrombosis 10/18/2022    Depression 10/18/2022    Encounter for blood transfusion 10/18/2022    GERD (gastroesophageal reflux disease) 10/18/2022    Heart murmur 10/18/2022    Myocardial infarction 10/18/2022    Pulmonary embolism 10/18/2022    Seizures 10/18/2022    Stroke 10/18/2022         Past Surgical History:   Procedure Laterality Date    BACK SURGERY      x 4    FRACTURE SURGERY      right thigh    HERNIA REPAIR      SPINE SURGERY      TOTAL KNEE ARTHROPLASTY Right 07/06/2020    TRIAL OF SPINAL CORD NERVE STIMULATOR N/A 09/12/2022    Procedure: TRIAL, SPINAL CORD STIMULATOR TRIAL PEOPLES REP;  Surgeon: Siobhan Laguerre MD;  Location: [facility];  Service: Pain Management;  Laterality: N/A;  7/29 RESCHEDULE       Review of Systems   Constitutional:  Negative for activity change, appetite change, chills, diaphoresis, fatigue, fever and unexpected weight change.   HENT:  Negative for congestion, dental problem, drooling, trouble swallowing and voice change.    Eyes:  Negative for photophobia and visual disturbance.        No acute visual  "changes   Respiratory:  Negative for apnea, cough, choking, chest tightness, shortness of breath, wheezing and stridor.         STOP bang  Score 3    High risk GIANNI   Cardiovascular:  Negative for chest pain, palpitations and leg swelling.   Gastrointestinal:  Negative for abdominal pain, blood in stool, constipation, diarrhea, nausea and vomiting.        No FLD, Hepatitis, Cirrhosis  No BRB or black tarry stool   Genitourinary:  Negative for difficulty urinating, dysuria, frequency, hematuria and urgency.        Nocturia- 1   Musculoskeletal:  Positive for arthralgias, back pain, gait problem, neck pain and neck stiffness. Negative for myalgias.   Neurological:  Positive for tremors, weakness and numbness. Negative for dizziness, seizures, syncope, light-headedness and headaches.        Left leg getting numb   Psychiatric/Behavioral:  Negative for suicidal ideas. The patient is not nervous/anxious.       VITALS  Visit Vitals  BP (!) 144/67 (BP Location: Left arm, Patient Position: Sitting)   Pulse 65   Temp 97.9 °F (36.6 °C) (Oral)   Ht 5' 9" (1.753 m)   Wt 93.9 kg (207 lb)   SpO2 96%   BMI 30.57 kg/m²        Physical Exam  Constitutional:       General: He is not in acute distress.     Appearance: He is well-developed. He is not diaphoretic.   HENT:      Head: Normocephalic.      Right Ear: Hearing normal.      Left Ear: Hearing normal.      Nose: Nose normal.      Mouth/Throat:      Lips: Pink.   Eyes:      General: Lids are normal.      Conjunctiva/sclera: Conjunctivae normal.      Pupils: Pupils are equal, round, and reactive to light.   Neck:      Vascular: No carotid bruit.      Trachea: Trachea and phonation normal.   Cardiovascular:      Rate and Rhythm: Normal rate and regular rhythm.      Pulses:           Carotid pulses are 2+ on the right side and 2+ on the left side.       Radial pulses are 2+ on the right side and 2+ on the left side.        Posterior tibial pulses are 2+ on the right side and 2+ on " the left side.      Heart sounds: Normal heart sounds. No murmur heard.    No friction rub. No gallop.   Pulmonary:      Effort: Pulmonary effort is normal.      Breath sounds: Normal breath sounds.   Abdominal:      General: Abdomen is protuberant. Bowel sounds are normal. There is no distension.      Palpations: Abdomen is soft.      Tenderness: There is no abdominal tenderness.   Musculoskeletal:         General: No tenderness or deformity. Normal range of motion.      Cervical back: Normal range of motion.      Right lower le+ Edema present.      Left lower le+ Edema present.   Lymphadenopathy:      Head:      Right side of head: No submental, submandibular, tonsillar, preauricular, posterior auricular or occipital adenopathy.      Left side of head: No submental, submandibular, tonsillar, preauricular, posterior auricular or occipital adenopathy.      Cervical:      Right cervical: No superficial cervical adenopathy.     Left cervical: No superficial cervical adenopathy.   Skin:     General: Skin is warm and dry.      Capillary Refill: Capillary refill takes 2 to 3 seconds.      Coloration: Skin is not pale.      Findings: No erythema or rash.   Neurological:      Mental Status: He is alert and oriented to person, place, and time.      Motor: No abnormal muscle tone.      Coordination: Coordination normal.   Psychiatric:         Behavior: Behavior is cooperative.        Significant Labs:  Lab Results   Component Value Date    HGBA1C 6.5 (H) 2022       Diagnostic Studies: No relevant studies.    EKG:   Results for orders placed or performed in visit on 20   IN OFFICE EKG 12-LEAD (to [place])    Collection Time: 20 11:51 AM    Narrative    Test Reason : R94.31,I10,I10,    Vent. Rate : 067 BPM     Atrial Rate : 067 BPM     P-R Int : 170 ms          QRS Dur : 094 ms      QT Int : 394 ms       P-R-T Axes : 018 124 047 degrees     QTc Int : 416 ms    Normal sinus rhythm  Right axis  deviation  Anterior infarct (cited on or before 02-AUG-2019) is possible  Abnormal ECG  When compared with ECG of 02-AUG-2019 10:26,  Questionable change in The axis  Confirmed by Marcos Cho MD ([id number]) on 2/14/2020 4:13:52 PM    Referred By:             Confirmed By:Marcos Cho MD       2D ECHO:  TTE:  No results found for this or any previous visit.    ALTAGRACIA:  No results found for this or any previous visit.     Imaging   Active Cardiac Conditions: None      Revised Cardiac Risk Index   High -Risk Surgery  Intraperitoneal; Intrathoracic; suprainguinal vascular Yes- + 1 No- 0   History of Ischemic Heart Disease   (Hx of MI/positive exercise test/current chest pain due to ischemia/use of nitrate therapy/EKG with pathological Q waves) Yes- + 1 No- 0   History of CHF  (Pulmonary edema/bilateral rales or S3 gallop/PND/CXR showing pulmonary vascular redistribution) Yes- + 1 No- 0   History of CVA   (Prior stroke or TIA) Yes- + 1 No- 0   Pre-operative treatment with insulin Yes- + 1 No- 0   Pre-operative creatinine > 2mg/dl Yes- + 1 No- 0   Total:      Risk Status:  Estimated risk of cardiac complications after non-cardiac surgery using the Revised Cardiac Risk Index for Preoperative risk is 3.9 %      ARISCAT (Canet) risk index: Low: 1.6% risk of post-op pulmonary complications.    American Society of Anesthesiologists Physical Status classification (ASA): 3         No further cardiac workup needed prior to surgery.      Preoperative cardiac risk assessment-  The patient does not have any active cardiac conditions . Revised cardiac risk index predictors- ---.Functional capacity is more than 4 Mets. He will be undergoing a Spine procedure that carries a Moderate Risk risk     The estimated risk of the rate of adverse cardiac outcomes  13.3    No further cardiac work up is indicated prior to proceeding with the surgery     Orders Placed This Encounter    Comprehensive Metabolic Panel    Urinalysis, Reflex to Urine  Culture Urine, Clean Catch    CBC Auto Differential    Hemoglobin A1C    Basic Metabolic Panel    Ambulatory referral/consult to Cardiology    Ambulatory referral/consult to Nephrology    EKG 12-lead       American Society of Anesthesiologists Physical status classification ( ASA ) class: 3     Postoperative pulmonary complication risk assessment: 3     ARISCAT ( Canet) risk index- risk class -  Low, if duration of surgery is under 3 hours, intermediate, if duration of surgery is over 3 hours       Assessment/Plan:     Essential hypertension  Current /67  today.    Taking: lopressor  Patient reports home BP readings of: 120-130/ 82Keeping a healthy weight/BMI can help with better BP control    Lifestyle changes to reduce systolic BP:  exercise 30 minutes per day,  5 days per week or 150 minutes weekly; sodium reduction and avoidance of high salt foods such as processed meats, frozen meals and  fast foods.     BP acceptable for surgery. I recommend monitoring BP during perioperative period as uncontrolled pain can elevate blood pressure.           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   Currently takes:   Insulin pump and Dexcom continuous glucose monitor  Most recent A1c is 7.5 .       Denies peripheral neuropathy.  Reports visual changes.    Maintain healthy weight. Exercise at least 150 minutes weekly. Encouraged diet rich in nutrients such as fruits, vegetables, and whole grains; reduce sugar intake from cakes, candy, and sugared drinks.    Micro - Retinopathy, Nephropathy   Macro- Carotid, Coronary , Peripheral disease     Carotid arterial disease  Statin Fenofibrate    Carotid US1/7/22       Right Carotid The highest right ICA velocity divided by the right distal CCA velocity is 2.03 .    Velocities are elevated in the right internal carotid artery consistent with a 50-69%  stenosis..   Left Carotid The left mid common carotid artery is visualized.     The left carotid bulb artery is visualized.     The left distal internal carotid artery has 0-19% stenosis.     There is acceleration in the left external carotid artery.     The left vertebral artery is visualized.   The left vertebral artery is associated with anterograde flow.     The highest left ICA velocity divided by the left distal CCA velocity is 1.26.         Benign prostatic hyperplasia with urinary frequency  Patient is at risk for urinary retention    Localized edema  Edema on left leg worse than right- not new, stable over time    Wear compression socks for edema  On salt restriction  Unable to elevate legs due to back pain    CKD (chronic kidney disease) stage 4, GFR 15-29 ml/min  BUN 51 CR 2.6 GFR 23.7  labs were faxed to Dr. Mann's office for review- patients PCP/Nephrologist  Stages of CKD discussed  Deleterious effects NSAID's , Beneficial effects of Hydration discussed   Tylenol as needed for pain   I suggest monitoring renal function, in put and out put status [place]-operatively. I  suggest avoiding nephrotoxic medication including NSAIDs, COX2 inhibitors, intravenous contrast agent,avoiding hypotension to prevent further renal impairment.     Anemia  Hgb  11.6  HCT 37.2    Abnormal EKG  10/11/2022 Frandy Mccarthy Jr. 1940    Preoperative cardiac risk stratification has been requested prior to implantation of neurostimulator.  A copy of the patient's most recent evaluation is available in [facility].  At that time, his cardiac status was stable.  In summary, the patient is considered to be at no increased cardiovascular risk for the planned procedure.  No additional cardiac testing is indicated.  No special perioperative cardiovascular monitoring should be required.  Please contact me if you need any further information.    Best regards,    Lonny Corona MD      EKG 10/18/22     Vent. Rate : 057 BPM     Atrial  Rate : 057 BPM      P-R Int : 190 ms          QRS Dur : 098 ms       QT Int : 402 ms       P-R-T Axes : 009 -08 053 degrees      QTc Int : 391 ms     Sinus bradycardia   Possible Inferior infarct ,age undetermined   Anterior infarct (cited on or before 02-AUG-2019)   Abnormal ECG   When compared with ECG of 14-FEB-2020 11:51,   The axis Shifted left   Possible Inferior infarct is now Present   Confirmed by Kit Sommer MD ([id number]) on 10/18/2022 11:41:01 AM     Referred By: FROILAN HARRIS           Confirmed By:Kit Sommer MD     Specimen Collected: 10/18/22 11:26 Last Resulted: 10/18/22 11:41          Hyperkalemia  K-5.3--> repeat K level 4.6  Encourage restriction of potassium in diet      Preventive perioperative care    Thromboembolic prophylaxis:  His risk factors for thrombosis include obesity, surgical procedure, age, and reduced mobility.I suggest  thromboembolic prophylaxis ( mechanical/pharmacological, weighing the risk benefits of pharmacological agent use considering [place] procedural bleeding )  during the perioperative period.I suggested being active in the post operative period.      Postoperative pulmonary complication prophylaxis-Risk factors for post operative pulmonary complications include age over 65 years, surgery lasting over 3 hours, and ASA class >2- I suggest incentive spirometry use, early ambulation, and pain control so as to avoid diaphragmatic splinting  Brush teeth twice per day, oral rinses, sleep with the head of the bed up 30 degrees     Renal complication prophylaxis-Risk factors for renal complications include pre-existing renal disease, age, diabetes mellitus, and hypertension . I suggest keeping him well hydrated and avoidance/ minimizing the use of  NSAID's,DELGADO 2 Inhibitors ,IV contrast if possible in the perioperative period.I suggested drinking 2 litre's of water a day      Surgical site Infection Prophylaxis-I  suggest appropriate antibiotic for Prophylaxis against Surgical  site infections Shower with Hibiclens in the night before surgery and the morning of surgery     Delirium prophylaxis-Risk factors - Advanced Age - I suggest avoidance / minimizing the use of  Benzodiazepines ( unless the patient has been taking it on a regular basis ),Anticholinergic medication,Antihistamines ( like  Benadryl).I suggest minimizing the use of opioid medication and use of IV tylenol,if it is appropriate. I suggest using the lowest possible dose of opioids for the shortest duration possible in the perioperative period. I suggest to Keep shades/blinds open during the day, lights off and shades closed at night to encourage normal sleep/wake cycle.I encourage the presence of the family member with the patient at all times, if at all possible as mental status changes can be picked up early by the family members and they help with reorientation. I encouraged the presence of family to help with orientation in the perioperative period. Benadryl avoidance suggested      In view of BPH the patient  is at risk of postoperative urinary retention.  I suggest avoidance / minimizing the of  Benzodiazepines,Anticholinergic medication,antihistamines ( Benadryl) , if possible in the perioperative period. I suggest using the minimum possible use of opioids for the minimum period of time in the perioperative period. Benadryl avoidance suggested      This visit was focused on Preoperative evaluation, Perioperative Medical management, complication reduction plans. I suggest that the patient follows up with primary care or relevant sub specialists for ongoing health care.    I appreciate the opportunity to be involved in this patients care. Please feel free to contact me if there were any questions about this consultation.    Patient is OPTIMIZED      Roni Carias NP  Perioperative Medicine  [facility]   Pager [phone]

## 2022-10-19 ENCOUNTER — ANESTHESIA EVENT (OUTPATIENT)
Dept: SURGERY | Facility: OTHER | Age: 82
End: 2022-10-19
Payer: MEDICARE

## 2022-10-19 ENCOUNTER — TELEPHONE (OUTPATIENT)
Dept: PREADMISSION TESTING | Facility: HOSPITAL | Age: 82
End: 2022-10-19
Payer: MEDICARE

## 2022-10-19 NOTE — TELEPHONE ENCOUNTER
Spoke to patient's wife, informed her the cardiac clearance letter from  is all we need for her 's cardiac clearance for upcoming surgery.

## 2022-10-21 ENCOUNTER — TELEPHONE (OUTPATIENT)
Dept: NEUROSURGERY | Facility: CLINIC | Age: 82
End: 2022-10-21
Payer: MEDICARE

## 2022-10-21 PROBLEM — E87.5 HYPERKALEMIA: Status: ACTIVE | Noted: 2022-10-21

## 2022-10-21 NOTE — TELEPHONE ENCOUNTER
LVM  Called to give surgery time/arrival time/night before instructions    Surgery at  11:00  - check in by 9:00am    Nothing to eat past 9pm. From 9pm until pt leaves the house to head to hospital pt may drink water, powerade or gatorade. Follow medication directions given by anesthesia. Wear comfortable clothes. don't wear jewelry, makeup, lotion, perfume, or deodorant. Shower the night before and the morning of with hibiclens or dial soap.

## 2022-10-21 NOTE — TELEPHONE ENCOUNTER
Spoke w/ patients wife.  Called to give surgery time/arrival time/night before instructions    Surgery at   11 - check in by 9am    Nothing to eat and drink past midnight. Medication per anesthesia directions. Wear comfortable clothes. don't wear jewelry, makeup, lotion, perfume, or deodorant. Shower the night before and the morning of with hibiclens or dial soap.

## 2022-10-24 ENCOUNTER — TELEPHONE (OUTPATIENT)
Dept: PREADMISSION TESTING | Facility: HOSPITAL | Age: 82
End: 2022-10-24
Payer: MEDICARE

## 2022-10-24 NOTE — PRE ADMISSION SCREENING
Pre admit phone call completed.    Instructions given to patient about NPO status as follows:     The evening before surgery do not eat anything after 9 p.m. ( this includes hard candy, chewing gum and mints).  You may only have GATORADE, POWERADE AND WATER from 9 p.m. until you leave your home. DO NOT  DRINK ANY LIQUIDS ON THE WAY TO THE HOSPITAL.      Patient was also instructed on the below information:    Park in the Parking lot behind the hospital or in the [facility] [employer] [facility] across the street from the parking lot.  Parking is complimentary.  If you will be discharged the same day as your procedure, please arrange for a responsible adult to drive you home or  to accompany you if traveling by taxi.  YOU WILL NOT BE PERMITTED TO DRIVE OR TO LEAVE THE HOSPITAL ALONE AFTER SURGERY.  It is strongly recommended that you arrange for someone to remain with you for the first 24 hrs following your surgery.  2 visitor policy discussed.   Instructed by MD on what meds to take/hold am of surgery.  Instructed to hold insulin am of surgery.     Patient verbalized understanding of above instructions.

## 2022-10-24 NOTE — TELEPHONE ENCOUNTER
Faxed labs showing decreased kidney function to Dr.Joshua Mann's office so he is aware per Roni Carias NP.

## 2022-10-25 ENCOUNTER — LAB VISIT (OUTPATIENT)
Dept: LAB | Facility: OTHER | Age: 82
End: 2022-10-25
Attending: NURSE PRACTITIONER
Payer: MEDICARE

## 2022-10-25 DIAGNOSIS — E87.5 HYPERKALEMIA: ICD-10-CM

## 2022-10-25 LAB
ANION GAP SERPL CALC-SCNC: 8 MMOL/L (ref 8–16)
BUN SERPL-MCNC: 58 MG/DL (ref 8–23)
CALCIUM SERPL-MCNC: 9.9 MG/DL (ref 8.7–10.5)
CHLORIDE SERPL-SCNC: 103 MMOL/L (ref 95–110)
CO2 SERPL-SCNC: 30 MMOL/L (ref 23–29)
CREAT SERPL-MCNC: 2.7 MG/DL (ref 0.5–1.4)
EST. GFR  (NO RACE VARIABLE): 23 ML/MIN/1.73 M^2
GLUCOSE SERPL-MCNC: 114 MG/DL (ref 70–110)
POTASSIUM SERPL-SCNC: 4.6 MMOL/L (ref 3.5–5.1)
SODIUM SERPL-SCNC: 141 MMOL/L (ref 136–145)

## 2022-10-25 PROCEDURE — 36415 COLL VENOUS BLD VENIPUNCTURE: CPT | Performed by: NURSE PRACTITIONER

## 2022-10-25 PROCEDURE — 80048 BASIC METABOLIC PNL TOTAL CA: CPT | Performed by: NURSE PRACTITIONER

## 2022-10-26 ENCOUNTER — HOSPITAL ENCOUNTER (OUTPATIENT)
Facility: OTHER | Age: 82
Discharge: HOME | End: 2022-10-26
Attending: NEUROLOGICAL SURGERY | Admitting: NEUROLOGICAL SURGERY
Payer: MEDICARE

## 2022-10-26 ENCOUNTER — ANESTHESIA (OUTPATIENT)
Dept: SURGERY | Facility: OTHER | Age: 82
End: 2022-10-26
Payer: MEDICARE

## 2022-10-26 VITALS
SYSTOLIC BLOOD PRESSURE: 148 MMHG | BODY MASS INDEX: 30.66 KG/M2 | DIASTOLIC BLOOD PRESSURE: 63 MMHG | OXYGEN SATURATION: 95 % | HEART RATE: 65 BPM | WEIGHT: 207 LBS | TEMPERATURE: 98 F | HEIGHT: 69 IN | RESPIRATION RATE: 16 BRPM

## 2022-10-26 DIAGNOSIS — G40.909 EPILEPSY: ICD-10-CM

## 2022-10-26 LAB
APTT BLDCRRT: 27.3 SEC (ref 21–32)
BILIRUB UR QL STRIP: NEGATIVE
CLARITY UR: CLEAR
COLOR UR: YELLOW
GLUCOSE UR QL STRIP: NEGATIVE
HGB UR QL STRIP: ABNORMAL
INR PPP: 1 (ref 0.8–1.2)
KETONES UR QL STRIP: NEGATIVE
LEUKOCYTE ESTERASE UR QL STRIP: NEGATIVE
MICROSCOPIC COMMENT: ABNORMAL
NITRITE UR QL STRIP: NEGATIVE
PH UR STRIP: 7 [PH] (ref 5–8)
POCT GLUCOSE: 125 MG/DL (ref 70–110)
POCT GLUCOSE: 198 MG/DL (ref 70–110)
PROT UR QL STRIP: NEGATIVE
PROTHROMBIN TIME: 10.7 SEC (ref 9–12.5)
RBC #/AREA URNS HPF: 24 /HPF (ref 0–4)
SP GR UR STRIP: 1.01 (ref 1–1.03)
SQUAMOUS #/AREA URNS HPF: 5 /HPF
URN SPEC COLLECT METH UR: ABNORMAL
UROBILINOGEN UR STRIP-ACNC: NEGATIVE EU/DL

## 2022-10-26 PROCEDURE — 63685 PR IMPLANT SPINAL NEUROSTIM/RECEIVER: ICD-10-PCS | Mod: 51,,, | Performed by: NEUROLOGICAL SURGERY

## 2022-10-26 PROCEDURE — 71000015 HC POSTOP RECOV 1ST HR: Performed by: NEUROLOGICAL SURGERY

## 2022-10-26 PROCEDURE — 25000003 PHARM REV CODE 250: Performed by: ANESTHESIOLOGY

## 2022-10-26 PROCEDURE — 63600175 PHARM REV CODE 636 W HCPCS: Performed by: NEUROLOGICAL SURGERY

## 2022-10-26 PROCEDURE — 71000039 HC RECOVERY, EACH ADD'L HOUR: Performed by: NEUROLOGICAL SURGERY

## 2022-10-26 PROCEDURE — 63685 INS/RPLC SPI NPG/RCVR POCKET: CPT | Mod: 51,,, | Performed by: NEUROLOGICAL SURGERY

## 2022-10-26 PROCEDURE — 27201423 OPTIME MED/SURG SUP & DEVICES STERILE SUPPLY: Performed by: NEUROLOGICAL SURGERY

## 2022-10-26 PROCEDURE — 25000003 PHARM REV CODE 250: Performed by: NEUROLOGICAL SURGERY

## 2022-10-26 PROCEDURE — 71000016 HC POSTOP RECOV ADDL HR: Performed by: NEUROLOGICAL SURGERY

## 2022-10-26 PROCEDURE — 85730 THROMBOPLASTIN TIME PARTIAL: CPT | Performed by: NEUROLOGICAL SURGERY

## 2022-10-26 PROCEDURE — 81000 URINALYSIS NONAUTO W/SCOPE: CPT | Performed by: NEUROLOGICAL SURGERY

## 2022-10-26 PROCEDURE — 36000711: Performed by: NEUROLOGICAL SURGERY

## 2022-10-26 PROCEDURE — 63600175 PHARM REV CODE 636 W HCPCS: Performed by: STUDENT IN AN ORGANIZED HEALTH CARE EDUCATION/TRAINING PROGRAM

## 2022-10-26 PROCEDURE — 63655 IMPLANT NEUROELECTRODES: CPT | Mod: AS,,, | Performed by: PHYSICIAN ASSISTANT

## 2022-10-26 PROCEDURE — 25000003 PHARM REV CODE 250: Performed by: STUDENT IN AN ORGANIZED HEALTH CARE EDUCATION/TRAINING PROGRAM

## 2022-10-26 PROCEDURE — 36415 COLL VENOUS BLD VENIPUNCTURE: CPT | Performed by: NEUROLOGICAL SURGERY

## 2022-10-26 PROCEDURE — C1778 LEAD, NEUROSTIMULATOR: HCPCS | Performed by: NEUROLOGICAL SURGERY

## 2022-10-26 PROCEDURE — 25000003 PHARM REV CODE 250

## 2022-10-26 PROCEDURE — 85610 PROTHROMBIN TIME: CPT | Performed by: NEUROLOGICAL SURGERY

## 2022-10-26 PROCEDURE — C1767 GENERATOR, NEURO NON-RECHARG: HCPCS | Performed by: NEUROLOGICAL SURGERY

## 2022-10-26 PROCEDURE — 36000710: Performed by: NEUROLOGICAL SURGERY

## 2022-10-26 PROCEDURE — 63655 PR SURG IMPLNT NEUROELECT,EPIDURAL: ICD-10-PCS | Mod: AS,,, | Performed by: PHYSICIAN ASSISTANT

## 2022-10-26 PROCEDURE — 37000008 HC ANESTHESIA 1ST 15 MINUTES: Performed by: NEUROLOGICAL SURGERY

## 2022-10-26 PROCEDURE — 71000033 HC RECOVERY, INTIAL HOUR: Performed by: NEUROLOGICAL SURGERY

## 2022-10-26 PROCEDURE — 63685 INS/RPLC SPI NPG/RCVR POCKET: CPT | Mod: AS,51,, | Performed by: PHYSICIAN ASSISTANT

## 2022-10-26 PROCEDURE — 37000009 HC ANESTHESIA EA ADD 15 MINS: Performed by: NEUROLOGICAL SURGERY

## 2022-10-26 PROCEDURE — 63655 PR SURG IMPLNT NEUROELECT,EPIDURAL: ICD-10-PCS | Mod: ,,, | Performed by: NEUROLOGICAL SURGERY

## 2022-10-26 PROCEDURE — 63685 PR IMPLANT SPINAL NEUROSTIM/RECEIVER: ICD-10-PCS | Mod: AS,51,, | Performed by: PHYSICIAN ASSISTANT

## 2022-10-26 PROCEDURE — 82962 GLUCOSE BLOOD TEST: CPT | Performed by: NEUROLOGICAL SURGERY

## 2022-10-26 PROCEDURE — 63655 IMPLANT NEUROELECTRODES: CPT | Mod: ,,, | Performed by: NEUROLOGICAL SURGERY

## 2022-10-26 DEVICE — IMPLANTABLE PULSE GENERATOR
Type: IMPLANTABLE DEVICE | Site: BACK | Status: FUNCTIONAL
Brand: PROCLAIM™

## 2022-10-26 DEVICE — 3MM LEAD, 60 CM
Type: IMPLANTABLE DEVICE | Site: BACK | Status: FUNCTIONAL
Brand: PENTA™

## 2022-10-26 RX ORDER — SODIUM CHLORIDE 0.9 G/100ML
IRRIGANT IRRIGATION
Status: DISCONTINUED | OUTPATIENT
Start: 2022-10-26 | End: 2022-10-26 | Stop reason: HOSPADM

## 2022-10-26 RX ORDER — TAMSULOSIN HYDROCHLORIDE 0.4 MG/1
0.4 CAPSULE ORAL ONCE
Status: COMPLETED | OUTPATIENT
Start: 2022-10-26 | End: 2022-10-26

## 2022-10-26 RX ORDER — LIDOCAINE HYDROCHLORIDE 20 MG/ML
INJECTION INTRAVENOUS
Status: DISCONTINUED | OUTPATIENT
Start: 2022-10-26 | End: 2022-10-26

## 2022-10-26 RX ORDER — OXYCODONE AND ACETAMINOPHEN 10; 325 MG/1; MG/1
1 TABLET ORAL EVERY 4 HOURS PRN
Status: DISCONTINUED | OUTPATIENT
Start: 2022-10-26 | End: 2022-10-26 | Stop reason: HOSPADM

## 2022-10-26 RX ORDER — PROPOFOL 10 MG/ML
VIAL (ML) INTRAVENOUS
Status: DISCONTINUED | OUTPATIENT
Start: 2022-10-26 | End: 2022-10-26

## 2022-10-26 RX ORDER — FUROSEMIDE 20 MG/1
40 TABLET ORAL ONCE
Status: COMPLETED | OUTPATIENT
Start: 2022-10-26 | End: 2022-10-26

## 2022-10-26 RX ORDER — OXYCODONE HYDROCHLORIDE 5 MG/1
5 TABLET ORAL
Status: DISCONTINUED | OUTPATIENT
Start: 2022-10-26 | End: 2022-10-26 | Stop reason: HOSPADM

## 2022-10-26 RX ORDER — OXYCODONE HYDROCHLORIDE 10 MG/1
10 TABLET ORAL EVERY 6 HOURS PRN
Qty: 28 TABLET | Refills: 0 | Status: ON HOLD | OUTPATIENT
Start: 2022-10-26 | End: 2022-11-09 | Stop reason: SDUPTHER

## 2022-10-26 RX ORDER — ROCURONIUM BROMIDE 10 MG/ML
INJECTION, SOLUTION INTRAVENOUS
Status: DISCONTINUED | OUTPATIENT
Start: 2022-10-26 | End: 2022-10-26

## 2022-10-26 RX ORDER — PROCHLORPERAZINE EDISYLATE 5 MG/ML
5 INJECTION INTRAMUSCULAR; INTRAVENOUS EVERY 30 MIN PRN
Status: DISCONTINUED | OUTPATIENT
Start: 2022-10-26 | End: 2022-10-26 | Stop reason: HOSPADM

## 2022-10-26 RX ORDER — LIDOCAINE HYDROCHLORIDE AND EPINEPHRINE 10; 10 MG/ML; UG/ML
INJECTION, SOLUTION INFILTRATION; PERINEURAL
Status: DISCONTINUED | OUTPATIENT
Start: 2022-10-26 | End: 2022-10-26 | Stop reason: HOSPADM

## 2022-10-26 RX ORDER — ONDANSETRON 2 MG/ML
INJECTION INTRAMUSCULAR; INTRAVENOUS
Status: DISCONTINUED | OUTPATIENT
Start: 2022-10-26 | End: 2022-10-26

## 2022-10-26 RX ORDER — BUPIVACAINE HYDROCHLORIDE AND EPINEPHRINE 2.5; 5 MG/ML; UG/ML
INJECTION, SOLUTION EPIDURAL; INFILTRATION; INTRACAUDAL; PERINEURAL
Status: DISCONTINUED | OUTPATIENT
Start: 2022-10-26 | End: 2022-10-26 | Stop reason: HOSPADM

## 2022-10-26 RX ORDER — BACITRACIN ZINC 500 UNIT/G
OINTMENT (GRAM) TOPICAL
Status: DISCONTINUED | OUTPATIENT
Start: 2022-10-26 | End: 2022-10-26 | Stop reason: HOSPADM

## 2022-10-26 RX ORDER — MEPERIDINE HYDROCHLORIDE 25 MG/ML
12.5 INJECTION INTRAMUSCULAR; INTRAVENOUS; SUBCUTANEOUS ONCE AS NEEDED
Status: DISCONTINUED | OUTPATIENT
Start: 2022-10-26 | End: 2022-10-26 | Stop reason: HOSPADM

## 2022-10-26 RX ORDER — PHENYLEPHRINE HYDROCHLORIDE 10 MG/ML
INJECTION INTRAVENOUS
Status: DISCONTINUED | OUTPATIENT
Start: 2022-10-26 | End: 2022-10-26

## 2022-10-26 RX ORDER — MUPIROCIN 20 MG/G
OINTMENT TOPICAL 2 TIMES DAILY
Status: DISCONTINUED | OUTPATIENT
Start: 2022-10-26 | End: 2022-10-26 | Stop reason: HOSPADM

## 2022-10-26 RX ORDER — OXYCODONE AND ACETAMINOPHEN 5; 325 MG/1; MG/1
1 TABLET ORAL EVERY 4 HOURS PRN
Status: DISCONTINUED | OUTPATIENT
Start: 2022-10-26 | End: 2022-10-26 | Stop reason: HOSPADM

## 2022-10-26 RX ORDER — SULFAMETHOXAZOLE AND TRIMETHOPRIM 800; 160 MG/1; MG/1
1 TABLET ORAL 2 TIMES DAILY
Qty: 10 TABLET | Refills: 0 | Status: ON HOLD | OUTPATIENT
Start: 2022-10-26 | End: 2022-11-07 | Stop reason: HOSPADM

## 2022-10-26 RX ORDER — CEFTRIAXONE 1 G/1
INJECTION, POWDER, FOR SOLUTION INTRAMUSCULAR; INTRAVENOUS
Status: DISCONTINUED | OUTPATIENT
Start: 2022-10-26 | End: 2022-10-26 | Stop reason: HOSPADM

## 2022-10-26 RX ORDER — SODIUM CHLORIDE 0.9 % (FLUSH) 0.9 %
3 SYRINGE (ML) INJECTION
Status: DISCONTINUED | OUTPATIENT
Start: 2022-10-26 | End: 2022-10-26 | Stop reason: HOSPADM

## 2022-10-26 RX ORDER — FENTANYL CITRATE 50 UG/ML
INJECTION, SOLUTION INTRAMUSCULAR; INTRAVENOUS
Status: DISCONTINUED | OUTPATIENT
Start: 2022-10-26 | End: 2022-10-26

## 2022-10-26 RX ORDER — DIPHENHYDRAMINE HYDROCHLORIDE 50 MG/ML
25 INJECTION INTRAMUSCULAR; INTRAVENOUS EVERY 6 HOURS PRN
Status: DISCONTINUED | OUTPATIENT
Start: 2022-10-26 | End: 2022-10-26 | Stop reason: HOSPADM

## 2022-10-26 RX ORDER — HYDROMORPHONE HYDROCHLORIDE 2 MG/ML
0.4 INJECTION, SOLUTION INTRAMUSCULAR; INTRAVENOUS; SUBCUTANEOUS EVERY 5 MIN PRN
Status: DISCONTINUED | OUTPATIENT
Start: 2022-10-26 | End: 2022-10-26 | Stop reason: HOSPADM

## 2022-10-26 RX ORDER — ACETAMINOPHEN 325 MG/1
325 TABLET ORAL EVERY 6 HOURS PRN
Status: DISCONTINUED | OUTPATIENT
Start: 2022-10-26 | End: 2022-10-26 | Stop reason: HOSPADM

## 2022-10-26 RX ORDER — ONDANSETRON 2 MG/ML
4 INJECTION INTRAMUSCULAR; INTRAVENOUS EVERY 8 HOURS PRN
Status: DISCONTINUED | OUTPATIENT
Start: 2022-10-26 | End: 2022-10-26 | Stop reason: HOSPADM

## 2022-10-26 RX ORDER — DEXAMETHASONE SODIUM PHOSPHATE 4 MG/ML
INJECTION, SOLUTION INTRA-ARTICULAR; INTRALESIONAL; INTRAMUSCULAR; INTRAVENOUS; SOFT TISSUE
Status: DISCONTINUED | OUTPATIENT
Start: 2022-10-26 | End: 2022-10-26

## 2022-10-26 RX ADMIN — SODIUM CHLORIDE, SODIUM LACTATE, POTASSIUM CHLORIDE, AND CALCIUM CHLORIDE: .6; .31; .03; .02 INJECTION, SOLUTION INTRAVENOUS at 10:10

## 2022-10-26 RX ADMIN — ROCURONIUM BROMIDE 50 MG: 10 INJECTION, SOLUTION INTRAVENOUS at 11:10

## 2022-10-26 RX ADMIN — PHENYLEPHRINE HYDROCHLORIDE 0.5 MCG/KG/MIN: 10 INJECTION INTRAVENOUS at 11:10

## 2022-10-26 RX ADMIN — SUGAMMADEX 200 MG: 100 INJECTION, SOLUTION INTRAVENOUS at 03:10

## 2022-10-26 RX ADMIN — DEXAMETHASONE SODIUM PHOSPHATE 4 MG: 4 INJECTION, SOLUTION INTRAMUSCULAR; INTRAVENOUS at 11:10

## 2022-10-26 RX ADMIN — FENTANYL CITRATE 100 MCG: 50 INJECTION, SOLUTION INTRAMUSCULAR; INTRAVENOUS at 11:10

## 2022-10-26 RX ADMIN — FUROSEMIDE 40 MG: 20 TABLET ORAL at 07:10

## 2022-10-26 RX ADMIN — VANCOMYCIN HYDROCHLORIDE 1000 MG: 1 INJECTION, POWDER, LYOPHILIZED, FOR SOLUTION INTRAVENOUS at 11:10

## 2022-10-26 RX ADMIN — SODIUM CHLORIDE, SODIUM LACTATE, POTASSIUM CHLORIDE, AND CALCIUM CHLORIDE: .6; .31; .03; .02 INJECTION, SOLUTION INTRAVENOUS at 02:10

## 2022-10-26 RX ADMIN — PHENYLEPHRINE HYDROCHLORIDE 100 MCG: 10 INJECTION INTRAVENOUS at 11:10

## 2022-10-26 RX ADMIN — ONDANSETRON HYDROCHLORIDE 4 MG: 2 INJECTION INTRAMUSCULAR; INTRAVENOUS at 11:10

## 2022-10-26 RX ADMIN — FENTANYL CITRATE 100 MCG: 50 INJECTION, SOLUTION INTRAMUSCULAR; INTRAVENOUS at 02:10

## 2022-10-26 RX ADMIN — PROPOFOL 150 MG: 10 INJECTION, EMULSION INTRAVENOUS at 11:10

## 2022-10-26 RX ADMIN — PHENYLEPHRINE HYDROCHLORIDE 200 MCG: 10 INJECTION INTRAVENOUS at 11:10

## 2022-10-26 RX ADMIN — LIDOCAINE HYDROCHLORIDE 75 MG: 20 INJECTION, SOLUTION INTRAVENOUS at 11:10

## 2022-10-26 RX ADMIN — OXYCODONE 5 MG: 5 TABLET ORAL at 03:10

## 2022-10-26 RX ADMIN — TAMSULOSIN HYDROCHLORIDE 0.4 MG: 0.4 CAPSULE ORAL at 07:10

## 2022-10-26 RX ADMIN — GLYCOPYRROLATE 0.2 MG: 0.2 INJECTION, SOLUTION INTRAMUSCULAR; INTRAVITREAL at 11:10

## 2022-10-26 NOTE — DISCHARGE INSTRUCTIONS
Anesthesia: After Your Surgery  Youve just had surgery. During surgery, you received medication called anesthesia to keep you comfortable and pain-free. After surgery, you may experience some pain or nausea. This is common. Here are some tips for feeling better and recovering after surgery.    Going home  Your doctor or nurse will show you how to take care of yourself when you go home. He or she will also answer your questions. Have an adult family member or friend drive you home. For the first 24 hours after your surgery:  Do not drive or use heavy equipment.  Do not make important decisions or sign legal documents.  Avoid alcohol.  Have someone stay with you, if needed. He or she can watch for problems and help keep you safe.  Take your time getting up from a seated or lying position. You may experience dizziness for 24 hours  Be sure to keep all follow-up appointments with your doctor. And rest after your procedure for as long as your doctor tells you to.    Coping with pain  If you have pain after surgery, pain medication will help you feel better. Take it as directed, before pain becomes severe. Also, ask your doctor or pharmacist about other ways to control pain, such as with heat, ice, and relaxation. And follow any other instructions your surgeon or nurse gives you.    URINARY RETENTION  Should you experience a decrease in your urine output or are unable to urinate following surgery, this can be due to the medications given during surgery.  We recommend you going to the nearest Emergency Department.    Tips for taking pain medication  To get the best relief possible, remember these points:  Pain medications can upset your stomach. Taking them with a little food may help.  Most pain relievers taken by mouth need at least 20 to 30 minutes to take effect.  Taking medication on a schedule can help you remember to take it. Try to time your medication so that you can take it before beginning an activity, such  as dressing, walking, or sitting down for dinner.  Constipation is a common side effect of pain medications. Contact your doctor before taking any medications like laxatives or stool softeners to help relieve constipation. Also ask about any dietary restrictions, because drinking lots of fluids and eating foods like fruits and vegetables that are high in fiber can also help. Remember, dont take laxatives unless your surgeon has prescribed them.  Mixing alcohol and pain medication can cause dizziness and slow your breathing. It can even be fatal. Dont drink alcohol while taking pain medication.  Pain medication can slow your reflexes. Dont drive or operate machinery while taking pain medication.  If your health care provider tells you to take acetaminophen to help relieve your pain, ask him or her how much you are supposed to take each day. (Acetaminophen is the generic name for Tylenol and other brand-name pain relievers.) Acetaminophen or other pain relievers may interact with your prescription medicines or other over-the-counter (OTC) drugs. Some prescription medications contain acetaminophen along with other active ingredients. Using both prescription and OTC acetaminophen for pain can cause you to overdose. The FDA recommends that you read the labels on your OTC medications carefully. This will help you to clearly understand the list of active ingredients, dosing instructions, and any warnings. It may also help you avoid taking too much acetaminophen. If you have questions or don't understand the information, ask your pharmacist or health care provider to explain it to you before you take the OTC medication.    Managing nausea  Some people have an upset stomach after surgery. This is often due to anesthesia, pain, pain medications, or the stress of surgery. The following tips will help you manage nausea and get good nutrition as you recover. If you were on a special diet before surgery, ask your doctor if you  should follow it during recovery. These tips may help:  Dont push yourself to eat. Your body will tell you when to eat and how much.  Start off with clear liquids and soup. They are easier to digest.  Progress to semi-solid foods (mashed potatoes, applesauce, and gelatin) as you feel ready.  Slowly move to solid foods. Dont eat fatty, rich, or spicy foods at first.  Dont force yourself to have three large meals a day. Instead, eat smaller amounts more often.  Take pain medications with a small amount of solid food, such as crackers or toast to avoid nausea.      Call your surgeon if    You feel too sleepy, dizzy, or groggy (medication may be too strong).  You have side effects like nausea, vomiting, or skin changes (rash, itching, or hives).   © 2199-5902 [employer] [address] [place] [address]. All rights reserved. This information is not intended as a substitute for professional medical care. Always follow your healthcare professional's instructions.               An indwelling urinary catheter is a flexible plastic tube that is inserted through the opening that carries urine from the  bladder to the outside of the body (urethra), to drain urine. The tube is kept in place by a small balloon that is inflated  once the tube is securely in the bladder. Urine drains into a bag that is attached to the thigh through this catheter.      To care for your urinary catheter at home:     Make sure that urine is flowing out of the catheter into the drainage bag.   Check the area around the insertion site for signs of infections (such as inflammation and irritated/swollen/  red/tender skin), and/or leakage of urine around the catheter   Keep the urinary drainage bag below the level of the bladder (to prevent backflow into the bladder).   Make sure that the urinary drainage bag does not drag and pull on the catheter.    Caring for your catheter:     Clean the area around the drainage tube twice  each day.   Use a mild soap and water around the drainage tube.   Rinse well and dry with a clean towel.      Draining the urine collection bag:    The bag that collects urine may be strapped to your thigh. You will need to empty the bag at regular intervals,  typically every 2 to 4 hours, or whenever the catheter bag is half-full, and at bedtime.   Wash your hands with soap and water. If you are emptying another persons catheter bag, you may wish to wear  disposable gloves. Wash your hands before you put the gloves on and after removing them.

## 2022-10-26 NOTE — PLAN OF CARE
Certification of Assistant at Surgery       Surgery Date: 10/26/2022     Participating Surgeons:  Surgeon(s) and Role:     * Daphnie Dalton MD - Primary    Assisting: Jayleen Lam PA-C    Procedures:  Procedure(s) (LRB):  Insertion, Neurostimulator, Spinal Cord (N/A)  LAMINECTOMY, SPINE (N/A)    Assistant Surgeon's Certification of Necessity:  I understand that section 1842 (b) (6) (d) of the Social Security Act generally prohibits Medicare Part B reasonable charge payment for the services of assistants at surgery in teaching hospitals when qualified residents are available to furnish such services. I certify that the services for which payment is claimed were medically necessary, and that no qualified resident was available to perform the services. I further understand that these services are subject to post-payment review by the Medicare carrier.      Jayleen Lam PA-C    10/26/2022  3:04 PM

## 2022-10-26 NOTE — ANESTHESIA PROCEDURE NOTES
Intubation    Date/Time: 10/26/2022 11:22 AM  Performed by: Geovanny Lloyd CRNA  Authorized by: Kian Phelps MD     Intubation:     Induction:  Intravenous    Intubated:  Postinduction    Mask Ventilation:  Moderately difficult with oral airway (beard/edentulous)    Attempts:  1    Attempted By:  CRNA    Method of Intubation:  Video laryngoscopy    Blade:  Barrett 3    Laryngeal View Grade: Grade I - full view of cords      Difficult Airway Encountered?: No      Complications:  None    Airway Device:  Oral endotracheal tube    Airway Device Size:  8.0    Style/Cuff Inflation:  Cuffed (inflated to minimal occlusive pressure)    Tube secured:  24    Secured at:  The lips    Placement Verified By:  Capnometry and Revisualization with laryngoscopy    Complicating Factors:  None    Findings Post-Intubation:  BS equal bilateral and atraumatic/condition of teeth unchanged

## 2022-10-26 NOTE — H&P
Interval H&P:    The patient has been examined and the H&P has been reviewed:    I concur with the findings and no changes have occurred since H&P was written.    Surgery risks, benefits and alternative options discussed and understood by patient/family.    Plan to proceed with placement of spinal cord stimulator.    Jayleen Lam PA-C  Neurosurgery  [facility]    Recent clinic visit with Dr. Dalton on 9/22/22:      Neurosurgery  History & Physical     SUBJECTIVE:      Chief Complaint: chronic pain, postlaminectomy syndrome      History of Present Illness:  Frandy Mccarthy Jr. is a 82 y.o. male referred by Dr. Laguerre for consideration of laminectomy for spinal cord stimulator placement after successful trial with [employer]. The patient reports that, at baseline, he has pain in the lower back and down both legs. The pain is 8/10 in his legs, 8/10 in the back, and 8/10 in the arms. He has no neck pain.      The pain is constant. It has been present for 7 years. It is made worse by standing and walking. It improves with sitting and leaning on a shopping cart. There has been no change in bowel/bladder habits. He has tried physical therapy and injections in the past. He has had 5 previous neck and back surgeries.      He experienced 100% improvement in both back and leg pain with the trial. Goal mid T7-T8. He also endorses some arm improvement with his trial.      He has multiple medical problems, DM, HTN, CKDIII, scoliosis.      The patient denies any bleeding or anesthetic complications with any previous surgery. He had MRSA with a previous back surgery, which required HHIV antibiotics.      Review of patient's allergies indicates:  No Known Allergies     Current Medications          Current Outpatient Medications   Medication Sig Dispense Refill    cholecalciferol, vitamin D3, 2,000 unit Cap Take 1 capsule by mouth once daily.   6    DULoxetine (CYMBALTA) 30 MG capsule Take 30 mg by mouth once  daily.         fenofibrate 160 MG Tab Take 1 tablet (160 mg total) by mouth once daily. 90 tablet 3    folic acid (FOLVITE) 800 MCG Tab Take 800 mcg by mouth once daily.        furosemide (LASIX) 40 MG tablet Take 40 mg by mouth 2 (two) times daily.        gabapentin (NEURONTIN) 800 MG tablet Take 800 mg by mouth 4 (four) times daily.        HUMALOG U-100 INSULIN 100 unit/mL injection TAKE AS DIRECTED  HUMALOG / VIA V GO 40 / 6 CLICKS DAY        irbesartan (AVAPRO) 150 MG tablet Take 150 mg by mouth once daily.         metFORMIN (GLUCOPHAGE) 500 MG tablet Take 500 mg by mouth 2 (two) times daily with meals.        metoprolol tartrate (LOPRESSOR) 100 MG tablet Take 100 mg by mouth.        oxyCODONE-acetaminophen (PERCOCET) 5-325 mg per tablet TAKE 1 TABLET BY MOUTH ONCE A DAY AS NEEDED FOR PAIN   0    pramipexole (MIRAPEX) 0.25 MG tablet Take by mouth every evening.         simvastatin (ZOCOR) 20 MG tablet Take 20 mg by mouth once daily.        tamsulosin (FLOMAX) 0.4 mg Cap Take 0.4 mg by mouth once daily.        traMADol (ULTRAM) 50 mg tablet          V-GO 30 Isabel AS DIRECTED , APPLY 1 V-GO DEVICE DAILY   6    V-GO 40 Isabel APPLY ONE VGO40 DAILY          No current facility-administered medications for this visit.                 Past Medical History:   Diagnosis Date    Arthritis      Diabetes mellitus, type 2      Hyperlipidemia      Hypertension      Thyroid disease 06/2017     parathyroid removed            Past Surgical History:   Procedure Laterality Date    BACK SURGERY         x 4    FRACTURE SURGERY         right thigh    HERNIA REPAIR        SPINE SURGERY        TOTAL KNEE ARTHROPLASTY Left 07/06/2020    TRIAL OF SPINAL CORD NERVE STIMULATOR N/A 9/12/2022     Procedure: TRIAL, SPINAL CORD STIMULATOR TRIAL PEOPLES REP;  Surgeon: Siobhan Laguerre MD;  Location: [facility] PAIN T;  Service: Pain Management;  Laterality: N/A;  7/29 RESCHEDULE      Family History         Problem Relation (Age of Onset)     Cancer  Mother, Father     Diabetes Mother, Father             Social History            Socioeconomic History    Marital status:    Tobacco Use    Smoking status: Former       Types: Cigarettes       Quit date: 1973       Years since quittin.6    Smokeless tobacco: Never   Substance and Sexual Activity    Alcohol use: Yes       Comment: occasional    Drug use: No    Sexual activity: Yes       Partners: Female         Review of Systems   Constitutional:  Negative for fever.   HENT:  Negative for nosebleeds.    Eyes:  Negative for visual disturbance.   Respiratory:  Positive for cough.    Cardiovascular:  Negative for chest pain.   Musculoskeletal:  Positive for back pain and gait problem.   Hematological:  Does not bruise/bleed easily.      OBJECTIVE:      Vital Signs  There is no height or weight on file to calculate BMI.        Physical Exam:     Constitutional: He appears well-developed and well-nourished. No distress.      Eyes: EOM are normal.      Abdominal: Soft.      Skin:   Back incision well healed       Psych/Behavior: He is alert. He is oriented to person, place, and time.      Musculoskeletal:      Comments: No focal weakness, but generally 4/5        Neurological:        Coordination: He has normal finger to nose coordination.      Pulmonary: nonlabored respirations      Hematologic: no bruising noted      Diagnostic Results:  Xrays reviewed   No neural element imaging available     ASSESSMENT/PLAN:   Frandy Mccarthy Jr. is a 82 y.o. male who is referred by Dr. Laguerre and JACK Epstein for SCS placement after successful trial. He reports 100% improvement with his trial.      Hs has no preference for sidedness for generator. He will require Vancomycin for prophylaxis given his history of MRSA.      I will need to see MRI of the thoracic spine prior to surgery to ensure there is no cord compression at the proposed levels of paddle placement. He will require sedation for the exam.      We had a  pleasant discussion about the risks, benefits, and alternatives to surgery. Risks include, but are not limited to, bleeding, pain, infection, scarring, need for further/repeat procedure, death, paralysis, damage to bowel/bladder/sexual function, stroke/damage to major blood vessels, leak of cerebrospinal fluid, failure to improve, and hardware-related complications. The patient expressed understanding and is eager to proceed. Informed consent was obtained. We also discussed that this is an implanted device, which means that infection from elsewhere in the body can seed the device, requiring its removal, so it is important that he treat all infections promptly.      He will need to see Dr. Spain for medical pre-optimization and clearance. He will need to be off all AC/AP agents for one week before surgery.      We will tentatively plan for implantation on 10/26/22 at the [facility] location.      I have encouraged them to contact the clinic in interim with any questions, concerns, or adverse clinical change.             Electronically signed by Daphnie Dalton MD at 9/27/2022  3:29 PM

## 2022-10-26 NOTE — OP NOTE
[facility])  Neurosurgery  Operative Note    SUMMARY      Date of Procedure: 10/26/2022     Procedure: Procedure(s) (LRB):  Insertion, Neurostimulator, Spinal Cord (N/A)  LAMINECTOMY, SPINE (N/A)     Surgeon(s) and Role:     * Daphnie Dalton MD - Primary    Assisting Surgeon: Jayleen Lam PA-C     Pre-Operative Diagnosis: Post laminectomy syndrome [M96.1]  Chronic pain syndrome [G89.4]    Post-Operative Diagnosis: Post-Op Diagnosis Codes:     * Post laminectomy syndrome [M96.1]     * Chronic pain syndrome [G89.4]    Anesthesia: General    Technical Procedures Used:   1. Partial T7-T8, T8-T9 for placement of spinal cord stimulator paddle (Youmiam Penta [id number])  2. Connection to intermittent pulse generator (Abbott Proclaim XR5 [id number])   3. Use of intraoperative neuromonitoring   4. Use of intraoperative fluoroscopy      Indications:   Frandy Mccarthy Jr. is a 82 y.o. male who is referred by Dr. Laguerre and JACK Epstein for SCS placement after successful trial. He reports 100% improvement with his trial.      Hs has no preference for sidedness for generator. He will require Vancomycin for prophylaxis given his history of MRSA.      I will need to see MRI of the thoracic spine prior to surgery to ensure there is no cord compression at the proposed levels of paddle placement. He will require sedation for the exam.      We had a pleasant discussion about the risks, benefits, and alternatives to surgery. Risks include, but are not limited to, bleeding, pain, infection, scarring, need for further/repeat procedure, death, paralysis, damage to bowel/bladder/sexual function, stroke/damage to major blood vessels, leak of cerebrospinal fluid, failure to improve, and hardware-related complications. The patient expressed understanding and is eager to proceed. Informed consent was obtained. We also discussed that this is an implanted device, which means that infection from elsewhere in the body can seed the  device, requiring its removal, so it is important that he treat all infections promptly.          Description of the Findings of the Procedure:   The patient was brought back to the operating room, and general endotracheal anesthesia was induced by the anesthesia service. The patient was then carefully positioned prone, with all pressure points carefully padded.  Neuro monitoring was hooked up and obtained baseline SSEP, and EMG. Lower extremity SSEPs were not reliable.  A mondragon was placed given the anticipated operative time of >2.5 hours.      The patient was prepped and draped in the usual sterile fashion.  The C-arm was brought in to localize the T9 level for goal T7-T8 paddle placement.  We counted up from the sacrum using 2 spinal needles.  We then confirmed on AP and continued to hernán our landmarks from there.       Lidocaine with epinephrine was injected.  A midline incision was created over the previously created incision.  We carried our dissection down with Bovie cautery. Using the previous hardware and fluoroscopy to guide our levels, we performed the laminectomy. This was carefully extended with Kerrison rongeurs to minimize any trauma to the thoracic cord.      Once meticulous hemostasis was obtained, we passed the paddle up into the epidural space.  This was found not to pass rostral, so we performed an additional laminectomy at T8. We freed up substantial scar tissue with the upgoing curette and Darya elevator. On subsequent x-ray, the paddle was found to be satisfactorily centered and adequately covering the T7-T8 level.  Neuro stimulation indicated that there was good bilateral coverage.  We thus elected to keep the paddle lead in that position.     After again obtaining meticulous hemostasis using a combination of bonewax, floseal, and bipolar cautery, we applied Vistaseal to the laminectomy sites to help anchor the lead.  We used a penetrating towel clamp to anchor the leads to the T9 spinous  process.  A 2 0 silk suture was passed through this hole in use to secure the extension cables of the epidural paddle and form a retention loop.     Next, create a pocket for the pulse generator on the right between the ribs and the iliac crest.  We then turned our attention to tunneling.  The tunneling device was passed in a caudal direction from the midline thoracic incision to the generator pocket. Then the leads were then successfully brought down to the pocket.  Impedances were checked, and then both cables were secured to the generator and torqued into place.     Both sites were copiously irrigated with rocephin-containing solution. Marcaine with epinephrine was injected for pain control. The tunnel site was closed with two layers of 3 0 Vicryl stitch, with running monocryl in the skin.  The midline thoracic incision was closed with 0 Vicryl and Stratafix for the fascia followed by 3 0 Vicryl stitch inverted in the subcutaneous layer.  The skin was closed with running monocryl.   We dressed these with a sterile dressing of bacitracin ointment, Telfa, and Tegaderm.     Neuro monitoring remained stable throughout the case. All counts were correct x2.  The patient was then returned supine and awakened by the anesthesia service before being brought to PACU in satisfactory condition.      Complications: No    Estimated Blood Loss (EBL): 30 mL           Specimens:   Specimen (24h ago, onward)      None             Implants:   Implant Name Type Inv. Item Serial No.  Lot No. LRB No. Used Action   LEAD 3MM 60CM PENTA - [id number]  LEAD 3MM 60CM PENTA [id number] [place]  N/A 1 Implanted   Implantable Pulse Generator   [id number] [id number]  N/A 1 Implanted              Condition: Stable    Disposition: PACU - hemodynamically stable.    Attestation: I was present and scrubbed for the entire procedure.

## 2022-10-26 NOTE — ANESTHESIA PREPROCEDURE EVALUATION
10/26/2022  Frandy Mccarthy Jr. is a 82 y.o., male.      Pre-op Assessment    I have reviewed the Patient Summary Reports.     I have reviewed the Nursing Notes.    I have reviewed the Medications.     Review of Systems  Anesthesia Hx:  Denies Family Hx of Anesthesia complications.   Denies Personal Hx of Anesthesia complications.   Social:  Non-Smoker    Hematology/Oncology:  Hematology Normal   Oncology Normal     EENT/Dental:EENT/Dental Normal   Cardiovascular:   Hypertension    Pulmonary:  Pulmonary Normal    Renal/:   Chronic Renal Disease, CKD    Hepatic/GI:  Hepatic/GI Normal    Musculoskeletal:   Arthritis     Neurological:  Neurology Normal    Endocrine:  Endocrine Normal    Dermatological:  Skin Normal    Psych:  Psychiatric Normal           Physical Exam  General: Well nourished and Alert    Airway:  Mallampati: II   Mouth Opening: Normal  Tongue: Normal    Dental:  Intact        Anesthesia Plan  Type of Anesthesia, risks & benefits discussed:    Anesthesia Type: Gen ETT  Intra-op Monitoring Plan: Standard ASA Monitors  Post Op Pain Control Plan: multimodal analgesia  Induction:  IV  Airway Plan: Video  Informed Consent: Informed consent signed with the Patient and all parties understand the risks and agree with anesthesia plan.  All questions answered.   ASA Score: 3    Ready For Surgery From Anesthesia Perspective.     .

## 2022-10-26 NOTE — ANESTHESIA POSTPROCEDURE EVALUATION
Anesthesia Post Evaluation    Patient: Frandy Mccarthy [facility]    Procedure(s) Performed: Procedure(s) (LRB):  Insertion, Neurostimulator, Spinal Cord (N/A)  LAMINECTOMY, SPINE (N/A)    Final Anesthesia Type: general      Patient location during evaluation: PACU  Patient participation: Yes- Able to Participate  Level of consciousness: awake and alert  Post-procedure vital signs: reviewed and stable  Airway patency: patent    PONV status at discharge: No PONV  Anesthetic complications: no      Cardiovascular status: blood pressure returned to baseline  Respiratory status: unassisted and spontaneous ventilation  Hydration status: euvolemic  Follow-up not needed.          Vitals Value Taken Time   /66 10/26/22 1555   Temp 36 °C (96.8 °F) 10/26/22 1523   Pulse 67 10/26/22 1608   Resp 16 10/26/22 1555   SpO2 100 % 10/26/22 1608   Vitals shown include unvalidated device data.      No case tracking events are documented in the log.      Pain/Maryann Score: Pain Rating Prior to Med Admin: 4 (10/26/2022  3:54 PM)  Maryann Score: 9 (10/26/2022  3:53 PM)

## 2022-10-26 NOTE — TRANSFER OF CARE
"Anesthesia Transfer of Care Note    Patient: Frandy Mccarthy Jr.    Procedure(s) Performed: Procedure(s) (LRB):  Insertion, Neurostimulator, Spinal Cord (N/A)  LAMINECTOMY, SPINE (N/A)    Patient location: PACU    Anesthesia Type: general    Transport from OR: Transported from OR on 6-10 L/min O2 by face mask with adequate spontaneous ventilation    Post pain: adequate analgesia    Post assessment: no apparent anesthetic complications and tolerated procedure well    Post vital signs: stable    Level of consciousness: awake and responds to stimulation    Nausea/Vomiting: no nausea/vomiting    Complications: none    Transfer of care protocol was followed      Last vitals:   Visit Vitals  /62 (BP Location: Right arm, Patient Position: Lying)   Pulse (!) 52   Temp 36 °C (96.8 °F) (Temporal)   Resp 16   Ht 5' 9" (1.753 m)   Wt 93.9 kg (207 lb)   SpO2 (!) 94%   BMI 30.57 kg/m²     "

## 2022-10-26 NOTE — PATIENT INSTRUCTIONS
Neurosurgery Patient Information      -No driving until cleared in your post-operative appointment. No driving while on narcotics.   -Do not take any OTC products containing acetaminophen at the same time as you take your narcotic pain medication. Medications that may contain acetaminophen include but are not limited to: Excedrin and other headache medications, arthritis medications, cold and sinus medications, etc. Please review the list of active ingredients in any OTC medication prior to taking it.  -Do not take any Aspirin or Aspirin-containing products for 2 weeks after surgery.  -Do not take any Aleve, Naprosyn, Naproxen, Ibuprofen, Advil or any other nonsteroidal anti-inflammatory drug (NSAID) for 2 weeks after surgery.  -Do not take any herbal supplements for 2 weeks after surgery.   -Do not consume any alcoholic beverages until released by your neurosurgeon  -Do not perform any excessive bending over or leaning forward as this is a fall hazard.  -Do not lift anything heavier than a gallon of milk until cleared in post-operative visit.     Contact the Neurosurgery Office immediately if:  If you begin to notice any neurologic changes such as:           -Sudden onset of lethargy or sleepiness           -Sudden confusion, trouble speaking, or understanding            -Sudden trouble seeing in one or both eyes            -Sudden trouble walking, dizziness, loss of coordination            -Sudden severe headache with no known cause            -Sudden onset of numbness or weakness       Wound Care:  Keep your incision open to air. You may shower on the 2nd day after your surgery. Please shower with baby shampoo, but do not take a bath. Keep the incision clean and dry at all times. Please cover the incision with saran wrap or other occlusive dressing while showering and REMOVE once you have completed taking your shower. Do not allow the force of water to hit the incision. If the incision gets damp, gently pat it  dry. Do not rub or scrub the incision. You cannot take a bath/swim/submerge the incision until 8 weeks after surgery.    The incision does not need to be cleaned with any water, soap, alcohol, peroxide, or other substance.    Dissolvable suture in place:   Remove outer dressing after 48 hours. Then, please apply bacitracin ointment to incisions twice daily. You have dissolvable suture in place. It does not need to be removed.         You now have an implanted device in place. It is imperative that any infection (such as a urinary tract infection) be treated immediately so that it cannot get into your bloodstream. If an infection ends up in your blood, it may seed the device, thus requiring us to remove it. Call the Neurosurgery office or go to the Emergency Room for any signs of infection including: increased redness, drainage, pain or fever (temperature greater than or equal to 101.4).         Miscellaneous:  -You have been discharged home on antibiotics and it is very important that you complete the course of antibiotics as instructed.   -Follow up with Dr. Dalton  in 2 weeks for a wound check. Appointment will be mailed to you.      Neurosurgery Office: [phone]

## 2022-10-26 NOTE — OR NURSING
Notified Dr Dalton in the or unable to void to get the ua over wise ready to roll to holding. Stated ok to send down and would collect the ua in the or.

## 2022-10-26 NOTE — BRIEF OP NOTE
[facility] - Surgery ([place])  Brief Operative Note    Surgery Date: 10/26/2022     Surgeon(s) and Role:     * Daphnie Dalton MD - Primary    Assisting: Jayleen Lam PA-C    Pre-op Diagnosis:  Post laminectomy syndrome [M96.1]  Chronic pain syndrome [G89.4]    Post-op Diagnosis:  Post-Op Diagnosis Codes:     * Post laminectomy syndrome [M96.1]     * Chronic pain syndrome [G89.4]    Procedure(s) (LRB):  Insertion, Neurostimulator, Spinal Cord (N/A)  LAMINECTOMY, SPINE (N/A)    Anesthesia: General    Operative Findings: Spinal cord stimulator inserted. T8-9 laminectomy performed and paddle placed along T7-T8 epidural space. Generator inserted into R posterior abdominal pocket, connected to tunneled leads. Neuromonitoring was stable throughout the case. See full operative note for further details.     Estimated Blood Loss: 30 mL         Specimens:   Specimen (24h ago, onward)      None              Discharge Note    OUTCOME: Patient tolerated treatment/procedure well without complication and is now ready for discharge.    DISPOSITION: Home or Self Care    FINAL DIAGNOSIS:  Lumbar spine pain    FOLLOWUP: In clinic    DISCHARGE INSTRUCTIONS:     Neurosurgery Patient Information        -No driving until cleared in your post-operative appointment. No driving while on narcotics.   -Do not take any OTC products containing acetaminophen at the same time as you take your narcotic pain medication. Medications that may contain acetaminophen include but are not limited to: Excedrin and other headache medications, arthritis medications, cold and sinus medications, etc. Please review the list of active ingredients in any OTC medication prior to taking it.  -Do not take any Aspirin or Aspirin-containing products for 2 weeks after surgery.  -Do not take any Aleve, Naprosyn, Naproxen, Ibuprofen, Advil or any other nonsteroidal anti-inflammatory drug (NSAID) for 2 weeks after surgery.  -Do not take any herbal supplements for 2  weeks after surgery.   -Do not consume any alcoholic beverages until released by your neurosurgeon  -Do not perform any excessive bending over or leaning forward as this is a fall hazard.  -Do not lift anything heavier than a gallon of milk until cleared in post-operative visit.      Contact the Neurosurgery Office immediately if:  If you begin to notice any neurologic changes such as:           -Sudden onset of lethargy or sleepiness           -Sudden confusion, trouble speaking, or understanding            -Sudden trouble seeing in one or both eyes            -Sudden trouble walking, dizziness, loss of coordination            -Sudden severe headache with no known cause            -Sudden onset of numbness or weakness         Wound Care:  Keep your incision open to air. You may shower on the 2nd day after your surgery. Please shower with baby shampoo, but do not take a bath. Keep the incision clean and dry at all times. Please cover the incision with saran wrap or other occlusive dressing while showering and REMOVE once you have completed taking your shower. Do not allow the force of water to hit the incision. If the incision gets damp, gently pat it dry. Do not rub or scrub the incision. You cannot take a bath/swim/submerge the incision until 8 weeks after surgery.     The incision does not need to be cleaned with any water, soap, alcohol, peroxide, or other substance.     Dissolvable suture in place:   Remove outer dressing after 48 hours. Then, please apply bacitracin ointment to incisions twice daily. You have dissolvable suture in place. It does not need to be removed.            You now have an implanted device in place. It is imperative that any infection (such as a urinary tract infection) be treated immediately so that it cannot get into your bloodstream. If an infection ends up in your blood, it may seed the device, thus requiring us to remove it. Call the Neurosurgery office or go to the Emergency Room  for any signs of infection including: increased redness, drainage, pain or fever (temperature greater than or equal to 101.4).            Miscellaneous:  -You have been discharged home on antibiotics and it is very important that you complete the course of antibiotics as instructed.   -Follow up with Dr. Dalton  in 2 weeks for a wound check. Appointment will be mailed to you.        Neurosurgery Office: [phone]

## 2022-10-27 ENCOUNTER — PATIENT MESSAGE (OUTPATIENT)
Dept: NEUROSURGERY | Facility: CLINIC | Age: 82
End: 2022-10-27
Payer: MEDICARE

## 2022-10-27 NOTE — OR NURSING
Dr. Dalton notified of patient not being able to void. Ordered Rader to be inserted then d/c'd to home and instructed to follow up with urology

## 2022-10-27 NOTE — PLAN OF CARE
Frandy Mccarthy Jr. has met all discharge criteria from Phase II. Vital Signs are stable, ambulating  without difficulty. Discharge instructions given, patient verbalized understanding. Discharged from facility via wheelchair in stable condition.

## 2022-10-28 ENCOUNTER — TELEPHONE (OUTPATIENT)
Dept: NEUROSURGERY | Facility: CLINIC | Age: 82
End: 2022-10-28
Payer: MEDICARE

## 2022-10-28 NOTE — TELEPHONE ENCOUNTER
wife of Frandy Mccarthy MRN [id number] she says he is having dizziness and balance problems and trouble walking, lethargic, shaky and blood sugar is 42     I spoke with the pt wife at the direction of Irma JIMENEZ where she asked if the pt had any candy or juice he could drink to increase his glucose. The pt wife stated she gave the pt peppermint and will give him some orange juice.     I contacted the pt wife around 4pm to check his status. The pt wife reports that the pt is doing much better and his glucose is 200.   Pt denies any fever or chills, nausea, or dizziness. He is able to eat now.   I VU and instructed them to contact the office  should they have any other concerns.

## 2022-10-29 ENCOUNTER — HOSPITAL ENCOUNTER (INPATIENT)
Facility: OTHER | Age: 82
LOS: 10 days | Discharge: NURSING, REHAB OR LONG-TERM CARE | DRG: 177 | End: 2022-11-09
Attending: EMERGENCY MEDICINE | Admitting: INTERNAL MEDICINE
Payer: MEDICARE

## 2022-10-29 DIAGNOSIS — N18.9 CHRONIC KIDNEY DISEASE, UNSPECIFIED CKD STAGE: ICD-10-CM

## 2022-10-29 DIAGNOSIS — R13.19 ESOPHAGEAL DYSPHAGIA: ICD-10-CM

## 2022-10-29 DIAGNOSIS — J69.0 ASPIRATION PNEUMONIA, UNSPECIFIED ASPIRATION PNEUMONIA TYPE, UNSPECIFIED LATERALITY, UNSPECIFIED PART OF LUNG: ICD-10-CM

## 2022-10-29 DIAGNOSIS — J18.9 MULTIFOCAL PNEUMONIA: Primary | ICD-10-CM

## 2022-10-29 DIAGNOSIS — R09.A2 GLOBUS SENSATION: ICD-10-CM

## 2022-10-29 DIAGNOSIS — K59.00 CONSTIPATION: ICD-10-CM

## 2022-10-29 DIAGNOSIS — N18.4 CKD (CHRONIC KIDNEY DISEASE) STAGE 4, GFR 15-29 ML/MIN: Chronic | ICD-10-CM

## 2022-10-29 DIAGNOSIS — R09.02 HYPOXIA: ICD-10-CM

## 2022-10-29 DIAGNOSIS — R13.10 DYSPHAGIA, UNSPECIFIED TYPE: ICD-10-CM

## 2022-10-29 PROBLEM — N40.1 BENIGN PROSTATIC HYPERPLASIA WITH URINARY FREQUENCY: Chronic | Status: ACTIVE | Noted: 2020-10-06

## 2022-10-29 PROBLEM — R35.0 BENIGN PROSTATIC HYPERPLASIA WITH URINARY FREQUENCY: Chronic | Status: ACTIVE | Noted: 2020-10-06

## 2022-10-29 PROBLEM — I77.9 CAROTID ARTERIAL DISEASE: Chronic | Status: ACTIVE | Noted: 2021-12-02

## 2022-10-29 PROBLEM — R60.0 LOCALIZED EDEMA: Status: RESOLVED | Noted: 2017-05-15 | Resolved: 2022-10-29

## 2022-10-29 PROBLEM — E11.42 DIABETIC POLYNEUROPATHY ASSOCIATED WITH TYPE 2 DIABETES MELLITUS: Chronic | Status: ACTIVE | Noted: 2020-10-06

## 2022-10-29 PROBLEM — G89.29 CHRONIC PAIN: Chronic | Status: ACTIVE | Noted: 2018-03-23

## 2022-10-29 LAB
ALBUMIN SERPL BCP-MCNC: 3.1 G/DL (ref 3.5–5.2)
ALP SERPL-CCNC: 50 U/L (ref 55–135)
ALT SERPL W/O P-5'-P-CCNC: 13 U/L (ref 10–44)
ANION GAP SERPL CALC-SCNC: 13 MMOL/L (ref 8–16)
AST SERPL-CCNC: 17 U/L (ref 10–40)
BASOPHILS # BLD AUTO: 0.02 K/UL (ref 0–0.2)
BASOPHILS NFR BLD: 0.2 % (ref 0–1.9)
BILIRUB SERPL-MCNC: 0.5 MG/DL (ref 0.1–1)
BUN SERPL-MCNC: 59 MG/DL (ref 8–23)
CALCIUM SERPL-MCNC: 9.2 MG/DL (ref 8.7–10.5)
CHLORIDE SERPL-SCNC: 100 MMOL/L (ref 95–110)
CO2 SERPL-SCNC: 26 MMOL/L (ref 23–29)
CREAT SERPL-MCNC: 3.3 MG/DL (ref 0.5–1.4)
CTP QC/QA: YES
CTP QC/QA: YES
DIFFERENTIAL METHOD: ABNORMAL
EOSINOPHIL # BLD AUTO: 0.1 K/UL (ref 0–0.5)
EOSINOPHIL NFR BLD: 0.8 % (ref 0–8)
ERYTHROCYTE [DISTWIDTH] IN BLOOD BY AUTOMATED COUNT: 15.4 % (ref 11.5–14.5)
EST. GFR  (NO RACE VARIABLE): 18 ML/MIN/1.73 M^2
GLUCOSE SERPL-MCNC: 169 MG/DL (ref 70–110)
HCT VFR BLD AUTO: 32.2 % (ref 40–54)
HGB BLD-MCNC: 10.3 G/DL (ref 14–18)
IMM GRANULOCYTES # BLD AUTO: 0.05 K/UL (ref 0–0.04)
IMM GRANULOCYTES NFR BLD AUTO: 0.6 % (ref 0–0.5)
LACTATE SERPL-SCNC: 1.2 MMOL/L (ref 0.5–2.2)
LYMPHOCYTES # BLD AUTO: 0.5 K/UL (ref 1–4.8)
LYMPHOCYTES NFR BLD: 6 % (ref 18–48)
MCH RBC QN AUTO: 30.7 PG (ref 27–31)
MCHC RBC AUTO-ENTMCNC: 32 G/DL (ref 32–36)
MCV RBC AUTO: 96 FL (ref 82–98)
MONOCYTES # BLD AUTO: 0.8 K/UL (ref 0.3–1)
MONOCYTES NFR BLD: 9 % (ref 4–15)
NEUTROPHILS # BLD AUTO: 7.2 K/UL (ref 1.8–7.7)
NEUTROPHILS NFR BLD: 83.4 % (ref 38–73)
NRBC BLD-RTO: 0 /100 WBC
PLATELET # BLD AUTO: 202 K/UL (ref 150–450)
PMV BLD AUTO: 10.8 FL (ref 9.2–12.9)
POC MOLECULAR INFLUENZA A AGN: NEGATIVE
POC MOLECULAR INFLUENZA B AGN: NEGATIVE
POCT GLUCOSE: 152 MG/DL (ref 70–110)
POTASSIUM SERPL-SCNC: 4.8 MMOL/L (ref 3.5–5.1)
PROT SERPL-MCNC: 7.3 G/DL (ref 6–8.4)
RBC # BLD AUTO: 3.36 M/UL (ref 4.6–6.2)
SARS-COV-2 RDRP RESP QL NAA+PROBE: NEGATIVE
SODIUM SERPL-SCNC: 139 MMOL/L (ref 136–145)
WBC # BLD AUTO: 8.68 K/UL (ref 3.9–12.7)

## 2022-10-29 PROCEDURE — G0378 HOSPITAL OBSERVATION PER HR: HCPCS

## 2022-10-29 PROCEDURE — 63600175 PHARM REV CODE 636 W HCPCS: Performed by: NURSE PRACTITIONER

## 2022-10-29 PROCEDURE — 63600175 PHARM REV CODE 636 W HCPCS: Performed by: INTERNAL MEDICINE

## 2022-10-29 PROCEDURE — 96372 THER/PROPH/DIAG INJ SC/IM: CPT | Performed by: INTERNAL MEDICINE

## 2022-10-29 PROCEDURE — 99220 PR INITIAL OBSERVATION CARE,LEVL III: CPT | Mod: ,,, | Performed by: INTERNAL MEDICINE

## 2022-10-29 PROCEDURE — 83605 ASSAY OF LACTIC ACID: CPT | Performed by: NURSE PRACTITIONER

## 2022-10-29 PROCEDURE — 25000003 PHARM REV CODE 250: Performed by: NURSE PRACTITIONER

## 2022-10-29 PROCEDURE — 99220 PR INITIAL OBSERVATION CARE,LEVL III: ICD-10-PCS | Mod: ,,, | Performed by: INTERNAL MEDICINE

## 2022-10-29 PROCEDURE — 80053 COMPREHEN METABOLIC PANEL: CPT | Performed by: NURSE PRACTITIONER

## 2022-10-29 PROCEDURE — 99285 EMERGENCY DEPT VISIT HI MDM: CPT | Mod: 25

## 2022-10-29 PROCEDURE — 85025 COMPLETE CBC W/AUTO DIFF WBC: CPT | Performed by: NURSE PRACTITIONER

## 2022-10-29 PROCEDURE — 25000003 PHARM REV CODE 250: Performed by: INTERNAL MEDICINE

## 2022-10-29 PROCEDURE — 87635 SARS-COV-2 COVID-19 AMP PRB: CPT | Performed by: NURSE PRACTITIONER

## 2022-10-29 RX ORDER — GLUCAGON 1 MG
1 KIT INJECTION
Status: DISCONTINUED | OUTPATIENT
Start: 2022-10-29 | End: 2022-11-09 | Stop reason: HOSPADM

## 2022-10-29 RX ORDER — OXYBUTYNIN CHLORIDE 5 MG/1
10 TABLET, EXTENDED RELEASE ORAL DAILY
Status: DISCONTINUED | OUTPATIENT
Start: 2022-10-30 | End: 2022-11-01

## 2022-10-29 RX ORDER — METOPROLOL TARTRATE 50 MG/1
100 TABLET ORAL DAILY
Status: DISCONTINUED | OUTPATIENT
Start: 2022-10-30 | End: 2022-11-09 | Stop reason: HOSPADM

## 2022-10-29 RX ORDER — OXYCODONE HYDROCHLORIDE 5 MG/1
10 TABLET ORAL EVERY 4 HOURS PRN
Status: DISCONTINUED | OUTPATIENT
Start: 2022-10-29 | End: 2022-11-09 | Stop reason: HOSPADM

## 2022-10-29 RX ORDER — FINASTERIDE 5 MG/1
5 TABLET, FILM COATED ORAL DAILY
Status: DISCONTINUED | OUTPATIENT
Start: 2022-10-30 | End: 2022-11-09 | Stop reason: HOSPADM

## 2022-10-29 RX ORDER — ACETAMINOPHEN 325 MG/1
650 TABLET ORAL EVERY 4 HOURS PRN
Status: DISCONTINUED | OUTPATIENT
Start: 2022-10-29 | End: 2022-11-09 | Stop reason: HOSPADM

## 2022-10-29 RX ORDER — ONDANSETRON 4 MG/1
4 TABLET, ORALLY DISINTEGRATING ORAL EVERY 6 HOURS PRN
Status: DISCONTINUED | OUTPATIENT
Start: 2022-10-29 | End: 2022-11-09 | Stop reason: HOSPADM

## 2022-10-29 RX ORDER — FENOFIBRATE 160 MG/1
160 TABLET ORAL
Status: DISCONTINUED | OUTPATIENT
Start: 2022-10-30 | End: 2022-11-09 | Stop reason: HOSPADM

## 2022-10-29 RX ORDER — SODIUM CHLORIDE 0.9 % (FLUSH) 0.9 %
10 SYRINGE (ML) INJECTION
Status: DISCONTINUED | OUTPATIENT
Start: 2022-10-29 | End: 2022-11-09 | Stop reason: HOSPADM

## 2022-10-29 RX ORDER — TALC
6 POWDER (GRAM) TOPICAL NIGHTLY PRN
Status: DISCONTINUED | OUTPATIENT
Start: 2022-10-29 | End: 2022-11-09 | Stop reason: HOSPADM

## 2022-10-29 RX ORDER — INSULIN ASPART 100 [IU]/ML
0-5 INJECTION, SOLUTION INTRAVENOUS; SUBCUTANEOUS EVERY 6 HOURS PRN
Status: DISCONTINUED | OUTPATIENT
Start: 2022-10-29 | End: 2022-11-09 | Stop reason: HOSPADM

## 2022-10-29 RX ORDER — SODIUM CHLORIDE 0.9 % (FLUSH) 0.9 %
10 SYRINGE (ML) INJECTION
Status: CANCELLED | OUTPATIENT
Start: 2022-10-29

## 2022-10-29 RX ORDER — PREGABALIN 75 MG/1
225 CAPSULE ORAL 2 TIMES DAILY
Status: DISCONTINUED | OUTPATIENT
Start: 2022-10-29 | End: 2022-11-09 | Stop reason: HOSPADM

## 2022-10-29 RX ORDER — TAMSULOSIN HYDROCHLORIDE 0.4 MG/1
0.4 CAPSULE ORAL DAILY
Status: DISCONTINUED | OUTPATIENT
Start: 2022-10-30 | End: 2022-11-09 | Stop reason: HOSPADM

## 2022-10-29 RX ORDER — BISACODYL 10 MG
10 SUPPOSITORY, RECTAL RECTAL DAILY
Status: DISCONTINUED | OUTPATIENT
Start: 2022-10-30 | End: 2022-10-30

## 2022-10-29 RX ORDER — HEPARIN SODIUM 5000 [USP'U]/ML
5000 INJECTION, SOLUTION INTRAVENOUS; SUBCUTANEOUS EVERY 8 HOURS
Status: CANCELLED | OUTPATIENT
Start: 2022-10-29

## 2022-10-29 RX ORDER — HEPARIN SODIUM 5000 [USP'U]/ML
5000 INJECTION, SOLUTION INTRAVENOUS; SUBCUTANEOUS EVERY 8 HOURS
Status: DISCONTINUED | OUTPATIENT
Start: 2022-10-29 | End: 2022-11-09 | Stop reason: HOSPADM

## 2022-10-29 RX ORDER — SODIUM CHLORIDE 9 MG/ML
INJECTION, SOLUTION INTRAVENOUS CONTINUOUS
Status: DISCONTINUED | OUTPATIENT
Start: 2022-10-29 | End: 2022-11-02

## 2022-10-29 RX ORDER — ATORVASTATIN CALCIUM 10 MG/1
10 TABLET, FILM COATED ORAL DAILY
Status: DISCONTINUED | OUTPATIENT
Start: 2022-10-30 | End: 2022-11-09 | Stop reason: HOSPADM

## 2022-10-29 RX ORDER — ONDANSETRON 2 MG/ML
4 INJECTION INTRAMUSCULAR; INTRAVENOUS EVERY 6 HOURS PRN
Status: DISCONTINUED | OUTPATIENT
Start: 2022-10-29 | End: 2022-11-09 | Stop reason: HOSPADM

## 2022-10-29 RX ORDER — DULOXETIN HYDROCHLORIDE 30 MG/1
30 CAPSULE, DELAYED RELEASE ORAL DAILY
Status: DISCONTINUED | OUTPATIENT
Start: 2022-10-30 | End: 2022-11-08

## 2022-10-29 RX ORDER — PRAMIPEXOLE DIHYDROCHLORIDE 0.25 MG/1
0.25 TABLET ORAL NIGHTLY
Status: DISCONTINUED | OUTPATIENT
Start: 2022-10-29 | End: 2022-11-09 | Stop reason: HOSPADM

## 2022-10-29 RX ADMIN — ALUMINUM HYDROXIDE, MAGNESIUM HYDROXIDE, DIMETHICONE 15 ML: 200; 200; 20 LIQUID ORAL at 12:10

## 2022-10-29 RX ADMIN — AMPICILLIN SODIUM AND SULBACTAM SODIUM 3 G: 2; 1 INJECTION, POWDER, FOR SOLUTION INTRAMUSCULAR; INTRAVENOUS at 05:10

## 2022-10-29 RX ADMIN — SODIUM CHLORIDE: 0.9 INJECTION, SOLUTION INTRAVENOUS at 07:10

## 2022-10-29 RX ADMIN — HEPARIN SODIUM 5000 UNITS: 5000 INJECTION INTRAVENOUS; SUBCUTANEOUS at 09:10

## 2022-10-29 NOTE — ED NOTES
Pt given a sip of water per provider's request. Pt coughed after swallowing and stated he felt like something was stuck in his throat. O2 sat remained 95% on 2L nasal canula.

## 2022-10-29 NOTE — HPI
Mr. Mccarthy is an 82/M with PMH HTN, HLD, DMII with neuropathy, BPH, chronic pain s/p spinal stimulator implantation 10/25 who presented to [facility] 10/29 with a 5 day history of progressively worsening progressively worsening cough, weakness, and dysphagia with associated constipation since his procedure. The patient has been unable to tolerate medications, liquids, or solids for the last 5 days.  Per his wife at bedside, he has no meaningful oral intake and he has become significantly weaker since his surgery.  He is now unable to rise out of bed without significant assistance.  The patient is able to briefly awaken during the interview and corroborate that he has cough, globus sensation with swallowing, and significant dysphagia, but denies odynophagia or neck swelling.  He denies hoarseness of voice, reflux, or fever.  Whenever the patient attempts to swallow liquids or medications, he has coughing and sputtering. ED evaluation was significant for SpO2 93-95% on RA, diffuse debility, and CT Chest/Abd/Pelvis showing multifocal pneumonia, large bowel distention and R-sided inguinal hernia with loops of small bowel without obstruction, and multiple low-attenuation lesions in the kidneys. He was started on ampicillin-sulbactam and hospital medicine was contacted for admission.

## 2022-10-29 NOTE — ASSESSMENT & PLAN NOTE
Dysphagia, debility  - Several days of worsening debility, weakness, decreased PO intake in the setting of recent procedure and multifocal pneumonia on CT with dysphagia.  - Continue ampicillin-sulbactam 3g IV q12hr.  - NPO sips with medications pending SLP evaluation; aspiration precautions.  - IVFs with LR at 75mL/hr.

## 2022-10-29 NOTE — ED PROVIDER NOTES
Source of History:  Patient  Patient's wife    Chief complaint:  Sore Throat (Throat pain and difficulty swallowing x 1 week since having surgery/)      HPI:  Frandy Mccarthy Jr. is a 82 y.o. male with PMH HTN, DM with neuropathy, DDD lumbar, CKD 3 HLD, anemia presenting with sore throat and difficulty swallowing. Patient had laminectomy with spinal stimulator insertion 10/26 during which he was intubated.  Since the surgery, patient reports decreased oral intake secondary to pain from swallowing.  He states that when he drinks or eats, he coughs.  States that prior to the surgery however he also felt like there was something stuck in his throat and he coughed/choked when he ate or drank as well.  He continues to report foreign body sensation in his throat. Patient also reports constipation states he has not had a bowel movement since Tuesday.  He was told not to take laxatives, but he has been taking opiate pain medicine without a stool softener.  He reports general abdominal tenderness and bloating. No fever, chills, chest pain or shortness of breath.    This is the extent to the patients complaints today here in the emergency department.    ROS: As per HPI and below:  General: No fever.  No chills.  Eyes: No visual changes.  ENT: + sore throat. +dysphagia +odynophagia +globus sensation  Head: No headache.    Chest: No shortness of breath.  Cardiovascular: No chest pain.  Abdomen: + abdominal pain +abdominal distension. +constipation. No nausea or vomiting.  Genito-Urinary: +inability to spontaneously void.  Neurologic: No focal weakness.  No numbness.  MSK: no back pain.  Integument: No rashes or lesions.  Hematologic: No easy bruising.  Endocrine: No excessive thirst or urination.    Review of patient's allergies indicates:  No Known Allergies    PMH:  As per HPI and below:  Past Medical History:   Diagnosis Date    Anemia 10/18/2022    Arthritis     Diabetes mellitus, type 2     Disorder of kidney and  "ureter     History of hyperparathyroidism     History of hyperparathyroidism 10/6/2020    Formatting of this note might be different from the original. Adenoma LL s/p resection 2017    Hyperlipidemia     Hypertension     Thyroid disease 2017    parathyroid removed     Past Surgical History:   Procedure Laterality Date    BACK SURGERY      x 4    FRACTURE SURGERY      right thigh    HERNIA REPAIR      LAMINECTOMY N/A 10/26/2022    Procedure: LAMINECTOMY, SPINE;  Surgeon: Daphnie Dalton MD;  Location: [facility] OR;  Service: Neurosurgery;  Laterality: N/A;  T8-T9 Laminectomy  T7-T8 Paddle    SPINE SURGERY      TOTAL KNEE ARTHROPLASTY Right 2020    TRIAL OF SPINAL CORD NERVE STIMULATOR N/A 2022    Procedure: TRIAL, SPINAL CORD STIMULATOR TRIAL PEOPLES REP;  Surgeon: Siobhan Laguerre MD;  Location: [facility] PAIN MGT;  Service: Pain Management;  Laterality: N/A;   RESCHEDULE       Social History     Tobacco Use    Smoking status: Former     Types: Cigarettes     Quit date: 1973     Years since quittin.7    Smokeless tobacco: Never   Substance Use Topics    Alcohol use: Not Currently     Comment: occasional    Drug use: No       Physical Exam:    BP (!) 140/63   Pulse 89   Temp 99.7 °F (37.6 °C)   Resp 17   Ht 5' 9" (1.753 m)   Wt 93.9 kg (207 lb)   SpO2 98%   BMI 30.57 kg/m²   Nursing note and vital signs reviewed.  Appearance: No acute distress. Obese. Low grade temp.  Eyes: No conjunctival injection.  ENT: Oropharynx clear.  Normal phonation  Chest/ Respiratory: Diminished breath sounds bilaterally.  Decreased air movement.  No wheezes.  No rhonchi. No rales. No accessory muscle use.  Cardiovascular: Regular rate and rhythm.  No murmurs. No gallops. No rubs.  Abdomen: Soft.  Distended.  Mild generalized abdominal tenderness.  No focal tenderness. No guarding.  No rebound. Non-peritoneal.  : Rader in place draining clear yellow urine  Musculoskeletal: Posterior dressing on back CDI. No " surrounding erythema.  No deformities.  Neck supple.  No meningismus. Moves all extremities spontaneously.  Decreased mobility at baseline  Skin: No rashes seen.  Good turgor.  No abrasions.  No ecchymoses.  Neurologic: Motor intact.  Sensation intact.  Cerebellar intact.  Cranial nerves intact.  Mental Status:  Alert and oriented x 3.  Appropriate, conversant    Labs that have been ordered have been independently reviewed and interpreted by myself.        Labs Reviewed   CBC W/ AUTO DIFFERENTIAL - Abnormal; Notable for the following components:       Result Value    RBC 3.36 (*)     Hemoglobin 10.3 (*)     Hematocrit 32.2 (*)     RDW 15.4 (*)     Immature Granulocytes 0.6 (*)     Immature Grans (Abs) 0.05 (*)     Lymph # 0.5 (*)     Gran % 83.4 (*)     Lymph % 6.0 (*)     All other components within normal limits   COMPREHENSIVE METABOLIC PANEL - Abnormal; Notable for the following components:    Glucose 169 (*)     BUN 59 (*)     Creatinine 3.3 (*)     Albumin 3.1 (*)     Alkaline Phosphatase 50 (*)     eGFR 18 (*)     All other components within normal limits   LACTIC ACID, PLASMA   POCT INFLUENZA A/B MOLECULAR   SARS-COV-2 RDRP GENE       Imaging Results              CT Chest Abdomen Without Contrast (XPD) (Final result)  Result time 10/29/22 16:16:20      Final result by Abbe Daley MD (10/29/22 16:16:20)                   Impression:      CT chest:    1.  Multifocal airspace opacities, consistent with multifocal pneumonia.  Follow-up to resolution is recommended.    2.  Trace bilateral pleural effusions.    3.  4-5 mm pulmonary nodules in the right hemithorax.  For multiple solid nodules all <6 mm, Fleischner Society 2017 guidelines recommend no routine follow up for a low risk patient, or follow up with non-contrast chest CT at 12 months after discovery in a high risk patient.    4.  Changes of recent intrathecal catheter insertion.    CT abdomen pelvis:    1.  Gaseous distention of the large bowel.  No CT  evidence of bowel obstruction    2.  Right-sided inguinal hernia containing loops of small bowel.  No obstruction identified.    3.  Multiple low-attenuation lesions in the kidneys.  Outpatient follow-up with dedicated renal ultrasound may be obtained for further evaluation.    4.  Additional findings as above.      Electronically signed by: Abbe Daley MD  Date:    10/29/2022  Time:    16:16               Narrative:    EXAMINATION:  CT CHEST ABDOMEN WITHOUT CONTRAST (XPD)    CLINICAL HISTORY:  new onset hypoxia post-op, abdominal distension constipation. plain films not great due to habitus and inability to stand;    TECHNIQUE:  Axial CT scan of the chest, abdomen, and pelvis was performed without intravenous contrast.  Coronal and sagittal reformats were obtained.    COMPARISON:  10/29/2022.    FINDINGS:  CT chest:    The base of the neck is within normal limits. The supraclavicular regions are unremarkable.  The thyroid gland is within normal limits.    The trachea is unremarkable.  The central airways are within normal limits.  There is no evidence of bronchiectasis.  No endobronchial lesion is identified.    The heart is unremarkable.  There are no pericardial effusions.  There are coronary artery calcifications present.    The thoracic aorta is normal in caliber.  There are calcifications of the thoracic aorta.  The pulmonary trunk does not appear enlarged.    There are subcentimeter lymph nodes within the mediastinum.  There is no obvious hilar lymphadenopathy.  The axillary regions are within normal limits.    There are trace bilateral pleural effusions.  There is no evidence of a pneumothorax.  There is no evidence of pneumomediastinum.    There are changes of centrilobular and paraseptal emphysema.  There are airspace opacities within the bilateral lower lobes and in the lingula.  There is a 5 mm pulmonary nodule in the right lung apex.  There is a 4 mm pulmonary nodule in the right middle lobe.    The  esophagus is unremarkable.  Please see the abdomen and pelvis examination for findings of the upper abdomen.    There are postoperative changes of recent insertion of intrathecal catheter.  There is expected foci of air along the course of the catheter.  The catheter terminates within the dorsal thecal sac at the level of T7.    There are degenerative changes in the osseous structures.  There is a synovial cyst in the right shoulder.  There is no evidence of a fracture.    CT abdomen/pelvis:    There is normal tapering of the abdominal aorta.  There are extensive calcifications along the course of the abdominal aorta and its branch vessels.    There is no evidence of lymphadenopathy in the abdomen or pelvis.    The esophagus, stomach, and duodenum are within normal limits.  The small bowel loops are unremarkable.  The appendix is unremarkable.  There is gaseous distention of the large bowel.  No transition point is identified.  There is colonic diverticula without evidence of acute diverticulitis.    The liver is unremarkable.  The gallbladder is within normal limits.  The biliary tree is unremarkable.  The spleen is unremarkable.  The pancreas is within normal limits.  The adrenal glands are unremarkable.    There is a 3 cm simple cyst in the right kidney.  There is a 2.1 cm low-attenuation lesion in the lower pole the right kidney with Hounsfield of 33.  There is no evidence of nephrolithiasis.    There is a bilobed 7.7 cm cystic lesion in the lower pole of the left kidney.  There is an additional cystic lesion lower pole left kidney with increased attenuation.  These are incompletely characterized.    The ureters are unremarkable.  There is a Rader catheter within the lumen of the urinary bladder.  The urinary bladder is collapsed.  The prostate gland is unremarkable.    There is no evidence of free fluid in the abdomen or pelvis.  There is no evidence of free air.  There is no evidence of pneumatosis.  No portal  venous air is identified.    The psoas margins are unremarkable.  There are bilateral inguinal hernias.  There is component of small bowel within the right-sided inguinal hernia.  No evidence of obstruction is identified.    There are postoperative changes in the lumbar spine with adjacent level disease at the T12-L1 level.  No perihardware lucency is identified.                                       X-Ray Chest AP Portable (Final result)  Result time 10/29/22 13:25:13      Final result by Vikas Christine Jr., MD (10/29/22 13:25:13)                   Impression:      Low volume chest with bibasilar atelectasis      Electronically signed by: Vikas Solano Jr  Date:    10/29/2022  Time:    13:25               Narrative:    EXAMINATION:  XR CHEST AP PORTABLE    CLINICAL HISTORY:  Hypoxemia    TECHNIQUE:  Single frontal view of the chest was performed.    COMPARISON:  None    FINDINGS:  Cardiomediastinal silhouettewithin normal limits for age.    Lung volumes are very low with resultant crowding of the pulmonary vascular markings.  Bibasilar dependent and or subsegmental atelectasis suspected.    Small pleural effusions not excluded.                                       X-Ray Neck Soft Tissue (Final result)  Result time 10/29/22 13:40:47      Final result by Zafar Watson MD (10/29/22 13:40:47)                   Impression:      1. No definite radiopaque foreign body within the visualized soft tissues of the neck or airway however there is limited evaluation given habitus.  Correlation is advised.      Electronically signed by: Zafar Watson MD  Date:    10/29/2022  Time:    13:40               Narrative:    EXAMINATION:  XR NECK SOFT TISSUE    CLINICAL HISTORY:  Other specified symptoms and signs involving the circulatory and respiratory systems    TECHNIQUE:  AP and lateral soft tissue views the neck were performed.    COMPARISON:  None.    FINDINGS:  Two views soft tissues neck.    Please note, C5, C6  and C7 are obscured by soft tissues.  Allowing for this, spinal alignment appears maintained on AP and lateral views noting multilevel facet arthropathy.  There is suboptimal evaluation of the soft tissues of the neck given positioning.  No definite radiopaque foreign body although evaluation is limited.                                       X-Ray Abdomen AP 1 View (KUB) (Final result)  Result time 10/29/22 13:34:30      Final result by Zafar Watson MD (10/29/22 13:34:30)                   Impression:      1. Slow flow through it appears to be large bowel suggesting constipation.  Further evaluation could be performed with upright and erect radiography to exclude developing small bowel obstruction.      Electronically signed by: Zafar Watson MD  Date:    10/29/2022  Time:    13:34               Narrative:    EXAMINATION:  XR ABDOMEN AP 1 VIEW    CLINICAL HISTORY:  Constipation, unspecified    TECHNIQUE:  AP View(s) of the abdomen was performed.    COMPARISON:  None    FINDINGS:  Two views abdomen supine.    Multilevel spinal fusion hardware noted.  There are several prominent gas-filled large bowel loops noting prominent gas-filled loops project over the right lower quadrant.  Findings may reflect constipation.  There are a few scattered air-filled small bowel loops, upper limit of normal in caliber.  There is a stimulator device projected over the right lower quadrant.  No convincing pneumatosis.                                      Initial Impression/ Differential Dx:  Urgent evaluation of 82 y.o. male presenting with sore throat, gobus sensation and dysphagia post spinal laminectomy with stimulator placement 10/26/22. Patient has a temp of 99.7°.  Patient is obese and chronically ill-appearing however not toxic appearing.  Patient is protecting his airway and is no acute respiratory distress, however on room air with great waveform patient 90-91%.  Given recent surgery, PE is a consideration.  Also as  patient reports dysphagia and odynophagia, aspiration a concern. Abdomen is distended but soft.  Given history of opiate use without the use of stool softeners, suspect constipation however will rule out SBO. Will obtain labs, imaging, test for COVID and flu and continue to reassess    Differential Diagnosis includes, but is not limited to:  SBO/volvulus, ileus, constipation, PE, aspiration PNA, PNA, sepsis, dehydration, metabolic derangement, globus, COVID, influenza      MDM:        ED Course as of 10/29/22 1723   Sat Oct 29, 2022   1241 SARS-CoV-2 RNA, Amplification, Qual: Negative [CU]   1241 POC Molecular Influenza A Ag: Negative [CU]   1241 POC Molecular Influenza B Ag: Negative [CU]   1242 CBC auto differential(!)  No leukocytosis, left shift noted, stable anemia [CU]   1306 Comprehensive metabolic panel(!)  No significant electrolyte abnormalities, hyperglycemia in patient with known DM, CKD noted, kidney function worsened labs from 4 days ago.  No elevation of LFTs [CU]   1306 Lactate, Curt: 1.2 [CU]   1332 X-Ray Chest AP Portable  Low volume chest with bibasilar atelectasis [CU]   1350 X-Ray Abdomen AP 1 View (KUB)  Slow flow through it appears to be large bowel suggesting constipation [CU]   1350 X-Ray Neck Soft Tissue  No definite radiopaque foreign body within the visualized soft tissues of the neck or airway however there is limited evaluation given habitus.   [CU]   1350 Plain films limited by body habitus and decreased mobility.  Patient is 96% on 2 L nasal cannula.  Continues to denies shortness of breath.  Concern for aspiration pneumonia versus PE.  Cannot get CTA secondary to kidney function.  Additionally, concern for small-bowel obstruction verses constipation given abdominal distension and lack of a bowel movement.  Will obtain CT abdomen pelvis.  Patient will likely also need a V/Q scan if no evidence of pneumonia.  Patient given water per RN.  He was able to take sips but then began coughing.   Patient likely also needs swallow study. Patient will ultimately be admitted to Hospital Medicine pending workup. [CU]   1627 CT Chest Abdomen Without Contrast (XPD) [CU]   1627 CT Chest Abdomen Without Contrast (XPD)  Multifocal airspace opacities, consistent with multifocal pneumonia. Trace pleural effusions. Will treat as aspiration pneumonia given dysphagia. Gaseous distention of abdomen. No evidence of obstruction. [CU]   1656 Discussed with Dr. Gibson who will admit the patient to Dr. Chang's service [CU]      ED Course User Index  [CU] Vanessa Iraheta NP       Critical Care    Date/Time: 10/29/2022 5:23 PM  Performed by: Vanessa Iraheta NP  Authorized by: SHLOMO Chang MD   Direct patient critical care time: 6 minutes  Additional history critical care time: 6 minutes  Ordering / reviewing critical care time: 6 minutes  Documentation critical care time: 6 minutes  Consulting other physicians critical care time: 6 minutes  Total critical care time (exclusive of procedural time) : 30 minutes  Critical care time was exclusive of separately billable procedures and treating other patients and teaching time.  Critical care was necessary to treat or prevent imminent or life-threatening deterioration of the following conditions: renal failure and respiratory failure.  Critical care was time spent personally by me on the following activities: development of treatment plan with patient or surrogate, discussions with consultants, discussions with primary provider, interpretation of cardiac output measurements, evaluation of patient's response to treatment, examination of patient, obtaining history from patient or surrogate, ordering and performing treatments and interventions, ordering and review of laboratory studies, ordering and review of radiographic studies, pulse oximetry, re-evaluation of patient's condition and review of old charts.                 Diagnostic Impression:    1. Multifocal pneumonia    2.  Globus sensation    3. Hypoxia    4. Constipation    5. Dysphagia, unspecified type    6. Chronic kidney disease, unspecified CKD stage    7. Aspiration pneumonia, unspecified aspiration pneumonia type, unspecified laterality, unspecified part of lung         ED Disposition Condition    Observation Stable                  Vanessa Iraheta, ZIGGY  10/29/22 [id number]

## 2022-10-29 NOTE — ASSESSMENT & PLAN NOTE
Diabetic polyneuropathy  - Continue duloxetine 30mg PO daily, pramipexole 0.25mg PO qHS, pregabalin 225mg PO BID, oxycodone 10mg PO as q4hr PRN.

## 2022-10-29 NOTE — ED NOTES
Pt rounding complete.  Patient resting on stretcher. Denies any needs at this time. Restroom and comfort needs addressed- mondragon catheter to gravity.  Pt updated on plan of care.  Call light within reach.  Will continue to monitor.

## 2022-10-29 NOTE — ED NOTES
Pt rounding complete.  Patient reports cough and o2 dipping down to 90%. Placed on 2L nasal canula. O2 96%.  Restroom and comfort needs addressed- Pt has mondragon cath in place.  Pt updated on plan of care.  Call light within reach, family member at bedside.  Will continue to monitor.

## 2022-10-29 NOTE — ED NOTES
Pt rounding complete.  Patient is resting comfortably on stretcher. No needs at this time. Restroom and comfort needs addressed- Rader to gravity.  Pt updated on plan of care.  Call light within reach, visitor at bedside.  Will continue to monitor.

## 2022-10-29 NOTE — ASSESSMENT & PLAN NOTE
- On V-Go at home; will not replace in AM.  - Start low-dose sliding scale insulin aspart 0-5U subQ q6hr PRN.

## 2022-10-29 NOTE — SUBJECTIVE & OBJECTIVE
Past Medical History:   Diagnosis Date    Anemia 10/18/2022    Arthritis     Diabetes mellitus, type 2     Disorder of kidney and ureter     History of hyperparathyroidism     History of hyperparathyroidism 10/6/2020    Formatting of this note might be different from the original. 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    Hyperlipidemia     Hypertension     Thyroid disease 06/2017    parathyroid removed     Past Surgical History:   Procedure Laterality Date    BACK SURGERY      x 4    FRACTURE SURGERY      right thigh    HERNIA REPAIR      LAMINECTOMY N/A 10/26/2022    Procedure: LAMINECTOMY, SPINE;  Surgeon: Daphnie Dalton MD;  Location: [facility] OR;  Service: Neurosurgery;  Laterality: N/A;  T8-T9 Laminectomy  T7-T8 Paddle    SPINE SURGERY      TOTAL KNEE ARTHROPLASTY Right 07/06/2020    TRIAL OF SPINAL CORD NERVE STIMULATOR N/A 09/12/2022    Procedure: TRIAL, SPINAL CORD STIMULATOR TRIAL PEOPLES REP;  Surgeon: Siobhan Laguerre MD;  Location: [facility] PAIN MGT;  Service: Pain Management;  Laterality: N/A;  7/29 RESCHEDULE       Review of patient's allergies indicates:  No Known Allergies    No current facility-administered medications on file prior to encounter.     Current Outpatient Medications on File Prior to Encounter   Medication Sig    cyanocobalamin (VITAMIN B-12) 1000 MCG tablet Take 100 mcg by mouth.    DULoxetine (CYMBALTA) 30 MG capsule Take 30 mg by mouth once daily.     fenofibrate 160 MG Tab Take 1 tablet (160 mg total) by mouth once daily.    finasteride (PROSCAR) 5 mg tablet Take 5 mg by mouth.    folic acid (FOLVITE) 800 MCG Tab Take 800 mcg by mouth once daily.    furosemide (LASIX) 40 MG tablet Take 40 mg by mouth 2 (two) times daily.    HUMALOG U-100 INSULIN 100 unit/mL injection V-Go pump 30 units at a preset basal rate of 1.25 units/ hour  Up to 36 units of on demand bolus dosing    Has Dexcom continuous glucose meter hand held meter   Does premeal check of glucose and adds as needed dose of additional insulin     metoprolol tartrate (LOPRESSOR) 100 MG tablet Take 100 mg by mouth.    mirabegron (MYRBETRIQ) 25 mg Tb24 ER tablet Take 25 mg by mouth once daily.    oxybutynin (DITROPAN-XL) 10 MG 24 hr tablet Take 10 mg by mouth once daily.    oxyCODONE (ROXICODONE) 10 mg Tab immediate release tablet Take 1 tablet (10 mg total) by mouth every 6 (six) hours as needed for Pain. Do not take at the same time as your Percocet.    oxyCODONE-acetaminophen (PERCOCET) 5-325 mg per tablet TAKE 1 TABLET BY MOUTH ONCE A DAY AS NEEDED FOR PAIN    pramipexole (MIRAPEX) 0.25 MG tablet Take by mouth every evening.     pregabalin (LYRICA) 225 MG Cap Take 225 mg by mouth 2 (two) times daily.    simvastatin (ZOCOR) 20 MG tablet Take 20 mg by mouth once daily.    sulfamethoxazole-trimethoprim 800-160mg (BACTRIM DS) 800-160 mg Tab Take 1 tablet by mouth 2 (two) times daily. for 5 days    tamsulosin (FLOMAX) 0.4 mg Cap Take 0.4 mg by mouth once daily.    UNABLE TO FIND Take by mouth 2 (two) times a day. medication name: Super Beta Prostate Advanced    V-GO 30 Isabel AS DIRECTED , APPLY 1 V-GO DEVICE DAILY     Family History       Problem Relation (Age of Onset)    Cancer Mother, Father    Diabetes Mother, Father          Tobacco Use    Smoking status: Former     Types: Cigarettes     Quit date: 1973     Years since quittin.7    Smokeless tobacco: Never   Substance and Sexual Activity    Alcohol use: Not Currently     Comment: occasional    Drug use: No    Sexual activity: Yes     Partners: Female     Review of Systems   Constitutional:  Positive for fatigue. Negative for chills and fever.   HENT:  Negative for congestion, sore throat and trouble swallowing.    Eyes:  Negative for pain and visual disturbance.   Respiratory:  Positive for cough, choking and shortness of breath.    Cardiovascular:  Positive for leg swelling. Negative for chest pain and palpitations.   Gastrointestinal:  Positive for constipation. Negative for abdominal pain,  nausea and vomiting.   Endocrine: Negative for polyuria.   Genitourinary:  Negative for difficulty urinating and dysuria.   Musculoskeletal:  Negative for arthralgias and joint swelling.   Skin:  Negative for rash and wound.   Neurological:  Negative for dizziness and headaches.   Objective:     Vital Signs (Most Recent):  Temp: 99.7 °F (37.6 °C) (10/29/22 1040)  Pulse: 95 (10/29/22 1601)  Resp: 18 (10/29/22 1601)  BP: (!) 145/62 (10/29/22 1601)  SpO2: 96 % (10/29/22 1601)   Vital Signs (24h Range):  Temp:  [99.7 °F (37.6 °C)] 99.7 °F (37.6 °C)  Pulse:  [] 95  Resp:  [16-20] 18  SpO2:  [91 %-96 %] 96 %  BP: (124-145)/(60-68) 145/62     Weight: 93.9 kg (207 lb)  Body mass index is 30.57 kg/m².    Physical Exam  Vitals reviewed.   Constitutional:       General: He is not in acute distress.     Appearance: He is obese. He is ill-appearing.   Eyes:      General:         Right eye: No discharge.         Left eye: No discharge.   Cardiovascular:      Rate and Rhythm: Normal rate and regular rhythm.   Pulmonary:      Effort: Pulmonary effort is normal.      Breath sounds: Rales present. No wheezing.   Abdominal:      Tenderness: There is no abdominal tenderness. There is no guarding.      Comments: Less frequent bowel sounds   Musculoskeletal:      Cervical back: Neck supple.      Right lower leg: Edema (1+ pitting edema about the ankle) present.      Left lower leg: Edema (trace left lower extremity edema) present.   Skin:     General: Skin is warm.      Capillary Refill: Capillary refill takes less than 2 seconds.      Comments: Thoracic and right hip incisions well approximated, clean dry and intact, no erythema or purulence.  Covered in bandages   Neurological:      General: No focal deficit present.   Psychiatric:         Mood and Affect: Mood normal.           Significant Labs: All pertinent labs within the past 24 hours have been reviewed.  CBC:   Recent Labs   Lab 10/29/22  1221   WBC 8.68   HGB 10.3*   HCT  32.2*        CMP:   Recent Labs   Lab 10/29/22  1221      K 4.8      CO2 26   *   BUN 59*   CREATININE 3.3*   CALCIUM 9.2   PROT 7.3   ALBUMIN 3.1*   BILITOT 0.5   ALKPHOS 50*   AST 17   ALT 13   ANIONGAP 13       Significant Imaging:   EXAMINATION:  CT CHEST ABDOMEN WITHOUT CONTRAST (XPD)     CLINICAL HISTORY:  new onset hypoxia post-op, abdominal distension constipation. plain films not great due to habitus and inability to stand;     TECHNIQUE:  Axial CT scan of the chest, abdomen, and pelvis was performed without intravenous contrast.  Coronal and sagittal reformats were obtained.     COMPARISON:  10/29/2022.     FINDINGS:  CT chest:     The base of the neck is within normal limits. The supraclavicular regions are unremarkable.  The thyroid gland is within normal limits.     The trachea is unremarkable.  The central airways are within normal limits.  There is no evidence of bronchiectasis.  No endobronchial lesion is identified.     The heart is unremarkable.  There are no pericardial effusions.  There are coronary artery calcifications present.     The thoracic aorta is normal in caliber.  There are calcifications of the thoracic aorta.  The pulmonary trunk does not appear enlarged.     There are subcentimeter lymph nodes within the mediastinum.  There is no obvious hilar lymphadenopathy.  The axillary regions are within normal limits.     There are trace bilateral pleural effusions.  There is no evidence of a pneumothorax.  There is no evidence of pneumomediastinum.     There are changes of centrilobular and paraseptal emphysema.  There are airspace opacities within the bilateral lower lobes and in the lingula.  There is a 5 mm pulmonary nodule in the right lung apex.  There is a 4 mm pulmonary nodule in the right middle lobe.     The esophagus is unremarkable.  Please see the abdomen and pelvis examination for findings of the upper abdomen.     There are postoperative changes of  recent insertion of intrathecal catheter.  There is expected foci of air along the course of the catheter.  The catheter terminates within the dorsal thecal sac at the level of T7.     There are degenerative changes in the osseous structures.  There is a synovial cyst in the right shoulder.  There is no evidence of a fracture.     CT abdomen/pelvis:     There is normal tapering of the abdominal aorta.  There are extensive calcifications along the course of the abdominal aorta and its branch vessels.     There is no evidence of lymphadenopathy in the abdomen or pelvis.     The esophagus, stomach, and duodenum are within normal limits.  The small bowel loops are unremarkable.  The appendix is unremarkable.  There is gaseous distention of the large bowel.  No transition point is identified.  There is colonic diverticula without evidence of acute diverticulitis.     The liver is unremarkable.  The gallbladder is within normal limits.  The biliary tree is unremarkable.  The spleen is unremarkable.  The pancreas is within normal limits.  The adrenal glands are unremarkable.     There is a 3 cm simple cyst in the right kidney.  There is a 2.1 cm low-attenuation lesion in the lower pole the right kidney with Hounsfield of 33.  There is no evidence of nephrolithiasis.     There is a bilobed 7.7 cm cystic lesion in the lower pole of the left kidney.  There is an additional cystic lesion lower pole left kidney with increased attenuation.  These are incompletely characterized.     The ureters are unremarkable.  There is a Rader catheter within the lumen of the urinary bladder.  The urinary bladder is collapsed.  The prostate gland is unremarkable.     There is no evidence of free fluid in the abdomen or pelvis.  There is no evidence of free air.  There is no evidence of pneumatosis.  No portal venous air is identified.     The psoas margins are unremarkable.  There are bilateral inguinal hernias.  There is component of small  bowel within the right-sided inguinal hernia.  No evidence of obstruction is identified.     There are postoperative changes in the lumbar spine with adjacent level disease at the T12-L1 level.  No perihardware lucency is identified.     Impression:     CT chest:     1.  Multifocal airspace opacities, consistent with multifocal pneumonia.  Follow-up to resolution is recommended.     2.  Trace bilateral pleural effusions.     3.  4-5 mm pulmonary nodules in the right hemithorax.  For multiple solid nodules all <6 mm, Fleischner Society 2017 guidelines recommend no routine follow up for a low risk patient, or follow up with non-contrast chest CT at 12 months after discovery in a high risk patient.     4.  Changes of recent intrathecal catheter insertion.     CT abdomen pelvis:     1.  Gaseous distention of the large bowel.  No CT evidence of bowel obstruction     2.  Right-sided inguinal hernia containing loops of small bowel.  No obstruction identified.     3.  Multiple low-attenuation lesions in the kidneys.  Outpatient follow-up with dedicated renal ultrasound may be obtained for further evaluation.     4.  Additional findings as above.        Electronically signed by: Abbe Daley MD  Date:                                            10/29/2022  Time:                                           16:16

## 2022-10-29 NOTE — ED TRIAGE NOTES
Pt reports to ED with a cough, fever, and a sore throat/trouble swallowing since he had back surgery on Wednesday. He reports that he feels like there is something stuck in his throat. He is aaox4. In no acute distress at this time.

## 2022-10-29 NOTE — ASSESSMENT & PLAN NOTE
Carotid artery disease  - Continue fenofibrate 160mg PO daily, atorvastatin 10mg PO daily in place of home simvastatin.

## 2022-10-29 NOTE — ED NOTES
Pt rounding complete.  Patient resting on stretcher. Denies any needs at this time. Restroom and comfort needs addressed- mondragon catheter to gravity.  Pt updated on plan of care.  Call light within reach, family member at bedside.  Will continue to monitor.

## 2022-10-30 LAB
ANION GAP SERPL CALC-SCNC: 9 MMOL/L (ref 8–16)
BASOPHILS # BLD AUTO: 0.02 K/UL (ref 0–0.2)
BASOPHILS NFR BLD: 0.3 % (ref 0–1.9)
BUN SERPL-MCNC: 49 MG/DL (ref 8–23)
CALCIUM SERPL-MCNC: 9.2 MG/DL (ref 8.7–10.5)
CHLORIDE SERPL-SCNC: 105 MMOL/L (ref 95–110)
CO2 SERPL-SCNC: 25 MMOL/L (ref 23–29)
CREAT SERPL-MCNC: 2.6 MG/DL (ref 0.5–1.4)
DIFFERENTIAL METHOD: ABNORMAL
EOSINOPHIL # BLD AUTO: 0.1 K/UL (ref 0–0.5)
EOSINOPHIL NFR BLD: 1.6 % (ref 0–8)
ERYTHROCYTE [DISTWIDTH] IN BLOOD BY AUTOMATED COUNT: 15.3 % (ref 11.5–14.5)
EST. GFR  (NO RACE VARIABLE): 24 ML/MIN/1.73 M^2
GLUCOSE SERPL-MCNC: 174 MG/DL (ref 70–110)
HCT VFR BLD AUTO: 33 % (ref 40–54)
HGB BLD-MCNC: 10.4 G/DL (ref 14–18)
IMM GRANULOCYTES # BLD AUTO: 0.03 K/UL (ref 0–0.04)
IMM GRANULOCYTES NFR BLD AUTO: 0.4 % (ref 0–0.5)
LYMPHOCYTES # BLD AUTO: 0.4 K/UL (ref 1–4.8)
LYMPHOCYTES NFR BLD: 6.1 % (ref 18–48)
MAGNESIUM SERPL-MCNC: 2.2 MG/DL (ref 1.6–2.6)
MCH RBC QN AUTO: 30.1 PG (ref 27–31)
MCHC RBC AUTO-ENTMCNC: 31.5 G/DL (ref 32–36)
MCV RBC AUTO: 95 FL (ref 82–98)
MONOCYTES # BLD AUTO: 0.7 K/UL (ref 0.3–1)
MONOCYTES NFR BLD: 9.4 % (ref 4–15)
NEUTROPHILS # BLD AUTO: 5.7 K/UL (ref 1.8–7.7)
NEUTROPHILS NFR BLD: 82.2 % (ref 38–73)
NRBC BLD-RTO: 0 /100 WBC
PHOSPHATE SERPL-MCNC: 2.6 MG/DL (ref 2.7–4.5)
PLATELET # BLD AUTO: 208 K/UL (ref 150–450)
PMV BLD AUTO: 10.5 FL (ref 9.2–12.9)
POCT GLUCOSE: 195 MG/DL (ref 70–110)
POCT GLUCOSE: 198 MG/DL (ref 70–110)
POCT GLUCOSE: 206 MG/DL (ref 70–110)
POTASSIUM SERPL-SCNC: 4.6 MMOL/L (ref 3.5–5.1)
RBC # BLD AUTO: 3.46 M/UL (ref 4.6–6.2)
SODIUM SERPL-SCNC: 139 MMOL/L (ref 136–145)
WBC # BLD AUTO: 6.93 K/UL (ref 3.9–12.7)

## 2022-10-30 PROCEDURE — 11000001 HC ACUTE MED/SURG PRIVATE ROOM

## 2022-10-30 PROCEDURE — 63600175 PHARM REV CODE 636 W HCPCS: Performed by: INTERNAL MEDICINE

## 2022-10-30 PROCEDURE — 99226 PR SUBSEQUENT OBSERVATION CARE,LEVEL III: ICD-10-PCS | Mod: ,,, | Performed by: INTERNAL MEDICINE

## 2022-10-30 PROCEDURE — 97166 OT EVAL MOD COMPLEX 45 MIN: CPT

## 2022-10-30 PROCEDURE — 84100 ASSAY OF PHOSPHORUS: CPT | Performed by: INTERNAL MEDICINE

## 2022-10-30 PROCEDURE — 85025 COMPLETE CBC W/AUTO DIFF WBC: CPT | Performed by: INTERNAL MEDICINE

## 2022-10-30 PROCEDURE — 96372 THER/PROPH/DIAG INJ SC/IM: CPT | Performed by: INTERNAL MEDICINE

## 2022-10-30 PROCEDURE — 36415 COLL VENOUS BLD VENIPUNCTURE: CPT | Performed by: INTERNAL MEDICINE

## 2022-10-30 PROCEDURE — 25000242 PHARM REV CODE 250 ALT 637 W/ HCPCS: Performed by: INTERNAL MEDICINE

## 2022-10-30 PROCEDURE — 99226 PR SUBSEQUENT OBSERVATION CARE,LEVEL III: CPT | Mod: ,,, | Performed by: INTERNAL MEDICINE

## 2022-10-30 PROCEDURE — 97530 THERAPEUTIC ACTIVITIES: CPT

## 2022-10-30 PROCEDURE — 97535 SELF CARE MNGMENT TRAINING: CPT

## 2022-10-30 PROCEDURE — 83735 ASSAY OF MAGNESIUM: CPT | Performed by: INTERNAL MEDICINE

## 2022-10-30 PROCEDURE — 63600175 PHARM REV CODE 636 W HCPCS: Performed by: NURSE PRACTITIONER

## 2022-10-30 PROCEDURE — 97161 PT EVAL LOW COMPLEX 20 MIN: CPT

## 2022-10-30 PROCEDURE — 92610 EVALUATE SWALLOWING FUNCTION: CPT

## 2022-10-30 PROCEDURE — 80048 BASIC METABOLIC PNL TOTAL CA: CPT | Performed by: INTERNAL MEDICINE

## 2022-10-30 PROCEDURE — 25000003 PHARM REV CODE 250: Performed by: INTERNAL MEDICINE

## 2022-10-30 RX ORDER — HYDRALAZINE HYDROCHLORIDE 20 MG/ML
10 INJECTION INTRAMUSCULAR; INTRAVENOUS EVERY 8 HOURS PRN
Status: DISCONTINUED | OUTPATIENT
Start: 2022-10-30 | End: 2022-11-09 | Stop reason: HOSPADM

## 2022-10-30 RX ORDER — LUBIPROSTONE 24 UG/1
24 CAPSULE ORAL ONCE
Status: COMPLETED | OUTPATIENT
Start: 2022-10-30 | End: 2022-10-30

## 2022-10-30 RX ORDER — BISACODYL 10 MG
10 SUPPOSITORY, RECTAL RECTAL DAILY
Status: DISCONTINUED | OUTPATIENT
Start: 2022-10-30 | End: 2022-11-08

## 2022-10-30 RX ADMIN — FINASTERIDE 5 MG: 5 TABLET, FILM COATED ORAL at 11:10

## 2022-10-30 RX ADMIN — HEPARIN SODIUM 5000 UNITS: 5000 INJECTION INTRAVENOUS; SUBCUTANEOUS at 09:10

## 2022-10-30 RX ADMIN — FENOFIBRATE 160 MG: 160 TABLET ORAL at 11:10

## 2022-10-30 RX ADMIN — INSULIN ASPART 2 UNITS: 100 INJECTION, SOLUTION INTRAVENOUS; SUBCUTANEOUS at 12:10

## 2022-10-30 RX ADMIN — PRAMIPEXOLE DIHYDROCHLORIDE 0.25 MG: 0.25 TABLET ORAL at 09:10

## 2022-10-30 RX ADMIN — DULOXETINE 30 MG: 30 CAPSULE, DELAYED RELEASE ORAL at 11:10

## 2022-10-30 RX ADMIN — OXYCODONE 10 MG: 5 TABLET ORAL at 04:10

## 2022-10-30 RX ADMIN — AMPICILLIN SODIUM AND SULBACTAM SODIUM 3 G: 2; 1 INJECTION, POWDER, FOR SOLUTION INTRAMUSCULAR; INTRAVENOUS at 05:10

## 2022-10-30 RX ADMIN — AMPICILLIN SODIUM AND SULBACTAM SODIUM 3 G: 2; 1 INJECTION, POWDER, FOR SOLUTION INTRAMUSCULAR; INTRAVENOUS at 04:10

## 2022-10-30 RX ADMIN — HEPARIN SODIUM 5000 UNITS: 5000 INJECTION INTRAVENOUS; SUBCUTANEOUS at 05:10

## 2022-10-30 RX ADMIN — HYDRALAZINE HYDROCHLORIDE 10 MG: 20 INJECTION INTRAMUSCULAR; INTRAVENOUS at 04:10

## 2022-10-30 RX ADMIN — HEPARIN SODIUM 5000 UNITS: 5000 INJECTION INTRAVENOUS; SUBCUTANEOUS at 02:10

## 2022-10-30 RX ADMIN — BISACODYL 10 MG: 10 SUPPOSITORY RECTAL at 11:10

## 2022-10-30 RX ADMIN — PREGABALIN 225 MG: 75 CAPSULE ORAL at 09:10

## 2022-10-30 RX ADMIN — TAMSULOSIN HYDROCHLORIDE 0.4 MG: 0.4 CAPSULE ORAL at 11:10

## 2022-10-30 RX ADMIN — METOPROLOL TARTRATE 100 MG: 50 TABLET, FILM COATED ORAL at 11:10

## 2022-10-30 RX ADMIN — LUBIPROSTONE 24 MCG: 24 CAPSULE, GELATIN COATED ORAL at 04:10

## 2022-10-30 RX ADMIN — ATORVASTATIN CALCIUM 10 MG: 10 TABLET, FILM COATED ORAL at 11:10

## 2022-10-30 RX ADMIN — OXYBUTYNIN CHLORIDE 10 MG: 5 TABLET, EXTENDED RELEASE ORAL at 11:10

## 2022-10-30 NOTE — PT/OT/SLP EVAL
Speech Language Pathology Evaluation  Bedside Swallow    Patient Name:  Frandy Mccarthy Jr.   MRN:  [id number]  Admitting Diagnosis: Multifocal pneumonia    Recommendations:                 General Recommendations:  Modified barium swallow study  Diet recommendations:  NPO except icechips following oral care     Aspiration Precautions: NPO at this time. Oral care to prevent adverse affect from possible aspiration of secretions   General Precautions: Standard, aspiration  Communication strategies:  none    History:     HPI: Mr. Mccarthy is an 82/M with PMH HTN, HLD, DMII with neuropathy, BPH, chronic pain s/p spinal stimulator implantation 10/25 who presented to [facility] 10/29 with a 5 day history of progressively worsening progressively worsening cough, weakness, and dysphagia with associated constipation since his procedure. The patient has been unable to tolerate medications, liquids, or solids for the last 5 days.  Per his wife at bedside, he has no meaningful oral intake and he has become significantly weaker since his surgery.  He is now unable to rise out of bed without significant assistance.  The patient is able to briefly awaken during the interview and corroborate that he has cough, globus sensation with swallowing, and significant dysphagia, but denies odynophagia or neck swelling.  He denies hoarseness of voice, reflux, or fever.  Whenever the patient attempts to swallow liquids or medications, he has coughing and sputtering. ED evaluation was significant for SpO2 93-95% on RA, diffuse debility, and CT Chest/Abd/Pelvis showing multifocal pneumonia, large bowel distention and R-sided inguinal hernia with loops of small bowel without obstruction, and multiple low-attenuation lesions in the kidneys. He was started on ampicillin-sulbactam and hospital medicine was contacted for admission.       Chart review from prior admission with operative report indicates a T8-9 laminectomy was performed with paddle placed  along T7-T8 epidural space.     Past Medical History:   Diagnosis Date    Anemia 10/18/2022    Arthritis     Diabetes mellitus, type 2     Disorder of kidney and ureter     History of hyperparathyroidism     History of hyperparathyroidism 10/6/2020    Formatting of this note might be different from the original. 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    Hyperlipidemia     Hypertension     Thyroid disease 06/2017    parathyroid removed       Past Surgical History:   Procedure Laterality Date    BACK SURGERY      x 4    FRACTURE SURGERY      right thigh    HERNIA REPAIR      LAMINECTOMY N/A 10/26/2022    Procedure: LAMINECTOMY, SPINE;  Surgeon: Daphnie Dalton MD;  Location: [facility] OR;  Service: Neurosurgery;  Laterality: N/A;  T8-T9 Laminectomy  T7-T8 Paddle    SPINE SURGERY      TOTAL KNEE ARTHROPLASTY Right 07/06/2020    TRIAL OF SPINAL CORD NERVE STIMULATOR N/A 09/12/2022    Procedure: TRIAL, SPINAL CORD STIMULATOR TRIAL PEOPLES REP;  Surgeon: Siobhan Laguerre MD;  Location: [facility] PAIN MGT;  Service: Pain Management;  Laterality: N/A;  7/29 RESCHEDULE       Social History: Patient lives with family.    Prior Intubation HX:  Chart review indicates patient was intubated with an 8.0mm endotracheal tube for procedure with no indicated complications    Modified Barium Swallow: none per chart review    X-Ray neck soft tissue 10/29/22:      FINDINGS:  Two views soft tissues neck.     Please note, C5, C6 and C7 are obscured by soft tissues.  Allowing for this, spinal alignment appears maintained on AP and lateral views noting multilevel facet arthropathy.  There is suboptimal evaluation of the soft tissues of the neck given positioning.  No definite radiopaque foreign body although evaluation is limited.     Impression:     1. No definite radiopaque foreign body within the visualized soft tissues of the neck or airway however there is limited evaluation given habitus.  Correlation is advised.    CT chest 10/30/22:    CT chest:     1.   Multifocal airspace opacities, consistent with multifocal pneumonia.  Follow-up to resolution is recommended.     2.  Trace bilateral pleural effusions.     3.  4-5 mm pulmonary nodules in the right hemithorax.  For multiple solid nodules all <6 mm, Fleischner Society 2017 guidelines recommend no routine follow up for a low risk patient, or follow up with non-contrast chest CT at 12 months after discovery in a high risk patient.     4.  Changes of recent intrathecal catheter insertion.    Prior diet: regular solids and thin liquids.      Subjective     Patient seen with family at bedside. Patient alert and agreeable to evaluation.  Patient reports he has not had meal since surgery (10/25), he has tried sips of water, grits, eggs, and oysters. Patient reports with all PO trials he experiences globus sensation and coughing.     Pain/Comfort:   Patient denies pain    Respiratory Status: Nasal cannula, flow 2 L/min    Objective:     Oral Musculature Evaluation  Oral Musculature: WFL  Dentition: upper dentures  Secretion Management: other (see comments) (xerostomic)  Mucosal Quality: dry  Mandibular Strength and Mobility: WFL  Oral Labial Strength and Mobility: WFL  Lingual Strength and Mobility: WFL  Velar Elevation: WFL  Volitional Cough: weak  Volitional Swallow: present  Voice Prior to PO Intake: clear    Bedside Swallow Eval:   Consistencies Assessed:  Thin liquids icechips, thin H2O by tsp and cupsip provided      Oral Phase:   Patient with grossly functional oral phase  Mastication of icechips was functional  Oral acceptance and manipulation of all consistencies assessed appears WFL    Pharyngeal Phase:   Patient with baseline mild hoarseness and weak volitional cough  Patient with significant cough following each trial  Patient with double swallows per bolus, report of globus sensation following each trial    Patient at high risk for aspiration in setting of s/s pharyngeal dysphagia including globus sensation,  multiple swallows per bolus, and coughing following each swallow. Patient with inability to tolerate meals at home prior to admission. Patient with multifocal pna.     Recommend continuation of NPO status (except icechips, following oral care). Reviewed with RN and MD. Recommend modified barium swallow study to further assess oropharyngeal swallow function.    Assessment:     Frandy Mccarthy Jr. is a 82 y.o. male with an SLP diagnosis of Dysphagia.  He presents with globus sensation and coughing with all intake.    Goals:   Multidisciplinary Problems       SLP Goals          Problem: SLP    Goal Priority Disciplines Outcome   SLP Goal     SLP Ongoing, Progressing   Description: 1) Patient will participate in modified barium swallow study to further assess oropharyngeal swallow function and to best guide tx                         Plan:     Patient to be seen:  3 x/week, 5 x/week   Plan of Care reviewed with:   patient, family, RN, MD  SLP Follow-Up:  Yes       Barriers to Discharge:   NPO status    Time Tracking:     SLP Treatment Date:   10/30/22  Speech Start Time:  1115  Speech Stop Time:  1132     Speech Total Time (min):  17 min    Billable Minutes: Edson 17     10/30/2022

## 2022-10-30 NOTE — PLAN OF CARE
Problem: Occupational Therapy  Goal: Occupational Therapy Goal  Description: Goals to be met by: 10/30/2022     Patient will increase functional independence with ADLs by performing:    UE Dressing with Minimal Assistance.  Grooming while EOB with Contact Guard Assistance.  Upper body Bathing from  edge of bed with Minimal Assistance.  Sitting at edge of bed x15 minutes with Contact Guard Assistance.  Rolling to Bilateral with Stand-by Assistance and use of bedrail as needed.   Supine to sit with Minimal Assistance using log roll tech.    Outcome: Ongoing, Progressing     Initial OT eval/treat complete.  Has SPC, RW, rollator, W/C, grab bars in shower, and built in shower bench.  DME needs TBD pending progress though anticipate need for BSC.  Recommend post acute OT in SNF.  To benefit from continued acute care OT services to increase independence in self-care/functional transfers.  OT to follow.

## 2022-10-30 NOTE — PROGRESS NOTES
[facility] Medicine  Progress Note    Patient Name: Frandy Mccarthy Jr.  MRN: [id number]  Patient Class: OP- Observation   Admission Date: 10/29/2022  Length of Stay: 0 days  Attending Physician: SHLOMO Chang MD  Primary Care Provider: Jose Mann MD        Subjective:     Principal Problem:Multifocal pneumonia        HPI:  Mr. Mccarthy is an 82/M with PMH HTN, HLD, DMII with neuropathy, BPH, chronic pain s/p spinal stimulator implantation 10/25 who presented to [facility] 10/29 with a 5 day history of progressively worsening progressively worsening cough, weakness, and dysphagia with associated constipation since his procedure. The patient has been unable to tolerate medications, liquids, or solids for the last 5 days.  Per his wife at bedside, he has no meaningful oral intake and he has become significantly weaker since his surgery.  He is now unable to rise out of bed without significant assistance.  The patient is able to briefly awaken during the interview and corroborate that he has cough, globus sensation with swallowing, and significant dysphagia, but denies odynophagia or neck swelling.  He denies hoarseness of voice, reflux, or fever.  Whenever the patient attempts to swallow liquids or medications, he has coughing and sputtering. ED evaluation was significant for SpO2 93-95% on RA, diffuse debility, and CT Chest/Abd/Pelvis showing multifocal pneumonia, large bowel distention and R-sided inguinal hernia with loops of small bowel without obstruction, and multiple low-attenuation lesions in the kidneys. He was started on ampicillin-sulbactam and hospital medicine was contacted for admission.      Overview/Hospital Course:  Admitted with dysphagia and multifocal pneumonia. NPO except meds / ice chips. Started ampicillin-sulbactam and SLP consulted. PT/OT consulted given diffuse weakness.      Interval History: No acute events overnight. +flatus, no BM yet. Abdomen distended.  Breathing somewhat better. Discussed with patient and wife at bedside. No other concerns at this time.    Review of Systems   Constitutional:  Negative for chills and fever.   Respiratory:  Positive for cough and shortness of breath.    Cardiovascular:  Negative for chest pain and palpitations.   Gastrointestinal:  Positive for abdominal distention. Negative for abdominal pain, nausea and vomiting.   Objective:     Vital Signs (Most Recent):  Temp: 98.2 °F (36.8 °C) (10/30/22 1137)  Pulse: (!) 123 (10/30/22 1137)  Resp: 16 (10/30/22 1137)  BP: (!) 168/77 (10/30/22 1137)  SpO2: (!) 93 % (10/30/22 1137)   Vital Signs (24h Range):  Temp:  [98.2 °F (36.8 °C)-99.6 °F (37.6 °C)] 98.2 °F (36.8 °C)  Pulse:  [] 123  Resp:  [16-20] 16  SpO2:  [90 %-98 %] 93 %  BP: (132-191)/(62-93) 168/77     Weight: 93.9 kg (207 lb)  Body mass index is 30.57 kg/m².    Intake/Output Summary (Last 24 hours) at 10/30/2022 1439  Last data filed at 10/30/2022 1139  Gross per 24 hour   Intake --   Output 2950 ml   Net -2950 ml      Physical Exam  Vitals and nursing note reviewed.   Constitutional:       General: He is not in acute distress.     Appearance: He is well-developed. He is obese.   HENT:      Head: Normocephalic and atraumatic.   Eyes:      General:         Right eye: No discharge.         Left eye: No discharge.      Conjunctiva/sclera: Conjunctivae normal.   Cardiovascular:      Rate and Rhythm: Normal rate.      Pulses: Normal pulses.   Pulmonary:      Effort: Pulmonary effort is normal. No respiratory distress.      Comments: R-sided crackles improved from prior.  Abdominal:      General: Bowel sounds are decreased. There is distension.      Palpations: Abdomen is soft.      Tenderness: There is no abdominal tenderness.   Musculoskeletal:         General: Normal range of motion.      Right lower leg: No edema.      Left lower leg: No edema.   Skin:     General: Skin is warm and dry.   Neurological:      Mental Status: He is  alert and oriented to person, place, and time.     Significant Labs:   CBC:  Recent Labs   Lab 10/29/22  1221 10/30/22  0426   WBC 8.68 6.93   HGB 10.3* 10.4*   HCT 32.2* 33.0*    208   GRAN 83.4*  7.2 82.2*  5.7   LYMPH 6.0*  0.5* 6.1*  0.4*   MONO 9.0  0.8 9.4  0.7   EOS 0.1 0.1   BASO 0.02 0.02   CMP:  Recent Labs   Lab 10/29/22  1221 10/30/22  0426    139   K 4.8 4.6    105   CO2 26 25   BUN 59* 49*   CREATININE 3.3* 2.6*   * 174*   CALCIUM 9.2 9.2   MG  --  2.2   PHOS  --  2.6*   ALKPHOS 50*  --    AST 17  --    ALT 13  --    BILITOT 0.5  --    PROT 7.3  --    ALBUMIN 3.1*  --    ANIONGAP 13 9      Significant Imaging:   No new imaging this morning.      Assessment/Plan:      * Multifocal pneumonia  Dysphagia, debility  - Several days of worsening debility, weakness, decreased PO intake in the setting of recent procedure and multifocal pneumonia on CT with dysphagia.  - Continue ampicillin-sulbactam 3g IV q12hr.  - NPO sips with medications; SLP recommending MBSS.  - IVFs with LR at 75mL/hr.    Benign prostatic hyperplasia with urinary frequency  - Continue finasteride 5mg PO daily, oxybutynin 10mg PO daily, tamsulosin 0.4mg PO as BID. Family to bring mirabegron as feasible to continue in house.    Chronic pain  Diabetic polyneuropathy  - Continue duloxetine 30mg PO daily, pramipexole 0.25mg PO qHS, pregabalin 225mg PO BID, oxycodone 10mg PO q4hr PRN.    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  - Continue low-dose sliding scale insulin aspart 0-5U subQ q6hr PRN.    Essential hypertension  - Continue metoprolol 100mg PO daily.    Hyperlipidemia  Carotid artery disease  - Continue fenofibrate 160mg PO daily, atorvastatin 10mg PO daily.    VTE Risk Mitigation (From admission, onward)         Ordered     heparin (porcine) injection 5,000 Units  Every 8 hours         10/29/22 1902     IP  VTE HIGH RISK PATIENT  Once         10/29/22 1902                Discharge Planning   MARYAM:      Code Status: Full Code   Is the patient medically ready for discharge?:     Reason for patient still in hospital (select all that apply): Treatment  Discharge Plan A: Home with family                  D Kian Chang MD  Department of Hospital Medicine   [facility] ([place])

## 2022-10-30 NOTE — SUBJECTIVE & OBJECTIVE
Interval History: No acute events overnight. +flatus, no BM yet. Abdomen distended. Breathing somewhat better. Discussed with patient and wife at bedside. No other concerns at this time.    Review of Systems   Constitutional:  Negative for chills and fever.   Respiratory:  Positive for cough and shortness of breath.    Cardiovascular:  Negative for chest pain and palpitations.   Gastrointestinal:  Positive for abdominal distention. Negative for abdominal pain, nausea and vomiting.   Objective:     Vital Signs (Most Recent):  Temp: 98.2 °F (36.8 °C) (10/30/22 1137)  Pulse: (!) 123 (10/30/22 1137)  Resp: 16 (10/30/22 1137)  BP: (!) 168/77 (10/30/22 1137)  SpO2: (!) 93 % (10/30/22 1137)   Vital Signs (24h Range):  Temp:  [98.2 °F (36.8 °C)-99.6 °F (37.6 °C)] 98.2 °F (36.8 °C)  Pulse:  [] 123  Resp:  [16-20] 16  SpO2:  [90 %-98 %] 93 %  BP: (132-191)/(62-93) 168/77     Weight: 93.9 kg (207 lb)  Body mass index is 30.57 kg/m².    Intake/Output Summary (Last 24 hours) at 10/30/2022 1439  Last data filed at 10/30/2022 1139  Gross per 24 hour   Intake --   Output 2950 ml   Net -2950 ml      Physical Exam  Vitals and nursing note reviewed.   Constitutional:       General: He is not in acute distress.     Appearance: He is well-developed. He is obese.   HENT:      Head: Normocephalic and atraumatic.   Eyes:      General:         Right eye: No discharge.         Left eye: No discharge.      Conjunctiva/sclera: Conjunctivae normal.   Cardiovascular:      Rate and Rhythm: Normal rate.      Pulses: Normal pulses.   Pulmonary:      Effort: Pulmonary effort is normal. No respiratory distress.      Comments: R-sided crackles improved from prior.  Abdominal:      General: Bowel sounds are decreased. There is distension.      Palpations: Abdomen is soft.      Tenderness: There is no abdominal tenderness.   Musculoskeletal:         General: Normal range of motion.      Right lower leg: No edema.      Left lower leg: No edema.    Skin:     General: Skin is warm and dry.   Neurological:      Mental Status: He is alert and oriented to person, place, and time.     Significant Labs:   CBC:  Recent Labs   Lab 10/29/22  1221 10/30/22  0426   WBC 8.68 6.93   HGB 10.3* 10.4*   HCT 32.2* 33.0*    208   GRAN 83.4*  7.2 82.2*  5.7   LYMPH 6.0*  0.5* 6.1*  0.4*   MONO 9.0  0.8 9.4  0.7   EOS 0.1 0.1   BASO 0.02 0.02   CMP:  Recent Labs   Lab 10/29/22  1221 10/30/22  0426    139   K 4.8 4.6    105   CO2 26 25   BUN 59* 49*   CREATININE 3.3* 2.6*   * 174*   CALCIUM 9.2 9.2   MG  --  2.2   PHOS  --  2.6*   ALKPHOS 50*  --    AST 17  --    ALT 13  --    BILITOT 0.5  --    PROT 7.3  --    ALBUMIN 3.1*  --    ANIONGAP 13 9      Significant Imaging:   No new imaging this morning.

## 2022-10-30 NOTE — H&P
[facility], [place], operated by [facility] Medicine  History & Physical    Patient Name: Frandy Mccarthy Jr.  MRN: [id number]  Patient Class: OP- Observation  Admission Date: 10/29/2022  Attending Physician: DANIELLE Chang MD  Primary Care Provider: Jose Mann MD     Patient information was obtained from patient, spouse/SO, past medical records and ER records.     Subjective:     Principal Problem:Multifocal pneumonia    Chief Complaint:   Chief Complaint   Patient presents with    Sore Throat     Throat pain and difficulty swallowing x 1 week since having surgery          HPI: Mr. Mccarthy is an 82/M with PMH HTN, HLD, DMII with neuropathy, BPH, chronic pain s/p spinal stimulator implantation 10/25 who presented to [facility] 10/29 with a 5 day history of progressively worsening progressively worsening cough, weakness, and dysphagia with associated constipation since his procedure. The patient has been unable to tolerate medications, liquids, or solids for the last 5 days.  Per his wife at bedside, he has no meaningful oral intake and he has become significantly weaker since his surgery.  He is now unable to rise out of bed without significant assistance.  The patient is able to briefly awaken during the interview and corroborate that he has cough, globus sensation with swallowing, and significant dysphagia, but denies odynophagia or neck swelling.  He denies hoarseness of voice, reflux, or fever.  Whenever the patient attempts to swallow liquids or medications, he has coughing and sputtering. ED evaluation was significant for SpO2 93-95% on RA, diffuse debility, and CT Chest/Abd/Pelvis showing multifocal pneumonia, large bowel distention and R-sided inguinal hernia with loops of small bowel without obstruction, and multiple low-attenuation lesions in the kidneys. He was started on ampicillin-sulbactam and hospital medicine was contacted for admission.      Past Medical History:   Diagnosis Date    Anemia  10/18/2022    Arthritis     Diabetes mellitus, type 2     Disorder of kidney and ureter     History of hyperparathyroidism     History of hyperparathyroidism 10/6/2020    Formatting of this note might be different from the original. 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    Hyperlipidemia     Hypertension     Thyroid disease 06/2017    parathyroid removed     Past Surgical History:   Procedure Laterality Date    BACK SURGERY      x 4    FRACTURE SURGERY      right thigh    HERNIA REPAIR      LAMINECTOMY N/A 10/26/2022    Procedure: LAMINECTOMY, SPINE;  Surgeon: Daphnie Dalton MD;  Location: [facility], [place], operated by [facility] OR;  Service: Neurosurgery;  Laterality: N/A;  T8-T9 Laminectomy  T7-T8 Paddle    SPINE SURGERY      TOTAL KNEE ARTHROPLASTY Right 07/06/2020    TRIAL OF SPINAL CORD NERVE STIMULATOR N/A 09/12/2022    Procedure: TRIAL, SPINAL CORD STIMULATOR TRIAL PEOPLES REP;  Surgeon: Siobhan Laguerre MD;  Location: [facility], [place], operated by [facility] PAIN MGT;  Service: Pain Management;  Laterality: N/A;  7/29 RESCHEDULE       Review of patient's allergies indicates:  No Known Allergies    No current facility-administered medications on file prior to encounter.     Current Outpatient Medications on File Prior to Encounter   Medication Sig    cyanocobalamin (VITAMIN B-12) 1000 MCG tablet Take 100 mcg by mouth.    DULoxetine (CYMBALTA) 30 MG capsule Take 30 mg by mouth once daily.     fenofibrate 160 MG Tab Take 1 tablet (160 mg total) by mouth once daily.    finasteride (PROSCAR) 5 mg tablet Take 5 mg by mouth.    folic acid (FOLVITE) 800 MCG Tab Take 800 mcg by mouth once daily.    furosemide (LASIX) 40 MG tablet Take 40 mg by mouth 2 (two) times daily.    HUMALOG U-100 INSULIN 100 unit/mL injection V-Go pump 30 units at a preset basal rate of 1.25 units/ hour  Up to 36 units of on demand bolus dosing    Has Dexcom continuous glucose meter hand held meter   Does premeal check of glucose and adds as needed dose of additional insulin    metoprolol tartrate  (LOPRESSOR) 100 MG tablet Take 100 mg by mouth.    mirabegron (MYRBETRIQ) 25 mg Tb24 ER tablet Take 25 mg by mouth once daily.    oxybutynin (DITROPAN-XL) 10 MG 24 hr tablet Take 10 mg by mouth once daily.    oxyCODONE (ROXICODONE) 10 mg Tab immediate release tablet Take 1 tablet (10 mg total) by mouth every 6 (six) hours as needed for Pain. Do not take at the same time as your Percocet.    oxyCODONE-acetaminophen (PERCOCET) 5-325 mg per tablet TAKE 1 TABLET BY MOUTH ONCE A DAY AS NEEDED FOR PAIN    pramipexole (MIRAPEX) 0.25 MG tablet Take by mouth every evening.     pregabalin (LYRICA) 225 MG Cap Take 225 mg by mouth 2 (two) times daily.    simvastatin (ZOCOR) 20 MG tablet Take 20 mg by mouth once daily.    sulfamethoxazole-trimethoprim 800-160mg (BACTRIM DS) 800-160 mg Tab Take 1 tablet by mouth 2 (two) times daily. for 5 days    tamsulosin (FLOMAX) 0.4 mg Cap Take 0.4 mg by mouth once daily.    UNABLE TO FIND Take by mouth 2 (two) times a day. medication name: Super Beta Prostate Advanced    V-GO 30 Isabel AS DIRECTED , APPLY 1 V-GO DEVICE DAILY     Family History       Problem Relation (Age of Onset)    Cancer Mother, Father    Diabetes Mother, Father          Tobacco Use    Smoking status: Former     Types: Cigarettes     Quit date: 1973     Years since quittin.7    Smokeless tobacco: Never   Substance and Sexual Activity    Alcohol use: Not Currently     Comment: occasional    Drug use: No    Sexual activity: Yes     Partners: Female     Review of Systems   Constitutional:  Positive for fatigue. Negative for chills and fever.   HENT:  Negative for congestion, sore throat and trouble swallowing.    Eyes:  Negative for pain and visual disturbance.   Respiratory:  Positive for cough, choking and shortness of breath.    Cardiovascular:  Positive for leg swelling. Negative for chest pain and palpitations.   Gastrointestinal:  Positive for constipation. Negative for abdominal pain, nausea  and vomiting.   Endocrine: Negative for polyuria.   Genitourinary:  Negative for difficulty urinating and dysuria.   Musculoskeletal:  Negative for arthralgias and joint swelling.   Skin:  Negative for rash and wound.   Neurological:  Negative for dizziness and headaches.   Objective:     Vital Signs (Most Recent):  Temp: 99.7 °F (37.6 °C) (10/29/22 1040)  Pulse: 95 (10/29/22 1601)  Resp: 18 (10/29/22 1601)  BP: (!) 145/62 (10/29/22 1601)  SpO2: 96 % (10/29/22 1601)   Vital Signs (24h Range):  Temp:  [99.7 °F (37.6 °C)] 99.7 °F (37.6 °C)  Pulse:  [] 95  Resp:  [16-20] 18  SpO2:  [91 %-96 %] 96 %  BP: (124-145)/(60-68) 145/62     Weight: 93.9 kg (207 lb)  Body mass index is 30.57 kg/m².    Physical Exam  Vitals reviewed.   Constitutional:       General: He is not in acute distress.     Appearance: He is obese. He is ill-appearing.   Eyes:      General:         Right eye: No discharge.         Left eye: No discharge.   Cardiovascular:      Rate and Rhythm: Normal rate and regular rhythm.   Pulmonary:      Effort: Pulmonary effort is normal.      Breath sounds: Rales present. No wheezing.   Abdominal:      Tenderness: There is no abdominal tenderness. There is no guarding.      Comments: Less frequent bowel sounds   Musculoskeletal:      Cervical back: Neck supple.      Right lower leg: Edema (1+ pitting edema about the ankle) present.      Left lower leg: Edema (trace left lower extremity edema) present.   Skin:     General: Skin is warm.      Capillary Refill: Capillary refill takes less than 2 seconds.      Comments: Thoracic and right hip incisions well approximated, clean dry and intact, no erythema or purulence.  Covered in bandages   Neurological:      General: No focal deficit present.   Psychiatric:         Mood and Affect: Mood normal.           Significant Labs: All pertinent labs within the past 24 hours have been reviewed.  CBC:   Recent Labs   Lab 10/29/22  1221   WBC 8.68   HGB 10.3*   HCT 32.2*         CMP:   Recent Labs   Lab 10/29/22  1221      K 4.8      CO2 26   *   BUN 59*   CREATININE 3.3*   CALCIUM 9.2   PROT 7.3   ALBUMIN 3.1*   BILITOT 0.5   ALKPHOS 50*   AST 17   ALT 13   ANIONGAP 13       Significant Imaging:   EXAMINATION:  CT CHEST ABDOMEN WITHOUT CONTRAST (XPD)     CLINICAL HISTORY:  new onset hypoxia post-op, abdominal distension constipation. plain films not great due to habitus and inability to stand;     TECHNIQUE:  Axial CT scan of the chest, abdomen, and pelvis was performed without intravenous contrast.  Coronal and sagittal reformats were obtained.     COMPARISON:  10/29/2022.     FINDINGS:  CT chest:     The base of the neck is within normal limits. The supraclavicular regions are unremarkable.  The thyroid gland is within normal limits.     The trachea is unremarkable.  The central airways are within normal limits.  There is no evidence of bronchiectasis.  No endobronchial lesion is identified.     The heart is unremarkable.  There are no pericardial effusions.  There are coronary artery calcifications present.     The thoracic aorta is normal in caliber.  There are calcifications of the thoracic aorta.  The pulmonary trunk does not appear enlarged.     There are subcentimeter lymph nodes within the mediastinum.  There is no obvious hilar lymphadenopathy.  The axillary regions are within normal limits.     There are trace bilateral pleural effusions.  There is no evidence of a pneumothorax.  There is no evidence of pneumomediastinum.     There are changes of centrilobular and paraseptal emphysema.  There are airspace opacities within the bilateral lower lobes and in the lingula.  There is a 5 mm pulmonary nodule in the right lung apex.  There is a 4 mm pulmonary nodule in the right middle lobe.     The esophagus is unremarkable.  Please see the abdomen and pelvis examination for findings of the upper abdomen.     There are postoperative changes of recent  insertion of intrathecal catheter.  There is expected foci of air along the course of the catheter.  The catheter terminates within the dorsal thecal sac at the level of T7.     There are degenerative changes in the osseous structures.  There is a synovial cyst in the right shoulder.  There is no evidence of a fracture.     CT abdomen/pelvis:     There is normal tapering of the abdominal aorta.  There are extensive calcifications along the course of the abdominal aorta and its branch vessels.     There is no evidence of lymphadenopathy in the abdomen or pelvis.     The esophagus, stomach, and duodenum are within normal limits.  The small bowel loops are unremarkable.  The appendix is unremarkable.  There is gaseous distention of the large bowel.  No transition point is identified.  There is colonic diverticula without evidence of acute diverticulitis.     The liver is unremarkable.  The gallbladder is within normal limits.  The biliary tree is unremarkable.  The spleen is unremarkable.  The pancreas is within normal limits.  The adrenal glands are unremarkable.     There is a 3 cm simple cyst in the right kidney.  There is a 2.1 cm low-attenuation lesion in the lower pole the right kidney with Hounsfield of 33.  There is no evidence of nephrolithiasis.     There is a bilobed 7.7 cm cystic lesion in the lower pole of the left kidney.  There is an additional cystic lesion lower pole left kidney with increased attenuation.  These are incompletely characterized.     The ureters are unremarkable.  There is a Rader catheter within the lumen of the urinary bladder.  The urinary bladder is collapsed.  The prostate gland is unremarkable.     There is no evidence of free fluid in the abdomen or pelvis.  There is no evidence of free air.  There is no evidence of pneumatosis.  No portal venous air is identified.     The psoas margins are unremarkable.  There are bilateral inguinal hernias.  There is component of small bowel  within the right-sided inguinal hernia.  No evidence of obstruction is identified.     There are postoperative changes in the lumbar spine with adjacent level disease at the T12-L1 level.  No perihardware lucency is identified.     Impression:     CT chest:     1.  Multifocal airspace opacities, consistent with multifocal pneumonia.  Follow-up to resolution is recommended.     2.  Trace bilateral pleural effusions.     3.  4-5 mm pulmonary nodules in the right hemithorax.  For multiple solid nodules all <6 mm, Fleischner Society 2017 guidelines recommend no routine follow up for a low risk patient, or follow up with non-contrast chest CT at 12 months after discovery in a high risk patient.     4.  Changes of recent intrathecal catheter insertion.     CT abdomen pelvis:     1.  Gaseous distention of the large bowel.  No CT evidence of bowel obstruction     2.  Right-sided inguinal hernia containing loops of small bowel.  No obstruction identified.     3.  Multiple low-attenuation lesions in the kidneys.  Outpatient follow-up with dedicated renal ultrasound may be obtained for further evaluation.     4.  Additional findings as above.        Electronically signed by: Abbe Daley MD  Date:                                            10/29/2022  Time:                                           16:16    Assessment/Plan:     * Multifocal pneumonia  Dysphagia, debility  - Several days of worsening debility, weakness, decreased PO intake in the setting of recent procedure and multifocal pneumonia on CT with dysphagia.  - Continue ampicillin-sulbactam 3g IV q12hr.  - NPO sips with medications pending SLP evaluation; aspiration precautions.  - IVFs with LR at 75mL/hr.    Benign prostatic hyperplasia with urinary frequency  - Continue finasteride 5mg PO daily, oxybutynin 10mg PO daily, tamsulosin 0.4mg PO daily.    Chronic pain  Diabetic polyneuropathy  - Continue duloxetine 30mg PO daily, pramipexole 0.25mg PO qHS, pregabalin  225mg PO BID, oxycodone 10mg PO as q4hr PRN.    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  - Start low-dose sliding scale insulin aspart 0-5U subQ q6hr PRN.    Essential hypertension  - Continue metoprolol 100mg PO daily.    Hyperlipidemia  Carotid artery disease  - Continue fenofibrate 160mg PO daily, atorvastatin 10mg PO daily in place of home simvastatin.    VTE Risk Mitigation (From admission, onward)    None             DANIELLE Chang MD  Department of Hospital Medicine   [facility] ([place])

## 2022-10-30 NOTE — PLAN OF CARE
Problem: SLP  Goal: SLP Goal  Description: 1) Patient will participate in modified barium swallow study to further assess oropharyngeal swallow function and to best guide tx    10/30/2022 1250 by Lyssa Alvarez CCC-SLP  Outcome: Ongoing, Progressing  10/30/2022 1250 by Lyssa Alvarez CCC-SLP  Outcome: Ongoing, Progressing

## 2022-10-30 NOTE — ASSESSMENT & PLAN NOTE
Dysphagia, debility  - Several days of worsening debility, weakness, decreased PO intake in the setting of recent procedure and multifocal pneumonia on CT with dysphagia.  - Continue ampicillin-sulbactam 3g IV q12hr.  - NPO sips with medications; SLP recommending MBSS.  - IVFs with LR at 75mL/hr.

## 2022-10-30 NOTE — PT/OT/SLP EVAL
Physical Therapy Evaluation    Patient Name:  Frandy Mccarthy Jr.   MRN:  [id number]    Recommendations:     Discharge Recommendations:  nursing facility, skilled   Discharge Equipment Recommendations: bedside commode   Barriers to discharge: Inaccessible home and Decreased caregiver support    Assessment:     Frandy Mccarthy Jr. is a 82 y.o. male admitted with a medical diagnosis of Multifocal pneumonia.  He presents with the following impairments/functional limitations:  weakness, impaired endurance, impaired self care skills, impaired functional mobility, gait instability, impaired balance, decreased lower extremity function, pain, decreased ROM, edema, impaired cardiopulmonary response to activity. Pt was able to transfer supine to sit with max assist.  He sat at the bed side with mod assist to min assist with B UE support.  Pt required mod/max assist of two to transfer sit to supine and mod assist of one to roll to either side in bed.  He refused to attempt to stand with a RW..    Rehab Prognosis: Good; patient would benefit from acute skilled PT services to address these deficits and reach maximum level of function.    Recent Surgery: * No surgery found *      Plan:     During this hospitalization, patient to be seen 5 x/week to address the identified rehab impairments via gait training, therapeutic activities, therapeutic exercises, neuromuscular re-education and progress toward the following goals:    Plan of Care Expires:  11/30/22    Subjective     Chief Complaint: weakness  Patient/Family Comments/goals: none reported  Pain/Comfort:  Pain Rating 1: 0/10    Patients cultural, spiritual, Adventist conflicts given the current situation: no    Living Environment:  Pt lives in a single story home with his wife.  There are 4 steps to enter his home  Prior to admission, patients level of function was dependent on his wife to assist with dressing LE's, getting in the shower prior to his back surgery last  Wednesday..  Equipment used at home: wheelchair, walker, rolling, rollator, glucometer, cane, straight.  DME owned (not currently used): none.  Upon discharge, patient will have assistance from wife.    Objective:     Communicated with Nurse prior to session.  Patient found HOB elevated with mondragon catheter, oxygen, IV and telemetry upon PT entry to room.    General Precautions: Standard,  (standard and fall)   Orthopedic Precautions:N/A   Braces: N/A  Respiratory Status: Nasal cannula, flow 2 L/min    Exams:  Cognitive Exam:  Patient is oriented to Person, Place, Time, and Situation  Sensation:    -       Impaired  light/touch in b feet  Skin Integrity/Edema:      -       Skin integrity: Visible skin intact  RUE ROM: See OT Note  RUE Strength: See OT note  LUE ROM: See OT note  LUE Strength: See OT note  RLE ROM: WFL  RLE Strength: Deficits: 2/5  LLE ROM: WFL  LLE Strength: Deficits 2/5    Functional Mobility:  Rolling  to L & R mod assist  Supine to sit max assist  Sit to supine mod assist of 2      AM-PAC 6 CLICK MOBILITY  Total Score:8       Treatment & Education:  Pt was educated on proper bed mobility with spinal precautions including supine to sit and sit to supine    Patient left HOB elevated with all lines intact, call button in reach, Nurse notified, and family member present.    GOALS:   Multidisciplinary Problems       Physical Therapy Goals          Problem: Physical Therapy    Goal Priority Disciplines Outcome Goal Variances Interventions   Physical Therapy Goal     PT, PT/OT Ongoing, Progressing     Description: Goals to be met by: 2022    Patient will increase functional independence with mobility by performin. Sit<>stand with min assist with RW.  2. Gait x 50 feet with RW with SBA.  3. Ascend/descend 5 step(s) with least restrictive assistive device and CGA.                           History:     Past Medical History:   Diagnosis Date    Anemia 10/18/2022    Arthritis     Diabetes  mellitus, type 2     Disorder of kidney and ureter     History of hyperparathyroidism     History of hyperparathyroidism 10/6/2020    Formatting of this note might be different from the original. 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    Hyperlipidemia     Hypertension     Thyroid disease 06/2017    parathyroid removed       Past Surgical History:   Procedure Laterality Date    BACK SURGERY      x 4    FRACTURE SURGERY      right thigh    HERNIA REPAIR      LAMINECTOMY N/A 10/26/2022    Procedure: LAMINECTOMY, SPINE;  Surgeon: Daphnie Dalton MD;  Location: [facility] OR;  Service: Neurosurgery;  Laterality: N/A;  T8-T9 Laminectomy  T7-T8 Paddle    SPINE SURGERY      TOTAL KNEE ARTHROPLASTY Right 07/06/2020    TRIAL OF SPINAL CORD NERVE STIMULATOR N/A 09/12/2022    Procedure: TRIAL, SPINAL CORD STIMULATOR TRIAL PEOPLES REP;  Surgeon: Siobhan Laguerre MD;  Location: [facility] PAIN MGT;  Service: Pain Management;  Laterality: N/A;  7/29 RESCHEDULE       Time Tracking:     PT Received On: 10/30/22  PT Start Time: 0947     PT Stop Time: 1008  PT Total Time (min): 21 min     Billable Minutes: Evaluation 10 and Therapeutic Activity 11      10/30/2022

## 2022-10-30 NOTE — ASSESSMENT & PLAN NOTE
- Continue finasteride 5mg PO daily, oxybutynin 10mg PO daily, tamsulosin 0.4mg PO as BID. Family to bring mirabegron as feasible to continue in house.

## 2022-10-30 NOTE — PLAN OF CARE
PCP Jose Mann  Pharmacy [facility]    Lives with wife - reports independence in ADLs with DME - grandson or wife will provide transportation home - denied any anticipated discharge needs       10/30/22 5474   Discharge Assessment   Assessment Type Discharge Planning Assessment   Confirmed/corrected address, phone number and insurance Yes   Confirmed Demographics Correct on Facesheet   Source of Information patient   Does patient/caregiver understand observation status Yes   Lives With spouse   Do you expect to return to your current living situation? Yes   Do you have help at home or someone to help you manage your care at home? Yes   Prior to hospitilization cognitive status: Alert/Oriented   Current cognitive status: Alert/Oriented   Walking or Climbing Stairs Difficulty ambulation difficulty, requires equipment   Dressing/Bathing Difficulty none   Equipment Currently Used at Home cane, straight;rollator;glucometer   Do you currently have service(s) that help you manage your care at home? No   Do you take prescription medications? Yes   Do you have prescription coverage? Yes   Do you have any problems affording any of your prescribed medications? No   Is the patient taking medications as prescribed? yes   Who is going to help you get home at discharge? grandson or wife   How do you get to doctors appointments? family or friend will provide   Are you on dialysis? No   Discharge Plan A Home with family   Discharge Plan B Home Health   Discharge Plan discussed with: Patient   Discharge Barriers Identified None   Physical Activity   On average, how many days per week do you engage in moderate to strenuous exercise (like a brisk walk)? 7 days   On average, how many minutes do you engage in exercise at this level? 10 min   Financial Resource Strain   How hard is it for you to pay for the very basics like food, housing, medical care, and heating? Not hard   Housing Stability   In the last 12 months, was  there a time when you were not able to pay the mortgage or rent on time? N   In the last 12 months, was there a time when you did not have a steady place to sleep or slept in a shelter (including now)? N   Transportation Needs   In the past 12 months, has lack of transportation kept you from medical appointments or from getting medications? no   In the past 12 months, has lack of transportation kept you from meetings, work, or from getting things needed for daily living? No   Food Insecurity   Within the past 12 months, you worried that your food would run out before you got the money to buy more. Never true   Within the past 12 months, the food you bought just didn't last and you didn't have money to get more. Never true   Stress   Do you feel stress - tense, restless, nervous, or anxious, or unable to sleep at night because your mind is troubled all the time - these days? To some exte   Social Connections   In a typical week, how many times do you talk on the phone with family, friends, or neighbors? Never   How often do you get together with friends or relatives? Never   How often do you attend Tenriism or Jainism services? Never   Do you belong to any clubs or organizations such as Tenriism groups, unions, fraternal or athletic groups, or school groups? No   How often do you attend meetings of the clubs or organizations you belong to? Never   Are you , , , , never , or living with a partner?    Alcohol Use   Q1: How often do you have a drink containing alcohol? Never   Q2: How many drinks containing alcohol do you have on a typical day when you are drinking? None   Q3: How often do you have six or more drinks on one occasion? Never

## 2022-10-30 NOTE — PLAN OF CARE
Problem: Diabetes Comorbidity  Goal: Blood Glucose Level Within Targeted Range  Intervention: Monitor and Manage Glycemia  Flowsheets (Taken 10/30/2022 1458)  Glycemic Management: blood glucose monitored     Problem: Fluid Imbalance (Pneumonia)  Goal: Fluid Balance  Intervention: Monitor and Manage Fluid Balance  Flowsheets (Taken 10/30/2022 1458)  Fluid/Electrolyte Management: intravenous fluids adjusted     Problem: Infection (Pneumonia)  Goal: Resolution of Infection Signs and Symptoms  Intervention: Prevent Infection Progression  Flowsheets (Taken 10/30/2022 1438)  Isolation Precautions: (IV abx per order) --

## 2022-10-30 NOTE — HOSPITAL COURSE
Admitted with dysphagia and multifocal pneumonia. NPO except meds / ice chips. Started ampicillin-sulbactam and SLP consulted. PT/OT consulted given diffuse weakness. MBSS performed 10/31 showing severe esophageal dysphagia; started full liquids and GI consulted for further evaluation recommendations. Void trial initiated from difficulty with urinating noted after procedure 10/25. EGD showed esophagitis, esophageal candidiasis and duodenal ulcer. Diflucan started and PPI. Voiding trial done done twice, last time successful on 11/5. Fever spiked on evening of 11/5 and workup initiated. Blood culture NGTD, UA abnormal but not unexpected given recent removal of mondragon catheter, and CXR without acute changes. PT and OT following, and awaiting SNF placement.

## 2022-10-30 NOTE — ASSESSMENT & PLAN NOTE
Diabetic polyneuropathy  - Continue duloxetine 30mg PO daily, pramipexole 0.25mg PO qHS, pregabalin 225mg PO BID, oxycodone 10mg PO q4hr PRN.

## 2022-10-30 NOTE — PLAN OF CARE
10/30/22 0827   MANCINI Message   Medicare Outpatient and Observation Notification regarding financial responsibility Given to patient/caregiver;Explained to patient/caregiver;Signed/date by patient/caregiver   Date MANCINI was signed 10/30/22   Time MANCINI was signed 0800

## 2022-10-30 NOTE — PLAN OF CARE
Problem: Physical Therapy  Goal: Physical Therapy Goal  Description: Goals to be met by: 2022    Patient will increase functional independence with mobility by performin. Sit<>stand with min assist with RW.  2. Gait x 50 feet with RW with SBA.  3. Ascend/descend 5 step(s) with least restrictive assistive device and CGA.      Outcome: Ongoing, Progressing   Pt was able to transfer supine to sit with max assist.  He sat at the bed side with mod assist to min assist with B UE support.  Pt required mod/max assist of two to transfer sit to supine and mod assist of one to roll to either side in bed.  He refused to attempt to stand with a RW.

## 2022-10-30 NOTE — PT/OT/SLP EVAL
Occupational Therapy   Evaluation and Treatment    Name: Frandy Mccarthy Jr.  MRN: [id number]  Admitting Diagnosis:  Multifocal pneumonia  Recent Surgery: * No surgery found *      Recommendations:     Discharge Recommendations: nursing facility, skilled  Discharge Equipment Recommendations:   (TBD pending progress)  Barriers to discharge:  Inaccessible home environment (4 KELI house; current functional level)    Assessment:   Initial OT eval/treat complete.  MAX A for supine>sit and MOD-MAX A of 2 persons for return to supine.  Initial static sitting with MOD A eventually MIN A with increased cuing for safe hand placement to allow for UE support to maintain balance.  Increased cuing for log roll tech to maintain spinal precautions during bed mobility tasks. Has SPC, RW, rollator, W/C, grab bars in shower, and built in shower bench.  DME needs TBD pending progress though anticipate need for BSC.  Recommend post acute OT in SNF.  To benefit from continued acute care OT services to increase independence in self-care/functional transfers.  OT to follow.     Frandy Mccarthy Jr. is a 82 y.o. male with a medical diagnosis of Multifocal pneumonia.  He presents with below deficits decreasing independence in self-care/functional transfers. Performance deficits affecting function: weakness, impaired endurance, impaired self care skills, impaired functional mobility, gait instability, decreased lower extremity function, decreased upper extremity function, impaired balance, decreased safety awareness, pain, decreased ROM, orthopedic precautions, impaired skin, impaired sensation.      Rehab Prognosis: Good; patient would benefit from acute skilled OT services to address these deficits and reach maximum level of function.       Plan:     Patient to be seen 5 x/week to address the above listed problems via self-care/home management, therapeutic activities, therapeutic exercises  Plan of Care Expires: 11/13/22  Plan of Care Reviewed  with: patient    Subjective     Chief Complaint: With c/o back pain rated 8/10.  Patient/Family Comments/goals: No goals stated at this time.  Pt. Expressed not wanting to get up and initial fear of falling.     Occupational Profile:  Lives with spouse in [facility] with 4 KELI and B-handrails in front entrance and 4 KELI with unilateral handrail in back entrance; bathroom setup as walk-in shower with grab bars and built-in shower bench.  Prior to procedure on 10/26/2022, Pt. Was able to get into shower and bathe self though spouse assisting occasionally with dressing and would dress Pt. For MD appts.  Previously ambulating with RW mostly though sometimes using SPC.  Has W/C for long distances such as for MD appts.    Equipment Used at Home:  cane, straight, rollator, glucometer, grab bar, wheelchair, walker, rolling (built-in shower bench)  Assistance upon Discharge: Spouse able to assist.    Pain/Comfort:  Pain Rating 1: 8/10  Location 1: thoracic spine  Pain Addressed 1: Distraction, Cessation of Activity, Nurse notified  Pain Rating Post-Intervention 1: 8/10    Patients cultural, spiritual, Jainism conflicts given the current situation:  (None stated.)    Objective:     Communicated with: Greer SHELLEY RN prior to session.  Patient found HOB elevated with peripheral IV, oxygen, mondragon catheter and spouse present upon OT entry to room.    General Precautions: Standard, diabetic, fall, NPO, airborne   Orthopedic Precautions:spinal precautions (Pt. POD4 of laminectomy (T-spine) with spinal stimulator insertion)   Braces: N/A  Respiratory Status: Nasal cannula, flow 2 L/min    Occupational Performance:    Bed Mobility:    Supine>sit with MAX A, increased cuing for use of bed rail, log roll tech, and manual assist for LE positioning during task.  Static sitting balance EOB with initial MOD A with posterior trunk lean and initially bringing BLE off of floor and reaching for therapists while seated requiring increased cuing and  HOHA for hand placement, manual assist for BLE foot placement with eventual follow through, and verbal cue for more forward trunk.  B-rolling MOD A for upper/lower trunk management with verbal cuing for reaching for bed rails.  TOTAL A of 2 persons for pulling towards HOB via draw sheet method.        Functional Mobility/Transfers:  Discussed performing sit>stand transfer with Pt. Given reassurance for task though Pt. With continued refusal to participate in stance.      Activities of Daily Living:  TOTAL A to don socks at bed level.  Rader catheter in place for toileting.      Cognitive/Visual Perceptual:  Cognitive/Psychosocial Skills:  -       Oriented to: Person, Place, knows the year and states the month is November, and Situation   -       Follows Commands/attention:Follows one-step commands  -       Communication: able to make basic needs known and answers questions appropriately  -       Memory: No Deficits noted  -       Safety awareness/insight to disability: impaired   -       Mood/Affect/Coping skills/emotional control: Cooperative  Visual/Perceptual:  -Intact grossly    Physical Exam:  Postural examination/scapula alignment: -       Rounded shoulders  -       Forward head  Skin integrity: dressing noted at thoracic spine   Upper Extremity Range of Motion:  -       Right Upper Extremity:  WFL and 50% of shoulder flex noted  -       Left Upper Extremity:  WFL and 50% of shoulder flex noted   Upper Extremity Strength: -       Right Upper Extremity:  at least 3/5 grossly and 3-/5 at shoulder  -       Left Upper Extremity:  at least 3/5 grossly and 3-/5 at shoulder   Strength: -       Right Upper Extremity:  fair plus  -       Left Upper Extremity:  fair plus  Fine Motor Coordination: -       Intact  Left hand thumb/finger opposition skills and Right hand thumb/finger opposition skills    AMPAC 6 Click ADL:  AMPAC Total Score: 12    Treatment & Education:  Educated on role of OT and POC.   Supine>sit  with MAX A, increased cuing for use of bed rail, log roll tech, and manual assist for LE positioning during task.  Static sitting balance EOB with initial MOD A and initially bring BLE off of floor and reaching for therapists while seated requiring increased cuing and HOHA for hand placement and manual assist for BLE foot placement with eventual follow through.  B-rolling MOD A for upper/lower trunk management with verbal cuing for reaching for bed rails.  TOTAL A of 2 persons for pulling towards HOB via draw sheet method.      Discussed performing sit>stand transfer with Pt. Given reassurance for task though Pt. With continued refusal to participate in stance.    TOTAL A to don socks at bed level.  Rader catheter in place for toileting.    Prior to activity educated on spinal precautions for safety s/p laminectomy with spouse verifying that Pt. Was given movement restrictions regarding no bending, twisting, or lifting.    Received call light review and importance of calling for assist as needed.     Patient left right sidelying with all lines intact, call button in reach, bed alarm on, nursing notified, and spouse present    GOALS:   Multidisciplinary Problems       Occupational Therapy Goals          Problem: Occupational Therapy    Goal Priority Disciplines Outcome Interventions   Occupational Therapy Goal     OT, PT/OT Ongoing, Progressing    Description: Goals to be met by: 10/30/2022     Patient will increase functional independence with ADLs by performing:    UE Dressing with Minimal Assistance.  Grooming while EOB with Contact Guard Assistance.  Upper body Bathing from  edge of bed with Minimal Assistance.  Sitting at edge of bed x15 minutes with Contact Guard Assistance.  Rolling to Bilateral with Stand-by Assistance and use of bedrail as needed.   Supine to sit with Minimal Assistance using log roll tech.                         History:     Past Medical History:   Diagnosis Date    Anemia 10/18/2022     Arthritis     Diabetes mellitus, type 2     Disorder of kidney and ureter     History of hyperparathyroidism     History of hyperparathyroidism 10/6/2020    Formatting of this note might be different from the original. 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    Hyperlipidemia     Hypertension     Thyroid disease 06/2017    parathyroid removed         Past Surgical History:   Procedure Laterality Date    BACK SURGERY      x 4    FRACTURE SURGERY      right thigh    HERNIA REPAIR      LAMINECTOMY N/A 10/26/2022    Procedure: LAMINECTOMY, SPINE;  Surgeon: Daphnie Dalton MD;  Location: [facility] OR;  Service: Neurosurgery;  Laterality: N/A;  T8-T9 Laminectomy  T7-T8 Paddle    SPINE SURGERY      TOTAL KNEE ARTHROPLASTY Right 07/06/2020    TRIAL OF SPINAL CORD NERVE STIMULATOR N/A 09/12/2022    Procedure: TRIAL, SPINAL CORD STIMULATOR TRIAL PEOPLES REP;  Surgeon: Siobhan Laguerre MD;  Location: [facility] PAIN MGT;  Service: Pain Management;  Laterality: N/A;  7/29 RESCHEDULE       Time Tracking:     OT Date of Treatment: 10/30/22  OT Start Time: 1205  OT Stop Time: 1240  OT Total Time (min): 35 min    Overlap with PT for portions of session due to complex nature of patient, tolerance for therapeutic modalities, and safety with mobility to decrease fall risk for patient and caregiver injury requiring two skilled therapists to provide interventions.      Billable Minutes:Evaluation 15  Self Care/Home Management 10    10/30/2022

## 2022-10-31 ENCOUNTER — PATIENT OUTREACH (OUTPATIENT)
Dept: ADMINISTRATIVE | Facility: OTHER | Age: 82
End: 2022-10-31
Payer: MEDICARE

## 2022-10-31 PROBLEM — R13.19 ESOPHAGEAL DYSPHAGIA: Status: ACTIVE | Noted: 2022-10-29

## 2022-10-31 PROBLEM — L89.313 PRESSURE INJURY OF RIGHT BUTTOCK, STAGE 3: Status: ACTIVE | Noted: 2022-10-31

## 2022-10-31 LAB
ANION GAP SERPL CALC-SCNC: 8 MMOL/L (ref 8–16)
BASOPHILS # BLD AUTO: 0.02 K/UL (ref 0–0.2)
BASOPHILS NFR BLD: 0.3 % (ref 0–1.9)
BUN SERPL-MCNC: 44 MG/DL (ref 8–23)
CALCIUM SERPL-MCNC: 9 MG/DL (ref 8.7–10.5)
CHLORIDE SERPL-SCNC: 110 MMOL/L (ref 95–110)
CO2 SERPL-SCNC: 25 MMOL/L (ref 23–29)
CREAT SERPL-MCNC: 2.3 MG/DL (ref 0.5–1.4)
DIFFERENTIAL METHOD: ABNORMAL
EOSINOPHIL # BLD AUTO: 0.1 K/UL (ref 0–0.5)
EOSINOPHIL NFR BLD: 0.7 % (ref 0–8)
ERYTHROCYTE [DISTWIDTH] IN BLOOD BY AUTOMATED COUNT: 15.7 % (ref 11.5–14.5)
EST. GFR  (NO RACE VARIABLE): 27 ML/MIN/1.73 M^2
GLUCOSE SERPL-MCNC: 160 MG/DL (ref 70–110)
HCT VFR BLD AUTO: 29.7 % (ref 40–54)
HGB BLD-MCNC: 9.6 G/DL (ref 14–18)
IMM GRANULOCYTES # BLD AUTO: 0.02 K/UL (ref 0–0.04)
IMM GRANULOCYTES NFR BLD AUTO: 0.3 % (ref 0–0.5)
LYMPHOCYTES # BLD AUTO: 0.6 K/UL (ref 1–4.8)
LYMPHOCYTES NFR BLD: 9 % (ref 18–48)
MAGNESIUM SERPL-MCNC: 2.3 MG/DL (ref 1.6–2.6)
MCH RBC QN AUTO: 30.8 PG (ref 27–31)
MCHC RBC AUTO-ENTMCNC: 32.3 G/DL (ref 32–36)
MCV RBC AUTO: 95 FL (ref 82–98)
MONOCYTES # BLD AUTO: 0.7 K/UL (ref 0.3–1)
MONOCYTES NFR BLD: 10.4 % (ref 4–15)
NEUTROPHILS # BLD AUTO: 5.4 K/UL (ref 1.8–7.7)
NEUTROPHILS NFR BLD: 79.3 % (ref 38–73)
NRBC BLD-RTO: 0 /100 WBC
PHOSPHATE SERPL-MCNC: 1.8 MG/DL (ref 2.7–4.5)
PLATELET # BLD AUTO: 238 K/UL (ref 150–450)
PMV BLD AUTO: 10.9 FL (ref 9.2–12.9)
POCT GLUCOSE: 197 MG/DL (ref 70–110)
POCT GLUCOSE: 222 MG/DL (ref 70–110)
POCT GLUCOSE: 223 MG/DL (ref 70–110)
POTASSIUM SERPL-SCNC: 3.9 MMOL/L (ref 3.5–5.1)
RBC # BLD AUTO: 3.12 M/UL (ref 4.6–6.2)
SODIUM SERPL-SCNC: 143 MMOL/L (ref 136–145)
WBC # BLD AUTO: 6.76 K/UL (ref 3.9–12.7)

## 2022-10-31 PROCEDURE — 25500020 PHARM REV CODE 255: Performed by: INTERNAL MEDICINE

## 2022-10-31 PROCEDURE — 97530 THERAPEUTIC ACTIVITIES: CPT | Mod: CQ

## 2022-10-31 PROCEDURE — 83735 ASSAY OF MAGNESIUM: CPT | Performed by: INTERNAL MEDICINE

## 2022-10-31 PROCEDURE — 85025 COMPLETE CBC W/AUTO DIFF WBC: CPT | Performed by: INTERNAL MEDICINE

## 2022-10-31 PROCEDURE — 80048 BASIC METABOLIC PNL TOTAL CA: CPT | Performed by: INTERNAL MEDICINE

## 2022-10-31 PROCEDURE — 97110 THERAPEUTIC EXERCISES: CPT | Mod: CQ

## 2022-10-31 PROCEDURE — 99233 PR SUBSEQUENT HOSPITAL CARE,LEVL III: ICD-10-PCS | Mod: ,,, | Performed by: INTERNAL MEDICINE

## 2022-10-31 PROCEDURE — 11000001 HC ACUTE MED/SURG PRIVATE ROOM

## 2022-10-31 PROCEDURE — 25000242 PHARM REV CODE 250 ALT 637 W/ HCPCS: Performed by: INTERNAL MEDICINE

## 2022-10-31 PROCEDURE — 63600175 PHARM REV CODE 636 W HCPCS: Performed by: INTERNAL MEDICINE

## 2022-10-31 PROCEDURE — 84100 ASSAY OF PHOSPHORUS: CPT | Performed by: INTERNAL MEDICINE

## 2022-10-31 PROCEDURE — 92611 MOTION FLUOROSCOPY/SWALLOW: CPT

## 2022-10-31 PROCEDURE — 36415 COLL VENOUS BLD VENIPUNCTURE: CPT | Performed by: INTERNAL MEDICINE

## 2022-10-31 PROCEDURE — 25000003 PHARM REV CODE 250: Performed by: INTERNAL MEDICINE

## 2022-10-31 PROCEDURE — 96372 THER/PROPH/DIAG INJ SC/IM: CPT | Performed by: INTERNAL MEDICINE

## 2022-10-31 PROCEDURE — 99233 SBSQ HOSP IP/OBS HIGH 50: CPT | Mod: ,,, | Performed by: INTERNAL MEDICINE

## 2022-10-31 PROCEDURE — A9698 NON-RAD CONTRAST MATERIALNOC: HCPCS | Performed by: INTERNAL MEDICINE

## 2022-10-31 RX ORDER — MUPIROCIN 20 MG/G
OINTMENT TOPICAL 2 TIMES DAILY
Status: DISPENSED | OUTPATIENT
Start: 2022-10-31 | End: 2022-11-05

## 2022-10-31 RX ADMIN — OXYBUTYNIN CHLORIDE 10 MG: 5 TABLET, EXTENDED RELEASE ORAL at 09:10

## 2022-10-31 RX ADMIN — HEPARIN SODIUM 5000 UNITS: 5000 INJECTION INTRAVENOUS; SUBCUTANEOUS at 02:10

## 2022-10-31 RX ADMIN — SODIUM CHLORIDE: 0.9 INJECTION, SOLUTION INTRAVENOUS at 02:10

## 2022-10-31 RX ADMIN — PREGABALIN 225 MG: 75 CAPSULE ORAL at 09:10

## 2022-10-31 RX ADMIN — INSULIN ASPART 2 UNITS: 100 INJECTION, SOLUTION INTRAVENOUS; SUBCUTANEOUS at 07:10

## 2022-10-31 RX ADMIN — PRAMIPEXOLE DIHYDROCHLORIDE 0.25 MG: 0.25 TABLET ORAL at 10:10

## 2022-10-31 RX ADMIN — TAMSULOSIN HYDROCHLORIDE 0.4 MG: 0.4 CAPSULE ORAL at 09:10

## 2022-10-31 RX ADMIN — SODIUM PHOSPHATE, MONOBASIC, MONOHYDRATE 30 MMOL: 276; 142 INJECTION, SOLUTION INTRAVENOUS at 02:10

## 2022-10-31 RX ADMIN — PREGABALIN 225 MG: 75 CAPSULE ORAL at 10:10

## 2022-10-31 RX ADMIN — DULOXETINE 30 MG: 30 CAPSULE, DELAYED RELEASE ORAL at 09:10

## 2022-10-31 RX ADMIN — BARIUM SULFATE 20 ML: 0.81 POWDER, FOR SUSPENSION ORAL at 12:10

## 2022-10-31 RX ADMIN — FENOFIBRATE 160 MG: 160 TABLET ORAL at 02:10

## 2022-10-31 RX ADMIN — MUPIROCIN: 20 OINTMENT TOPICAL at 10:10

## 2022-10-31 RX ADMIN — METOPROLOL TARTRATE 100 MG: 50 TABLET, FILM COATED ORAL at 09:10

## 2022-10-31 RX ADMIN — FINASTERIDE 5 MG: 5 TABLET, FILM COATED ORAL at 09:10

## 2022-10-31 RX ADMIN — AMPICILLIN SODIUM AND SULBACTAM SODIUM 3 G: 2; 1 INJECTION, POWDER, FOR SOLUTION INTRAMUSCULAR; INTRAVENOUS at 07:10

## 2022-10-31 RX ADMIN — ATORVASTATIN CALCIUM 10 MG: 10 TABLET, FILM COATED ORAL at 09:10

## 2022-10-31 RX ADMIN — AMPICILLIN SODIUM AND SULBACTAM SODIUM 3 G: 2; 1 INJECTION, POWDER, FOR SOLUTION INTRAMUSCULAR; INTRAVENOUS at 05:10

## 2022-10-31 RX ADMIN — HEPARIN SODIUM 5000 UNITS: 5000 INJECTION INTRAVENOUS; SUBCUTANEOUS at 10:10

## 2022-10-31 RX ADMIN — MUPIROCIN: 20 OINTMENT TOPICAL at 09:10

## 2022-10-31 RX ADMIN — HEPARIN SODIUM 5000 UNITS: 5000 INJECTION INTRAVENOUS; SUBCUTANEOUS at 05:10

## 2022-10-31 NOTE — PLAN OF CARE
Problem: SLP  Goal: SLP Goal  Description: 1) Patient will participate in modified barium swallow study to further assess oropharyngeal swallow function and to best guide tx -Met 10/31    UPDATED GOALS:  2. Pt will consume a full liquid diet with reduced overt s/s of aspiration or airway threat during 100% of po intake given mod assist for utilizations for swallow strategies.   3. Pt will participate in dysphagia based exercise program targeting tongue base retraction, laryngeal elevation, and CP distension/duration with 100% acc given mod assist.     Outcome: Ongoing, Progressing    MBSS completed this date. Recommend full liquid diet and GI consult for severe esophageal dysphagia. Full report to follow, see SLP report for full details.

## 2022-10-31 NOTE — PLAN OF CARE
Problem: Adult Inpatient Plan of Care  Goal: Plan of Care Review  Outcome: Ongoing, Progressing  Flowsheets (Taken 10/31/2022 0550)  Plan of Care Reviewed With: patient  Goal: Optimal Comfort and Wellbeing  Outcome: Ongoing, Progressing     Problem: Diabetes Comorbidity  Goal: Blood Glucose Level Within Targeted Range  Outcome: Ongoing, Progressing  Intervention: Monitor and Manage Glycemia  Flowsheets (Taken 10/31/2022 0550)  Glycemic Management: blood glucose monitored     Problem: Skin Injury Risk Increased  Goal: Skin Health and Integrity  Outcome: Ongoing, Progressing  Intervention: Optimize Skin Protection  Flowsheets (Taken 10/31/2022 0550)  Skin Protection: silicone foam dressing in place     Problem: Pain Acute  Goal: Acceptable Pain Control and Functional Ability  Outcome: Ongoing, Progressing     Vitals:    10/31/22 0534   BP: (!) 129/58   Pulse: 93   Resp: 18   Temp: 98.9 °F (37.2 °C)

## 2022-10-31 NOTE — CONSULTS
[facility] - Med Surg ([place])  Adult Nutrition  Consult Note    SUMMARY     Recommendations    Recommendation/Intervention:   1. Continue on texture modified diet per SLP recommendations    2. Recommend oral supplements to promote wound healing and po intake   3. Daily weights   4. Collaboration with medical providers    Goals: Patient to consume > 50% of EEN prior to RD follow up.    Nutrition Goal Status: new    Communication of RD Recs: other (comment) (poc, consults)    Assessment and Plan  Nutrition Problem  Inability to consume po intake     Related to (etiology):   Chewing and swallowing issues     Signs and Symptoms (as evidenced by):   Need to texture modified diet     Interventions/Recommendations (treatment strategy):  1. Continue on texture modified diet per SLP recommendations    2. Recommend oral supplements to promote wound healing and po intake   3. Daily weights   4. Collaboration with medical providers    Nutrition Diagnosis Status:   New      Malnutrition Assessment                                       Reason for Assessment    Reason For Assessment: consult, identified at risk by screening criteria, other (see comments) (dysphagia)  Diagnosis: infection/sepsis, diabetes diagnosis/complications, renal disease, cardiac disease  Patient Active Problem List   Diagnosis    Postlaminectomy syndrome of lumbar region    Lumbar spondylosis    Myalgia    IT band syndrome    Lumbar facet arthropathy    Trigger finger (acquired)    Hyperlipidemia    Essential hypertension    Controlled type 2 diabetes mellitus with diabetic autonomic neuropathy, with long-term current use of insulin    Lumbar spine pain    DDD (degenerative disc disease), lumbar    Chronic pain    Carotid arterial disease    Benign prostatic hyperplasia with urinary frequency    Diabetic polyneuropathy associated with type 2 diabetes mellitus    CKD (chronic kidney disease) stage 4, GFR 15-29 ml/min    Vitamin D deficiency    Arthritis of  "knee, right    Anemia    Abnormal EKG    Hyperkalemia    Multifocal pneumonia    Esophageal dysphagia    Pressure injury of right buttock, stage 3     Past Medical History:   Diagnosis Date    Anemia 10/18/2022    Arthritis     Diabetes mellitus, type 2     Disorder of kidney and ureter     History of hyperparathyroidism     History of hyperparathyroidism 10/6/2020    Formatting of this note might be different from the original. 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  Patient with decreased po intake due to difficulty swallowing/chewing.  RD to continue to monitor and provide nutrition therapy per recommendations of SLP.  Labs reviewed.  NKFA.  LBM:10/31.  Patient with wound to coccyx area.  Patient also with CKD.  RD to continue to monitor and encourage po intake.  (RD remote)    Nutrition Discharge Planning: Patient to continue on texture appropriate diet prior and post discharge.    Nutrition Risk Screen    Nutrition Risk Screen: dysphagia or difficulty swallowing, large or nonhealing wound, burn or pressure injury    Nutrition/Diet History    Spiritual, Cultural Beliefs, Zoroastrianism Practices, Values that Affect Care: no  Food Allergies: NKFA  Factors Affecting Nutritional Intake: chewing difficulties/inability to chew food, difficulty/impaired swallowing    Anthropometrics    Temp: 98.8 °F (37.1 °C)  Height Method: Stated  Height: 5' 9" (175.3 cm)  Height (inches): 69 in  Weight Method: Stated  Weight: 93.9 kg (207 lb)  Weight (lb): 207 lb  Ideal Body Weight (IBW), Male: 160 lb  % Ideal Body Weight, Male (lb): 129.38 %  BMI (Calculated): 30.6  BMI Grade: 30 - 34.9- obesity - grade I       Lab/Procedures/Meds    Pertinent Labs Reviewed: reviewed  Pertinent Medications " Reviewed: reviewed  BMP  Lab Results   Component Value Date     10/31/2022    K 3.9 10/31/2022     10/31/2022    CO2 25 10/31/2022    BUN 44 (H) 10/31/2022    CREATININE 2.3 (H) 10/31/2022    CALCIUM 9.0 10/31/2022    ANIONGAP 8 10/31/2022     Lab Results   Component Value Date    HGBA1C 7.5 (H) 10/18/2022     Lab Results   Component Value Date    CALCIUM 9.0 10/31/2022    PHOS 1.8 (L) 10/31/2022     Scheduled Meds:   ampicillin-sulbactim (UNASYN) IVPB  3 g Intravenous Q12H    atorvastatin  10 mg Oral Daily    bisacodyL  10 mg Rectal Daily    DULoxetine  30 mg Oral Daily    fenofibrate  160 mg Oral with lunch    finasteride  5 mg Oral Daily    heparin (porcine)  5,000 Units Subcutaneous Q8H    metoprolol tartrate  100 mg Oral Daily    mupirocin   Nasal BID    oxybutynin  10 mg Oral Daily    pramipexole  0.25 mg Oral QHS    pregabalin  225 mg Oral BID    sodium phosphate IVPB  30 mmol Intravenous Once    tamsulosin  0.4 mg Oral Daily     Continuous Infusions:   sodium chloride 0.9% 75 mL/hr at 10/31/22 0239     PRN Meds:.acetaminophen, dextrose 10%, dextrose 10%, glucagon (human recombinant), hydrALAZINE, insulin aspart U-100, melatonin, ondansetron, ondansetron, oxyCODONE, sodium chloride 0.9%    Physical Findings/Assessment         Estimated/Assessed Needs    Weight Used For Calorie Calculations: 93.9 kg (207 lb 0.2 oz)  Energy Calorie Requirements (kcal): 20-25kcals/kg (1878-2348kcals/day)  Energy Need Method: Kcal/kg  Protein Requirements: 0.8g/kg (CKD)  Weight Used For Protein Calculations: 93.9 kg (207 lb 0.2 oz)  Fluid Requirements (mL): 1000+output  Estimated Fluid Requirement Method: other (see comments)  RDA Method (mL): 20  CHO Requirement: 250      Nutrition Prescription Ordered    Current Diet Order: full liquids    Evaluation of Received Nutrient/Fluid Intake    % Kcal Needs: <50%  % Protein Needs: <50%  Energy Calories Required: not meeting needs  Protein Required: not meeting needs  Fluid  Required: not meeting needs  Tolerance: not tolerating  % Intake of Estimated Energy Needs: 0 - 25 %  % Meal Intake: 0 - 25 %  Intake/Output - Last 3 Shifts         10/29 0700  10/30 0659 10/30 0700  10/31 0659 10/31 0700  11/01 0659    P.O.  50     Total Intake(mL/kg)  50 (0.5)     Urine (mL/kg/hr) 2200 1775 (0.8)     Total Output 2200 1775     Net -2200 -1725            Stool Occurrence   2 x            Nutrition Risk    Level of Risk/Frequency of Follow-up: moderate       Monitor and Evaluation    Food and Nutrient Intake: energy intake, food and beverage intake  Food and Nutrient Adminstration: diet order  Knowledge/Beliefs/Attitudes: food and nutrition knowledge/skill, beliefs and attitudes  Physical Activity and Function: nutrition-related ADLs and IADLs, factors affecting access to physical activity  Anthropometric Measurements: height/length, weight, weight change, body mass index  Biochemical Data, Medical Tests and Procedures: inflammatory profile, glucose/endocrine profile, gastrointestinal profile, electrolyte and renal panel, lipid profile       Nutrition Follow-Up    RD Follow-up?: Yes  Luisana Rinaldi, MS, RDN, LDN

## 2022-10-31 NOTE — PLAN OF CARE
Family request services with [facility] pending [employer] approval.   10/31/22 0345   Post-Acute Status   Post-Acute Authorization Home Health   Home Health Status Pending Payor Medical Review   Patient choice form signed by patient/caregiver List with quality metrics by geographic area provided;List from CMS Compare;List from System Post-Acute Care   Discharge Plan   Discharge Plan A Home Health

## 2022-10-31 NOTE — ASSESSMENT & PLAN NOTE
Dysphagia, debility  - Several days of worsening debility, weakness, decreased PO intake in the setting of recent procedure and multifocal pneumonia on CT with dysphagia.  - Continue ampicillin-sulbactam 3g IV q12hr.  - MBSS today demonstrating severe esophageal dysmotility. Full liquid diet for time being, consult GI for further evaluation and management recommendations.  - IVFs with LR at 75mL/hr.

## 2022-10-31 NOTE — PROGRESS NOTES
[facility] ([place])  Wound Care    Patient Name:  Frandy Mccarthy Jr.   MRN:  [id number]  Date: 10/31/2022  Diagnosis: Multifocal pneumonia    History:     Past Medical History:   Diagnosis Date    Anemia 10/18/2022    Arthritis     Diabetes mellitus, type 2     Disorder of kidney and ureter     History of hyperparathyroidism     History of hyperparathyroidism 10/6/2020    Formatting of this note might be different from the original. Adenoma LL s/p resection 2017    Hyperlipidemia     Hypertension     Thyroid disease 2017    parathyroid removed       Social History     Socioeconomic History    Marital status:      Spouse name: Kevin    Number of children: 4   Tobacco Use    Smoking status: Former     Types: Cigarettes     Quit date: 1973     Years since quittin.7    Smokeless tobacco: Never   Substance and Sexual Activity    Alcohol use: Not Currently     Comment: occasional    Drug use: No    Sexual activity: Yes     Partners: Female   Social History Narrative    Stairs- 4     Social Determinants of Health     Financial Resource Strain: Low Risk     Difficulty of Paying Living Expenses: Not hard at all   Food Insecurity: No Food Insecurity    Worried About Running Out of Food in the Last Year: Never true    Ran Out of Food in the Last Year: Never true   Transportation Needs: No Transportation Needs    Lack of Transportation (Medical): No    Lack of Transportation (Non-Medical): No   Physical Activity: Insufficiently Active    Days of Exercise per Week: 7 days    Minutes of Exercise per Session: 10 min   Stress: Stress Concern Present    Feeling of Stress : To some extent   Social Connections: Socially Isolated    Frequency of Communication with Friends and Family: Never    Frequency of Social Gatherings with Friends and Family: Never    Attends Hindu Services: Never    Active Member of Clubs or Organizations: No    Attends Club or Organization Meetings: Never    Marital Status:     Housing Stability: Low Risk     Unable to Pay for Housing in the Last Year: No    Number of Places Lived in the Last Year: 1    Unstable Housing in the Last Year: No       Precautions:     Allergies as of 10/29/2022    (No Known Allergies)       [facility] Assessment Details/Treatment   Wound care consulted for back incisions   Noted vertical incision approximated with staples to thoracic back and and other horizontal incision to right lower back. Incisions appear to be approximated with out redness or swelling. No drainage on old dressing . Sites cleaned and re dressed with mepore island dressing.   Noted right medial buttock ulceration with some slough in full thickness wound base. States nursing has been applying foam band-aids. Feel site may benefit from Triad hydrophilic wound dressing as it would be a difficult place to maintain a dressing to the site. Pt relates he has had the wound for some time.     10/31/22 1453        Incision/Site 10/26/22 1930 Right Lumbar spine lower   Date First Assessed/Time First Assessed: 10/26/22 1930   Present Prior to Hospital Arrival?: Yes  Side: Right  Location: Lumbar spine  Orientation: lower  Closure Method: Sutures   Wound Image    Incision WDL WDL   Dressing Appearance Clean;Intact   Drainage Amount None   Drainage Characteristics/Odor No odor   Appearance Staples intact   Periwound Area Intact   Wound Edges Approximated   Care Cleansed with:;Wound cleanser   Dressing Applied;Island/border        Incision/Site 10/26/22 1234 Back   Date First Assessed/Time First Assessed: 10/26/22 1234   Location: Back   Wound Image    Incision WDL ex   Dressing Appearance Open to air   Drainage Amount None   Appearance Staples intact   Care Cleansed with:;Wound cleanser   Dressing Applied;Island/border        Altered Skin Integrity 10/31/22 1400 Right medial Buttocks Full thickness tissue loss. Subcutaneous fat may be visible but bone, tendon or muscle are not exposed   Date First  Assessed/Time First Assessed: 10/31/22 1400   Altered Skin Integrity Present on Admission: (c) yes  Side: Right  Orientation: medial  Location: Buttocks  Is this injury device related?: No  Description of Altered Skin Integrity: Full thickn...   Wound Image    Description of Altered Skin Integrity Full thickness tissue loss. Subcutaneous fat may be visible but bone, tendon or muscle are not exposed   Dressing Appearance Open to air;No dressing   Drainage Amount None   Drainage Characteristics/Odor No odor   Appearance Pink;Yellow;Slough;Moist   Tissue loss description Full thickness   Red (%), Wound Tissue Color 50 %   Yellow (%), Wound Tissue Color 50 %   Periwound Area Macerated   Wound Edges Open   Wound Length (cm) 1.5 cm   Wound Width (cm) 0.8 cm   Wound Depth (cm) 0.2 cm   Wound Volume (cm^3) 0.24 cm^3   Wound Surface Area (cm^2) 1.2 cm^2   Care Cleansed with:;Wound cleanser   Dressing Applied;Silicone;Foam         10/31/2022

## 2022-10-31 NOTE — PT/OT/SLP PROGRESS
Physical Therapy Treatment    Patient Name:  Frandy Mccarthy Jr.   MRN:  [id number]    Recommendations:     Discharge Recommendations:  nursing facility, skilled   Discharge Equipment Recommendations: bedside commode   Barriers to discharge: Decreased caregiver support    Assessment:     Frandy Mccarthy Jr. is a 82 y.o. male admitted with a medical diagnosis of Multifocal pneumonia.  He presents with the following impairments/functional limitations:  weakness, impaired endurance, impaired self care skills, impaired functional mobility, gait instability, impaired balance, decreased lower extremity function, decreased upper extremity function, decreased coordination, decreased safety awareness, pain, decreased ROM, impaired skin, orthopedic precautions ;pt with improved mobility today, able to t/f to commode chair next to bed, though req'd min/mod A  x 2.    Rehab Prognosis: Good and Fair; patient would benefit from acute skilled PT services to address these deficits and reach maximum level of function.    Recent Surgery: * No surgery found *      Plan:     During this hospitalization, patient to be seen 5 x/week to address the identified rehab impairments via gait training, therapeutic activities, therapeutic exercises, neuromuscular re-education and progress toward the following goals:    Plan of Care Expires:  11/30/22    Subjective     Chief Complaint: only min c/o's pain in bed, pt mostly concerned about having a BM.   Patient/Family Comments/goals: pt reports he hasn't had a BM since before his back surgery, however, nsg. Reports he had a loose BM last night.  Pain/Comfort:  Pain Rating 1: 2/10  Location 1: thoracic spine  Pain Addressed 1: Reposition, Distraction  Pain Rating Post-Intervention 1: 2/10      Objective:     Communicated with nurse prior to session.  Patient found HOB elevated with oxygen, mondragon catheter, bed alarm upon PT entry to room.     General Precautions: Standard, aspiration, diabetic    Orthopedic Precautions:spinal precautions   Braces: N/A  Respiratory Status: Nasal cannula, flow 2 L/min     Functional Mobility:  Bed Mobility:     Rolling Left:  minimum assistance, moderate assistance, and of 1 persons  Scooting: total assistance, of 2 persons, and in suipne  Supine to Sit: minimum assistance, of 2 persons, and with HOB elevated and pt using bedrail  Sit to Supine: maximal assistance and of 2 persons  Transfers:     Sit to Stand:  minimum assistance, of 2 persons, and from slightly elevated EOB with rolling walker  Gait: pt able to take a few steps bed to commode chair w/ RW and min/modA x 2.      AM-PAC 6 CLICK MOBILITY  Turning over in bed (including adjusting bedclothes, sheets and blankets)?: 2  Sitting down on and standing up from a chair with arms (e.g., wheelchair, bedside commode, etc.): 2  Moving from lying on back to sitting on the side of the bed?: 2  Moving to and from a bed to a chair (including a wheelchair)?: 2  Need to walk in hospital room?: 2  Climbing 3-5 steps with a railing?: 1  Basic Mobility Total Score: 11       Treatment & Education:  Pt perf'd standing act's ~2 min. X 2 trials for hygiene purposes, RW and min/modA.   Perf'd AAROM AP's, hip IR, heelslides, hip abd/add x 8 ea.     Patient left HOB elevated with all lines intact, call button in reach, bed alarm on, and nurse present..    GOALS:   Multidisciplinary Problems       Physical Therapy Goals          Problem: Physical Therapy    Goal Priority Disciplines Outcome Goal Variances Interventions   Physical Therapy Goal     PT, PT/OT Ongoing, Progressing     Description: Goals to be met by: 2022    Patient will increase functional independence with mobility by performin. Sit<>stand with min assist with RW.  2. Gait x 50 feet with RW with SBA.  3. Ascend/descend 5 step(s) with least restrictive assistive device and CGA.                           Time Tracking:     PT Received On: 10/31/22  PT Start Time:  1314     PT Stop Time: 1400  PT Total Time (min): 46 min     Billable Minutes: Therapeutic Activity 30 and Therapeutic Exercise 16    Treatment Type: Treatment  PT/PTA: PTA     PTA Visit Number: 1     10/31/2022

## 2022-10-31 NOTE — PROGRESS NOTES
IP Liaison - Initial Visit Note    Patient: Frandy Mccarthy Jr.  MRN:  [id number]  Date of Service:  10/31/2022  Completed by:  JOSÉ LUIS Stephenson    Reason for Visit   Patient presents with    IP Liaison Initial Visit       RSW met with patient at bedside in order to complete SDOH questionnaire and liaison assessment.  Pt has identified no barriers to care.    The following were addressed during this visit:  - Review SDOH Questions   - Complete patient assessment   - Review and discuss options for social groups or events   - Review and discuss options to become more physically active   - Review and discuss options for reducing daily stress   - Complete initial visit with patient        Patient Summary     IP Liaison Patient Assessment    General  Level of Caregiver support: Member independent and does not need caregiver assistance  Have you had to make a decision between paying for any of the following in the last 2 months?: None  Transportation means: Family  Employment status: Retired and not working  Current symptoms: Sleep disturbances  Assessments  Was the PHQ Depression Screening completed this visit?: Yes  Was the CLIF-7 Screening completed this visit?: No       JOSÉ LUIS Stephenson

## 2022-10-31 NOTE — SUBJECTIVE & OBJECTIVE
Interval History: No acute events overnight. Reports + BM. Seen shortly before going to [facility] [place]. No other concerns at this time.    Review of Systems   Constitutional:  Negative for chills and fever.   Respiratory:  Positive for cough and shortness of breath.    Cardiovascular:  Negative for chest pain and palpitations.   Gastrointestinal:  Positive for abdominal distention. Negative for abdominal pain, nausea and vomiting.   Objective:     Vital Signs (Most Recent):  Temp: 98.8 °F (37.1 °C) (10/31/22 1155)  Pulse: 70 (10/31/22 1155)  Resp: 18 (10/31/22 1155)  BP: 133/75 (10/31/22 1155)  SpO2: 98 % (10/31/22 1155) Vital Signs (24h Range):  Temp:  [98.1 °F (36.7 °C)-99.2 °F (37.3 °C)] 98.8 °F (37.1 °C)  Pulse:  [] 70  Resp:  [16-18] 18  SpO2:  [91 %-98 %] 98 %  BP: (118-158)/(57-81) 133/75     Weight: 93.9 kg (207 lb)  Body mass index is 30.57 kg/m².    Intake/Output Summary (Last 24 hours) at 10/31/2022 1420  Last data filed at 10/31/2022 0502  Gross per 24 hour   Intake 50 ml   Output 1025 ml   Net -975 ml      Physical Exam  Vitals and nursing note reviewed.   Constitutional:       General: He is not in acute distress.     Appearance: He is well-developed. He is obese.   HENT:      Head: Normocephalic and atraumatic.   Eyes:      General:         Right eye: No discharge.         Left eye: No discharge.      Conjunctiva/sclera: Conjunctivae normal.   Cardiovascular:      Rate and Rhythm: Normal rate.      Pulses: Normal pulses.   Pulmonary:      Effort: Pulmonary effort is normal. No respiratory distress.      Comments: R-sided crackles improved from prior.  Abdominal:      General: There is distension.      Palpations: Abdomen is soft.      Tenderness: There is no abdominal tenderness.   Musculoskeletal:         General: Normal range of motion.      Right lower leg: No edema.      Left lower leg: No edema.   Skin:     General: Skin is warm and dry.   Neurological:      Mental Status: He is alert and oriented to  person, place, and time.       Significant Labs:   CBC:  Recent Labs   Lab 10/29/22  1221 10/30/22  0426 10/31/22  0447   WBC 8.68 6.93 6.76   HGB 10.3* 10.4* 9.6*   HCT 32.2* 33.0* 29.7*    208 238   GRAN 83.4*  7.2 82.2*  5.7 79.3*  5.4   LYMPH 6.0*  0.5* 6.1*  0.4* 9.0*  0.6*   MONO 9.0  0.8 9.4  0.7 10.4  0.7   EOS 0.1 0.1 0.1   BASO 0.02 0.02 0.02   BMP:  Recent Labs   Lab 10/29/22  1221 10/30/22  0426 10/31/22  0447    139 143   K 4.8 4.6 3.9    105 110   CO2 26 25 25   BUN 59* 49* 44*   CREATININE 3.3* 2.6* 2.3*   * 174* 160*   CALCIUM 9.2 9.2 9.0   MG  --  2.2 2.3   PHOS  --  2.6* 1.8*     Significant Imaging:   No new imaging this morning.

## 2022-10-31 NOTE — PLAN OF CARE
Nutrition Plan of Care:    Recommendations     Recommendation/Intervention:   1. Continue on texture modified diet per SLP recommendations    2. Recommend oral supplements to promote wound healing and po intake   3. Daily weights   4. Collaboration with medical providers     Goals: Patient to consume > 50% of EEN prior to RD follow up.     Nutrition Goal Status: new     Communication of RD Recs: other (comment) (poc, consults)     Assessment and Plan  Nutrition Problem  Inability to consume po intake      Related to (etiology):   Chewing and swallowing issues      Signs and Symptoms (as evidenced by):   Need to texture modified diet      Interventions/Recommendations (treatment strategy):  1. Continue on texture modified diet per SLP recommendations    2. Recommend oral supplements to promote wound healing and po intake   3. Daily weights   4. Collaboration with medical providers     Nutrition Diagnosis Status:   New    Luisana Rinaldi MS, RDN, LDN

## 2022-10-31 NOTE — ASSESSMENT & PLAN NOTE
- Continue finasteride 5mg PO daily, oxybutynin 10mg PO daily, tamsulosin 0.4mg PO as BID. Family brought mirabegron but did not have packaging with dosing information to confirm per pharmacy; resume if labeling can be brought from home.  - Rader in place since procedure 10/25; void trial today. If fails will consult Urology in AM to further evaluate.

## 2022-10-31 NOTE — PT/OT/SLP PROGRESS
Occupational Therapy      Patient Name:  Frandy Mccarthy Jr.   MRN:  [id number]    Patient not seen today secondary to off floor for x-ray 10:35  . Will follow-up at next scheduled therapy session.    PHOENIX Lewis  10/31/2022

## 2022-10-31 NOTE — PROCEDURES
"Modified Barium Swallow    Patient Name:  Frandy Mccarthy Jr.   MRN:  [id number]      Recommendations:     Recommendations:                General Recommendations:    Speech pathology to follow 4-6x/week for ongoing assessment and treatment of pharyngoesophageal dysphagia.  Recommend GI consult 2/2 significant esophageal dysmotility.     Diet recommendations:   , Full liquids, Thin liquids - IDDSI Level 0     Aspiration Precautions:   1. Sit upright at 90 degrees   2. Remain upright for 30 minute before and after meal   3. Slight neck extension   4. SMALL SIPS (2-5ml)   5. Re-swallow x2-3   6. Frequent oral care to reduce oral bacteria    General Precautions: Standard, aspiration    Communication strategies:  n/a    Referral     Reason for Referral  Patient was referred for a Modified Barium Swallow Study to assess the efficiency of his swallow function, rule out aspiration and make recommendations regarding safe dietary consistencies, effective compensatory strategies, and safe eating environment.     Diagnosis: Multifocal pneumonia       History:     Past Medical History:   Diagnosis Date    Anemia 10/18/2022    Arthritis     Diabetes mellitus, type 2     Disorder of kidney and ureter     History of hyperparathyroidism     History of hyperparathyroidism 10/6/2020    Formatting of this note might be different from the original. 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 Mccarthy is an 82/M with PMH HTN, HLD, DMII with neuropathy, BPH, chronic pain s/p spinal stimulator implantation 10/25 who [place] to [facility] [place] with a 5 day history of progressively worsening progressively worsening cough, weakness, and dysphagia with associated constipation since his procedure. The patient has been unable to tolerate medications, liquids, or solids for the last 5 days.  Per his wife at bedside, he has no meaningful " "oral intake and he has become significantly weaker since his surgery.  He is now unable to rise out of bed without significant assistance.  The patient is able to briefly awaken during the interview and corroborate that he has cough, globus sensation with swallowing, and significant dysphagia, but denies odynophagia or neck swelling.  He denies hoarseness of voice, reflux, or fever.  Whenever the patient attempts to swallow liquids or medications, he has coughing and sputtering. ED evaluation was significant for SpO2 93-95% on RA, diffuse debility, and CT Chest/Abd/Pelvis showing multifocal pneumonia, large bowel distention and R-sided inguinal hernia with loops of small bowel without obstruction, and multiple low-attenuation lesions in the kidneys. He was started on ampicillin-sulbactam and hospital medicine was contacted for admission.        Overview/Hospital Course:  Admitted with dysphagia and multifocal pneumonia. NPO except meds / ice chips. Started ampicillin-sulbactam and SLP consulted. PT/OT consulted given diffuse weakness. MBSS performed 10/31 showing severe esophageal dysphagia; started full liquids and GI consulted for further evaluation recommendations. Void trial initiated from difficulty with urinating noted after procedure 10/25."    X-Ray neck soft tissue 10/29/22:      FINDINGS:  Two views soft tissues neck.     Please note, C5, C6 and C7 are obscured by soft tissues.  Allowing for this, spinal alignment appears maintained on AP and lateral views noting multilevel facet arthropathy.  There is suboptimal evaluation of the soft tissues of the neck given positioning.  No definite radiopaque foreign body although evaluation is limited.     Impression:     1. No definite radiopaque foreign body within the visualized soft tissues of the neck or airway however there is limited evaluation given habitus.  Correlation is advised.     CT chest 10/30/22:     CT chest:     1.  Multifocal airspace opacities, " consistent with multifocal pneumonia.  Follow-up to resolution is recommended.     2.  Trace bilateral pleural effusions.     3.  4-5 mm pulmonary nodules in the right hemithorax.  For multiple solid nodules all <6 mm, Fleischner Society 2017 guidelines recommend no routine follow up for a low risk patient, or follow up with non-contrast chest CT at 12 months after discovery in a high risk patient.     4.  Changes of recent intrathecal catheter insertion.    Objective:     Visualization  Patient was seen in the lateral view    Consistencies Assessed  Thin Liquids: 2.5ml, 5ml, 10ml, 15ml, unmeasured single straw sips  Mildly Thick/Nectar: 5ml, 10ml  Puree: 1/2 tsp, 1/2tsp, 1tsp  Solids: small piece of dry solids    Oral Preparation/Oral Phase  Functional oral phase of the swallow. Completed labial seal without anterior spillage. Mastication mildly disorganized, however, effective, no pieces of bolus left unchewed. Noted piecemeal deglutition of puree and solid boluses. Mildly dcr tongue control during bolus hold with posterior loss. Minimal oral residue on lingual surface.     Pharyngeal Phase   THIN  Tertiary swallow trigger at the level of the pyriform sinuses. Pt with deep airway penetration to the vocal cords during initial trial of 2.5ml. Additional episodes of airway penetration during trial of 10ml and unmeasured single straw sip. Pt without cough response to airway penetration. However, significant coughing episodes following retrograde through the UES into the pyriform sinuses due to reduced CP distention/duration and suspected CP bar. No episodes of aspiration. Mild residue along tongue base, valleculae, PPW, and pyriform sinuses.     MILDLY THICK  Secondary swallow trigger at the level of the valleculae. Worsening pharyngeal residue diffusely as compared to thin liquid trials- tongue base, valleculae, PPW, aryepiglottic folds, and pyriform sinuses. X1 cough response following retrograde through the UES. No  penetration or aspiration episodes.     PUREE  Delayed initiation of the pharyngeal swallow at the level of the valleculae. Pooling of residue in valleculae and pyriform sinuses. Mild-moderate residue along TB and PPW. No aspiration or penetration. Ongoing retrograde through the UES. Worsening with incr in volume and consistency.    SOLIDS  Secondary swallow trigger of the pharyngeal swallow. Moderate amount of pooling in valleculae and pyriform sinuses. Worsening retrograde through the UES. No aspiration or penetration.     Cervical Esophageal Phase  Noted posterior pharyngeal wall thickening/edema along C4-6. Significant Cricopharyngeal bar seen during trials of larger volumes and consistencies. CP bar impacting ability for clearance of material, resulting in retrograde through the UES into the pyriform sinuses. High risk for prandial aspiration. During esophageal sweep, noted severe dysmotility with stasis of material below the clavicular level of the esophagus.     Assessment:     Impressions  Pt presents with moderate pharyngoesophageal dysphagia characterized by:  Delayed initiation of the pharyngeal swallow  Dcr laryngeal elevation and anterior hyoid excursion  Dcr tongue base retraction  Dcr cricopharyngeal distension/duration  Suspected CP bar, impacting ability for clearance of material. Resulting in retrograde of material through the UES, worsening with incr in volume and consistency  Significant esophageal dysmotility noted    Prognosis: Good    Plan  Recommend full liquid diet and referral to GI.     Education  Results were discussed with patient. Pt able to appreciate airway threat, as well as, retrograde of material. Reviewed importance of aspiration precautions of small volumes (2-5ml), sit upright at 90 degrees with slight next extension, swallow 2-3x per bite/sip. Pt understanding of need for full liquid diet, pending GI evaluation.     Goals:   Multidisciplinary Problems       SLP Goals           Problem: SLP    Goal Priority Disciplines Outcome   SLP Goal     SLP Ongoing, Progressing   Description: 1) Patient will participate in modified barium swallow study to further assess oropharyngeal swallow function and to best guide tx -Met 10/31    UPDATED GOALS:  2. Pt will consume a full liquid diet with reduced overt s/s of aspiration or airway threat during 100% of po intake given mod assist for utilizations for swallow strategies.   3. Pt will participate in dysphagia based exercise program targeting tongue base retraction, laryngeal elevation, and CP distension/duration with 100% acc given mod assist.                          Plan:   Patient to be seen:  Therapy Frequency: 4 x/week, 6 x/week   Plan of Care expires:     Plan of Care reviewed with:  patient        Discharge recommendations:      Barriers to Discharge:      Time Tracking:   SLP Treatment Date:   10/31/22  Speech Start Time:  1030  Speech Stop Time:  1140     Speech Total Time (min):  70 min    10/31/2022

## 2022-10-31 NOTE — PLAN OF CARE
POC reviewed. No significant events this shift. 2L NC noted. No complaints of pain. Pt is bowel and bladder incontinent with external catheter in use. Bed low and locked, side rails up x3, call light within reach.    Problem: Adult Inpatient Plan of Care  Goal: Plan of Care Review  Outcome: Ongoing, Progressing  Goal: Patient-Specific Goal (Individualized)  Outcome: Ongoing, Progressing  Goal: Absence of Hospital-Acquired Illness or Injury  Outcome: Ongoing, Progressing  Goal: Optimal Comfort and Wellbeing  Outcome: Ongoing, Progressing  Goal: Readiness for Transition of Care  Outcome: Ongoing, Progressing     Problem: Diabetes Comorbidity  Goal: Blood Glucose Level Within Targeted Range  Outcome: Ongoing, Progressing     Problem: Fluid Imbalance (Pneumonia)  Goal: Fluid Balance  Outcome: Ongoing, Progressing     Problem: Infection (Pneumonia)  Goal: Resolution of Infection Signs and Symptoms  Outcome: Ongoing, Progressing     Problem: Respiratory Compromise (Pneumonia)  Goal: Effective Oxygenation and Ventilation  Outcome: Ongoing, Progressing     Problem: Skin Injury Risk Increased  Goal: Skin Health and Integrity  Outcome: Ongoing, Progressing     Problem: Infection  Goal: Absence of Infection Signs and Symptoms  Outcome: Ongoing, Progressing     Problem: Pain Acute  Goal: Acceptable Pain Control and Functional Ability  Outcome: Ongoing, Progressing     Problem: Impaired Wound Healing  Goal: Optimal Wound Healing  Outcome: Ongoing, Progressing

## 2022-10-31 NOTE — PROGRESS NOTES
[facility], [place], operated by [facility]  Progress Note    Patient Name: Frandy Mccarthy Jr.  MRN: [id number]  Patient Class: IP- Inpatient   Admission Date: 10/29/2022  Length of Stay: 1 days  Attending Physician: SHLOMO Chang MD  Primary Care Provider: Jose Mann MD        Subjective:     Principal Problem:Multifocal pneumonia        HPI:  Mr. Mccarthy is an 82/M with PMH HTN, HLD, DMII with neuropathy, BPH, chronic pain s/p spinal stimulator implantation 10/25 who presented to [facility] 10/29 with a 5 day history of progressively worsening progressively worsening cough, weakness, and dysphagia with associated constipation since his procedure. The patient has been unable to tolerate medications, liquids, or solids for the last 5 days.  Per his wife at bedside, he has no meaningful oral intake and he has become significantly weaker since his surgery.  He is now unable to rise out of bed without significant assistance.  The patient is able to briefly awaken during the interview and corroborate that he has cough, globus sensation with swallowing, and significant dysphagia, but denies odynophagia or neck swelling.  He denies hoarseness of voice, reflux, or fever.  Whenever the patient attempts to swallow liquids or medications, he has coughing and sputtering. ED evaluation was significant for SpO2 93-95% on RA, diffuse debility, and CT Chest/Abd/Pelvis showing multifocal pneumonia, large bowel distention and R-sided inguinal hernia with loops of small bowel without obstruction, and multiple low-attenuation lesions in the kidneys. He was started on ampicillin-sulbactam and hospital medicine was contacted for admission.      Overview/Hospital Course:  Admitted with dysphagia and multifocal pneumonia. NPO except meds / ice chips. Started ampicillin-sulbactam and SLP consulted. PT/OT consulted given diffuse weakness. MBSS performed 10/31 showing severe esophageal dysphagia; started full liquids and GI  consulted for further evaluation recommendations. Void trial initiated from difficulty with urinating noted after procedure 10/25.      Interval History: No acute events overnight. Reports + BM. Seen shortly before going to [facility]. No other concerns at this time.    Review of Systems   Constitutional:  Negative for chills and fever.   Respiratory:  Positive for cough and shortness of breath.    Cardiovascular:  Negative for chest pain and palpitations.   Gastrointestinal:  Positive for abdominal distention. Negative for abdominal pain, nausea and vomiting.   Objective:     Vital Signs (Most Recent):  Temp: 98.8 °F (37.1 °C) (10/31/22 1155)  Pulse: 70 (10/31/22 1155)  Resp: 18 (10/31/22 1155)  BP: 133/75 (10/31/22 1155)  SpO2: 98 % (10/31/22 1155) Vital Signs (24h Range):  Temp:  [98.1 °F (36.7 °C)-99.2 °F (37.3 °C)] 98.8 °F (37.1 °C)  Pulse:  [] 70  Resp:  [16-18] 18  SpO2:  [91 %-98 %] 98 %  BP: (118-158)/(57-81) 133/75     Weight: 93.9 kg (207 lb)  Body mass index is 30.57 kg/m².    Intake/Output Summary (Last 24 hours) at 10/31/2022 1420  Last data filed at 10/31/2022 0502  Gross per 24 hour   Intake 50 ml   Output 1025 ml   Net -975 ml      Physical Exam  Vitals and nursing note reviewed.   Constitutional:       General: He is not in acute distress.     Appearance: He is well-developed. He is obese.   HENT:      Head: Normocephalic and atraumatic.   Eyes:      General:         Right eye: No discharge.         Left eye: No discharge.      Conjunctiva/sclera: Conjunctivae normal.   Cardiovascular:      Rate and Rhythm: Normal rate.      Pulses: Normal pulses.   Pulmonary:      Effort: Pulmonary effort is normal. No respiratory distress.      Comments: R-sided crackles improved from prior.  Abdominal:      General: There is distension.      Palpations: Abdomen is soft.      Tenderness: There is no abdominal tenderness.   Musculoskeletal:         General: Normal range of motion.      Right lower leg: No edema.       Left lower leg: No edema.   Skin:     General: Skin is warm and dry.   Neurological:      Mental Status: He is alert and oriented to person, place, and time.       Significant Labs:   CBC:  Recent Labs   Lab 10/29/22  1221 10/30/22  0426 10/31/22  0447   WBC 8.68 6.93 6.76   HGB 10.3* 10.4* 9.6*   HCT 32.2* 33.0* 29.7*    208 238   GRAN 83.4*  7.2 82.2*  5.7 79.3*  5.4   LYMPH 6.0*  0.5* 6.1*  0.4* 9.0*  0.6*   MONO 9.0  0.8 9.4  0.7 10.4  0.7   EOS 0.1 0.1 0.1   BASO 0.02 0.02 0.02   BMP:  Recent Labs   Lab 10/29/22  1221 10/30/22  0426 10/31/22  0447    139 143   K 4.8 4.6 3.9    105 110   CO2 26 25 25   BUN 59* 49* 44*   CREATININE 3.3* 2.6* 2.3*   * 174* 160*   CALCIUM 9.2 9.2 9.0   MG  --  2.2 2.3   PHOS  --  2.6* 1.8*     Significant Imaging:   No new imaging this morning.      Assessment/Plan:      * Multifocal pneumonia  Dysphagia, debility  - Several days of worsening debility, weakness, decreased PO intake in the setting of recent procedure and multifocal pneumonia on CT with dysphagia.  - Continue ampicillin-sulbactam 3g IV q12hr.  - MBSS today demonstrating severe esophageal dysmotility. Full liquid diet for time being, consult GI for further evaluation and management recommendations.  - IVFs with LR at 75mL/hr.    Benign prostatic hyperplasia with urinary frequency  - Continue finasteride 5mg PO daily, oxybutynin 10mg PO daily, tamsulosin 0.4mg PO as BID. Family brought mirabegron but did not have packaging with dosing information to confirm per pharmacy; resume if labeling can be brought from home.  - Rader in place since procedure 10/25; void trial today. If fails will consult Urology in AM to further evaluate.    Chronic pain  Diabetic polyneuropathy  - Continue duloxetine 30mg PO daily, pramipexole 0.25mg PO qHS, pregabalin 225mg PO BID, oxycodone 10mg PO q4hr PRN.    Controlled type 2 diabetes mellitus with diabetic autonomic neuropathy, with long-term  current use of insulin  - On V-Go at home; hold.  - Continue low-dose sliding scale insulin aspart 0-5U subQ q6hr PRN.    Essential hypertension  - Continue metoprolol 100mg PO daily.    Hyperlipidemia  Carotid artery disease  - Continue fenofibrate 160mg PO daily, atorvastatin 10mg PO daily.    VTE Risk Mitigation (From admission, onward)         Ordered     heparin (porcine) injection 5,000 Units  Every 8 hours         10/29/22 1902     IP VTE HIGH RISK PATIENT  Once         10/29/22 1902                Discharge Planning   MARYAM:      Code Status: Full Code   Is the patient medically ready for discharge?:     Reason for patient still in hospital (select all that apply): Treatment  Discharge Plan A: Home Health                  D Kian Chang MD  Department of Hospital Medicine   [facility] ([place])

## 2022-11-01 ENCOUNTER — ANESTHESIA EVENT (OUTPATIENT)
Dept: ENDOSCOPY | Facility: OTHER | Age: 82
DRG: 177 | End: 2022-11-01
Payer: MEDICARE

## 2022-11-01 ENCOUNTER — PATIENT OUTREACH (OUTPATIENT)
Dept: ADMINISTRATIVE | Facility: OTHER | Age: 82
End: 2022-11-01
Payer: MEDICARE

## 2022-11-01 PROBLEM — R33.9 URINARY RETENTION: Status: ACTIVE | Noted: 2022-11-01

## 2022-11-01 LAB
ANION GAP SERPL CALC-SCNC: 10 MMOL/L (ref 8–16)
BASOPHILS # BLD AUTO: 0.01 K/UL (ref 0–0.2)
BASOPHILS NFR BLD: 0.1 % (ref 0–1.9)
BUN SERPL-MCNC: 41 MG/DL (ref 8–23)
CALCIUM SERPL-MCNC: 9.6 MG/DL (ref 8.7–10.5)
CHLORIDE SERPL-SCNC: 109 MMOL/L (ref 95–110)
CO2 SERPL-SCNC: 25 MMOL/L (ref 23–29)
CREAT SERPL-MCNC: 2.1 MG/DL (ref 0.5–1.4)
DIFFERENTIAL METHOD: ABNORMAL
EOSINOPHIL # BLD AUTO: 0.1 K/UL (ref 0–0.5)
EOSINOPHIL NFR BLD: 0.6 % (ref 0–8)
ERYTHROCYTE [DISTWIDTH] IN BLOOD BY AUTOMATED COUNT: 15.7 % (ref 11.5–14.5)
EST. GFR  (NO RACE VARIABLE): 31 ML/MIN/1.73 M^2
GLUCOSE SERPL-MCNC: 206 MG/DL (ref 70–110)
HCT VFR BLD AUTO: 30.4 % (ref 40–54)
HGB BLD-MCNC: 9.5 G/DL (ref 14–18)
IMM GRANULOCYTES # BLD AUTO: 0.05 K/UL (ref 0–0.04)
IMM GRANULOCYTES NFR BLD AUTO: 0.6 % (ref 0–0.5)
LYMPHOCYTES # BLD AUTO: 0.4 K/UL (ref 1–4.8)
LYMPHOCYTES NFR BLD: 5.6 % (ref 18–48)
MAGNESIUM SERPL-MCNC: 2.3 MG/DL (ref 1.6–2.6)
MCH RBC QN AUTO: 30 PG (ref 27–31)
MCHC RBC AUTO-ENTMCNC: 31.3 G/DL (ref 32–36)
MCV RBC AUTO: 96 FL (ref 82–98)
MONOCYTES # BLD AUTO: 0.8 K/UL (ref 0.3–1)
MONOCYTES NFR BLD: 10 % (ref 4–15)
NEUTROPHILS # BLD AUTO: 6.5 K/UL (ref 1.8–7.7)
NEUTROPHILS NFR BLD: 83.1 % (ref 38–73)
NRBC BLD-RTO: 0 /100 WBC
PHOSPHATE SERPL-MCNC: 2.1 MG/DL (ref 2.7–4.5)
PLATELET # BLD AUTO: 268 K/UL (ref 150–450)
PMV BLD AUTO: 10.7 FL (ref 9.2–12.9)
POCT GLUCOSE: 196 MG/DL (ref 70–110)
POCT GLUCOSE: 200 MG/DL (ref 70–110)
POCT GLUCOSE: 217 MG/DL (ref 70–110)
POTASSIUM SERPL-SCNC: 3.8 MMOL/L (ref 3.5–5.1)
RBC # BLD AUTO: 3.17 M/UL (ref 4.6–6.2)
SODIUM SERPL-SCNC: 144 MMOL/L (ref 136–145)
WBC # BLD AUTO: 7.87 K/UL (ref 3.9–12.7)

## 2022-11-01 PROCEDURE — 25000003 PHARM REV CODE 250: Performed by: INTERNAL MEDICINE

## 2022-11-01 PROCEDURE — 63600175 PHARM REV CODE 636 W HCPCS: Performed by: INTERNAL MEDICINE

## 2022-11-01 PROCEDURE — 99222 1ST HOSP IP/OBS MODERATE 55: CPT | Mod: GC,,, | Performed by: UROLOGY

## 2022-11-01 PROCEDURE — 92526 ORAL FUNCTION THERAPY: CPT

## 2022-11-01 PROCEDURE — 36415 COLL VENOUS BLD VENIPUNCTURE: CPT | Performed by: INTERNAL MEDICINE

## 2022-11-01 PROCEDURE — 94761 N-INVAS EAR/PLS OXIMETRY MLT: CPT

## 2022-11-01 PROCEDURE — 97535 SELF CARE MNGMENT TRAINING: CPT

## 2022-11-01 PROCEDURE — 99233 PR SUBSEQUENT HOSPITAL CARE,LEVL III: ICD-10-PCS | Mod: ,,, | Performed by: HOSPITALIST

## 2022-11-01 PROCEDURE — 27000221 HC OXYGEN, UP TO 24 HOURS

## 2022-11-01 PROCEDURE — 97530 THERAPEUTIC ACTIVITIES: CPT | Mod: CQ

## 2022-11-01 PROCEDURE — 25000242 PHARM REV CODE 250 ALT 637 W/ HCPCS: Performed by: INTERNAL MEDICINE

## 2022-11-01 PROCEDURE — 92507 TX SP LANG VOICE COMM INDIV: CPT

## 2022-11-01 PROCEDURE — 84100 ASSAY OF PHOSPHORUS: CPT | Performed by: INTERNAL MEDICINE

## 2022-11-01 PROCEDURE — 99900035 HC TECH TIME PER 15 MIN (STAT)

## 2022-11-01 PROCEDURE — 99233 SBSQ HOSP IP/OBS HIGH 50: CPT | Mod: ,,, | Performed by: HOSPITALIST

## 2022-11-01 PROCEDURE — 85025 COMPLETE CBC W/AUTO DIFF WBC: CPT | Performed by: INTERNAL MEDICINE

## 2022-11-01 PROCEDURE — 99222 PR INITIAL HOSPITAL CARE,LEVL II: ICD-10-PCS | Mod: GC,,, | Performed by: UROLOGY

## 2022-11-01 PROCEDURE — 83735 ASSAY OF MAGNESIUM: CPT | Performed by: INTERNAL MEDICINE

## 2022-11-01 PROCEDURE — 80048 BASIC METABOLIC PNL TOTAL CA: CPT | Performed by: INTERNAL MEDICINE

## 2022-11-01 PROCEDURE — 11000001 HC ACUTE MED/SURG PRIVATE ROOM

## 2022-11-01 PROCEDURE — 51798 US URINE CAPACITY MEASURE: CPT

## 2022-11-01 RX ORDER — SENNOSIDES 8.6 MG/1
8.6 TABLET ORAL ONCE
Status: COMPLETED | OUTPATIENT
Start: 2022-11-01 | End: 2022-11-01

## 2022-11-01 RX ADMIN — HEPARIN SODIUM 5000 UNITS: 5000 INJECTION INTRAVENOUS; SUBCUTANEOUS at 08:11

## 2022-11-01 RX ADMIN — PREGABALIN 225 MG: 75 CAPSULE ORAL at 11:11

## 2022-11-01 RX ADMIN — PRAMIPEXOLE DIHYDROCHLORIDE 0.25 MG: 0.25 TABLET ORAL at 08:11

## 2022-11-01 RX ADMIN — OXYBUTYNIN CHLORIDE 10 MG: 5 TABLET, EXTENDED RELEASE ORAL at 11:11

## 2022-11-01 RX ADMIN — FENOFIBRATE 160 MG: 160 TABLET ORAL at 11:11

## 2022-11-01 RX ADMIN — HEPARIN SODIUM 5000 UNITS: 5000 INJECTION INTRAVENOUS; SUBCUTANEOUS at 03:11

## 2022-11-01 RX ADMIN — TAMSULOSIN HYDROCHLORIDE 0.4 MG: 0.4 CAPSULE ORAL at 11:11

## 2022-11-01 RX ADMIN — SODIUM CHLORIDE: 0.9 INJECTION, SOLUTION INTRAVENOUS at 01:11

## 2022-11-01 RX ADMIN — AMPICILLIN SODIUM AND SULBACTAM SODIUM 3 G: 2; 1 INJECTION, POWDER, FOR SOLUTION INTRAMUSCULAR; INTRAVENOUS at 05:11

## 2022-11-01 RX ADMIN — AMPICILLIN SODIUM AND SULBACTAM SODIUM 3 G: 2; 1 INJECTION, POWDER, FOR SOLUTION INTRAMUSCULAR; INTRAVENOUS at 06:11

## 2022-11-01 RX ADMIN — FINASTERIDE 5 MG: 5 TABLET, FILM COATED ORAL at 11:11

## 2022-11-01 RX ADMIN — OXYCODONE 10 MG: 5 TABLET ORAL at 01:11

## 2022-11-01 RX ADMIN — MUPIROCIN: 20 OINTMENT TOPICAL at 10:11

## 2022-11-01 RX ADMIN — ACETAMINOPHEN 650 MG: 325 TABLET, FILM COATED ORAL at 11:11

## 2022-11-01 RX ADMIN — DULOXETINE 30 MG: 30 CAPSULE, DELAYED RELEASE ORAL at 11:11

## 2022-11-01 RX ADMIN — SENNOSIDES 8.6 MG: 8.6 TABLET ORAL at 03:11

## 2022-11-01 RX ADMIN — HEPARIN SODIUM 5000 UNITS: 5000 INJECTION INTRAVENOUS; SUBCUTANEOUS at 05:11

## 2022-11-01 RX ADMIN — MUPIROCIN: 20 OINTMENT TOPICAL at 11:11

## 2022-11-01 RX ADMIN — METOPROLOL TARTRATE 100 MG: 50 TABLET, FILM COATED ORAL at 11:11

## 2022-11-01 RX ADMIN — ONDANSETRON 4 MG: 2 INJECTION INTRAMUSCULAR; INTRAVENOUS at 01:11

## 2022-11-01 RX ADMIN — INSULIN ASPART 0 UNITS: 100 INJECTION, SOLUTION INTRAVENOUS; SUBCUTANEOUS at 06:11

## 2022-11-01 RX ADMIN — ATORVASTATIN CALCIUM 10 MG: 10 TABLET, FILM COATED ORAL at 11:11

## 2022-11-01 RX ADMIN — PREGABALIN 225 MG: 75 CAPSULE ORAL at 08:11

## 2022-11-01 NOTE — PLAN OF CARE
CM met with patient and wife and both are interested in pursuing SNF placement vs Home health due to debilitated state. [facility] has been accepted to [facility] (wife to visit facility) before making final decision. First preference is [facility] pending bed availability.

## 2022-11-01 NOTE — PLAN OF CARE
POC reviewed. No significant events this shift. 2 LPM O2 NC noted. No complaints of pain. Bowel and bladder incontinent with mondragon in place. Bed low and locked, side rails up x3, call light within reach.    Problem: Adult Inpatient Plan of Care  Goal: Plan of Care Review  Outcome: Ongoing, Progressing  Goal: Patient-Specific Goal (Individualized)  Outcome: Ongoing, Progressing  Goal: Absence of Hospital-Acquired Illness or Injury  Outcome: Ongoing, Progressing  Goal: Optimal Comfort and Wellbeing  Outcome: Ongoing, Progressing  Goal: Readiness for Transition of Care  Outcome: Ongoing, Progressing     Problem: Diabetes Comorbidity  Goal: Blood Glucose Level Within Targeted Range  Outcome: Ongoing, Progressing     Problem: Fluid Imbalance (Pneumonia)  Goal: Fluid Balance  Outcome: Ongoing, Progressing     Problem: Infection (Pneumonia)  Goal: Resolution of Infection Signs and Symptoms  Outcome: Ongoing, Progressing     Problem: Respiratory Compromise (Pneumonia)  Goal: Effective Oxygenation and Ventilation  Outcome: Ongoing, Progressing     Problem: Skin Injury Risk Increased  Goal: Skin Health and Integrity  Outcome: Ongoing, Progressing     Problem: Infection  Goal: Absence of Infection Signs and Symptoms  Outcome: Ongoing, Progressing     Problem: Pain Acute  Goal: Acceptable Pain Control and Functional Ability  Outcome: Ongoing, Progressing     Problem: Impaired Wound Healing  Goal: Optimal Wound Healing  Outcome: Ongoing, Progressing

## 2022-11-01 NOTE — CONSULTS
[facility]  Urology  Consult Note    Patient Name: Frandy Mccarthy Jr.  MRN: [id number]  Admission Date: 10/29/2022  Hospital Length of Stay: 2   Code Status: Full Code   Attending Provider: Marie Benavides MD   Consulting Provider: Jose Vaughan MD  Primary Care Physician: Jose Mann MD  Principal Problem:Multifocal pneumonia    Inpatient consult to Urology  Consult performed by: Jose Vaughan MD  Consult ordered by: Marie Benavides MD  Reason for consult: Urinary Retention          Subjective:     HPI:  Mr. Mccarthy is an 81 yo male with HTN, DM2, PBH, chronic pain s/p spinal stimulator on 10/26 who was admitted with multifocal pneumonia. Urology consulted for urinary retention.     Patient follows with Dr. Diana at [facility]. He denies any urologic surgery. He is taking Flomax, finasteride, myrbetriq and oxybutynin. Wife states that he started Mybetriq approximately 2 months ago for possible overactive bladder.    Patient has had indwelling Rader since 10/26 surgery. Rader removed 10/31/22 pm, no sensation to void. In and Out catheterization 11/1/22 @0600 had a return of 600 ml. Bladder scan later showed approx 450 ml. Indwelling Rader placed around 1200 with return of 700 ml of c/y/u.    Labs 11/1/22: WBC 7.87 Hgb 9.5 Cr 2.1, baseline appears to be 2.3-2.6.    CT CAP 10/29/22: Adrenals are unremarkable. No hydronephrosis or urolithiasis bilaterally. Multiple cysts in bilateral kidneys. Approx 3 cm simple cyst R kidney. Approx 7.5 cm complex cyst L kidney lower pole. Approx 2 cm lesion lower pole right kidney. Left lower pole with complex cystic lesion. Rader catheter within bladder lumen.      Past Medical History:   Diagnosis Date    Anemia 10/18/2022    Arthritis     Diabetes mellitus, type 2     Disorder of kidney and ureter     History of hyperparathyroidism     History of hyperparathyroidism 10/6/2020    Formatting of this note might be different from the original. Adenoma  LL s/p resection 2017    Hyperlipidemia     Hypertension     Thyroid disease 2017    parathyroid removed       Past Surgical History:   Procedure Laterality Date    BACK SURGERY      x 4    FRACTURE SURGERY      right thigh    HERNIA REPAIR      LAMINECTOMY N/A 10/26/2022    Procedure: LAMINECTOMY, SPINE;  Surgeon: Daphnie Dalton MD;  Location: [facility] OR;  Service: Neurosurgery;  Laterality: N/A;  T8-T9 Laminectomy  T7-T8 Paddle    SPINE SURGERY      TOTAL KNEE ARTHROPLASTY Right 2020    TRIAL OF SPINAL CORD NERVE STIMULATOR N/A 2022    Procedure: TRIAL, SPINAL CORD STIMULATOR TRIAL PEOPLES REP;  Surgeon: Siobhan Laguerre MD;  Location: [facility] PAIN MGT;  Service: Pain Management;  Laterality: N/A;   RESCHEDULE       Review of patient's allergies indicates:  No Known Allergies    Family History       Problem Relation (Age of Onset)    Cancer Mother, Father    Diabetes Mother, Father            Tobacco Use    Smoking status: Former     Types: Cigarettes     Quit date: 1973     Years since quittin.7    Smokeless tobacco: Never   Substance and Sexual Activity    Alcohol use: Not Currently     Comment: occasional    Drug use: No    Sexual activity: Yes     Partners: Female       Review of Systems   Constitutional:  Positive for chills and fever. Negative for fatigue.   HENT: Negative.     Respiratory:  Positive for cough and choking.    Cardiovascular: Negative.    Gastrointestinal:  Negative for constipation, nausea and vomiting.   Genitourinary:  Positive for difficulty urinating. Negative for dysuria, flank pain, hematuria and testicular pain.   Musculoskeletal:  Negative for arthralgias and back pain.   Skin:  Negative for color change and pallor.   Neurological: Negative.  Negative for seizures, speech difficulty and headaches.   Psychiatric/Behavioral:  Negative for agitation and confusion.      Objective:     Temp:  [98.4 °F (36.9 °C)-101.2 °F (38.4 °C)] 101.2 °F (38.4  °C)  Pulse:  [] 96  Resp:  [16-18] 16  SpO2:  [93 %-94 %] 93 %  BP: (135-165)/(64-78) 151/67     Body mass index is 30.57 kg/m².    Date 11/01/22 0700 - 11/02/22 0659   Shift 5746-4290 4780-3888 7342-8766 24 Hour Total   INTAKE   Shift Total(mL/kg)       OUTPUT   Urine(mL/kg/hr) 400   400   Shift Total(mL/kg) 400(4.3)   400(4.3)   Weight (kg) 93.9 93.9 93.9 93.9     Bladder Scan Volume (mL): 497 mL (11/01/22 0121)    Drains       Drain  Duration                  Urethral Catheter 11/01/22 1218 16 Fr. <1 day                    Physical Exam  Vitals and nursing note reviewed.   Constitutional:       Appearance: Normal appearance.   HENT:      Head: Atraumatic.      Nose: Nose normal.   Eyes:      Extraocular Movements: Extraocular movements intact.      Pupils: Pupils are equal, round, and reactive to light.   Cardiovascular:      Rate and Rhythm: Normal rate.   Pulmonary:      Effort: Pulmonary effort is normal.   Abdominal:      General: Abdomen is flat. There is no distension.      Tenderness: There is no abdominal tenderness. There is no right CVA tenderness or left CVA tenderness.   Genitourinary:     Comments:  Bag with c/y/u  Musculoskeletal:         General: Normal range of motion.      Cervical back: Normal range of motion.   Neurological:      General: No focal deficit present.      Mental Status: He is alert and oriented to person, place, and time.   Psychiatric:         Mood and Affect: Mood normal.         Behavior: Behavior normal.       Significant Labs:    BMP:  Recent Labs   Lab 10/30/22  0426 10/31/22  0447 11/01/22  0426    143 144   K 4.6 3.9 3.8    110 109   CO2 25 25 25   BUN 49* 44* 41*   CREATININE 2.6* 2.3* 2.1*   CALCIUM 9.2 9.0 9.6       CBC:  Recent Labs   Lab 10/30/22  0426 10/31/22  0447 11/01/22  0426   WBC 6.93 6.76 7.87   HGB 10.4* 9.6* 9.5*   HCT 33.0* 29.7* 30.4*    238 268       Urine Studies:   Recent Labs   Lab 10/26/22  1310   COLORU Yellow    APPEARANCEUA Clear   PHUR 7.0   SPECGRAV 1.010   PROTEINUA Negative   GLUCUA Negative   KETONESU Negative   BILIRUBINUA Negative   OCCULTUA 2+*   NITRITE Negative   UROBILINOGEN Negative   LEUKOCYTESUR Negative   RBCUA 24*   SQUAMEPITHEL 5     All pertinent labs results from the past 24 hours have been reviewed.    Significant Imaging:  CT: I have reviewed all results within the past 24 hours and my personal findings are:  As per HPI.        Assessment and Plan:     Urinary retention  83 yo male with above comorbidities. Urology consulted for urinary retention.    - Recommend maintain Rader catheter minimum 3 days  - If still inpatient, can attempt voiding trial Fri 11/4/22  - Discontinue oxybutynin and Myrbetriq. Do not restart upon discharge  - Avoid narcotics if possible  - Ambulate QID and OOBTC  - Please arrange outpatient follow up with Dr. Sierra at [facility]  - Rest of care per primary          VTE Risk Mitigation (From admission, onward)         Ordered     heparin (porcine) injection 5,000 Units  Every 8 hours         10/29/22 1902     IP VTE HIGH RISK PATIENT  Once         10/29/22 1902                Thank you for your consult.     Jose Vaughan MD  Urology  [facility] ([place])

## 2022-11-01 NOTE — PT/OT/SLP PROGRESS
"Physical Therapy Treatment    Patient Name:  Frandy Mccarthy Jr.   MRN:  [id number]    Recommendations:     Discharge Recommendations:  nursing facility, skilled   Discharge Equipment Recommendations: bedside commode   Barriers to discharge: Decreased caregiver support    Assessment:     Frandy Mccarthy Jr. is a 82 y.o. male admitted with a medical diagnosis of Multifocal pneumonia.  He presents with the following impairments/functional limitations:  weakness, impaired endurance, impaired self care skills, impaired functional mobility, gait instability, impaired balance, decreased coordination, decreased upper extremity function, decreased lower extremity function, decreased safety awareness, pain, decreased ROM ;pt with fair/good mobiltiy today, still re's 2 person assist for t/f's OOB and from low surfaces.    Rehab Prognosis: Good and Fair; patient would benefit from acute skilled PT services to address these deficits and reach maximum level of function.    Recent Surgery: Procedure(s) (LRB):  EGD (ESOPHAGOGASTRODUODENOSCOPY) (N/A)      Plan:     During this hospitalization, patient to be seen 5 x/week to address the identified rehab impairments via therapeutic activities, gait training, therapeutic exercises, neuromuscular re-education and progress toward the following goals:    Plan of Care Expires:  11/30/22    Subjective     Chief Complaint: LLE pain  Patient/Family Comments/goals: pt agreeable to session, [facility]'s bedside commode chair, "Ya'll know right when to come".   Pain/Comfort:  Pain Rating 1: 5/10  Location - Side 1: Left  Location - Orientation 1: generalized  Location 1: leg  Pain Addressed 1: Reposition, Distraction  Pain Rating Post-Intervention 1: 5/10  Pain Rating 2: 2/10  Location - Orientation 2: generalized  Location 2: cervical spine  Pain Addressed 2: Reposition, Distraction  Pain Rating Post-Intervention 2: 2/10      Objective:     Communicated with nurse prior to session.  Patient found HOB " elevated with bed alarm, oxygen, mondragon catheter upon PT entry to room.     General Precautions: Standard, aspiration, diabetic, fall   Orthopedic Precautions:spinal precautions   Braces: N/A  Respiratory Status: Nasal cannula, flow 2 L/min     Functional Mobility:  Bed Mobility:     Scooting: stand by assistance and at EOB  Supine to Sit: minimum assistance and of 2 persons  Transfers:     Sit to Stand:  moderate assistance, of 2 persons, and from EOB (low) with rolling walker  Gait: pt able to take a few steps w/RW and Dominic x 2 from bed to commode to chair      AM-PAC 6 CLICK MOBILITY  Turning over in bed (including adjusting bedclothes, sheets and blankets)?: 2  Sitting down on and standing up from a chair with arms (e.g., wheelchair, bedside commode, etc.): 2  Moving from lying on back to sitting on the side of the bed?: 2  Moving to and from a bed to a chair (including a wheelchair)?: 2  Need to walk in hospital room?: 2  Climbing 3-5 steps with a railing?: 1  Basic Mobility Total Score: 11       Treatment & Education:  Pt perf'd t/f's OOB as noted above.   Co-tx w/ OT, OT perform ADLs at EOB.   Pt req'd totA x 1 for hygiene following BM, with CGA/Dominic of 2nd person for standing.     Patient left up in chair with all lines intact, call button in reach, and nurse notified..    GOALS:   Multidisciplinary Problems       Physical Therapy Goals          Problem: Physical Therapy    Goal Priority Disciplines Outcome Goal Variances Interventions   Physical Therapy Goal     PT, PT/OT Ongoing, Progressing     Description: Goals to be met by: 2022    Patient will increase functional independence with mobility by performin. Sit<>stand with min assist with RW.  2. Gait x 50 feet with RW with SBA.  3. Ascend/descend 5 step(s) with least restrictive assistive device and CGA.                           Time Tracking:     PT Received On: 22  PT Start Time: 1315     PT Stop Time: 1351  PT Total Time (min): 36  min     Billable Minutes: Therapeutic Activity 36    Treatment Type: Treatment  PT/PTA: PTA     PTA Visit Number: 2     11/01/2022

## 2022-11-01 NOTE — HPI
Mr. Mccarthy is an 83 yo male with HTN, DM2, PBH, chronic pain s/p spinal stimulator on 10/26 who was admitted with multifocal pneumonia. Urology consulted for urinary retention.     Patient follows with Dr. Diana at [facility] ([facility] He denies any urologic surgery. He is taking Flomax, finasteride, myrbetriq and oxybutynin. Wife states that he started Mybetriq approximately 2 months ago for possible overactive bladder.    Patient has had indwelling Rader since 10/26 surgery. Rader removed 10/31/22 pm, no sensation to void. In and Out catheterization 11/1/22 @0600 had a return of 600 ml. Bladder scan later showed approx 450 ml. Indwelling Rader placed around 1200 with return of 700 ml of c/y/u.    Labs 11/1/22: WBC 7.87 Hgb 9.5 Cr 2.1, baseline appears to be 2.3-2.6.    CT CAP 10/29/22: Adrenals are unremarkable. No hydronephrosis or urolithiasis bilaterally. Multiple cysts in bilateral kidneys. Approx 3 cm simple cyst R kidney. Approx 7.5 cm complex cyst L kidney lower pole. Approx 2 cm lesion lower pole right kidney. Left lower pole with complex cystic lesion. Rader catheter within bladder lumen.

## 2022-11-01 NOTE — PLAN OF CARE
Problem: Adult Inpatient Plan of Care  Goal: Plan of Care Review  Outcome: Ongoing, Progressing  Goal: Patient-Specific Goal (Individualized)  Outcome: Ongoing, Progressing  Goal: Absence of Hospital-Acquired Illness or Injury  Outcome: Ongoing, Progressing  Goal: Optimal Comfort and Wellbeing  Outcome: Ongoing, Progressing  Goal: Readiness for Transition of Care  Outcome: Ongoing, Progressing     Problem: Diabetes Comorbidity  Goal: Blood Glucose Level Within Targeted Range  Outcome: Ongoing, Progressing     Problem: Fluid Imbalance (Pneumonia)  Goal: Fluid Balance  Outcome: Ongoing, Progressing     Problem: Infection (Pneumonia)  Goal: Resolution of Infection Signs and Symptoms  Outcome: Ongoing, Progressing     Problem: Skin Injury Risk Increased  Goal: Skin Health and Integrity  Outcome: Ongoing, Progressing     Problem: Infection  Goal: Absence of Infection Signs and Symptoms  Outcome: Ongoing, Progressing     Problem: Pain Acute  Goal: Acceptable Pain Control and Functional Ability  Outcome: Ongoing, Progressing

## 2022-11-01 NOTE — ASSESSMENT & PLAN NOTE
81 yo male with above comorbidities. Urology consulted for urinary retention.    - Recommend maintain Rader catheter minimum 3 days  - If still inpatient, can attempt voiding trial Fri 11/4/22  - Discontinue oxybutynin and Myrbetriq. Do not restart upon discharge  - Avoid narcotics if possible  - Ambulate QID and OOBTC  - Please arrange outpatient follow up with Dr. Sierra at [facility]  - Rest of care per primary

## 2022-11-01 NOTE — SUBJECTIVE & OBJECTIVE
Past Medical History:   Diagnosis Date    Anemia 10/18/2022    Arthritis     Diabetes mellitus, type 2     Disorder of kidney and ureter     History of hyperparathyroidism     History of hyperparathyroidism 10/6/2020    Formatting of this note might be different from the original. Adenoma LL s/p resection 2017    Hyperlipidemia     Hypertension     Thyroid disease 2017    parathyroid removed       Past Surgical History:   Procedure Laterality Date    BACK SURGERY      x 4    FRACTURE SURGERY      right thigh    HERNIA REPAIR      LAMINECTOMY N/A 10/26/2022    Procedure: LAMINECTOMY, SPINE;  Surgeon: Daphnie Dalton MD;  Location: [facility] OR;  Service: Neurosurgery;  Laterality: N/A;  T8-T9 Laminectomy  T7-T8 Paddle    SPINE SURGERY      TOTAL KNEE ARTHROPLASTY Right 2020    TRIAL OF SPINAL CORD NERVE STIMULATOR N/A 2022    Procedure: TRIAL, SPINAL CORD STIMULATOR TRIAL PEOPLES REP;  Surgeon: Siobhan Laguerre MD;  Location: [facility] PAIN MGT;  Service: Pain Management;  Laterality: N/A;   RESCHEDULE       Review of patient's allergies indicates:  No Known Allergies    Family History       Problem Relation (Age of Onset)    Cancer Mother, Father    Diabetes Mother, Father            Tobacco Use    Smoking status: Former     Types: Cigarettes     Quit date: 1973     Years since quittin.7    Smokeless tobacco: Never   Substance and Sexual Activity    Alcohol use: Not Currently     Comment: occasional    Drug use: No    Sexual activity: Yes     Partners: Female       Review of Systems   Constitutional:  Positive for chills and fever. Negative for fatigue.   HENT: Negative.     Respiratory:  Positive for cough and choking.    Cardiovascular: Negative.    Gastrointestinal:  Negative for constipation, nausea and vomiting.   Genitourinary:  Positive for difficulty urinating. Negative for dysuria, flank pain, hematuria and testicular pain.   Musculoskeletal:  Negative for arthralgias and back pain.   Skin:   Negative for color change and pallor.   Neurological: Negative.  Negative for seizures, speech difficulty and headaches.   Psychiatric/Behavioral:  Negative for agitation and confusion.      Objective:     Temp:  [98.4 °F (36.9 °C)-101.2 °F (38.4 °C)] 101.2 °F (38.4 °C)  Pulse:  [] 96  Resp:  [16-18] 16  SpO2:  [93 %-94 %] 93 %  BP: (135-165)/(64-78) 151/67     Body mass index is 30.57 kg/m².    Date 11/01/22 0700 - 11/02/22 0659   Shift 0812-8290 5183-5852 9166-3113 24 Hour Total   INTAKE   Shift Total(mL/kg)       OUTPUT   Urine(mL/kg/hr) 400   400   Shift Total(mL/kg) 400(4.3)   400(4.3)   Weight (kg) 93.9 93.9 93.9 93.9     Bladder Scan Volume (mL): 497 mL (11/01/22 0121)    Drains       Drain  Duration                  Urethral Catheter 11/01/22 1218 16 Fr. <1 day                    Physical Exam  Vitals and nursing note reviewed.   Constitutional:       Appearance: Normal appearance.   HENT:      Head: Atraumatic.      Nose: Nose normal.   Eyes:      Extraocular Movements: Extraocular movements intact.      Pupils: Pupils are equal, round, and reactive to light.   Cardiovascular:      Rate and Rhythm: Normal rate.   Pulmonary:      Effort: Pulmonary effort is normal.   Abdominal:      General: Abdomen is flat. There is no distension.      Tenderness: There is no abdominal tenderness. There is no right CVA tenderness or left CVA tenderness.   Genitourinary:     Comments:  Bag with c/y/u  Musculoskeletal:         General: Normal range of motion.      Cervical back: Normal range of motion.   Neurological:      General: No focal deficit present.      Mental Status: He is alert and oriented to person, place, and time.   Psychiatric:         Mood and Affect: Mood normal.         Behavior: Behavior normal.       Significant Labs:    BMP:  Recent Labs   Lab 10/30/22  0426 10/31/22  0447 11/01/22  0426    143 144   K 4.6 3.9 3.8    110 109   CO2 25 25 25   BUN 49* 44* 41*   CREATININE 2.6* 2.3* 2.1*    CALCIUM 9.2 9.0 9.6       CBC:  Recent Labs   Lab 10/30/22  0426 10/31/22  0447 11/01/22  0426   WBC 6.93 6.76 7.87   HGB 10.4* 9.6* 9.5*   HCT 33.0* 29.7* 30.4*    238 268       Urine Studies:   Recent Labs   Lab 10/26/22  1310   COLORU Yellow   APPEARANCEUA Clear   PHUR 7.0   SPECGRAV 1.010   PROTEINUA Negative   GLUCUA Negative   KETONESU Negative   BILIRUBINUA Negative   OCCULTUA 2+*   NITRITE Negative   UROBILINOGEN Negative   LEUKOCYTESUR Negative   RBCUA 24*   SQUAMEPITHEL 5     All pertinent labs results from the past 24 hours have been reviewed.    Significant Imaging:  CT: I have reviewed all results within the past 24 hours and my personal findings are:  As per HPI.

## 2022-11-01 NOTE — PLAN OF CARE
Problem: Occupational Therapy  Goal: Occupational Therapy Goal  Description: Goals to be met by: 10/30/2022     Patient will increase functional independence with ADLs by performing:    UE Dressing with Minimal Assistance.  Grooming while EOB with Contact Guard Assistance.  Upper body Bathing from  edge of bed with Minimal Assistance.  Sitting at edge of bed x15 minutes with Contact Guard Assistance.  Rolling to Bilateral with Stand-by Assistance and use of bedrail as needed.   Supine to sit with Minimal Assistance using log roll tech.  Toileting with Min A from BSC.  Toilet transfer to BS with CGA.    Outcome: Ongoing, Progressing     Pt is making progress towards goals.  SNF is recommended upon d/c from acute care to further address deficits and help pt improve overall functional independence.     PHOENIX Lewis  11/1/2022

## 2022-11-01 NOTE — PT/OT/SLP PROGRESS
"Speech Language Pathology Treatment    Patient Name:  Frandy Mccarthy Jr.   MRN:  [id number]  Admitting Diagnosis: Multifocal pneumonia    Recommendations:                 General Recommendations:    Speech pathology to follow 4-6x/week for ongoing assessment and treatment of pharyngoesophageal dysphagia.  Recommend GI consult 2/2 significant esophageal dysmotility.      Diet recommendations:   , Full liquids, Thin liquids - IDDSI Level 0      Aspiration Precautions:   1. Sit upright at 90 degrees   2. Remain upright for 30 minute before and after meal   3. Slight neck extension   4. SMALL SIPS (2-5ml)   5. Re-swallow x2-3   6. Frequent oral care to reduce oral bacteria     General Precautions: Standard, aspiration     Communication strategies:  n/a    Subjective     GI consulted. Plans for EGD tomorrow.   Nursing care in progress for replacement of catheter for initial session. Pt's wife at bedside, SLP in to discuss results of MBSS.  SLP returned later in PM for dysphagia tx.     MBSS Impressions 10/31/22:  "Impressions  Pt presents with moderate pharyngoesophageal dysphagia characterized by:  Delayed initiation of the pharyngeal swallow  Dcr laryngeal elevation and anterior hyoid excursion  Dcr tongue base retraction  Dcr cricopharyngeal distension/duration  Suspected CP bar, impacting ability for clearance of material. Resulting in retrograde of material through the UES, worsening with incr in volume and consistency  Significant esophageal dysmotility noted"    Respiratory Status: Nasal Cannula    Objective:     Has the patient been evaluated by SLP for swallowing?   Yes  Keep patient NPO? No   Current Respiratory Status:    NC    AM Session- Swallowing Pt/Family Education:   SLP in to review MBSS results with pt's wife. Reviewed pharyngeal dysphagia, including cricopharyngeal dysfunction with suspected bar. As well as, retrograde leaving pt at high risk for post prandial aspiration. Reviewed esophageal findings as " "well. Notified wife plans for EGD tomorrow. Questions answered. Wife understanding of need for medical management of esophageal dysphagia, as well as, ongoing dysphagia tx targeting pharyngeal phase.     Nursing care in progress throughout session. SLP to re-attempt dysphagia based exercise program later this date.     PM Session- Dysphagia Based Exercise Program:  Pt reports dcr tolerance of po intake. Reports very limited attempts at po intake since MBSS. Reports he is only able to tolerate ice chips and cold milk. Stated everything else "will come back up". Denies ongoing coughing/choking with ice chips or milk.    SLP reviewed MBSS results and swallow strategies of slight next extension, swallow 2-3x, and small/single sips.     Pt completed x15 effortful swallows with slight neck extension during trials of pureed solids and and ice chips. Pt required verbal cues to swallow twice. No overt s/s of aspiration or airway threat throughout task. Vocal and breathing quality remained clear.    Attempted Shaker exercise, however, pt unable to lay flat due to severe back pain. Completed x2 rounds of 60-second Isometric CTAR. Completed x30 reps of Dynamic CTAR.     Attempted Katina and modified Katina exercise with use of tongue depressor. Pt with significant lingual pumping, unable to trigger swallow.    Education: At end of second tx session, pt reporting he was not delivered breakfast or lunch today. Discussed with RN, reports GI placed NPO order following visit this AM. RN unsure of reasoning given EGD planned for tomorrow. Notified RN pt has been consuming ice chips throughout day, as well as, consumed 1-2oz of water and 3oz of pureed solids during SLP session. Notified RN to resume full liquid diet if pt cleared for po intake for remainder of today.     Assessment:     Frandy VARGAS Shari Cee is a 82 y.o. male with an SLP diagnosis of Dysphagia.      Goals:   Multidisciplinary Problems       SLP Goals          Problem: " SLP    Goal Priority Disciplines Outcome   SLP Goal     SLP Ongoing, Progressing   Description: 1) Patient will participate in modified barium swallow study to further assess oropharyngeal swallow function and to best guide tx -Met 10/31    UPDATED GOALS:  2. Pt will consume a full liquid diet with reduced overt s/s of aspiration or airway threat during 100% of po intake given mod assist for utilizations for swallow strategies.   3. Pt will participate in dysphagia based exercise program targeting tongue base retraction, laryngeal elevation, and CP distension/duration with 100% acc given mod assist.                          Plan:     Patient to be seen:  4 x/week, 6 x/week   Plan of Care expires:     Plan of Care reviewed with:  patient, spouse   SLP Follow-Up:  Yes       Discharge recommendations:          Time Tracking:     SLP Treatment Date:   11/01/22    Speech Start Time 1:  1202  Speech Stop Time 1:  1212       Speech Start Time 2:  1410  Speech Stop Time 2:  1430       Speech Total Time (min):  10 min; 20 min    Billable Minutes: Speech Therapy Individual 10 min ; Treatment Swallowing dysfunction 20 min    11/01/2022

## 2022-11-01 NOTE — NURSING
Pt has not voided since he mondragon catheter was d/c'd at 1800, scanned bladder and showed >497 ml. Removed external catheter and noted small amount of blood at urethra. I/O straight cath, using aseptic technique, lyn urine, 600 ml. Pt tolerated well. Notified Dr. Benavides, via chat @ [phone]

## 2022-11-01 NOTE — CONSULTS
.[facility] - Med Surg ([place])  Gastroenterology  Consult Note    Patient Name: Frandy Mccarthy Jr.  MRN: [id number]  Admission Date: 10/29/2022  Hospital Length of Stay: 2 days  Code Status: Full Code   Primary Care Physician: Jose Mann MD  Principal Problem:Multifocal pneumonia    Inpatient consult to Gastroenterology  Consult performed by: Satish Galvan MD  Consult ordered by: SHLOMO Chang MD      Subjective:     Chief complaint: dysphagia    HPI: Mr. Frandy Mccarthy Jr. is an 82 y.o. man with a PMHx sig for HTN, DM2, PBH, chronic pain s/p spinal stimulator on 10/25 who was admitted with multifocal pneumonia.  GI consulted for dysphagia. Symptoms started about 2 weeks ago, suddenly, and progressively worsened.  He has never experienced this before.  No prior EGD.  He reports globus sensation when swallowing without odynophagia.  There is no associated hoarseness, fevers, GERD symptoms.  He has coughing after swallowing.  He has associated weakness.  Shortness of breath has improved since admit. No chest pain.    Of note, abdomen is very distended but non-tender.  He is passing flatus but no BM since Saturday.     Speech note reviewed. Recommend GI consult 2/2 significant esophageal dysmotility.  OK for full thin liquids.    Speech assessment:  Impressions  Pt presents with moderate pharyngoesophageal dysphagia characterized by:  Delayed initiation of the pharyngeal swallow  Dcr laryngeal elevation and anterior hyoid excursion  Dcr tongue base retraction  Dcr cricopharyngeal distension/duration  Suspected CP bar, impacting ability for clearance of material. Resulting in retrograde of material through the UES, worsening with incr in volume and consistency  Significant esophageal dysmotility noted       Past medical history:  Past Medical History:   Diagnosis Date    Anemia 10/18/2022    Arthritis     Diabetes mellitus, type 2     Disorder of kidney and ureter     History of hyperparathyroidism      History of hyperparathyroidism 10/6/2020    Formatting of this note might be different from the original. Adenoma LL s/p resection 2017    Hyperlipidemia     Hypertension     Thyroid disease 2017    parathyroid removed       Past surgical history:  Past Surgical History:   Procedure Laterality Date    BACK SURGERY      x 4    FRACTURE SURGERY      right thigh    HERNIA REPAIR      LAMINECTOMY N/A 10/26/2022    Procedure: LAMINECTOMY, SPINE;  Surgeon: Daphnie Dalton MD;  Location: [facility] OR;  Service: Neurosurgery;  Laterality: N/A;  T8-T9 Laminectomy  T7-T8 Paddle    SPINE SURGERY      TOTAL KNEE ARTHROPLASTY Right 2020    TRIAL OF SPINAL CORD NERVE STIMULATOR N/A 2022    Procedure: TRIAL, SPINAL CORD STIMULATOR TRIAL PEOPLES REP;  Surgeon: Siobhan Laguerre MD;  Location: [facility] PAIN MGT;  Service: Pain Management;  Laterality: N/A;   RESCHEDULE       Social history:  Social History     Socioeconomic History    Marital status:      Spouse name: Kevin    Number of children: 4   Tobacco Use    Smoking status: Former     Types: Cigarettes     Quit date: 1973     Years since quittin.7    Smokeless tobacco: Never   Substance and Sexual Activity    Alcohol use: Not Currently     Comment: occasional    Drug use: No    Sexual activity: Yes     Partners: Female   Social History Narrative    Stairs- 4     Social Determinants of Health     Financial Resource Strain: Low Risk     Difficulty of Paying Living Expenses: Not hard at all   Food Insecurity: No Food Insecurity    Worried About Running Out of Food in the Last Year: Never true    Ran Out of Food in the Last Year: Never true   Transportation Needs: No Transportation Needs    Lack of Transportation (Medical): No    Lack of Transportation (Non-Medical): No   Physical Activity: Insufficiently Active    Days of Exercise per Week: 7 days    Minutes of Exercise per Session: 10 min   Stress: Stress Concern Present    Feeling of Stress : To some  extent   Social Connections: Socially Isolated    Frequency of Communication with Friends and Family: Never    Frequency of Social Gatherings with Friends and Family: Never    Attends Orthodoxy Services: Never    Active Member of Clubs or Organizations: No    Attends Club or Organization Meetings: Never    Marital Status:    Housing Stability: Low Risk     Unable to Pay for Housing in the Last Year: No    Number of Places Lived in the Last Year: 1    Unstable Housing in the Last Year: No       Family history:  Family History   Problem Relation Age of Onset    Cancer Mother     Diabetes Mother     Diabetes Father     Cancer Father    No hx of GI diseases or malignancy.    Medications:  Medications Prior to Admission   Medication Sig Dispense Refill Last Dose    cyanocobalamin (VITAMIN B-12) 1000 MCG tablet Take 100 mcg by mouth.       DULoxetine (CYMBALTA) 30 MG capsule Take 30 mg by mouth once daily.        fenofibrate 160 MG Tab Take 1 tablet (160 mg total) by mouth once daily. 90 tablet 3     finasteride (PROSCAR) 5 mg tablet Take 5 mg by mouth.       folic acid (FOLVITE) 800 MCG Tab Take 800 mcg by mouth once daily.       furosemide (LASIX) 40 MG tablet Take 40 mg by mouth 2 (two) times daily.       HUMALOG U-100 INSULIN 100 unit/mL injection V-Go pump 30 units at a preset basal rate of 1.25 units/ hour  Up to 36 units of on demand bolus dosing    Has Dexcom continuous glucose meter hand held meter   Does premeal check of glucose and adds as needed dose of additional insulin       metoprolol tartrate (LOPRESSOR) 100 MG tablet Take 100 mg by mouth.       mirabegron (MYRBETRIQ) 25 mg Tb24 ER tablet Take 25 mg by mouth once daily.       oxybutynin (DITROPAN-XL) 10 MG 24 hr tablet Take 10 mg by mouth once daily.       oxyCODONE (ROXICODONE) 10 mg Tab immediate release tablet Take 1 tablet (10 mg total) by mouth every 6 (six) hours as needed for Pain. Do not take at the same time as your Percocet. 28 tablet 0      oxyCODONE-acetaminophen (PERCOCET) 5-325 mg per tablet TAKE 1 TABLET BY MOUTH ONCE A DAY AS NEEDED FOR PAIN  0     pramipexole (MIRAPEX) 0.25 MG tablet Take by mouth every evening.        pregabalin (LYRICA) 225 MG Cap Take 225 mg by mouth 2 (two) times daily.       simvastatin (ZOCOR) 20 MG tablet Take 20 mg by mouth once daily.       [] sulfamethoxazole-trimethoprim 800-160mg (BACTRIM DS) 800-160 mg Tab Take 1 tablet by mouth 2 (two) times daily. for 5 days 10 tablet 0     tamsulosin (FLOMAX) 0.4 mg Cap Take 0.4 mg by mouth once daily.       UNABLE TO FIND Take by mouth 2 (two) times a day. medication name: Super Beta Prostate Advanced       V-GO 30 Isabel AS DIRECTED , APPLY 1 V-GO DEVICE DAILY  6        Allergies:  Review of patient's allergies indicates:  No Known Allergies    Review of Systems   Constitutional:  Positive for fatigue. Negative for chills and fever.   HENT:  Negative for congestion, sore throat and trouble swallowing.    Eyes:  Negative for pain and visual disturbance.   Respiratory:  Positive for cough, choking and shortness of breath.    Cardiovascular:  Positive for leg swelling. Negative for chest pain and palpitations.   Gastrointestinal:  see HPI.  Endocrine: Negative for polyuria.   Genitourinary:  Negative for difficulty urinating and dysuria.   Musculoskeletal:  Negative for arthralgias and joint swelling.   Skin:  Negative for rash and wound.   Neurological:  Negative for dizziness and headaches.   Aside from above positives, complete 10 point review of systems negative.    Objective:     Vital Signs (Most Recent):  Temp: 99.1 °F (37.3 °C) (22)  Pulse: (!) 112 (22)  Resp: 18 (22)  BP: 135/64 (22)  SpO2: (!) 93 % (22)   Vital Signs (24h Range):  Temp:  [98.1 °F (36.7 °C)-99.1 °F (37.3 °C)] 99.1 °F (37.3 °C)  Pulse:  [] 112  Resp:  [16-18] 18  SpO2:  [93 %-98 %] 93 %  BP: (133-165)/(64-81) 135/64     Physical  examination:  General: obese, ill-appearing  HENT: NCAT, hearing grossly intact, no palpable or visible thyroid mass  Eyes: EOMI, anicteric sclera  LYMH: No cervical, supraclavicular lymphadenopathy   Cardiovascular: Regular rate and rhythm. No murmurs appreciated.  Lungs: Non-labored respirations. Breath sounds equal.   Abdomen: soft, distended, tympanic, non-tender, hypoactive bowel sounds  Extremities: No C/C/E, 2+ dorsalis pedis pulses bilaterally  Neuro: AA&O x 3, no asterixes or tremors  Psych: Appropriate mood and affect.   Skin: No jaundice, rashes or lesions  Musculoskeletal: 5/5 strength bilaterally    Labs:  CBC:   Recent Labs   Lab 10/31/22  0447 11/01/22  0426   WBC 6.76 7.87   HGB 9.6* 9.5*   HCT 29.7* 30.4*    268     CMP:   Recent Labs   Lab 11/01/22 0426   *   CALCIUM 9.6      K 3.8   CO2 25      BUN 41*   CREATININE 2.1*     Coagulation: No results for input(s): PT, INR, APTT in the last 48 hours.    Imaging:  Imaging results within the past 24 hours have been reviewed.  CT C/A/P reviewed.  1.  Multifocal airspace opacities, consistent with multifocal pneumonia.  Follow-up to resolution is recommended.     2.  Trace bilateral pleural effusions.     3.  4-5 mm pulmonary nodules in the right hemithorax.  For multiple solid nodules all <6 mm, Fleischner Society 2017 guidelines recommend no routine follow up for a low risk patient, or follow up with non-contrast chest CT at 12 months after discovery in a high risk patient.     4.  Changes of recent intrathecal catheter insertion.       1.  Gaseous distention of the large bowel.  No CT evidence of bowel obstruction     2.  Right-sided inguinal hernia containing loops of small bowel.  No obstruction identified.     3.  Multiple low-attenuation lesions in the kidneys.  Outpatient follow-up with dedicated renal ultrasound may be obtained for further evaluation.     4.  Additional findings as above.      Assessment:   Mr. Wise  SAM Mccarthy Jr. is an 82 y.o. man with a PMHx sig for HTN, DM2, PBH, chronic pain s/p spinal stimulator on 10/25 who was admitted with multifocal pneumonia.  GI consulted for acute onset dysphagia x2 weeks.  Of note, patient with significant abdominal distension.  Last BM Saturday. +flatus.    Plan:   - Enema today and start bowel regimen  - Repeat KUB  - NPO at midnight for EGD tomorrow morning.        Thank you for your consult.     Satish Galvan MD  Gastroenterology  [facility] ([place])

## 2022-11-01 NOTE — PT/OT/SLP PROGRESS
Occupational Therapy   Treatment    Name: Frandy Mccarthy Jr.  MRN: [id number]  Admitting Diagnosis:  Multifocal pneumonia       Recommendations:     Discharge Recommendations: nursing facility, skilled  Discharge Equipment Recommendations:  bedside commode  Barriers to discharge:  Inaccessible home environment (4 KELI; current functional level)    Assessment:     Frandy Mccarthy Jr. is a 82 y.o. male with a medical diagnosis of Multifocal pneumonia.  He presents with pain in left leg and neck, and edema in left hand. Performance deficits affecting function are weakness, impaired endurance, impaired self care skills, impaired functional mobility, gait instability, impaired balance, decreased lower extremity function, decreased upper extremity function, decreased coordination, decreased safety awareness, pain, decreased ROM, impaired skin, edema, orthopedic precautions. Pt agreeable to participating in therapy upon arrival to room.  Pt required Min A-Mod A, RW with assist of 2 to transfer from EOB <> BSC.  Total A, RW required to perform cheyenne care after bowel movement from BSC.    Overall, pt tolerated therapy well.  He continues to require increased assist for ADLs and functional mobility at this time; however, he is making progress towards goals.  He would continue to benefit from skilled OT services to address problems listed above and increase independence with ADLs.  SNF is recommended upon d/c from acute care to further address deficits and help pt improve overall functional independence.         Rehab Prognosis:  Good; patient would benefit from acute skilled OT services to address these deficits and reach maximum level of function.       Plan:     Patient to be seen 5 x/week to address the above listed problems via self-care/home management, therapeutic activities, therapeutic exercises  Plan of Care Expires: 11/13/22  Plan of Care Reviewed with: patient, spouse    Subjective     Pain/Comfort:  Pain Rating 1:   (pt reported pain in neck and hands)  Pain Rating Post-Intervention 1:  (left leg 5; neck 2)  Pain Addressed 2:  (pain remained consistent)    Objective:     Communicated with: RN Clarice) prior to session.  Patient found HOB elevated with oxygen, mondragon catheter, bed alarm upon OT entry to room.    General Precautions: Standard, aspiration   Orthopedic Precautions:spinal precautions   Braces: N/A  Respiratory Status: Nasal cannula, flow 1 L/min     Occupational Performance:     Bed Mobility:    Supine <> sit: Min A with assist of 2  Scooting: SBA seated towards EOB    Functional Mobility/Transfers:  Sit <> Stand:   Min A with assist of 2 x 1 trial from EOB  Min A with assist of 2 x 1 trial from BSC  Cues for technique and positioning required  Toilet:  Min A with assist of 2 from EOB <> BSC  Cues for safety, technique, and positioning provided  Impaired balance noted  Functional Mobility: Pt took steps from EOB <> BSC and from BSC <> chair with Min A-Mod A and assist of 2  Impaired balance and decreased postural stability observed  No instances of LOB noted    Activities of Daily Living:  Upper Body Dressing: Min A for doffing/donning gown while seated up in chair.  Lines present with cues for technique provided  Lower Body Dressing: Total A for adjusting socks and pulling upwards  Toileting: Total A, RW for performing cheyenne care in standing position after bowel movement from BSC  Pt able to maintain standing balance with CGA, RW      [facility] 6 Click ADL: 14    Treatment & Education:  *Session focused on promoting increased endurance, strength, balance, coordination, and ROM needed for ADLs and functional transfers as part of daily routine.   -pt educated on role of OT in acute care setting  -pt performed sit <> stand transfer; cues for technique provided  -pt performed toilet transfer to BSC; cues for technique and positioning provided  -pt performed toileting from BSC; cues for balance and safety provided  -pt  performed transfer from BS <> chair; cues for technique provided  -pt performed UB exercise; 1 set x 10 reps bilateral shoulder flexion  -pt performed UB dressing task; cues for technique provided\  -POC reviewed     Patient left up in chair with all lines intact, call button in reach, and RN (Carole) notified    GOALS:   Multidisciplinary Problems       Occupational Therapy Goals          Problem: Occupational Therapy    Goal Priority Disciplines Outcome Interventions   Occupational Therapy Goal     OT, PT/OT Ongoing, Progressing    Description: Goals to be met by: 10/30/2022     Patient will increase functional independence with ADLs by performing:    UE Dressing with Minimal Assistance.  Grooming while EOB with Contact Guard Assistance.  Upper body Bathing from  edge of bed with Minimal Assistance.  Sitting at edge of bed x15 minutes with Contact Guard Assistance.  Rolling to Bilateral with Stand-by Assistance and use of bedrail as needed.   Supine to sit with Minimal Assistance using log roll tech.  Toileting with Min A from [facility].  Toilet transfer to [facility] with CGA.                         Time Tracking:     OT Date of Treatment: 11/01/22  OT Start Time: 1315  OT Stop Time: 1350  OT Total Time (min): 35 min    Billable Minutes:Self Care/Home Management 35    OT/KARLA: OT        *co-tx with PTA    PHOENIX Lewis  11/1/2022

## 2022-11-02 ENCOUNTER — PATIENT MESSAGE (OUTPATIENT)
Dept: NEUROSURGERY | Facility: CLINIC | Age: 82
End: 2022-11-02
Payer: MEDICARE

## 2022-11-02 ENCOUNTER — ANESTHESIA (OUTPATIENT)
Dept: ENDOSCOPY | Facility: OTHER | Age: 82
DRG: 177 | End: 2022-11-02
Payer: MEDICARE

## 2022-11-02 LAB
ANION GAP SERPL CALC-SCNC: 7 MMOL/L (ref 8–16)
BASOPHILS # BLD AUTO: 0.02 K/UL (ref 0–0.2)
BASOPHILS NFR BLD: 0.3 % (ref 0–1.9)
BUN SERPL-MCNC: 39 MG/DL (ref 8–23)
CALCIUM SERPL-MCNC: 9.2 MG/DL (ref 8.7–10.5)
CHLORIDE SERPL-SCNC: 111 MMOL/L (ref 95–110)
CO2 SERPL-SCNC: 26 MMOL/L (ref 23–29)
CREAT SERPL-MCNC: 1.8 MG/DL (ref 0.5–1.4)
DIFFERENTIAL METHOD: ABNORMAL
EOSINOPHIL # BLD AUTO: 0.3 K/UL (ref 0–0.5)
EOSINOPHIL NFR BLD: 4.4 % (ref 0–8)
ERYTHROCYTE [DISTWIDTH] IN BLOOD BY AUTOMATED COUNT: 15.8 % (ref 11.5–14.5)
EST. GFR  (NO RACE VARIABLE): 37 ML/MIN/1.73 M^2
GLUCOSE SERPL-MCNC: 202 MG/DL (ref 70–110)
HCT VFR BLD AUTO: 28.6 % (ref 40–54)
HGB BLD-MCNC: 8.9 G/DL (ref 14–18)
IMM GRANULOCYTES # BLD AUTO: 0.04 K/UL (ref 0–0.04)
IMM GRANULOCYTES NFR BLD AUTO: 0.7 % (ref 0–0.5)
LYMPHOCYTES # BLD AUTO: 0.5 K/UL (ref 1–4.8)
LYMPHOCYTES NFR BLD: 8.2 % (ref 18–48)
MAGNESIUM SERPL-MCNC: 2.2 MG/DL (ref 1.6–2.6)
MCH RBC QN AUTO: 30.2 PG (ref 27–31)
MCHC RBC AUTO-ENTMCNC: 31.1 G/DL (ref 32–36)
MCV RBC AUTO: 97 FL (ref 82–98)
MONOCYTES # BLD AUTO: 0.5 K/UL (ref 0.3–1)
MONOCYTES NFR BLD: 8.7 % (ref 4–15)
NEUTROPHILS # BLD AUTO: 4.8 K/UL (ref 1.8–7.7)
NEUTROPHILS NFR BLD: 77.7 % (ref 38–73)
NRBC BLD-RTO: 0 /100 WBC
PHOSPHATE SERPL-MCNC: 2.1 MG/DL (ref 2.7–4.5)
PLATELET # BLD AUTO: 255 K/UL (ref 150–450)
PMV BLD AUTO: 10.1 FL (ref 9.2–12.9)
POCT GLUCOSE: 177 MG/DL (ref 70–110)
POCT GLUCOSE: 190 MG/DL (ref 70–110)
POCT GLUCOSE: 190 MG/DL (ref 70–110)
POCT GLUCOSE: 201 MG/DL (ref 70–110)
POCT GLUCOSE: 211 MG/DL (ref 70–110)
POTASSIUM SERPL-SCNC: 3.5 MMOL/L (ref 3.5–5.1)
RBC # BLD AUTO: 2.95 M/UL (ref 4.6–6.2)
SODIUM SERPL-SCNC: 144 MMOL/L (ref 136–145)
WBC # BLD AUTO: 6.12 K/UL (ref 3.9–12.7)

## 2022-11-02 PROCEDURE — 92526 ORAL FUNCTION THERAPY: CPT

## 2022-11-02 PROCEDURE — 99233 SBSQ HOSP IP/OBS HIGH 50: CPT | Mod: ,,, | Performed by: HOSPITALIST

## 2022-11-02 PROCEDURE — 88312 PR  SPECIAL STAINS,GROUP I: ICD-10-PCS | Mod: 26,,, | Performed by: PATHOLOGY

## 2022-11-02 PROCEDURE — 25000003 PHARM REV CODE 250: Performed by: HOSPITALIST

## 2022-11-02 PROCEDURE — 88305 TISSUE EXAM BY PATHOLOGIST: CPT | Mod: 59 | Performed by: PATHOLOGY

## 2022-11-02 PROCEDURE — 99233 PR SUBSEQUENT HOSPITAL CARE,LEVL III: ICD-10-PCS | Mod: ,,, | Performed by: HOSPITALIST

## 2022-11-02 PROCEDURE — 84100 ASSAY OF PHOSPHORUS: CPT | Performed by: INTERNAL MEDICINE

## 2022-11-02 PROCEDURE — 63600175 PHARM REV CODE 636 W HCPCS: Performed by: NURSE ANESTHETIST, CERTIFIED REGISTERED

## 2022-11-02 PROCEDURE — 11000001 HC ACUTE MED/SURG PRIVATE ROOM

## 2022-11-02 PROCEDURE — 63600175 PHARM REV CODE 636 W HCPCS: Performed by: INTERNAL MEDICINE

## 2022-11-02 PROCEDURE — 25000003 PHARM REV CODE 250: Performed by: NURSE ANESTHETIST, CERTIFIED REGISTERED

## 2022-11-02 PROCEDURE — 37000009 HC ANESTHESIA EA ADD 15 MINS: Performed by: INTERNAL MEDICINE

## 2022-11-02 PROCEDURE — 88305 TISSUE EXAM BY PATHOLOGIST: CPT | Mod: 26,,, | Performed by: PATHOLOGY

## 2022-11-02 PROCEDURE — 25000242 PHARM REV CODE 250 ALT 637 W/ HCPCS: Performed by: INTERNAL MEDICINE

## 2022-11-02 PROCEDURE — 97530 THERAPEUTIC ACTIVITIES: CPT | Mod: CQ

## 2022-11-02 PROCEDURE — 25000003 PHARM REV CODE 250: Performed by: INTERNAL MEDICINE

## 2022-11-02 PROCEDURE — 88312 SPECIAL STAINS GROUP 1: CPT | Performed by: PATHOLOGY

## 2022-11-02 PROCEDURE — 43239 EGD BIOPSY SINGLE/MULTIPLE: CPT | Performed by: INTERNAL MEDICINE

## 2022-11-02 PROCEDURE — 80048 BASIC METABOLIC PNL TOTAL CA: CPT | Performed by: INTERNAL MEDICINE

## 2022-11-02 PROCEDURE — 83735 ASSAY OF MAGNESIUM: CPT | Performed by: INTERNAL MEDICINE

## 2022-11-02 PROCEDURE — 85025 COMPLETE CBC W/AUTO DIFF WBC: CPT | Performed by: INTERNAL MEDICINE

## 2022-11-02 PROCEDURE — 36415 COLL VENOUS BLD VENIPUNCTURE: CPT | Performed by: INTERNAL MEDICINE

## 2022-11-02 PROCEDURE — 63600175 PHARM REV CODE 636 W HCPCS: Performed by: ANESTHESIOLOGY

## 2022-11-02 PROCEDURE — 88312 SPECIAL STAINS GROUP 1: CPT | Mod: 26,,, | Performed by: PATHOLOGY

## 2022-11-02 PROCEDURE — 27201012 HC FORCEPS, HOT/COLD, DISP: Performed by: INTERNAL MEDICINE

## 2022-11-02 PROCEDURE — 88305 TISSUE EXAM BY PATHOLOGIST: ICD-10-PCS | Mod: 26,,, | Performed by: PATHOLOGY

## 2022-11-02 PROCEDURE — 97110 THERAPEUTIC EXERCISES: CPT | Mod: CQ

## 2022-11-02 PROCEDURE — 37000008 HC ANESTHESIA 1ST 15 MINUTES: Performed by: INTERNAL MEDICINE

## 2022-11-02 RX ORDER — SODIUM CHLORIDE 450 MG/100ML
INJECTION, SOLUTION INTRAVENOUS CONTINUOUS
Status: DISCONTINUED | OUTPATIENT
Start: 2022-11-02 | End: 2022-11-04

## 2022-11-02 RX ORDER — LIDOCAINE HYDROCHLORIDE 20 MG/ML
INJECTION INTRAVENOUS
Status: DISCONTINUED | OUTPATIENT
Start: 2022-11-02 | End: 2022-11-02

## 2022-11-02 RX ORDER — FLUCONAZOLE 2 MG/ML
200 INJECTION, SOLUTION INTRAVENOUS ONCE
Status: COMPLETED | OUTPATIENT
Start: 2022-11-02 | End: 2022-11-02

## 2022-11-02 RX ORDER — PROPOFOL 10 MG/ML
VIAL (ML) INTRAVENOUS
Status: DISCONTINUED | OUTPATIENT
Start: 2022-11-02 | End: 2022-11-02

## 2022-11-02 RX ORDER — PANTOPRAZOLE SODIUM 40 MG/1
40 TABLET, DELAYED RELEASE ORAL DAILY
Status: DISCONTINUED | OUTPATIENT
Start: 2022-11-02 | End: 2022-11-09 | Stop reason: HOSPADM

## 2022-11-02 RX ADMIN — AMPICILLIN SODIUM AND SULBACTAM SODIUM 3 G: 2; 1 INJECTION, POWDER, FOR SOLUTION INTRAMUSCULAR; INTRAVENOUS at 01:11

## 2022-11-02 RX ADMIN — FINASTERIDE 5 MG: 5 TABLET, FILM COATED ORAL at 10:11

## 2022-11-02 RX ADMIN — AMPICILLIN SODIUM AND SULBACTAM SODIUM 3 G: 2; 1 INJECTION, POWDER, FOR SOLUTION INTRAMUSCULAR; INTRAVENOUS at 08:11

## 2022-11-02 RX ADMIN — PREGABALIN 225 MG: 75 CAPSULE ORAL at 08:11

## 2022-11-02 RX ADMIN — LIDOCAINE HYDROCHLORIDE 100 MG: 20 INJECTION, SOLUTION INTRAVENOUS at 08:11

## 2022-11-02 RX ADMIN — METOPROLOL TARTRATE 100 MG: 50 TABLET, FILM COATED ORAL at 10:11

## 2022-11-02 RX ADMIN — TAMSULOSIN HYDROCHLORIDE 0.4 MG: 0.4 CAPSULE ORAL at 10:11

## 2022-11-02 RX ADMIN — SODIUM CHLORIDE: 0.45 INJECTION, SOLUTION INTRAVENOUS at 04:11

## 2022-11-02 RX ADMIN — MUPIROCIN: 20 OINTMENT TOPICAL at 10:11

## 2022-11-02 RX ADMIN — FLUCONAZOLE 200 MG: 2 INJECTION, SOLUTION INTRAVENOUS at 10:11

## 2022-11-02 RX ADMIN — PROPOFOL 60 MG: 10 INJECTION, EMULSION INTRAVENOUS at 08:11

## 2022-11-02 RX ADMIN — PANTOPRAZOLE SODIUM 40 MG: 40 TABLET, DELAYED RELEASE ORAL at 10:11

## 2022-11-02 RX ADMIN — PREGABALIN 225 MG: 75 CAPSULE ORAL at 10:11

## 2022-11-02 RX ADMIN — PRAMIPEXOLE DIHYDROCHLORIDE 0.25 MG: 0.25 TABLET ORAL at 08:11

## 2022-11-02 RX ADMIN — SODIUM CHLORIDE: 0.9 INJECTION, SOLUTION INTRAVENOUS at 01:11

## 2022-11-02 RX ADMIN — HEPARIN SODIUM 5000 UNITS: 5000 INJECTION INTRAVENOUS; SUBCUTANEOUS at 01:11

## 2022-11-02 RX ADMIN — AMPICILLIN SODIUM AND SULBACTAM SODIUM 3 G: 2; 1 INJECTION, POWDER, FOR SOLUTION INTRAMUSCULAR; INTRAVENOUS at 04:11

## 2022-11-02 RX ADMIN — SODIUM CHLORIDE: 0.9 INJECTION, SOLUTION INTRAVENOUS at 04:11

## 2022-11-02 RX ADMIN — ATORVASTATIN CALCIUM 10 MG: 10 TABLET, FILM COATED ORAL at 10:11

## 2022-11-02 RX ADMIN — PROPOFOL 20 MG: 10 INJECTION, EMULSION INTRAVENOUS at 08:11

## 2022-11-02 RX ADMIN — DULOXETINE 30 MG: 30 CAPSULE, DELAYED RELEASE ORAL at 10:11

## 2022-11-02 RX ADMIN — HEPARIN SODIUM 5000 UNITS: 5000 INJECTION INTRAVENOUS; SUBCUTANEOUS at 09:11

## 2022-11-02 RX ADMIN — SODIUM CHLORIDE, SODIUM LACTATE, POTASSIUM CHLORIDE, AND CALCIUM CHLORIDE: .6; .31; .03; .02 INJECTION, SOLUTION INTRAVENOUS at 07:11

## 2022-11-02 RX ADMIN — MUPIROCIN: 20 OINTMENT TOPICAL at 08:11

## 2022-11-02 RX ADMIN — GLYCOPYRROLATE 0.2 MG: 0.2 INJECTION, SOLUTION INTRAMUSCULAR; INTRAVITREAL at 08:11

## 2022-11-02 RX ADMIN — FENOFIBRATE 160 MG: 160 TABLET ORAL at 01:11

## 2022-11-02 NOTE — PT/OT/SLP PROGRESS
"Occupational Therapy      Patient Name:  Frandy Mccarthy Jr.   MRN:  [id number]    Patient not seen today secondary to Patient fatigue after having EGD and PT this day; stating, "You got to be joking; he wore me out" in regards to PTA that seen Pt. Earlier this day. Will follow-up tomorrow, 11/3/2022.    11/2/2022  "

## 2022-11-02 NOTE — ANESTHESIA POSTPROCEDURE EVALUATION
Anesthesia Post Evaluation    Patient: Frandy Mccarthy JrChava    Procedure(s) Performed: Procedure(s) (LRB):  EGD (ESOPHAGOGASTRODUODENOSCOPY) (N/A)    Final Anesthesia Type: general      Patient location during evaluation: PACU  Patient participation: Yes- Able to Participate  Level of consciousness: awake and alert  Post-procedure vital signs: reviewed and stable  Pain management: adequate  Airway patency: patent    PONV status at discharge: No PONV  Anesthetic complications: no      Cardiovascular status: hemodynamically stable  Respiratory status: unassisted  Hydration status: euvolemic  Follow-up not needed.          Vitals Value Taken Time   /72 11/02/22 1139   Temp 36.4 °C (97.6 °F) 11/02/22 1139   Pulse 93 11/02/22 1139   Resp 16 11/02/22 1139   SpO2 95 % 11/02/22 1139         Event Time   Out of Recovery 11/02/2022 09:58:52         Pain/Maryann Score: Pain Rating Prior to Med Admin: 0 (11/1/2022 11:24 AM)  Pain Rating Post Med Admin: 3 (11/1/2022  2:15 AM)  Maryann Score: 9 (11/2/2022  9:25 AM)

## 2022-11-02 NOTE — PLAN OF CARE
Problem: Adult Inpatient Plan of Care  Goal: Plan of Care Review  Outcome: Ongoing, Progressing  Goal: Patient-Specific Goal (Individualized)  Outcome: Ongoing, Progressing  Goal: Absence of Hospital-Acquired Illness or Injury  Outcome: Ongoing, Progressing  Goal: Optimal Comfort and Wellbeing  Outcome: Ongoing, Progressing  Goal: Readiness for Transition of Care  Outcome: Ongoing, Progressing     Problem: Diabetes Comorbidity  Goal: Blood Glucose Level Within Targeted Range  Outcome: Ongoing, Progressing     Problem: Fluid Imbalance (Pneumonia)  Goal: Fluid Balance  Outcome: Ongoing, Progressing     Problem: Infection (Pneumonia)  Goal: Resolution of Infection Signs and Symptoms  Outcome: Ongoing, Progressing     Problem: Respiratory Compromise (Pneumonia)  Goal: Effective Oxygenation and Ventilation  Outcome: Ongoing, Progressing     Problem: Skin Injury Risk Increased  Goal: Skin Health and Integrity  Outcome: Ongoing, Progressing     Problem: Infection  Goal: Absence of Infection Signs and Symptoms  Outcome: Ongoing, Progressing     Problem: Pain Acute  Goal: Acceptable Pain Control and Functional Ability  Outcome: Ongoing, Progressing     Problem: Impaired Wound Healing  Goal: Optimal Wound Healing  Outcome: Ongoing, Progressing

## 2022-11-02 NOTE — PROGRESS NOTES
[facility] Medicine  Progress Note    Patient Name: Frandy Mccarthy Jr.  MRN: [id number]  Patient Class: IP- Inpatient   Admission Date: 10/29/2022  Length of Stay: 2 days  Attending Physician: Marie Benavides MD  Primary Care Provider: Jose Mann MD        Subjective:     Principal Problem:Multifocal pneumonia        HPI:  Mr. Mccarthy is an 82/M with PMH HTN, HLD, DMII with neuropathy, BPH, chronic pain s/p spinal stimulator implantation 10/25 who presented to [facility] 10/29 with a 5 day history of progressively worsening progressively worsening cough, weakness, and dysphagia with associated constipation since his procedure. The patient has been unable to tolerate medications, liquids, or solids for the last 5 days.  Per his wife at bedside, he has no meaningful oral intake and he has become significantly weaker since his surgery.  He is now unable to rise out of bed without significant assistance.  The patient is able to briefly awaken during the interview and corroborate that he has cough, globus sensation with swallowing, and significant dysphagia, but denies odynophagia or neck swelling.  He denies hoarseness of voice, reflux, or fever.  Whenever the patient attempts to swallow liquids or medications, he has coughing and sputtering. ED evaluation was significant for SpO2 93-95% on RA, diffuse debility, and CT Chest/Abd/Pelvis showing multifocal pneumonia, large bowel distention and R-sided inguinal hernia with loops of small bowel without obstruction, and multiple low-attenuation lesions in the kidneys. He was started on ampicillin-sulbactam and hospital medicine was contacted for admission.      Overview/Hospital Course:  Admitted with dysphagia and multifocal pneumonia. NPO except meds / ice chips. Started ampicillin-sulbactam and SLP consulted. PT/OT consulted given diffuse weakness. MBSS performed 10/31 showing severe esophageal dysphagia; started full liquids and GI  consulted for further evaluation recommendations. Void trial initiated from difficulty with urinating noted after procedure 10/25.      Interval History: No acute events, Overnight had urinary retention and unable to void and patient had in/out cath with >600 mL out. SOB better.    Review of Systems   Constitutional:  Negative for chills and fever.   Respiratory:  Positive for cough and shortness of breath.    Gastrointestinal:  Positive for abdominal distention. Negative for abdominal pain, nausea and vomiting.   Objective:     Vital Signs (Most Recent):  Temp: 99.1 °F (37.3 °C) (11/01/22 1851)  Pulse: 79 (11/01/22 1851)  Resp: 18 (11/01/22 1851)  BP: 134/60 (11/01/22 1851)  SpO2: 95 % (11/01/22 1933) Vital Signs (24h Range):  Temp:  [98 °F (36.7 °C)-101.2 °F (38.4 °C)] 99.1 °F (37.3 °C)  Pulse:  [] 79  Resp:  [16-18] 18  SpO2:  [93 %-98 %] 95 %  BP: (109-157)/(55-76) 134/60     Weight: 93.9 kg (207 lb)  Body mass index is 30.57 kg/m².    Intake/Output Summary (Last 24 hours) at 11/1/2022 2115  Last data filed at 11/1/2022 1844  Gross per 24 hour   Intake --   Output 1250 ml   Net -1250 ml        Physical Exam  Vitals and nursing note reviewed.   Constitutional:       General: He is not in acute distress.     Appearance: He is well-developed. He is obese.   HENT:      Head: Normocephalic and atraumatic.      Nose: Nose normal.   Eyes:      General:         Right eye: No discharge.         Left eye: No discharge.      Extraocular Movements: Extraocular movements intact.      Conjunctiva/sclera: Conjunctivae normal.   Cardiovascular:      Rate and Rhythm: Normal rate.      Pulses: Normal pulses.   Pulmonary:      Effort: Pulmonary effort is normal. No respiratory distress.      Comments: R-sided crackles improved from prior.  Abdominal:      General: There is distension.      Palpations: Abdomen is soft.      Tenderness: There is no abdominal tenderness.   Musculoskeletal:         General: Normal range of  motion.      Right lower leg: No edema.      Left lower leg: No edema.   Skin:     General: Skin is warm and dry.   Neurological:      Mental Status: He is alert and oriented to person, place, and time.       Significant Labs:   CBC:  Recent Labs   Lab 10/30/22  0426 10/31/22  0447 11/01/22  0426   WBC 6.93 6.76 7.87   HGB 10.4* 9.6* 9.5*   HCT 33.0* 29.7* 30.4*    238 268   GRAN 82.2*  5.7 79.3*  5.4 83.1*  6.5   LYMPH 6.1*  0.4* 9.0*  0.6* 5.6*  0.4*   MONO 9.4  0.7 10.4  0.7 10.0  0.8   EOS 0.1 0.1 0.1   BASO 0.02 0.02 0.01     BMP:  Recent Labs   Lab 10/30/22  0426 10/31/22  0447 11/01/22  0426    143 144   K 4.6 3.9 3.8    110 109   CO2 25 25 25   BUN 49* 44* 41*   CREATININE 2.6* 2.3* 2.1*   * 160* 206*   CALCIUM 9.2 9.0 9.6   MG 2.2 2.3 2.3   PHOS 2.6* 1.8* 2.1*       Significant Imaging:   All imaging reviewed.      Assessment/Plan:      * Multifocal pneumonia  Dysphagia, debility  - Several days of worsening debility, weakness, decreased PO intake in the setting of recent procedure and multifocal pneumonia on CT with dysphagia.  - Continue ampicillin-sulbactam 3g IV q12hr.  - MBSS demonstrating severe esophageal dysmotility  - Full liquid diet for time being  - Consulted GI, plan for EGD in am  - IVFs with LR at 75mL/hr.    Benign prostatic hyperplasia with urinary frequency  Urinary retention  - Continue finasteride 5mg PO daily, oxybutynin 10mg PO daily, tamsulosin 0.4mg PO as BID. Family brought mirabegron but did not have packaging with dosing information to confirm per pharmacy; resume if labeling can be brought from home.  - Mondragon in place since procedure 10/25; void trial done on 10/31  - Urinary retention overnight, place mondragon and consult Urology    Chronic pain  Diabetic polyneuropathy  - Continue duloxetine 30mg PO daily, pramipexole 0.25mg PO qHS, pregabalin 225mg PO BID, oxycodone 10mg PO q4hr PRN.    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  - Continue low-dose sliding scale insulin aspart 0-5U subQ q6hr PRN.    Essential hypertension  - Continue metoprolol 100mg PO daily.    Hyperlipidemia  Carotid artery disease  - Continue fenofibrate 160mg PO daily, atorvastatin 10mg PO daily.    VTE Risk Mitigation (From admission, onward)         Ordered     heparin (porcine) injection 5,000 Units  Every 8 hours         10/29/22 1902     IP VTE HIGH RISK PATIENT  Once         10/29/22 1902                    Marie Benavides MD  Department of Hospital Medicine   [facility] ([place])

## 2022-11-02 NOTE — SUBJECTIVE & OBJECTIVE
Interval History: No acute events, Overnight had urinary retention and unable to void and patient had in/out cath with >600 mL out. SOB better.    Review of Systems   Constitutional:  Negative for chills and fever.   Respiratory:  Positive for cough and shortness of breath.    Gastrointestinal:  Positive for abdominal distention. Negative for abdominal pain, nausea and vomiting.   Objective:     Vital Signs (Most Recent):  Temp: 99.1 °F (37.3 °C) (11/01/22 1851)  Pulse: 79 (11/01/22 1851)  Resp: 18 (11/01/22 1851)  BP: 134/60 (11/01/22 1851)  SpO2: 95 % (11/01/22 1933) Vital Signs (24h Range):  Temp:  [98 °F (36.7 °C)-101.2 °F (38.4 °C)] 99.1 °F (37.3 °C)  Pulse:  [] 79  Resp:  [16-18] 18  SpO2:  [93 %-98 %] 95 %  BP: (109-157)/(55-76) 134/60     Weight: 93.9 kg (207 lb)  Body mass index is 30.57 kg/m².    Intake/Output Summary (Last 24 hours) at 11/1/2022 2115  Last data filed at 11/1/2022 1844  Gross per 24 hour   Intake --   Output 1250 ml   Net -1250 ml        Physical Exam  Vitals and nursing note reviewed.   Constitutional:       General: He is not in acute distress.     Appearance: He is well-developed. He is obese.   HENT:      Head: Normocephalic and atraumatic.      Nose: Nose normal.   Eyes:      General:         Right eye: No discharge.         Left eye: No discharge.      Extraocular Movements: Extraocular movements intact.      Conjunctiva/sclera: Conjunctivae normal.   Cardiovascular:      Rate and Rhythm: Normal rate.      Pulses: Normal pulses.   Pulmonary:      Effort: Pulmonary effort is normal. No respiratory distress.      Comments: R-sided crackles improved from prior.  Abdominal:      General: There is distension.      Palpations: Abdomen is soft.      Tenderness: There is no abdominal tenderness.   Musculoskeletal:         General: Normal range of motion.      Right lower leg: No edema.      Left lower leg: No edema.   Skin:     General: Skin is warm and dry.   Neurological:      Mental  Status: He is alert and oriented to person, place, and time.       Significant Labs:   CBC:  Recent Labs   Lab 10/30/22  0426 10/31/22  0447 11/01/22  0426   WBC 6.93 6.76 7.87   HGB 10.4* 9.6* 9.5*   HCT 33.0* 29.7* 30.4*    238 268   GRAN 82.2*  5.7 79.3*  5.4 83.1*  6.5   LYMPH 6.1*  0.4* 9.0*  0.6* 5.6*  0.4*   MONO 9.4  0.7 10.4  0.7 10.0  0.8   EOS 0.1 0.1 0.1   BASO 0.02 0.02 0.01     BMP:  Recent Labs   Lab 10/30/22  0426 10/31/22  0447 11/01/22  0426    143 144   K 4.6 3.9 3.8    110 109   CO2 25 25 25   BUN 49* 44* 41*   CREATININE 2.6* 2.3* 2.1*   * 160* 206*   CALCIUM 9.2 9.0 9.6   MG 2.2 2.3 2.3   PHOS 2.6* 1.8* 2.1*       Significant Imaging:   All imaging reviewed.

## 2022-11-02 NOTE — PT/OT/SLP PROGRESS
Physical Therapy Treatment    Patient Name:  Frandy Mccarthy Jr.   MRN:  [id number]    Recommendations:     Discharge Recommendations:  nursing facility, skilled   Discharge Equipment Recommendations: bedside commode   Barriers to discharge: Decreased caregiver support    Assessment:     Frandy Mccarthy Jr. is a 82 y.o. male admitted with a medical diagnosis of Multifocal pneumonia.  He presents with the following impairments/functional limitations:  weakness, pain, gait instability, impaired balance, impaired endurance, impaired functional mobility, impaired self care skills, decreased coordination, decreased lower extremity function, decreased ROM, decreased safety awareness, decreased upper extremity function.    Supine<>sit with maxA  Sit>stand with modA and RW from partially elevated bed (x 2 trials)  Amb 3 lat steps to R with RW and modA for balance  Static sitting EOB x 25 mins with SBA  Static stand x 60 secs with RW and Dominic  Pt able to stand and take a few lat steps today, with good tolerance for sitting EOB  Rec SNF    Rehab Prognosis: Good and Fair; patient would benefit from acute skilled PT services to address these deficits and reach maximum level of function.    Recent Surgery: Procedure(s) (LRB):  EGD (ESOPHAGOGASTRODUODENOSCOPY) (N/A) Day of Surgery    Plan:     During this hospitalization, patient to be seen 5 x/week to address the identified rehab impairments via gait training, therapeutic activities, therapeutic exercises, neuromuscular re-education and progress toward the following goals:    Plan of Care Expires:  11/30/22    Subjective     Chief Complaint: L arm and hand pain/swelling; insurance says my preferred SNF's are out of network  Patient/Family Comments/goals: to get into a SNF and go  Pain/Comfort:  Pain Rating 1: other (see comments) (c/o LUE pain, not rated)  Location - Side 1: Left  Location - Orientation 1: generalized  Location 1: arm (and hand)  Pain Addressed 1: Reposition,  Distraction, Cessation of Activity  Pain Rating Post-Intervention 1: other (see comments) (no change)      Objective:     Communicated with nurse Joya prior to session.  Patient found HOB elevated with peripheral IV, oxygen, mondragon catheter upon PT entry to room.     General Precautions: Standard, aspiration, diabetic, fall   Orthopedic Precautions:spinal precautions   Braces:    Respiratory Status: Nasal cannula, flow 2 L/min     Functional Mobility:  Bed Mobility:     Supine to Sit: maximal assistance  Sit to Supine: maximal assistance  Transfers:     Sit to Stand:  moderate assistance with rolling walker  Gait: 3 lat steps to R with RW and modA for balance      AM-PAC 6 CLICK MOBILITY  Turning over in bed (including adjusting bedclothes, sheets and blankets)?: 2  Sitting down on and standing up from a chair with arms (e.g., wheelchair, bedside commode, etc.): 2  Moving from lying on back to sitting on the side of the bed?: 2  Moving to and from a bed to a chair (including a wheelchair)?: 2  Need to walk in hospital room?: 2  Climbing 3-5 steps with a railing?: 1  Basic Mobility Total Score: 11       Treatment & Education:  Static sitting EOB x 25 mins with SBA  Static stand x 60 secs with RW and Dominic  Seated therex B LE: LAQ x 10  Supine therex B LE: AP x 10    Patient left HOB elevated with all lines intact, call button in reach, and nurse Mahnaz notified..    GOALS:   Multidisciplinary Problems       Physical Therapy Goals          Problem: Physical Therapy    Goal Priority Disciplines Outcome Goal Variances Interventions   Physical Therapy Goal     PT, PT/OT Ongoing, Progressing     Description: Goals to be met by: 2022    Patient will increase functional independence with mobility by performin. Sit<>stand with min assist with RW.  2. Gait x 50 feet with RW with SBA.  3. Ascend/descend 5 step(s) with least restrictive assistive device and CGA.                           Time Tracking:     PT  Received On: 11/02/22  PT Start Time: 1405     PT Stop Time: 1453  PT Total Time (min): 48 min     Billable Minutes: Therapeutic Activity 37 and Therapeutic Exercise 11    Treatment Type: Treatment  PT/PTA: PTA     PTA Visit Number: 3     11/02/2022

## 2022-11-02 NOTE — ANESTHESIA PREPROCEDURE EVALUATION
11/02/2022  Frandy Mccarthy Jr. is a 82 y.o., male.      Pre-op Assessment    I have reviewed the Patient Summary Reports.     I have reviewed the Nursing Notes.    I have reviewed the Medications.     Review of Systems  Anesthesia Hx:  Denies Family Hx of Anesthesia complications.   Denies Personal Hx of Anesthesia complications.   Social:  Non-Smoker    Hematology/Oncology:  Hematology Normal   Oncology Normal     EENT/Dental:EENT/Dental Normal   Cardiovascular:   Hypertension PVD    Pulmonary:   Pneumonia    Renal/:   Chronic Renal Disease, CKD    Hepatic/GI:  Hepatic/GI Normal    Musculoskeletal:   Arthritis     Neurological:   Neuromuscular Disease,    Endocrine:   Diabetes Hyperthyroidism    Dermatological:  Skin Normal    Psych:  Psychiatric Normal           Physical Exam  General: Well nourished, Cooperative, Alert and Oriented    Airway:  Mallampati: III   Mouth Opening: Normal  TM Distance: Normal  Tongue: Normal  Neck ROM: Normal ROM    Dental:  Intact        Anesthesia Plan  Type of Anesthesia, risks & benefits discussed:    Anesthesia Type: MAC  Intra-op Monitoring Plan: Standard ASA Monitors  Post Op Pain Control Plan: multimodal analgesia  Informed Consent: Informed consent signed with the Patient and all parties understand the risks and agree with anesthesia plan.  All questions answered.   ASA Score: 3    Ready For Surgery From Anesthesia Perspective.     .

## 2022-11-02 NOTE — TRANSFER OF CARE
"Anesthesia Transfer of Care Note    Patient: Frandy Mccarthy Jr.    Procedure(s) Performed: Procedure(s) (LRB):  EGD (ESOPHAGOGASTRODUODENOSCOPY) (N/A)    Patient location: PACU    Anesthesia Type: general    Transport from OR: Transported from OR on 2-3 L/min O2 by NC with adequate spontaneous ventilation    Post pain: adequate analgesia    Post assessment: no apparent anesthetic complications    Post vital signs: stable    Level of consciousness: awake    Nausea/Vomiting: no nausea/vomiting    Complications: none    Transfer of care protocol was followed      Last vitals:   Visit Vitals  /64 (BP Location: Right arm, Patient Position: Lying)   Pulse 83   Temp 36.9 °C (98.4 °F) (Oral)   Resp 18   Ht 5' 9" (1.753 m)   Wt 93.9 kg (207 lb)   SpO2 (!) 94%   BMI 30.57 kg/m²     "

## 2022-11-02 NOTE — [employer] PATIENT INSTRUCTIONS
Discharge Summary/Instructions after an Endoscopic Procedure  Patient Name: Frandy Mccarthy  Patient MRN: [id number]  Patient YOB: 1940 Wednesday, November 2, 2022  Satish Galvan MD  RESTRICTIONS:  During your procedure today, you received medications for sedation.  These   medications may affect your judgment, balance and coordination.  Therefore,   for 24 hours, you have the following restrictions:   - DO NOT drive a car, operate machinery, make legal/financial decisions,   sign important papers or drink alcohol.    ACTIVITY:  Today: no heavy lifting, straining or running due to procedural   sedation/anesthesia.  The following day: return to full activity including work.  DIET:  Eat and drink normally unless instructed otherwise.     TREATMENT FOR COMMON SIDE EFFECTS:  - Mild abdominal pain, nausea, belching, bloating or excessive gas:  rest,   eat lightly and use a heating pad.  - Sore Throat: treat with throat lozenges and/or gargle with warm salt   water.  - Because air was used during the procedure, expelling large amounts of air   from your rectum or belching is normal.  - If a bowel prep was taken, you may not have a bowel movement for 1-3 days.    This is normal.  SYMPTOMS TO WATCH FOR AND REPORT TO YOUR PHYSICIAN:  1. Abdominal pain or bloating, other than gas cramps.  2. Chest pain.  3. Back pain.  4. Signs of infection such as: chills or fever occurring within 24 hours   after the procedure.  5. Rectal bleeding, which would show as bright red, maroon, or black stools.   (A tablespoon of blood from the rectum is not serious, especially if   hemorrhoids are present.)  6. Vomiting.  7. Weakness or dizziness.  GO DIRECTLY TO THE NEAREST EMERGENCY ROOM IF YOU HAVE ANY OF THE FOLLOWING:      Difficulty breathing              Chills and/or fever over 101 F   Persistent vomiting and/or vomiting blood   Severe abdominal pain   Severe chest pain   Black, tarry stools   Bleeding- more than one  tablespoon   Any other symptom or condition that you feel may need urgent attention  Your doctor recommends these additional instructions:  If any biopsies were taken, your doctors clinic will contact you in 1 to 2   weeks with any results.  - Return patient to hospital [facility] for ongoing care.   - Resume previous diet.   - Continue present medications.   - Await pathology results.   - Diflucan (fluconazole) 100 mg PO daily for 2 weeks.   - Return to GI office in 3 weeks.   - Use a proton pump inhibitor PO daily.  For questions, problems or results please call your physician - Satish Galvan MD at Work:  [phone].  [facility], EMERGENCY ROOM PHONE NUMBER: [phone], [facility]   [phone].  IF A COMPLICATION OR EMERGENCY SITUATION ARISES AND YOU ARE UNABLE TO REACH   YOUR PHYSICIAN - GO DIRECTLY TO THE EMERGENCY ROOM.  Satish Galvan MD  11/2/2022 8:51:24 AM  This report has been verified and signed electronically.  Dear patient,  As a result of recent federal legislation (The Federal Cures Act), you may   receive lab or pathology results from your procedure in your [facility]   account before your physician is able to contact you. Your physician or   their representative will relay the results to you with their   recommendations at their soonest availability.  Thank you,  [employer]

## 2022-11-02 NOTE — PLAN OF CARE
Problem: Adult Inpatient Plan of Care  Goal: Plan of Care Review  Outcome: Ongoing, Progressing  Goal: Patient-Specific Goal (Individualized)  Outcome: Ongoing, Progressing  Goal: Absence of Hospital-Acquired Illness or Injury  Outcome: Ongoing, Progressing  Goal: Optimal Comfort and Wellbeing  Outcome: Ongoing, Progressing  Goal: Readiness for Transition of Care  Outcome: Ongoing, Progressing     Problem: Diabetes Comorbidity  Goal: Blood Glucose Level Within Targeted Range  Outcome: Ongoing, Progressing     Problem: Infection (Pneumonia)  Goal: Resolution of Infection Signs and Symptoms  Outcome: Ongoing, Progressing     Problem: Fluid Imbalance (Pneumonia)  Goal: Fluid Balance  Outcome: Ongoing, Progressing     Problem: Respiratory Compromise (Pneumonia)  Goal: Effective Oxygenation and Ventilation  Outcome: Ongoing, Progressing     Problem: Infection  Goal: Absence of Infection Signs and Symptoms  Outcome: Ongoing, Progressing     Problem: Pain Acute  Goal: Acceptable Pain Control and Functional Ability  Outcome: Ongoing, Progressing     Problem: Impaired Wound Healing  Goal: Optimal Wound Healing  Outcome: Ongoing, Progressing

## 2022-11-02 NOTE — PLAN OF CARE
Patient has been accepted to [facility] and [facility]. Wife request placement at [facility] only. Bed available on Monday 11/7.   11/02/22 1352   Post-Acute Status   Post-Acute Authorization Placement   Post-Acute Placement Status Pending medical clearance/testing   Patient choice form signed by patient/caregiver List with quality metrics by geographic area provided;List from CMS Compare;List from System Post-Acute Care   Discharge Plan   Discharge Plan A Skilled Nursing Facility   Discharge Plan B Home Health

## 2022-11-02 NOTE — PROGRESS NOTES
Pharmacist Renal Dose Adjustment Note    Frandy Mccarthy Jr. is a 82 y.o. male being treated with the medication unasyn    Patient Data:    Vital Signs (Most Recent):  Temp: 97.9 °F (36.6 °C) (11/02/22 0855)  Pulse: 94 (11/02/22 0910)  Resp: 18 (11/02/22 0910)  BP: (!) 143/67 (11/02/22 0910)  SpO2: 96 % (11/02/22 0910)   Vital Signs (72h Range):  Temp:  [97.9 °F (36.6 °C)-101.2 °F (38.4 °C)]   Pulse:  []   Resp:  [16-20]   BP: (109-168)/(55-81)   SpO2:  [91 %-98 %]      Recent Labs   Lab 10/31/22  0447 11/01/22  0426 11/02/22  0519   CREATININE 2.3* 2.1* 1.8*     Serum creatinine: 1.8 mg/dL (H) 11/02/22 0519  Estimated creatinine clearance: 35.2 mL/min (A)     Medication Dose Route Frequency   Previous Order Unasyn 3g q12h   New Order Unasyn 3g q8h     Renal dose adjustments performed as noted above.  We will continue monitoring and adjusting as necessary.    Pharmacist: Aruna Franco, PharmD  [phone]

## 2022-11-02 NOTE — PT/OT/SLP PROGRESS
Physical Therapy      Patient Name:  Frandy Mccarthy Jr.   MRN:  [id number]    Patient not seen today secondary to post-EGD, patient feeling irritation in esophagus and unwilling to participate in therapy at this time . Will follow-up later today as scheduling permits.

## 2022-11-02 NOTE — ASSESSMENT & PLAN NOTE
Urinary retention  - Continue finasteride 5mg PO daily, oxybutynin 10mg PO daily, tamsulosin 0.4mg PO as BID. Family brought mirabegron but did not have packaging with dosing information to confirm per pharmacy; resume if labeling can be brought from home.  - Mondragon in place since procedure 10/25; void trial done on 10/31  - Urinary retention overnight, place mondragon and consult Urology

## 2022-11-02 NOTE — PT/OT/SLP PROGRESS
"Speech Language Pathology Treatment    Patient Name:  Frandy Mccarthy Jr.   MRN:  [id number]  Admitting Diagnosis: Multifocal pneumonia    Recommendations:                 General Recommendations:    Speech pathology to follow 4-6x/week for ongoing assessment and treatment of pharyngoesophageal dysphagia.  Recommend GI consult 2/2 significant esophageal dysmotility.      Diet recommendations:   , Full liquids, Thin liquids - IDDSI Level 0      Aspiration Precautions:   1. 1:1 SUPERVISION   2. Sit FULLY upright at 90 degrees   3. Remain upright for 30 minutes before and after meal   4. Slight NECK EXTENSION when swallowing   5. SMALL SIPS (2-5ml)   6. NO STRAWS   7. Re-swallow x2-3 per sip   8. Frequent oral care to reduce oral bacteria   9. Meds whole with small sips of water     General Precautions: Standard, aspiration     Communication strategies:  n/a    Subjective     Pt s/p EGD.  RN reporting pt with significant coughing when staff feeding lunch today.  Pt reclined in bed, agreeable to SLP session.    Per Dr. Galvan, s/p EGD 11/2/22:  "- Suspicious for candidiasis  - Esophagitis in distal esophagus  - Normal stomach  - Non-bleeding duodenal ulcers     Recs:  - Start fluconazole and treat for 14 days.  - Start PPI  - Avoid NSAIDs  - Await path results  - Follow up in clinic in 3-4 weeks"    MBSS Impressions 10/31/22:  "Impressions  Pt presents with moderate pharyngoesophageal dysphagia characterized by:  Delayed initiation of the pharyngeal swallow  Dcr laryngeal elevation and anterior hyoid excursion  Dcr tongue base retraction  Dcr cricopharyngeal distension/duration  Suspected CP bar, impacting ability for clearance of material. Resulting in retrograde of material through the UES, worsening with incr in volume and consistency  Significant esophageal dysmotility noted"    Respiratory Status: Nasal Cannula    Objective:     Has the patient been evaluated by SLP for swallowing?   Yes  Keep patient NPO? No "   Current Respiratory Status:    NC    Swallowing:   Pt reclined in bed. Pt reports significant coughing with staff feeding pt lunch. SLP repositioned pt upright in bed. Reviewed MBSS results and aspiration precautions. Educated on importance of sitting fully upright, slight neck extension, and swallowing twice. Pt agreeable to eating portions of lunch tray.     SLP provided mild-mod verbal cues during meal for use of swallow strategies of neck extension and re-swallow. Adequate labial seal without anterior spillage. During trials of soft solids, mastication effortful with piecemeal deglutition. Pt reporting globus sensation following trials of pureed solids and soft solids. Able to clear globus sensation with verbally cued cough and re-swallow, and small 1-2ml sips of cold milk. X1 episode of overt s/s of aspiration or airway threat at end of meal with sip of liquid- coughing. No change in vocal quality or breath stream. It should be noted, no aspiration was seen on MBSS results. Cough response seen during MBSS was related to retrograde of material into pharynx.     Education: Discussed with pt's wife. Wife requesting to speak with Dr. Benavides regarding d/c planning. Notified Dr. Benavides. Reviewed recommendation of continued full liquid diet with RN and MD. All in agreement.     Assessment:     Frandy Mccarthy Jr. is a 82 y.o. male with an SLP diagnosis of Dysphagia.      Goals:   Multidisciplinary Problems       SLP Goals          Problem: SLP    Goal Priority Disciplines Outcome   SLP Goal     SLP Ongoing, Progressing   Description: 1) Patient will participate in modified barium swallow study to further assess oropharyngeal swallow function and to best guide tx -Met 10/31    UPDATED GOALS:  2. Pt will consume a full liquid diet with reduced overt s/s of aspiration or airway threat during 100% of po intake given mod assist for utilizations for swallow strategies.   3. Pt will participate in dysphagia based exercise  program targeting tongue base retraction, laryngeal elevation, and CP distension/duration with 100% acc given mod assist.                          Plan:     Patient to be seen:  4 x/week, 6 x/week   Plan of Care expires:     Plan of Care reviewed with:  patient, spouse   SLP Follow-Up:  Yes       Discharge recommendations:          Time Tracking:     SLP Treatment Date:   11/02/22    Speech Start Time:  1147  Speech Stop Time:  1218       Speech Total Time (min):  31 min    Billable Minutes:Treatment Swallowing dysfunction 31 min    11/02/2022

## 2022-11-02 NOTE — BRIEF OP NOTE
Patient was not available when this Nurse arrived. Dialysis days changed to Mondays and Fridays per Unit Manager. Hospice staff made aware. Missed nursing visit today. S/P EGD    - Suspicious for candidiasis  - Esophagitis in distal esophagus  - Normal stomach  - Non-bleeding duodenal ulcers    Recs:  - Start fluconazole and treat for 14 days.  - Start PPI  - Avoid NSAIDs  - Await path results  - Follow up in clinic in 3-4 weeks    Thank you for the consult. Please call back with questions or concerns.    Satish Galvan MD

## 2022-11-02 NOTE — ASSESSMENT & PLAN NOTE
Dysphagia, debility  - Several days of worsening debility, weakness, decreased PO intake in the setting of recent procedure and multifocal pneumonia on CT with dysphagia.  - Continue ampicillin-sulbactam 3g IV q12hr.  - MBSS demonstrating severe esophageal dysmotility  - Full liquid diet for time being  - Consulted GI, plan for EGD in am  - IVFs with LR at 75mL/hr.

## 2022-11-03 LAB
ANION GAP SERPL CALC-SCNC: 7 MMOL/L (ref 8–16)
BASOPHILS # BLD AUTO: 0.01 K/UL (ref 0–0.2)
BASOPHILS NFR BLD: 0.2 % (ref 0–1.9)
BUN SERPL-MCNC: 31 MG/DL (ref 8–23)
CALCIUM SERPL-MCNC: 9.5 MG/DL (ref 8.7–10.5)
CHLORIDE SERPL-SCNC: 110 MMOL/L (ref 95–110)
CO2 SERPL-SCNC: 26 MMOL/L (ref 23–29)
CREAT SERPL-MCNC: 1.5 MG/DL (ref 0.5–1.4)
DIFFERENTIAL METHOD: ABNORMAL
EOSINOPHIL # BLD AUTO: 0.3 K/UL (ref 0–0.5)
EOSINOPHIL NFR BLD: 4.5 % (ref 0–8)
ERYTHROCYTE [DISTWIDTH] IN BLOOD BY AUTOMATED COUNT: 15.9 % (ref 11.5–14.5)
EST. GFR  (NO RACE VARIABLE): 46 ML/MIN/1.73 M^2
GLUCOSE SERPL-MCNC: 167 MG/DL (ref 70–110)
HCT VFR BLD AUTO: 27.4 % (ref 40–54)
HGB BLD-MCNC: 8.8 G/DL (ref 14–18)
IMM GRANULOCYTES # BLD AUTO: 0.03 K/UL (ref 0–0.04)
IMM GRANULOCYTES NFR BLD AUTO: 0.5 % (ref 0–0.5)
LYMPHOCYTES # BLD AUTO: 0.7 K/UL (ref 1–4.8)
LYMPHOCYTES NFR BLD: 12.1 % (ref 18–48)
MAGNESIUM SERPL-MCNC: 2.1 MG/DL (ref 1.6–2.6)
MCH RBC QN AUTO: 30.4 PG (ref 27–31)
MCHC RBC AUTO-ENTMCNC: 32.1 G/DL (ref 32–36)
MCV RBC AUTO: 95 FL (ref 82–98)
MONOCYTES # BLD AUTO: 0.5 K/UL (ref 0.3–1)
MONOCYTES NFR BLD: 9.5 % (ref 4–15)
NEUTROPHILS # BLD AUTO: 4.1 K/UL (ref 1.8–7.7)
NEUTROPHILS NFR BLD: 73.2 % (ref 38–73)
NRBC BLD-RTO: 0 /100 WBC
PHOSPHATE SERPL-MCNC: 1.8 MG/DL (ref 2.7–4.5)
PLATELET # BLD AUTO: 289 K/UL (ref 150–450)
PMV BLD AUTO: 10 FL (ref 9.2–12.9)
POCT GLUCOSE: 179 MG/DL (ref 70–110)
POCT GLUCOSE: 192 MG/DL (ref 70–110)
POCT GLUCOSE: 200 MG/DL (ref 70–110)
POCT GLUCOSE: 206 MG/DL (ref 70–110)
POTASSIUM SERPL-SCNC: 3.8 MMOL/L (ref 3.5–5.1)
RBC # BLD AUTO: 2.89 M/UL (ref 4.6–6.2)
SODIUM SERPL-SCNC: 143 MMOL/L (ref 136–145)
WBC # BLD AUTO: 5.56 K/UL (ref 3.9–12.7)

## 2022-11-03 PROCEDURE — 63600175 PHARM REV CODE 636 W HCPCS: Performed by: INTERNAL MEDICINE

## 2022-11-03 PROCEDURE — 99233 PR SUBSEQUENT HOSPITAL CARE,LEVL III: ICD-10-PCS | Mod: ,,, | Performed by: HOSPITALIST

## 2022-11-03 PROCEDURE — 11000001 HC ACUTE MED/SURG PRIVATE ROOM

## 2022-11-03 PROCEDURE — 25000003 PHARM REV CODE 250: Performed by: INTERNAL MEDICINE

## 2022-11-03 PROCEDURE — 84100 ASSAY OF PHOSPHORUS: CPT | Performed by: INTERNAL MEDICINE

## 2022-11-03 PROCEDURE — 97110 THERAPEUTIC EXERCISES: CPT | Mod: CQ

## 2022-11-03 PROCEDURE — 97530 THERAPEUTIC ACTIVITIES: CPT | Mod: CQ

## 2022-11-03 PROCEDURE — 99233 SBSQ HOSP IP/OBS HIGH 50: CPT | Mod: ,,, | Performed by: HOSPITALIST

## 2022-11-03 PROCEDURE — 83735 ASSAY OF MAGNESIUM: CPT | Performed by: INTERNAL MEDICINE

## 2022-11-03 PROCEDURE — 85025 COMPLETE CBC W/AUTO DIFF WBC: CPT | Performed by: INTERNAL MEDICINE

## 2022-11-03 PROCEDURE — 92526 ORAL FUNCTION THERAPY: CPT

## 2022-11-03 PROCEDURE — 36415 COLL VENOUS BLD VENIPUNCTURE: CPT | Performed by: INTERNAL MEDICINE

## 2022-11-03 PROCEDURE — 97535 SELF CARE MNGMENT TRAINING: CPT

## 2022-11-03 PROCEDURE — 80048 BASIC METABOLIC PNL TOTAL CA: CPT | Performed by: INTERNAL MEDICINE

## 2022-11-03 PROCEDURE — 25000242 PHARM REV CODE 250 ALT 637 W/ HCPCS: Performed by: INTERNAL MEDICINE

## 2022-11-03 RX ADMIN — METOPROLOL TARTRATE 100 MG: 50 TABLET, FILM COATED ORAL at 09:11

## 2022-11-03 RX ADMIN — ATORVASTATIN CALCIUM 10 MG: 10 TABLET, FILM COATED ORAL at 09:11

## 2022-11-03 RX ADMIN — FINASTERIDE 5 MG: 5 TABLET, FILM COATED ORAL at 09:11

## 2022-11-03 RX ADMIN — TAMSULOSIN HYDROCHLORIDE 0.4 MG: 0.4 CAPSULE ORAL at 09:11

## 2022-11-03 RX ADMIN — PANTOPRAZOLE SODIUM 40 MG: 40 TABLET, DELAYED RELEASE ORAL at 09:11

## 2022-11-03 RX ADMIN — HEPARIN SODIUM 5000 UNITS: 5000 INJECTION INTRAVENOUS; SUBCUTANEOUS at 03:11

## 2022-11-03 RX ADMIN — HEPARIN SODIUM 5000 UNITS: 5000 INJECTION INTRAVENOUS; SUBCUTANEOUS at 05:11

## 2022-11-03 RX ADMIN — FLUCONAZOLE 100 MG: 2 INJECTION, SOLUTION INTRAVENOUS at 09:11

## 2022-11-03 RX ADMIN — AMPICILLIN SODIUM AND SULBACTAM SODIUM 3 G: 2; 1 INJECTION, POWDER, FOR SOLUTION INTRAMUSCULAR; INTRAVENOUS at 05:11

## 2022-11-03 RX ADMIN — PRAMIPEXOLE DIHYDROCHLORIDE 0.25 MG: 0.25 TABLET ORAL at 08:11

## 2022-11-03 RX ADMIN — AMPICILLIN SODIUM AND SULBACTAM SODIUM 3 G: 2; 1 INJECTION, POWDER, FOR SOLUTION INTRAMUSCULAR; INTRAVENOUS at 01:11

## 2022-11-03 RX ADMIN — PREGABALIN 225 MG: 75 CAPSULE ORAL at 09:11

## 2022-11-03 RX ADMIN — HEPARIN SODIUM 5000 UNITS: 5000 INJECTION INTRAVENOUS; SUBCUTANEOUS at 09:11

## 2022-11-03 RX ADMIN — PREGABALIN 225 MG: 75 CAPSULE ORAL at 08:11

## 2022-11-03 RX ADMIN — FENOFIBRATE 160 MG: 160 TABLET ORAL at 01:11

## 2022-11-03 RX ADMIN — AMPICILLIN SODIUM AND SULBACTAM SODIUM 3 G: 2; 1 INJECTION, POWDER, FOR SOLUTION INTRAMUSCULAR; INTRAVENOUS at 08:11

## 2022-11-03 RX ADMIN — DULOXETINE 30 MG: 30 CAPSULE, DELAYED RELEASE ORAL at 09:11

## 2022-11-03 NOTE — SUBJECTIVE & OBJECTIVE
Interval History: No acute events, SOB better but still with cough.    Review of Systems   Constitutional:  Negative for chills and fever.   Respiratory:  Positive for cough and shortness of breath.    Gastrointestinal:  Positive for abdominal distention. Negative for abdominal pain, nausea and vomiting.   Objective:     Vital Signs (Most Recent):  Temp: 99 °F (37.2 °C) (11/02/22 1928)  Pulse: 82 (11/02/22 1928)  Resp: 18 (11/02/22 1928)  BP: (!) 157/69 (11/02/22 1928)  SpO2: (!) 94 % (11/02/22 1928) Vital Signs (24h Range):  Temp:  [97.6 °F (36.4 °C)-99.5 °F (37.5 °C)] 99 °F (37.2 °C)  Pulse:  [77-95] 82  Resp:  [16-20] 18  SpO2:  [93 %-97 %] 94 %  BP: (131-176)/(63-76) 157/69     Weight: 93.9 kg (207 lb)  Body mass index is 30.57 kg/m².    Intake/Output Summary (Last 24 hours) at 11/2/2022 2336  Last data filed at 11/2/2022 2314  Gross per 24 hour   Intake 6688.55 ml   Output 1300 ml   Net 5388.55 ml        Physical Exam  Vitals and nursing note reviewed.   Constitutional:       General: He is not in acute distress.     Appearance: He is well-developed. He is obese.   HENT:      Head: Normocephalic and atraumatic.      Nose: Nose normal.   Eyes:      General:         Right eye: No discharge.         Left eye: No discharge.      Extraocular Movements: Extraocular movements intact.      Conjunctiva/sclera: Conjunctivae normal.   Cardiovascular:      Rate and Rhythm: Normal rate.      Pulses: Normal pulses.   Pulmonary:      Effort: Pulmonary effort is normal. No respiratory distress.      Comments: R-sided crackles improved from prior.  Abdominal:      General: There is distension.      Palpations: Abdomen is soft.      Tenderness: There is no abdominal tenderness.   Musculoskeletal:         General: Normal range of motion.      Right lower leg: No edema.      Left lower leg: No edema.   Skin:     General: Skin is warm and dry.   Neurological:      Mental Status: He is alert and oriented to person, place, and time.        Significant Labs:   CBC:  Recent Labs   Lab 10/31/22  0447 11/01/22  0426 11/02/22  0519   WBC 6.76 7.87 6.12   HGB 9.6* 9.5* 8.9*   HCT 29.7* 30.4* 28.6*    268 255   GRAN 79.3*  5.4 83.1*  6.5 77.7*  4.8   LYMPH 9.0*  0.6* 5.6*  0.4* 8.2*  0.5*   MONO 10.4  0.7 10.0  0.8 8.7  0.5   EOS 0.1 0.1 0.3   BASO 0.02 0.01 0.02     BMP:  Recent Labs   Lab 10/31/22  0447 11/01/22  0426 11/02/22  0519    144 144   K 3.9 3.8 3.5    109 111*   CO2 25 25 26   BUN 44* 41* 39*   CREATININE 2.3* 2.1* 1.8*   * 206* 202*   CALCIUM 9.0 9.6 9.2   MG 2.3 2.3 2.2   PHOS 1.8* 2.1* 2.1*       Significant Imaging:   All imaging reviewed.

## 2022-11-03 NOTE — PT/OT/SLP PROGRESS
"Physical Therapy Treatment    Patient Name:  Frandy Mccarthy Jr.   MRN:  [id number]    Recommendations:     Discharge Recommendations:  nursing facility, skilled   Discharge Equipment Recommendations: bedside commode (may need regular RW, will defer to SNF)   Barriers to discharge: Decreased caregiver support    Assessment:     Frandy Mccarthy Jr. is a 82 y.o. male admitted with a medical diagnosis of Multifocal pneumonia.  He presents with the following impairments/functional limitations:  weakness, impaired endurance, impaired self care skills, impaired functional mobility, gait instability, impaired balance, decreased coordination, decreased upper extremity function, decreased lower extremity function, decreased safety awareness, pain, decreased ROM, impaired joint extensibility ;pt with fair mobility today, limited by pain in back and L knee, 8/10; pt able to stand ~1 min. W/ RW and min.A, and able to take ~4 small steps before fatiguing and needing to sit down.    Rehab Prognosis: Good and Fair; patient would benefit from acute skilled PT services to address these deficits and reach maximum level of function.    Recent Surgery: Procedure(s) (LRB):  EGD (ESOPHAGOGASTRODUODENOSCOPY) (N/A) 1 Day Post-Op    Plan:     During this hospitalization, patient to be seen 5 x/week to address the identified rehab impairments via gait training, therapeutic activities, therapeutic exercises, neuromuscular re-education and progress toward the following goals:    Plan of Care Expires:  11/30/22    Subjective     Chief Complaint: pain   Patient/Family Comments/goals: pt eager to get up from bed and sit on commode, "Oh thank you so much, ya'll always come at the right time".   Pain/Comfort:         Objective:     Communicated with nurse prior to session.  Patient found HOB elevated with oxygen, mondragon catheter, peripheral IV upon PT entry to room.     General Precautions: Standard, aspiration, fall   Orthopedic Precautions:spinal " "precautions   Braces: N/A  Respiratory Status: Nasal cannula, flow 2 L/min     Functional Mobility:  Bed Mobility:     Supine to Sit: moderate assistance and of 2 persons  Sit to Supine: maximal assistance and of 2 persons  Transfers:     Sit to Stand:  minimum assistance, moderate assistance, and of 2 persons with rolling walker  Gait: pt able to take a few steps bed to bedside commode w/ RW and min./modA x 2      AM-PAC 6 CLICK MOBILITY  Turning over in bed (including adjusting bedclothes, sheets and blankets)?: 2  Sitting down on and standing up from a chair with arms (e.g., wheelchair, bedside commode, etc.): 2  Moving from lying on back to sitting on the side of the bed?: 2  Moving to and from a bed to a chair (including a wheelchair)?: 2  Need to walk in hospital room?: 2  Climbing 3-5 steps with a railing?: 1  Basic Mobility Total Score: 11       Treatment & Education:  Pt sat up on commode for ~15 min., had loose BM, req'd totA for hygiene in standing, w/ min/modA x 1 for static standing, ~ 1min. Pt also stood at EOB w/ RW and Dominic x 2 for ~1 min., 4 small side steps, dec wt bearing on LLE.  Pt sat up EOB ~15 min. W/ SBA,  perf'd ADL's w/ OT , as well as LE ex's of heelraises, LAQ"s 2 x 5 reps ea.  Scooting up to HOB w/ maxA x 2, bed in trendelenburg.     Patient left HOB elevated with all lines intact, call button in reach, bed alarm on, nurse notified, and wife present..    GOALS:   Multidisciplinary Problems       Physical Therapy Goals          Problem: Physical Therapy    Goal Priority Disciplines Outcome Goal Variances Interventions   Physical Therapy Goal     PT, PT/OT Ongoing, Progressing     Description: Goals to be met by: 2022    Patient will increase functional independence with mobility by performin. Sit<>stand with min assist with RW.  2. Gait x 50 feet with RW with SBA.  3. Ascend/descend 5 step(s) with least restrictive assistive device and CGA.                           Time " Tracking:     PT Received On: 11/03/22  PT Start Time: 1147     PT Stop Time: 1229  PT Total Time (min): 42 min     Billable Minutes: Therapeutic Activity 30 and Therapeutic Exercise 12    Treatment Type: Treatment  PT/PTA: PTA     PTA Visit Number: 4     11/03/2022

## 2022-11-03 NOTE — PT/OT/SLP PROGRESS
Occupational Therapy   Treatment    Name: Frandy Mccarthy Jr.  MRN: [id number]  Admitting Diagnosis:  Multifocal pneumonia  1 Day Post-Op    Recommendations:     Discharge Recommendations: nursing facility, skilled  Discharge Equipment Recommendations:  bedside commode  Barriers to discharge:  Inaccessible home environment (4 KELI; current functional level)    Assessment:     Frandy Mccarthy Jr. is a 82 y.o. male with a medical diagnosis of Multifocal pneumonia.  He presents with pain. Performance deficits affecting function are weakness, impaired self care skills, impaired functional mobility, impaired endurance, gait instability, decreased lower extremity function, decreased upper extremity function, decreased ROM, decreased safety awareness, impaired balance. Pt agreeable to participating in therapy upon arrival to room.  Pt required Mod A, RW, with assist of 2  to perform sit <> stand transfer from EOB.  Min A-Mod A, RW required to transfer from EOB <> BSC.  Pt performed toileting with Total A, RW.  Pt able to maintain standing balance during cheyenne care with CGA, RW.    Overall, pt tolerated therapy well.  He continues to have difficulty taking steps with decreased activity tolerance observed.  Increased assist for ADLs and mobility required at this time.  He reported feeling better while seated at EOB.  He is making progress towards goals and would continue to benefit from skilled OT services to address problems listed above and increase independence with ADLs.  SNF is recommended upon d/c from acute care to further address deficits and help pt improve overall functional independence.         Rehab Prognosis:  Good; patient would benefit from acute skilled OT services to address these deficits and reach maximum level of function.       Plan:     Patient to be seen 5 x/week to address the above listed problems via self-care/home management, therapeutic activities, therapeutic exercises  Plan of Care Expires:  11/13/22  Plan of Care Reviewed with: patient    Subjective     Pain/Comfort:  Pain Rating 1:  (pt indicated pain in)    Objective:     Communicated with: RN prior to session.  Patient found HOB elevated with peripheral IV, oxygen, mondragon catheter upon OT entry to room.    General Precautions: Standard, aspiration   Orthopedic Precautions:spinal precautions   Braces: N/A  Respiratory Status: Nasal cannula, flow 2 L/min     Occupational Performance:     Bed Mobility:    Supine <> sit: Max A  Scooting: SBA seated at EOB    Functional Mobility/Transfers:  Sit <> Stand:   Mod A, RW with assist of 2 x 2 trials from EOB  Mod A, RW with assist of 2 x 1 trial from BSC  Cues for technique provided  Toilet: Min A-Mod A, RW taking steps from bed <> BSC placed perpendicular  Impaired balance and difficulty shifting weight noted; no LOB observed  Functional Mobility: Pt took steps with Min A-Mod A, RW.  Cues for technique and RW management provided  Increased effort required for task    Activities of Daily Living:  Upper Body Dressing: Min A for doffing gown; Mod A for donning clean gown while seated at EOB.  Some difficulty fully pulling up sleeves observed.  Toileting: Total A, RW for performing cheyenne care after liquid bowel movement from BSC.  Pt able to maintain standing balance with CGA, RW.  Pt stood for ~1 minute during task.      [facility] 6 Click ADL: 14    Treatment & Education:  *Session focused on promoting increased endurance, strength, balance, coordination, and ROM needed for ADLs and functional transfers as part of daily routine.   -pt educated on role of OT in acute care setting.  -pt performed sit <> stand transfer from EOB and toilet transfer to BSC; cues for technique and positioning required  -pt performed toileting from BSC; cues for posture and positioning provided  -pt took steps from BSC <> bed; cues for RW management and technique provided  -pt performed sit <> stand transfer from EOB and stood for 1 minute  to address endurance, balance  -POC reviewed with pt       Patient left sitting edge of bed with call button in reach and PTA present and wife outside room    GOALS:   Multidisciplinary Problems       Occupational Therapy Goals          Problem: Occupational Therapy    Goal Priority Disciplines Outcome Interventions   Occupational Therapy Goal     OT, PT/OT Ongoing, Progressing    Description: Goals to be met by: 11/10/2022    Patient will increase functional independence with ADLs by performing:    UE Dressing with Minimal Assistance.  Grooming while EOB with Contact Guard Assistance.  Upper body Bathing from  edge of bed with Minimal Assistance.  Sitting at edge of bed x15 minutes with Contact Guard Assistance.  Rolling to Bilateral with Stand-by Assistance and use of bedrail as needed.   Supine to sit with Minimal Assistance using log roll tech.  Toileting with Min A from BSC.  Toilet transfer to BS with CGA.                         Time Tracking:     OT Date of Treatment: 11/03/22  OT Start Time: 1150  OT Stop Time: 1218  OT Total Time (min): 28 min    Billable Minutes:Self Care/Home Management 28    OT/KARLA: OT        *co-tx with PTA    PHOENIX Lewis  11/3/2022

## 2022-11-03 NOTE — PROGRESS NOTES
[facility] Medicine  Progress Note    Patient Name: Frandy Mccarthy Jr.  MRN: [id number]  Patient Class: IP- Inpatient   Admission Date: 10/29/2022  Length of Stay: 3 days  Attending Physician: Marie Benavides MD  Primary Care Provider: Jose Mann MD        Subjective:     Principal Problem:Multifocal pneumonia        HPI:  Mr. Mccarthy is an 82/M with PMH HTN, HLD, DMII with neuropathy, BPH, chronic pain s/p spinal stimulator implantation 10/25 who presented to [facility] 10/29 with a 5 day history of progressively worsening progressively worsening cough, weakness, and dysphagia with associated constipation since his procedure. The patient has been unable to tolerate medications, liquids, or solids for the last 5 days.  Per his wife at bedside, he has no meaningful oral intake and he has become significantly weaker since his surgery.  He is now unable to rise out of bed without significant assistance.  The patient is able to briefly awaken during the interview and corroborate that he has cough, globus sensation with swallowing, and significant dysphagia, but denies odynophagia or neck swelling.  He denies hoarseness of voice, reflux, or fever.  Whenever the patient attempts to swallow liquids or medications, he has coughing and sputtering. ED evaluation was significant for SpO2 93-95% on RA, diffuse debility, and CT Chest/Abd/Pelvis showing multifocal pneumonia, large bowel distention and R-sided inguinal hernia with loops of small bowel without obstruction, and multiple low-attenuation lesions in the kidneys. He was started on ampicillin-sulbactam and hospital medicine was contacted for admission.      Overview/Hospital Course:  Admitted with dysphagia and multifocal pneumonia. NPO except meds / ice chips. Started ampicillin-sulbactam and SLP consulted. PT/OT consulted given diffuse weakness. MBSS performed 10/31 showing severe esophageal dysphagia; started full liquids and GI  consulted for further evaluation recommendations. Void trial initiated from difficulty with urinating noted after procedure 10/25.      Interval History: No acute events, SOB better but still with cough.    Review of Systems   Constitutional:  Negative for chills and fever.   Respiratory:  Positive for cough and shortness of breath.    Gastrointestinal:  Positive for abdominal distention. Negative for abdominal pain, nausea and vomiting.   Objective:     Vital Signs (Most Recent):  Temp: 99 °F (37.2 °C) (11/02/22 1928)  Pulse: 82 (11/02/22 1928)  Resp: 18 (11/02/22 1928)  BP: (!) 157/69 (11/02/22 1928)  SpO2: (!) 94 % (11/02/22 1928) Vital Signs (24h Range):  Temp:  [97.6 °F (36.4 °C)-99.5 °F (37.5 °C)] 99 °F (37.2 °C)  Pulse:  [77-95] 82  Resp:  [16-20] 18  SpO2:  [93 %-97 %] 94 %  BP: (131-176)/(63-76) 157/69     Weight: 93.9 kg (207 lb)  Body mass index is 30.57 kg/m².    Intake/Output Summary (Last 24 hours) at 11/2/2022 2336  Last data filed at 11/2/2022 2314  Gross per 24 hour   Intake 6688.55 ml   Output 1300 ml   Net 5388.55 ml        Physical Exam  Vitals and nursing note reviewed.   Constitutional:       General: He is not in acute distress.     Appearance: He is well-developed. He is obese.   HENT:      Head: Normocephalic and atraumatic.      Nose: Nose normal.   Eyes:      General:         Right eye: No discharge.         Left eye: No discharge.      Extraocular Movements: Extraocular movements intact.      Conjunctiva/sclera: Conjunctivae normal.   Cardiovascular:      Rate and Rhythm: Normal rate.      Pulses: Normal pulses.   Pulmonary:      Effort: Pulmonary effort is normal. No respiratory distress.      Comments: R-sided crackles improved from prior.  Abdominal:      General: There is distension.      Palpations: Abdomen is soft.      Tenderness: There is no abdominal tenderness.   Musculoskeletal:         General: Normal range of motion.      Right lower leg: No edema.      Left lower leg: No  edema.   Skin:     General: Skin is warm and dry.   Neurological:      Mental Status: He is alert and oriented to person, place, and time.       Significant Labs:   CBC:  Recent Labs   Lab 10/31/22  0447 11/01/22  0426 11/02/22  0519   WBC 6.76 7.87 6.12   HGB 9.6* 9.5* 8.9*   HCT 29.7* 30.4* 28.6*    268 255   GRAN 79.3*  5.4 83.1*  6.5 77.7*  4.8   LYMPH 9.0*  0.6* 5.6*  0.4* 8.2*  0.5*   MONO 10.4  0.7 10.0  0.8 8.7  0.5   EOS 0.1 0.1 0.3   BASO 0.02 0.01 0.02     BMP:  Recent Labs   Lab 10/31/22  0447 11/01/22  0426 11/02/22  0519    144 144   K 3.9 3.8 3.5    109 111*   CO2 25 25 26   BUN 44* 41* 39*   CREATININE 2.3* 2.1* 1.8*   * 206* 202*   CALCIUM 9.0 9.6 9.2   MG 2.3 2.3 2.2   PHOS 1.8* 2.1* 2.1*       Significant Imaging:   All imaging reviewed.      Assessment/Plan:      * Multifocal pneumonia  Dysphagia, debility, esophageal candidiasis, esophagitis, duodenal ulcers  - Several days of worsening debility, weakness, decreased PO intake in the setting of recent procedure and multifocal pneumonia on CT with dysphagia.  - Continue ampicillin-sulbactam 3g IV q12hr.  - MBSS demonstrating severe esophageal dysmotility  - Full liquid diet for time being  - Consulted GI, EGD showed  Esophageal candidiasis and ulcer  - Added diflucan and continue PPI  - IVFs with LR at 75mL/hr.    Benign prostatic hyperplasia with urinary frequency  Urinary retention  - Continue finasteride 5mg PO daily, oxybutynin 10mg PO daily, tamsulosin 0.4mg PO as BID. Family brought mirabegron but did not have packaging with dosing information to confirm per pharmacy; resume if labeling can be brought from home.  - Mondragon in place since procedure 10/25; void trial done on 10/31  - Urinary retention overnight, placed mondragon and consulted Urology on 11/1  - Patient to continue mondragon catheter for now    Chronic pain  Diabetic polyneuropathy  - Continue duloxetine 30mg PO daily, pramipexole 0.25mg PO qHS,  pregabalin 225mg PO BID, oxycodone 10mg PO q4hr PRN.    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  - Continue low-dose sliding scale insulin aspart 0-5U subQ q6hr PRN.    Essential hypertension  - Continue metoprolol 100mg PO daily.    Hyperlipidemia  Carotid artery disease  - Continue fenofibrate 160mg PO daily, atorvastatin 10mg PO daily.      VTE Risk Mitigation (From admission, onward)         Ordered     heparin (porcine) injection 5,000 Units  Every 8 hours         10/29/22 1902     IP VTE HIGH RISK PATIENT  Once         10/29/22 1902                      Marie Benavides MD  Department of Hospital Medicine   [facility] ([place])

## 2022-11-03 NOTE — ASSESSMENT & PLAN NOTE
Urinary retention  - Continue finasteride 5mg PO daily, oxybutynin 10mg PO daily, tamsulosin 0.4mg PO as BID. Family brought mirabegron but did not have packaging with dosing information to confirm per pharmacy; resume if labeling can be brought from home.  - Mondragon in place since procedure 10/25; void trial done on 10/31  - Urinary retention overnight, placed mondragon and consulted Urology on 11/1  - Patient to continue mondragon catheter for now

## 2022-11-03 NOTE — PLAN OF CARE
Problem: Adult Inpatient Plan of Care  Goal: Plan of Care Review  Outcome: Ongoing, Progressing  Flowsheets (Taken 11/3/2022 0427)  Plan of Care Reviewed With: patient  Goal: Patient-Specific Goal (Individualized)  Outcome: Ongoing, Progressing

## 2022-11-03 NOTE — ASSESSMENT & PLAN NOTE
Dysphagia, debility, esophageal candidiasis, esophagitis, duodenal ulcers  - Several days of worsening debility, weakness, decreased PO intake in the setting of recent procedure and multifocal pneumonia on CT with dysphagia.  - Continue ampicillin-sulbactam 3g IV q12hr.  - MBSS demonstrating severe esophageal dysmotility  - Full liquid diet for time being  - Consulted GI, EGD showed  Esophageal candidiasis and ulcer  - Added diflucan and continue PPI  - IVFs with LR at 75mL/hr.

## 2022-11-03 NOTE — PLAN OF CARE
11/03/22 1333   Discharge Assessment   Assessment Type Discharge Planning Reassessment   Confirmed/corrected address, phone number and insurance Yes   Confirmed Demographics Correct on Facesheet   Source of Information patient;family   Communicated MARYAM with patient/caregiver Yes   Lives With spouse   Do you expect to return to your current living situation? Yes   Do you have help at home or someone to help you manage your care at home? Yes   Prior to hospitilization cognitive status: Alert/Oriented   Current cognitive status: Alert/Oriented   Walking or Climbing Stairs Difficulty ambulation difficulty, requires equipment   Dressing/Bathing Difficulty bathing difficulty, assistance 1 person   Equipment Currently Used at Home rollator;cane, straight   Readmission within 30 days? No   Do you currently have service(s) that help you manage your care at home? No   Do you take prescription medications? Yes   Do you have prescription coverage? Yes   Do you have any problems affording any of your prescribed medications? No   Is the patient taking medications as prescribed? yes   How do you get to doctors appointments? family or friend will provide   Do you take coumadin? No   Discharge Plan A Skilled Nursing Facility   Discharge Plan B Home Health   DME Needed Upon Discharge  none   Discharge Plan discussed with: Patient;Spouse/sig other   Discharge Barriers Identified None

## 2022-11-04 ENCOUNTER — PATIENT OUTREACH (OUTPATIENT)
Dept: ADMINISTRATIVE | Facility: OTHER | Age: 82
End: 2022-11-04
Payer: MEDICARE

## 2022-11-04 LAB
ANION GAP SERPL CALC-SCNC: 10 MMOL/L (ref 8–16)
BASOPHILS # BLD AUTO: 0.02 K/UL (ref 0–0.2)
BASOPHILS NFR BLD: 0.3 % (ref 0–1.9)
BUN SERPL-MCNC: 25 MG/DL (ref 8–23)
CALCIUM SERPL-MCNC: 8.6 MG/DL (ref 8.7–10.5)
CHLORIDE SERPL-SCNC: 110 MMOL/L (ref 95–110)
CO2 SERPL-SCNC: 22 MMOL/L (ref 23–29)
CREAT SERPL-MCNC: 1.2 MG/DL (ref 0.5–1.4)
DIFFERENTIAL METHOD: ABNORMAL
EOSINOPHIL # BLD AUTO: 0.3 K/UL (ref 0–0.5)
EOSINOPHIL NFR BLD: 4.7 % (ref 0–8)
ERYTHROCYTE [DISTWIDTH] IN BLOOD BY AUTOMATED COUNT: 15.7 % (ref 11.5–14.5)
EST. GFR  (NO RACE VARIABLE): >60 ML/MIN/1.73 M^2
GLUCOSE SERPL-MCNC: 179 MG/DL (ref 70–110)
HCT VFR BLD AUTO: 27.7 % (ref 40–54)
HGB BLD-MCNC: 8.9 G/DL (ref 14–18)
IMM GRANULOCYTES # BLD AUTO: 0.05 K/UL (ref 0–0.04)
IMM GRANULOCYTES NFR BLD AUTO: 0.8 % (ref 0–0.5)
LYMPHOCYTES # BLD AUTO: 0.6 K/UL (ref 1–4.8)
LYMPHOCYTES NFR BLD: 9.8 % (ref 18–48)
MAGNESIUM SERPL-MCNC: 1.8 MG/DL (ref 1.6–2.6)
MCH RBC QN AUTO: 30.1 PG (ref 27–31)
MCHC RBC AUTO-ENTMCNC: 32.1 G/DL (ref 32–36)
MCV RBC AUTO: 94 FL (ref 82–98)
MONOCYTES # BLD AUTO: 0.5 K/UL (ref 0.3–1)
MONOCYTES NFR BLD: 8.2 % (ref 4–15)
NEUTROPHILS # BLD AUTO: 4.7 K/UL (ref 1.8–7.7)
NEUTROPHILS NFR BLD: 76.2 % (ref 38–73)
NRBC BLD-RTO: 0 /100 WBC
PHOSPHATE SERPL-MCNC: 1.6 MG/DL (ref 2.7–4.5)
PLATELET # BLD AUTO: 299 K/UL (ref 150–450)
PMV BLD AUTO: 9.6 FL (ref 9.2–12.9)
POCT GLUCOSE: 217 MG/DL (ref 70–110)
POCT GLUCOSE: 279 MG/DL (ref 70–110)
POCT GLUCOSE: 301 MG/DL (ref 70–110)
POTASSIUM SERPL-SCNC: 3.6 MMOL/L (ref 3.5–5.1)
RBC # BLD AUTO: 2.96 M/UL (ref 4.6–6.2)
SODIUM SERPL-SCNC: 142 MMOL/L (ref 136–145)
WBC # BLD AUTO: 6.11 K/UL (ref 3.9–12.7)

## 2022-11-04 PROCEDURE — 99233 PR SUBSEQUENT HOSPITAL CARE,LEVL III: ICD-10-PCS | Mod: ,,, | Performed by: HOSPITALIST

## 2022-11-04 PROCEDURE — 80048 BASIC METABOLIC PNL TOTAL CA: CPT | Performed by: INTERNAL MEDICINE

## 2022-11-04 PROCEDURE — 99233 SBSQ HOSP IP/OBS HIGH 50: CPT | Mod: ,,, | Performed by: HOSPITALIST

## 2022-11-04 PROCEDURE — 84100 ASSAY OF PHOSPHORUS: CPT | Performed by: INTERNAL MEDICINE

## 2022-11-04 PROCEDURE — 97535 SELF CARE MNGMENT TRAINING: CPT

## 2022-11-04 PROCEDURE — 85025 COMPLETE CBC W/AUTO DIFF WBC: CPT | Performed by: INTERNAL MEDICINE

## 2022-11-04 PROCEDURE — 97530 THERAPEUTIC ACTIVITIES: CPT | Mod: CQ

## 2022-11-04 PROCEDURE — 63600175 PHARM REV CODE 636 W HCPCS: Performed by: INTERNAL MEDICINE

## 2022-11-04 PROCEDURE — 97116 GAIT TRAINING THERAPY: CPT | Mod: CQ

## 2022-11-04 PROCEDURE — 36415 COLL VENOUS BLD VENIPUNCTURE: CPT | Performed by: INTERNAL MEDICINE

## 2022-11-04 PROCEDURE — 25000003 PHARM REV CODE 250: Performed by: INTERNAL MEDICINE

## 2022-11-04 PROCEDURE — 83735 ASSAY OF MAGNESIUM: CPT | Performed by: INTERNAL MEDICINE

## 2022-11-04 PROCEDURE — 11000001 HC ACUTE MED/SURG PRIVATE ROOM

## 2022-11-04 PROCEDURE — 25000003 PHARM REV CODE 250: Performed by: HOSPITALIST

## 2022-11-04 PROCEDURE — 94761 N-INVAS EAR/PLS OXIMETRY MLT: CPT

## 2022-11-04 PROCEDURE — 27000221 HC OXYGEN, UP TO 24 HOURS

## 2022-11-04 PROCEDURE — 25000242 PHARM REV CODE 250 ALT 637 W/ HCPCS: Performed by: INTERNAL MEDICINE

## 2022-11-04 PROCEDURE — 99900035 HC TECH TIME PER 15 MIN (STAT)

## 2022-11-04 RX ORDER — SODIUM,POTASSIUM PHOSPHATES 280-250MG
1 POWDER IN PACKET (EA) ORAL
Status: DISCONTINUED | OUTPATIENT
Start: 2022-11-04 | End: 2022-11-09 | Stop reason: HOSPADM

## 2022-11-04 RX ADMIN — POTASSIUM & SODIUM PHOSPHATES POWDER PACK 280-160-250 MG 1 PACKET: 280-160-250 PACK at 12:11

## 2022-11-04 RX ADMIN — METOPROLOL TARTRATE 100 MG: 50 TABLET, FILM COATED ORAL at 08:11

## 2022-11-04 RX ADMIN — INSULIN ASPART 3 UNITS: 100 INJECTION, SOLUTION INTRAVENOUS; SUBCUTANEOUS at 05:11

## 2022-11-04 RX ADMIN — ATORVASTATIN CALCIUM 10 MG: 10 TABLET, FILM COATED ORAL at 08:11

## 2022-11-04 RX ADMIN — POTASSIUM & SODIUM PHOSPHATES POWDER PACK 280-160-250 MG 1 PACKET: 280-160-250 PACK at 05:11

## 2022-11-04 RX ADMIN — HEPARIN SODIUM 5000 UNITS: 5000 INJECTION INTRAVENOUS; SUBCUTANEOUS at 09:11

## 2022-11-04 RX ADMIN — POTASSIUM & SODIUM PHOSPHATES POWDER PACK 280-160-250 MG 1 PACKET: 280-160-250 PACK at 08:11

## 2022-11-04 RX ADMIN — AMPICILLIN SODIUM AND SULBACTAM SODIUM 3 G: 2; 1 INJECTION, POWDER, FOR SOLUTION INTRAMUSCULAR; INTRAVENOUS at 08:11

## 2022-11-04 RX ADMIN — PRAMIPEXOLE DIHYDROCHLORIDE 0.25 MG: 0.25 TABLET ORAL at 08:11

## 2022-11-04 RX ADMIN — HEPARIN SODIUM 5000 UNITS: 5000 INJECTION INTRAVENOUS; SUBCUTANEOUS at 01:11

## 2022-11-04 RX ADMIN — AMPICILLIN SODIUM AND SULBACTAM SODIUM 3 G: 2; 1 INJECTION, POWDER, FOR SOLUTION INTRAMUSCULAR; INTRAVENOUS at 01:11

## 2022-11-04 RX ADMIN — PANTOPRAZOLE SODIUM 40 MG: 40 TABLET, DELAYED RELEASE ORAL at 08:11

## 2022-11-04 RX ADMIN — TAMSULOSIN HYDROCHLORIDE 0.4 MG: 0.4 CAPSULE ORAL at 08:11

## 2022-11-04 RX ADMIN — PREGABALIN 225 MG: 75 CAPSULE ORAL at 08:11

## 2022-11-04 RX ADMIN — DULOXETINE 30 MG: 30 CAPSULE, DELAYED RELEASE ORAL at 08:11

## 2022-11-04 RX ADMIN — FENOFIBRATE 160 MG: 160 TABLET ORAL at 12:11

## 2022-11-04 RX ADMIN — FINASTERIDE 5 MG: 5 TABLET, FILM COATED ORAL at 08:11

## 2022-11-04 RX ADMIN — FLUCONAZOLE 100 MG: 2 INJECTION, SOLUTION INTRAVENOUS at 03:11

## 2022-11-04 RX ADMIN — HEPARIN SODIUM 5000 UNITS: 5000 INJECTION INTRAVENOUS; SUBCUTANEOUS at 05:11

## 2022-11-04 RX ADMIN — AMPICILLIN SODIUM AND SULBACTAM SODIUM 3 G: 2; 1 INJECTION, POWDER, FOR SOLUTION INTRAMUSCULAR; INTRAVENOUS at 05:11

## 2022-11-04 NOTE — PT/OT/SLP PROGRESS
Occupational Therapy   Treatment    Name: Frandy Mccarthy Jr.  MRN: [id number]  Admitting Diagnosis:  Multifocal pneumonia  2 Days Post-Op    Recommendations:     Discharge Recommendations: nursing facility, skilled  Discharge Equipment Recommendations:  bedside commode  Barriers to discharge:  Inaccessible home environment, Other (Comment) (4 KELI, decreased independence with ADL/functional mobility/transfers)    Assessment:     Frandy Mccarthy Jr. is a 82 y.o. male with a medical diagnosis of Multifocal pneumonia.  He presents with weakness, impaired endurance, impaired self care skills, impaired functional mobility, gait instability, impaired balance, decreased coordination, pain, impaired coordination, orthopedic precautions.     Rehab Prognosis:  Good; patient would benefit from acute skilled OT services to address these deficits and reach maximum level of function.       Plan:     Patient to be seen 5 x/week to address the above listed problems via self-care/home management, therapeutic activities, therapeutic exercises  Plan of Care Expires: 11/13/22  Plan of Care Reviewed with: patient    Subjective     Pain/Comfort:  Pain Rating 1: other (see comments) (pt reports tolerable pain but unspecified location)  Pain Addressed 1: Reposition, Distraction, Cessation of Activity    Objective:     Communicated with: RN prior to session.  Patient found up in chair with oxygen, peripheral IV, mondragon catheter upon OT entry to room.    General Precautions: Standard, aspiration, fall   Orthopedic Precautions:spinal precautions   Braces: N/A  Respiratory Status: Nasal cannula, flow 2 L/min     Occupational Performance:     Bed Mobility:    Sit to supine: min A x1 with cues for log rolling      Functional Mobility/Transfers:  Sit <> stand: min A x2 with RW and max cues for safe mobility techniques and hand placement   Functional Mobility: Pt required min A x2 to stand pivot to EOB    Activities of Daily Living:  Pt required CGA  for static standing to change mepilex pad on sacrum, simulated standing toileting.       [facility] 6 Click ADL: 14    Treatment & Education:  OT role, plan of care, progression of goals, importance of continued OOB activity, functional mobility/transfer retraining, safety precautions, call don't fall, fall prevention    Patient left HOB elevated with all lines intact, call button in reach, and RN notified    GOALS:   Multidisciplinary Problems       Occupational Therapy Goals          Problem: Occupational Therapy    Goal Priority Disciplines Outcome Interventions   Occupational Therapy Goal     OT, PT/OT Ongoing, Progressing    Description: Goals to be met by: 11/10/2022    Patient will increase functional independence with ADLs by performing:    UE Dressing with Minimal Assistance.  Grooming while EOB with Contact Guard Assistance.  Upper body Bathing from  edge of bed with Minimal Assistance.  Sitting at edge of bed x15 minutes with Contact Guard Assistance.  Rolling to Bilateral with Stand-by Assistance and use of bedrail as needed.   Supine to sit with Minimal Assistance using log roll tech.  Toileting with Min A from [facility].  Toilet transfer to [facility] with CGA.                         Time Tracking:     OT Date of Treatment: 11/04/22  OT Start Time: 1427  OT Stop Time: 1444  OT Total Time (min): 17 min    Billable Minutes:Self Care/Home Management 17 minutes    Co treament performed due to patient's multiple deficits requiring two skilled therapists to safety assess patient's ability to complete ADLs and functional mobility in addition to accommodating for patient's activity tolerance while in acute care setting.      OT/KARLA: OT          11/4/2022

## 2022-11-04 NOTE — SUBJECTIVE & OBJECTIVE
Interval History: No acute events, SOB better and coughing decreased, more comfortable swallowing. Wife at the bedside.    Review of Systems   Constitutional:  Negative for chills and fever.   Respiratory:  Positive for cough and shortness of breath.    Gastrointestinal:  Positive for abdominal distention. Negative for abdominal pain, nausea and vomiting.   Objective:     Vital Signs (Most Recent):  Temp: 100 °F (37.8 °C) (11/03/22 1914)  Pulse: 86 (11/03/22 1914)  Resp: 15 (11/03/22 1914)  BP: (!) 174/79 (11/03/22 1914)  SpO2: 96 % (11/03/22 1914) Vital Signs (24h Range):  Temp:  [97.7 °F (36.5 °C)-100 °F (37.8 °C)] 100 °F (37.8 °C)  Pulse:  [] 86  Resp:  [15-18] 15  SpO2:  [93 %-98 %] 96 %  BP: (150-189)/(69-88) 174/79     Weight: 93.9 kg (207 lb)  Body mass index is 30.57 kg/m².    Intake/Output Summary (Last 24 hours) at 11/3/2022 2043  Last data filed at 11/3/2022 1850  Gross per 24 hour   Intake 1816.65 ml   Output 1200 ml   Net 616.65 ml        Physical Exam  Vitals and nursing note reviewed.   Constitutional:       General: He is not in acute distress.     Appearance: He is well-developed. He is obese.   HENT:      Head: Normocephalic and atraumatic.      Nose: Nose normal.   Eyes:      General:         Right eye: No discharge.         Left eye: No discharge.      Extraocular Movements: Extraocular movements intact.      Conjunctiva/sclera: Conjunctivae normal.   Cardiovascular:      Rate and Rhythm: Normal rate.      Pulses: Normal pulses.   Pulmonary:      Effort: Pulmonary effort is normal. No respiratory distress.      Comments: R-sided crackles improved from prior.  Abdominal:      General: There is no distension.      Palpations: Abdomen is soft.      Tenderness: There is no abdominal tenderness.   Musculoskeletal:         General: Normal range of motion.      Right lower leg: No edema.      Left lower leg: No edema.   Skin:     General: Skin is warm and dry.   Neurological:      Mental Status:  He is alert and oriented to person, place, and time.       Significant Labs:   CBC:  Recent Labs   Lab 11/01/22  0426 11/02/22  0519 11/03/22  0510   WBC 7.87 6.12 5.56   HGB 9.5* 8.9* 8.8*   HCT 30.4* 28.6* 27.4*    255 289   GRAN 83.1*  6.5 77.7*  4.8 73.2*  4.1   LYMPH 5.6*  0.4* 8.2*  0.5* 12.1*  0.7*   MONO 10.0  0.8 8.7  0.5 9.5  0.5   EOS 0.1 0.3 0.3   BASO 0.01 0.02 0.01     BMP:  Recent Labs   Lab 11/01/22 0426 11/02/22  0519 11/03/22  0510    144 143   K 3.8 3.5 3.8    111* 110   CO2 25 26 26   BUN 41* 39* 31*   CREATININE 2.1* 1.8* 1.5*   * 202* 167*   CALCIUM 9.6 9.2 9.5   MG 2.3 2.2 2.1   PHOS 2.1* 2.1* 1.8*       Significant Imaging:   All imaging reviewed.

## 2022-11-04 NOTE — SUBJECTIVE & OBJECTIVE
Interval History: No acute events, SOB better and coughing decreased, more comfortable swallowing.     Review of Systems   Constitutional:  Negative for chills and fever.   Respiratory:  Positive for cough and shortness of breath.    Gastrointestinal:  Negative for abdominal pain, nausea and vomiting.   Objective:     Vital Signs (Most Recent):  Temp: 97.8 °F (36.6 °C) (11/04/22 1233)  Pulse: 76 (11/04/22 1233)  Resp: 18 (11/04/22 1233)  BP: (!) 141/66 (11/04/22 1233)  SpO2: 97 % (11/04/22 1233) Vital Signs (24h Range):  Temp:  [97.8 °F (36.6 °C)-100 °F (37.8 °C)] 97.8 °F (36.6 °C)  Pulse:  [] 76  Resp:  [15-18] 18  SpO2:  [94 %-98 %] 97 %  BP: (141-178)/(66-82) 141/66     Weight: 93.9 kg (207 lb)  Body mass index is 30.57 kg/m².    Intake/Output Summary (Last 24 hours) at 11/4/2022 1514  Last data filed at 11/4/2022 1120  Gross per 24 hour   Intake 1953.1 ml   Output 1000 ml   Net 953.1 ml        Physical Exam  Vitals and nursing note reviewed.   Constitutional:       General: He is not in acute distress.     Appearance: He is well-developed. He is obese.   HENT:      Head: Normocephalic and atraumatic.      Nose: Nose normal.   Eyes:      General:         Right eye: No discharge.         Left eye: No discharge.      Extraocular Movements: Extraocular movements intact.      Conjunctiva/sclera: Conjunctivae normal.   Cardiovascular:      Rate and Rhythm: Normal rate.      Pulses: Normal pulses.   Pulmonary:      Effort: Pulmonary effort is normal. No respiratory distress.      Comments: R-sided crackles improved from prior.  Abdominal:      General: There is no distension.      Palpations: Abdomen is soft.      Tenderness: There is no abdominal tenderness.   Musculoskeletal:         General: Normal range of motion.      Right lower leg: No edema.      Left lower leg: No edema.   Skin:     General: Skin is warm and dry.   Neurological:      Mental Status: He is alert and oriented to person, place, and time.        Significant Labs:   CBC:  Recent Labs   Lab 11/02/22  0519 11/03/22  0510 11/04/22  0434   WBC 6.12 5.56 6.11   HGB 8.9* 8.8* 8.9*   HCT 28.6* 27.4* 27.7*    289 299   GRAN 77.7*  4.8 73.2*  4.1 76.2*  4.7   LYMPH 8.2*  0.5* 12.1*  0.7* 9.8*  0.6*   MONO 8.7  0.5 9.5  0.5 8.2  0.5   EOS 0.3 0.3 0.3   BASO 0.02 0.01 0.02     BMP:  Recent Labs   Lab 11/02/22 0519 11/03/22  0510 11/04/22  0434    143 142   K 3.5 3.8 3.6   * 110 110   CO2 26 26 22*   BUN 39* 31* 25*   CREATININE 1.8* 1.5* 1.2   * 167* 179*   CALCIUM 9.2 9.5 8.6*   MG 2.2 2.1 1.8   PHOS 2.1* 1.8* 1.6*       Significant Imaging:   All imaging reviewed.

## 2022-11-04 NOTE — PT/OT/SLP PROGRESS
Physical Therapy Treatment    Patient Name:  Frandy Mccarthy Jr.   MRN:  [id number]    Recommendations:     Discharge Recommendations:  nursing facility, skilled   Discharge Equipment Recommendations: bedside commode (may need regular RW, will defer to SNF)   Barriers to discharge: Decreased caregiver support    Assessment:     Frandy Mccarthy Jr. is a 82 y.o. male admitted with a medical diagnosis of Multifocal pneumonia.  He presents with the following impairments/functional limitations:  weakness, pain, gait instability, impaired balance, impaired endurance, impaired functional mobility, impaired joint extensibility, impaired self care skills, decreased coordination, decreased lower extremity function, decreased ROM, decreased safety awareness, decreased upper extremity function.    Supine>sit with Dominic x 2, using log roll  Sit>stand with RW and Dominic x 2, from EOB , from BSC  Bed>BSC with RW and modA x 2 for sequencing and RW mgt, stand/pivot  BSC>Chair with RW and Dominic x 2, step transfer  Amb 5-6 steps with RW and Dominic x 2, decreased stability but no LoB, increased double limb support time, decreased gait speed, saima and B step length  Pt able to perform 2 transfers and amb a few steps  Rec SNF      Rehab Prognosis: Good and Fair; patient would benefit from acute skilled PT services to address these deficits and reach maximum level of function.    Recent Surgery: Procedure(s) (LRB):  EGD (ESOPHAGOGASTRODUODENOSCOPY) (N/A) 2 Days Post-Op    Plan:     During this hospitalization, patient to be seen 5 x/week to address the identified rehab impairments via gait training, therapeutic activities, therapeutic exercises, neuromuscular re-education and progress toward the following goals:    Plan of Care Expires:  11/30/22    Subjective     Chief Complaint: UE pain  Patient/Family Comments/goals: Thank you - it's so nice to be in the chair  Pain/Comfort:  Pain Rating 1: other (see comments) (c/o pain, not  rated)  Location - Side 1: Right  Location - Orientation 1: generalized  Location 1: arm (and hand)  Pain Addressed 1: Reposition, Distraction, Cessation of Activity  Pain Rating Post-Intervention 1: other (see comments) (unrated; pt resting comfortably in recliner)      Objective:     Communicated with nurse Haynes prior to session.  Patient found HOB elevated with oxygen, peripheral IV upon PT entry to room.     General Precautions: Standard, aspiration, fall   Orthopedic Precautions:spinal precautions   Braces:    Respiratory Status: Nasal cannula, flow 2 L/min     Functional Mobility:  Bed Mobility:     Supine to Sit: minimum assistance and of 2 persons  Transfers:     Sit to Stand:  minimum assistance and of 2 persons with rolling walker  BSC to Chair: minimum assistance and of 2 persons with  rolling walker  using  Step Transfer  Toilet Transfer: moderate assistance and of 2 persons with  rolling walker  using  Stand Pivot  Gait: 5-6 steps with RW and Dominic x 2, decreased stability but no LoB, increased double limb support time, decreased gait speed, saima and B step length      AM-PAC 6 CLICK MOBILITY  Turning over in bed (including adjusting bedclothes, sheets and blankets)?: 2  Sitting down on and standing up from a chair with arms (e.g., wheelchair, bedside commode, etc.): 2  Moving from lying on back to sitting on the side of the bed?: 2  Moving to and from a bed to a chair (including a wheelchair)?: 2  Need to walk in hospital room?: 2  Climbing 3-5 steps with a railing?: 1  Basic Mobility Total Score: 11       Treatment & Education:  Seated therex: LAQ x 10  Transfers and gait as noted    Patient left up in chair with all lines intact, call button in reach, nurse Haynes notified, and spouse present..    GOALS:   Multidisciplinary Problems       Physical Therapy Goals          Problem: Physical Therapy    Goal Priority Disciplines Outcome Goal Variances Interventions   Physical Therapy Goal     PT,  PT/OT Ongoing, Progressing     Description: Goals to be met by: 2022    Patient will increase functional independence with mobility by performin. Sit<>stand with min assist with RW.  2. Gait x 50 feet with RW with SBA.  3. Ascend/descend 5 step(s) with least restrictive assistive device and CGA.                           Time Tracking:     PT Received On: 22  PT Start Time: 1155     PT Stop Time: 1225  PT Total Time (min): 30 min     Billable Minutes: Gait Training 15 and Therapeutic Activity 15  Co-treat with OT due to complex nature of patient, to insure patient safety, and to prevent injury to patient and caregivers, requiring intervention of two skilled therapists.    Treatment Type: Treatment  PT/PTA: PTA     PTA Visit Number: 5     2022

## 2022-11-04 NOTE — PLAN OF CARE
Recommendations  1) Continue diet per speech therapy   2) Increase Boost Glucose Control to BID  3) Add Laureano BID for wound healing    Goals: Patient to consume > 50% of EEN prior to RD follow up.  Nutrition Goal Status: progressing towards goal  Communication of RD Recs:  (POC)    Assessment and Plan  Nutrition Problem  Inability to consume po intake      Related to (etiology):   Chewing and swallowing issues      Signs and Symptoms (as evidenced by):   Need to texture modified diet      Interventions  Full liquid diet  Commercial beverage  Collaboration with other providers     Nutrition Diagnosis Status:   Continues

## 2022-11-04 NOTE — PT/OT/SLP PROGRESS
Occupational Therapy   Treatment    Name: Frandy Mccarthy Jr.  MRN: [id number]  Admitting Diagnosis:  Multifocal pneumonia  2 Days Post-Op    Recommendations:     Discharge Recommendations: nursing facility, skilled  Discharge Equipment Recommendations:  bedside commode  Barriers to discharge:  Inaccessible home environment, Other (Comment) (4 KELI, decreased independence with ADLs and functional mobility/transfers)    Assessment:     Frandy Mccarthy Jr. is a 82 y.o. male with a medical diagnosis of Multifocal pneumonia.  He presents with weakness, impaired endurance, impaired self care skills, impaired functional mobility, gait instability, impaired balance, decreased coordination, decreased upper extremity function, decreased lower extremity function, pain, impaired coordination, impaired fine motor, orthopedic precautions, impaired cardiopulmonary response to activity.     Rehab Prognosis:  Good; patient would benefit from acute skilled OT services to address these deficits and reach maximum level of function.       Plan:     Patient to be seen 5 x/week to address the above listed problems via self-care/home management, therapeutic activities, therapeutic exercises  Plan of Care Expires: 11/13/22  Plan of Care Reviewed with: patient, spouse    Subjective     Pain/Comfort:  Pain Rating 1: other (see comments) (unrated generalized pain to BUEs)  Pain Addressed 1: Reposition, Distraction, Cessation of Activity    Objective:     Communicated with: RN prior to session.  Patient found HOB elevated with oxygen, peripheral IV upon OT entry to room.    General Precautions: Standard, aspiration, fall   Orthopedic Precautions:spinal precautions   Braces: N/A  Respiratory Status: Nasal cannula, flow 2 L/min     Occupational Performance:     Bed Mobility:    Supine to sitting: mix A x2 with cues for log rolling technique     Functional Mobility/Transfers:  Sit > stand from EOB: Min A x2 and RW  Bed > BSC: Mod A x2 and RW for  stand pivot. Max cues for sequencing/safe transfer technique. Pt verbalized fear of falling and required max cues for safety.   BSC: min A x2 to stand from BSC using RW and BSC hand rails.   BSC > Chair: min A x2 and RW to take steps to chair. Pt with fear of falling and required cues for sequencing/safety during functional ambulation and transfer.   Functional ambulation performed to improve activity tolerance for increased endurance and independence with ADLs as well as functional balance retraining for fall prevention.      Activities of Daily Living:  Toileting: max A in standing for perineal hygiene.       [facility] 6 Click ADL: 14    Treatment & Education:  OT role, plan of care, progression of goals, importance of continued OOB activity, ADL/functional mobility/transfer retraining, fall prevention, safety precautions, call don't fall, spinal precautions      Patient left up in chair with all lines intact, call button in reach, RN notified, and RN and wife present    GOALS:   Multidisciplinary Problems       Occupational Therapy Goals          Problem: Occupational Therapy    Goal Priority Disciplines Outcome Interventions   Occupational Therapy Goal     OT, PT/OT Ongoing, Progressing    Description: Goals to be met by: 11/10/2022    Patient will increase functional independence with ADLs by performing:    UE Dressing with Minimal Assistance.  Grooming while EOB with Contact Guard Assistance.  Upper body Bathing from  edge of bed with Minimal Assistance.  Sitting at edge of bed x15 minutes with Contact Guard Assistance.  Rolling to Bilateral with Stand-by Assistance and use of bedrail as needed.   Supine to sit with Minimal Assistance using log roll tech.  Toileting with Min A from BSC.  Toilet transfer to BSC with CGA.                         Time Tracking:     OT Date of Treatment: 11/04/22  OT Start Time: 1155  OT Stop Time: 1225  OT Total Time (min): 30 min    Billable Minutes:Self Care/Home Management 30  minutes    Co treament performed due to patient's multiple deficits requiring two skilled therapists to safety assess patient's ability to complete ADLs and functional mobility in addition to accommodating for patient's activity tolerance while in acute care setting.      OT/KARLA: OT          11/4/2022

## 2022-11-04 NOTE — PROGRESS NOTES
"[facility] ([place]  Adult Nutrition  Progress Note    SUMMARY     Recommendations  1) Continue diet per speech therapy   2) Increase Boost Glucose Control to BID  3) Add Laureano BID for wound healing    Goals: Patient to consume > 50% of EEN prior to RD follow up.  Nutrition Goal Status: progressing towards goal  Communication of RD Recs:  (POC)    Assessment and Plan  Nutrition Problem  Inability to consume po intake      Related to (etiology):   Chewing and swallowing issues      Signs and Symptoms (as evidenced by):   Need to texture modified diet      Interventions  Full liquid diet  Commercial beverage  Collaboration with other providers     Nutrition Diagnosis Status:   Continues    Malnutrition Assessment   LOIDA NFPE due to remote assessment    Reason for Assessment  Reason For Assessment: RD follow-up  Diagnosis: infection/sepsis, diabetes diagnosis/complications, renal disease, cardiac disease  Past Medical History:   Diagnosis Date    Anemia 10/18/2022    Arthritis     Diabetes mellitus, type 2     Disorder of kidney and ureter     History of hyperparathyroidism     History of hyperparathyroidism 10/6/2020    Formatting of this note might be different from the original. 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 Denies N/V/D/C, reports "my stomach feels empty." Encouraged use of ONS while on full liquid diet. LBM 11/3. Incision to neck and spine, altered skin to buttocks. -210 lbs x 3 years per chart. EGD showed esophagitis, esophageal candidus, duodenal ulcer. Speech therapy following and recs to continue full liquid diet at this time  Nutrition Discharge Planning: Patient to continue on texture appropriate diet prior and post discharge.    Nutrition Risk Screen  Nutrition Risk " "Screen: dysphagia or difficulty swallowing, large or nonhealing wound, burn or pressure injury    Nutrition/Diet History  Spiritual, Cultural Beliefs, Temple Practices, Values that Affect Care: no  Food Allergies: NKFA  Factors Affecting Nutritional Intake: chewing difficulties/inability to chew food, difficulty/impaired swallowing    Anthropometrics  Temp: 97.8 °F (36.6 °C)  Height Method: Stated  Height: 5' 9" (175.3 cm)  Height (inches): 69 in  Weight Method: Stated  Weight: 93.9 kg (207 lb)  Weight (lb): 207 lb  Ideal Body Weight (IBW), Male: 160 lb  % Ideal Body Weight, Male (lb): 129.38 %  BMI (Calculated): 30.6  BMI Grade: 30 - 34.9- obesity - grade I       Lab/Procedures/Meds  Pertinent Labs Reviewed: reviewed  Pertinent Labs Comments: BUN 25, glu 179, Ca 8.6, Phos 1.6  Pertinent Medications Reviewed: reviewed  Pertinent Medications Comments: atorvastatin, bisacodyl, heparin, pantoprazole, Potassium sodium phosphates    Physical Findings/Assessment  Incision to neck, spine. Full thickness tissue loss to buttocks       Estimated/Assessed Needs  Weight Used For Calorie Calculations: 93.9 kg (207 lb 0.2 oz)  Energy Calorie Requirements (kcal): 20-25kcals/kg (1878-2348kcals/day)  Energy Need Method: Kcal/kg  Protein Requirements: 0.8g/kg (CKD)  Weight Used For Protein Calculations: 93.9 kg (207 lb 0.2 oz)  Fluid Requirements (mL): 1000+output  Estimated Fluid Requirement Method: other (see comments)  RDA Method (mL): 20  CHO Requirement: 250      Nutrition Prescription Ordered  Current Diet Order: Full Liquid since 11/2  Oral Nutrition Supplement: Boost Glucose Control BID    Evaluation of Received Nutrient/Fluid Intake    Intake/Output Summary (Last 24 hours) at 11/4/2022 1247  Last data filed at 11/4/2022 1120  Gross per 24 hour   Intake 1953.1 ml   Output 1000 ml   Net 953.1 ml     % Kcal Needs: <50%  % Protein Needs: <50%  I/O: +0.59 L since admit  Energy Calories Required: not meeting needs  Protein " Required: not meeting needs  Fluid Required: meeting needs  Comments: LBM 11/3  Tolerance:  (fair)  % Intake of Estimated Energy Needs: 25 - 50 %  % Meal Intake: full liquid diet    Nutrition Risk  Level of Risk/Frequency of Follow-up:  (2x weekly)     Monitor and Evaluation  Food and Nutrient Intake: energy intake, food and beverage intake  Food and Nutrient Adminstration: diet order  Knowledge/Beliefs/Attitudes: food and nutrition knowledge/skill, beliefs and attitudes  Physical Activity and Function: nutrition-related ADLs and IADLs, factors affecting access to physical activity  Anthropometric Measurements: height/length, weight, weight change, body mass index  Biochemical Data, Medical Tests and Procedures: inflammatory profile, glucose/endocrine profile, gastrointestinal profile, electrolyte and renal panel, lipid profile     Nutrition Follow-Up  RD Follow-up?: Yes

## 2022-11-04 NOTE — PROGRESS NOTES
[facility] Medicine  Progress Note    Patient Name: Frandy Mccarthy Jr.  MRN: [id number]  Patient Class: IP- Inpatient   Admission Date: 10/29/2022  Length of Stay: 4 days  Attending Physician: Marie Benavides MD  Primary Care Provider: Jose Mann MD        Subjective:     Principal Problem:Multifocal pneumonia        HPI:  Mr. Mccarthy is an 82/M with PMH HTN, HLD, DMII with neuropathy, BPH, chronic pain s/p spinal stimulator implantation 10/25 who presented to [facility] 10/29 with a 5 day history of progressively worsening progressively worsening cough, weakness, and dysphagia with associated constipation since his procedure. The patient has been unable to tolerate medications, liquids, or solids for the last 5 days.  Per his wife at bedside, he has no meaningful oral intake and he has become significantly weaker since his surgery.  He is now unable to rise out of bed without significant assistance.  The patient is able to briefly awaken during the interview and corroborate that he has cough, globus sensation with swallowing, and significant dysphagia, but denies odynophagia or neck swelling.  He denies hoarseness of voice, reflux, or fever.  Whenever the patient attempts to swallow liquids or medications, he has coughing and sputtering. ED evaluation was significant for SpO2 93-95% on RA, diffuse debility, and CT Chest/Abd/Pelvis showing multifocal pneumonia, large bowel distention and R-sided inguinal hernia with loops of small bowel without obstruction, and multiple low-attenuation lesions in the kidneys. He was started on ampicillin-sulbactam and hospital medicine was contacted for admission.      Overview/Hospital Course:  Admitted with dysphagia and multifocal pneumonia. NPO except meds / ice chips. Started ampicillin-sulbactam and SLP consulted. PT/OT consulted given diffuse weakness. MBSS performed 10/31 showing severe esophageal dysphagia; started full liquids and GI  consulted for further evaluation recommendations. Void trial initiated from difficulty with urinating noted after procedure 10/25. EGD showed esophagitis, esophageal candidiasis and duodenal ulcer.      Interval History: No acute events, SOB better and coughing decreased, more comfortable swallowing. Wife at the bedside.    Review of Systems   Constitutional:  Negative for chills and fever.   Respiratory:  Positive for cough and shortness of breath.    Gastrointestinal:  Positive for abdominal distention. Negative for abdominal pain, nausea and vomiting.   Objective:     Vital Signs (Most Recent):  Temp: 100 °F (37.8 °C) (11/03/22 1914)  Pulse: 86 (11/03/22 1914)  Resp: 15 (11/03/22 1914)  BP: (!) 174/79 (11/03/22 1914)  SpO2: 96 % (11/03/22 1914) Vital Signs (24h Range):  Temp:  [97.7 °F (36.5 °C)-100 °F (37.8 °C)] 100 °F (37.8 °C)  Pulse:  [] 86  Resp:  [15-18] 15  SpO2:  [93 %-98 %] 96 %  BP: (150-189)/(69-88) 174/79     Weight: 93.9 kg (207 lb)  Body mass index is 30.57 kg/m².    Intake/Output Summary (Last 24 hours) at 11/3/2022 2043  Last data filed at 11/3/2022 1850  Gross per 24 hour   Intake 1816.65 ml   Output 1200 ml   Net 616.65 ml        Physical Exam  Vitals and nursing note reviewed.   Constitutional:       General: He is not in acute distress.     Appearance: He is well-developed. He is obese.   HENT:      Head: Normocephalic and atraumatic.      Nose: Nose normal.   Eyes:      General:         Right eye: No discharge.         Left eye: No discharge.      Extraocular Movements: Extraocular movements intact.      Conjunctiva/sclera: Conjunctivae normal.   Cardiovascular:      Rate and Rhythm: Normal rate.      Pulses: Normal pulses.   Pulmonary:      Effort: Pulmonary effort is normal. No respiratory distress.      Comments: R-sided crackles improved from prior.  Abdominal:      General: There is no distension.      Palpations: Abdomen is soft.      Tenderness: There is no abdominal tenderness.  "  Musculoskeletal:         General: Normal range of motion.      Right lower leg: No edema.      Left lower leg: No edema.   Skin:     General: Skin is warm and dry.   Neurological:      Mental Status: He is alert and oriented to person, place, and time.       Significant Labs:   CBC:  Recent Labs   Lab 11/01/22 0426 11/02/22  0519 11/03/22  0510   WBC 7.87 6.12 5.56   HGB 9.5* 8.9* 8.8*   HCT 30.4* 28.6* 27.4*    255 289   GRAN 83.1*  6.5 77.7*  4.8 73.2*  4.1   LYMPH 5.6*  0.4* 8.2*  0.5* 12.1*  0.7*   MONO 10.0  0.8 8.7  0.5 9.5  0.5   EOS 0.1 0.3 0.3   BASO 0.01 0.02 0.01     BMP:  Recent Labs   Lab 11/01/22 0426 11/02/22 0519 11/03/22  0510    144 143   K 3.8 3.5 3.8    111* 110   CO2 25 26 26   BUN 41* 39* 31*   CREATININE 2.1* 1.8* 1.5*   * 202* 167*   CALCIUM 9.6 9.2 9.5   MG 2.3 2.2 2.1   PHOS 2.1* 2.1* 1.8*       Significant Imaging:   All imaging reviewed.      Assessment/Plan:      * Multifocal pneumonia  Dysphagia, debility, esophageal candidiasis, esophagitis, duodenal ulcers  - Several days of worsening debility, weakness, decreased PO intake in the setting of recent procedure and multifocal pneumonia on CT with dysphagia.  - Continue ampicillin-sulbactam 3g IV q12hr.  - MBSS demonstrating severe esophageal dysmotility  - Full liquid diet for time being  - Consulted GI, EGD showed "esophageal candidiasis and ulcer on 11/2  - Added diflucan (x 2 weeks)  and continue PPI  - IVFs with LR at 75mL/hr, will decrease rate to 50 mL/hr  - Clinically improving    Benign prostatic hyperplasia with urinary frequency  Urinary retention  - Continue finasteride 5mg PO daily, oxybutynin 10mg PO daily, tamsulosin 0.4mg PO as BID. Family brought mirabegron but did not have packaging with dosing information to confirm per pharmacy; resume if labeling can be brought from home.  - Rader in place since procedure 10/25; voiding trial done on 10/31  - Urinary retention overnight, placed " mondragon and consulted Urology on 11/1  - Patient to continue mondragon catheter for now    Chronic pain  Diabetic polyneuropathy  - Continue duloxetine 30mg PO daily, pramipexole 0.25mg PO qHS, pregabalin 225mg PO BID, oxycodone 10mg PO q4hr PRN.    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  - Continue low-dose sliding scale insulin aspart 0-5U subQ q6hr PRN.    Essential hypertension  - Continue metoprolol 100mg PO daily.    Hyperlipidemia  Carotid artery disease  - Continue fenofibrate 160mg PO daily, atorvastatin 10mg PO daily.      VTE Risk Mitigation (From admission, onward)         Ordered     heparin (porcine) injection 5,000 Units  Every 8 hours         10/29/22 1902     IP VTE HIGH RISK PATIENT  Once         10/29/22 1902                      Marie Benavides MD  Department of Hospital Medicine   [facility] ([place])

## 2022-11-04 NOTE — PT/OT/SLP PROGRESS
"Physical Therapy Treatment    Patient Name:  Frandy Mccarthy Jr.   MRN:  [id number]    Recommendations:     Discharge Recommendations:  nursing facility, skilled   Discharge Equipment Recommendations: bedside commode (may need regular RW, will defer to SNF)   Barriers to discharge: Decreased caregiver support    Assessment:     Frandy Mccarthy Jr. is a 82 y.o. male admitted with a medical diagnosis of Multifocal pneumonia.  He presents with the following impairments/functional limitations:  weakness, pain, orthopedic precautions, gait instability, impaired balance, impaired coordination, impaired endurance, impaired functional mobility, impaired self care skills, decreased coordination.    Sit>stand from chair with RW and Dominic x 2  Static stand x 1 min with RW and Dominic, cueing for wider Blanca  Chair>Bed with RW and Dominic x 2, stand/pivot  Sit>supine with modA x 2  Pt with fair mobility, able to perform transfers with assist of 2 persons  Rec SNF    Rehab Prognosis: Good and Fair; patient would benefit from acute skilled PT services to address these deficits and reach maximum level of function.    Recent Surgery: Procedure(s) (LRB):  EGD (ESOPHAGOGASTRODUODENOSCOPY) (N/A) 2 Days Post-Op    Plan:     During this hospitalization, patient to be seen 5 x/week to address the identified rehab impairments via gait training, therapeutic activities, therapeutic exercises, neuromuscular re-education and progress toward the following goals:    Plan of Care Expires:  11/30/22    Subjective     Chief Complaint: I can't stay in this chair all night  Patient/Family Comments/goals: Let's do more tomorrow  Pain/Comfort:  Pain Rating 1: other (see comments) (unrated, "tolerable" pain, unspecified location)  Location - Side 1: Right  Location - Orientation 1: generalized  Location 1: arm (and hand)  Pain Addressed 1: Reposition, Distraction, Cessation of Activity  Pain Rating Post-Intervention 1: other (see comments) (unrated, no " change)      Objective:     Communicated with nurse Haynes prior to session.  Patient found up in chair with oxygen, peripheral IV, mondragon catheter upon PT entry to room.     General Precautions: Standard, aspiration, fall   Orthopedic Precautions:spinal precautions   Braces:    Respiratory Status: Nasal cannula, flow 2 L/min     Functional Mobility:  Bed Mobility:     Sit to Supine: moderate assistance and of 2 persons  Transfers:     Sit to Stand:  minimum assistance and of 2 persons with rolling walker  Chair to mat: minimum assistance and of 2 persons with  rolling walker  using  Stand Pivot      AM-PAC 6 CLICK MOBILITY  Turning over in bed (including adjusting bedclothes, sheets and blankets)?: 2  Sitting down on and standing up from a chair with arms (e.g., wheelchair, bedside commode, etc.): 2  Moving from lying on back to sitting on the side of the bed?: 2  Moving to and from a bed to a chair (including a wheelchair)?: 2  Need to walk in hospital room?: 2  Climbing 3-5 steps with a railing?: 1  Basic Mobility Total Score: 11       Treatment & Education:  Static stand x 1 min with RW and Dominic, cueing to widen Blanca    Patient left HOB elevated with all lines intact and call button in reach..    GOALS:   Multidisciplinary Problems       Physical Therapy Goals          Problem: Physical Therapy    Goal Priority Disciplines Outcome Goal Variances Interventions   Physical Therapy Goal     PT, PT/OT Ongoing, Progressing     Description: Goals to be met by: 2022    Patient will increase functional independence with mobility by performin. Sit<>stand with min assist with RW.  2. Gait x 50 feet with RW with SBA.  3. Ascend/descend 5 step(s) with least restrictive assistive device and CGA.                           Time Tracking:     PT Received On: 22  PT Start Time: 1426     PT Stop Time: 1443  PT Total Time (min): 17 min     Billable Minutes: Therapeutic Activity 17  Co-treat with OT due to complex  nature of patient, to insure patient safety, and to prevent injury to patient and caregivers, requiring intervention of two skilled therapists.    Treatment Type: Treatment  PT/PTA: PTA     PTA Visit Number: 5 11/04/2022

## 2022-11-04 NOTE — ASSESSMENT & PLAN NOTE
"Dysphagia, debility, esophageal candidiasis, esophagitis, duodenal ulcers  - Several days of worsening debility, weakness, decreased PO intake in the setting of recent procedure and multifocal pneumonia on CT with dysphagia.  - Continue ampicillin-sulbactam 3g IV q12hr.  - MBSS demonstrating severe esophageal dysmotility  - Full liquid diet for time being  - Consulted GI, EGD showed "esophageal candidiasis and ulcer on 11/2  - Added diflucan (x 2 weeks)  and continue PPI  - IVFs with LR at 50mL/hr, will stop today  - Clinically improving  - Plan for 7 days of treatment for PNA and 2 weeks of diflucan  "

## 2022-11-04 NOTE — PROGRESS NOTES
MAYW met with patient and patient has no additional needs. Patient will discharge to SNF.    JOSÉ LUIS Stephenson

## 2022-11-04 NOTE — ASSESSMENT & PLAN NOTE
"Dysphagia, debility, esophageal candidiasis, esophagitis, duodenal ulcers  - Several days of worsening debility, weakness, decreased PO intake in the setting of recent procedure and multifocal pneumonia on CT with dysphagia.  - Continue ampicillin-sulbactam 3g IV q12hr.  - MBSS demonstrating severe esophageal dysmotility  - Full liquid diet for time being  - Consulted GI, EGD showed "esophageal candidiasis and ulcer on 11/2  - Added diflucan (x 2 weeks)  and continue PPI  - IVFs with LR at 75mL/hr, will decrease rate to 50 mL/hr  - Clinically improving  "

## 2022-11-04 NOTE — ASSESSMENT & PLAN NOTE
Urinary retention  - Continue finasteride 5mg PO daily, oxybutynin 10mg PO daily, tamsulosin 0.4mg PO as BID. Family brought mirabegron but did not have packaging with dosing information to confirm per pharmacy; resume if labeling can be brought from home.  - Mondragon in place since procedure 10/25; voiding trial done on 10/31  - Urinary retention overnight, placed mondragon and consulted Urology on 11/1  - Patient to continue mondragon catheter for now

## 2022-11-04 NOTE — PROGRESS NOTES
[facility] Medicine  Progress Note    Patient Name: Frandy Mccarthy Jr.  MRN: [id number]  Patient Class: IP- Inpatient   Admission Date: 10/29/2022  Length of Stay: 5 days  Attending Physician: Marie Benavides MD  Primary Care Provider: Jose Mann MD        Subjective:     Principal Problem:Multifocal pneumonia        HPI:  Mr. Mccarthy is an 82/M with PMH HTN, HLD, DMII with neuropathy, BPH, chronic pain s/p spinal stimulator implantation 10/25 who presented to [facility] 10/29 with a 5 day history of progressively worsening progressively worsening cough, weakness, and dysphagia with associated constipation since his procedure. The patient has been unable to tolerate medications, liquids, or solids for the last 5 days.  Per his wife at bedside, he has no meaningful oral intake and he has become significantly weaker since his surgery.  He is now unable to rise out of bed without significant assistance.  The patient is able to briefly awaken during the interview and corroborate that he has cough, globus sensation with swallowing, and significant dysphagia, but denies odynophagia or neck swelling.  He denies hoarseness of voice, reflux, or fever.  Whenever the patient attempts to swallow liquids or medications, he has coughing and sputtering. ED evaluation was significant for SpO2 93-95% on RA, diffuse debility, and CT Chest/Abd/Pelvis showing multifocal pneumonia, large bowel distention and R-sided inguinal hernia with loops of small bowel without obstruction, and multiple low-attenuation lesions in the kidneys. He was started on ampicillin-sulbactam and hospital medicine was contacted for admission.      Overview/Hospital Course:  Admitted with dysphagia and multifocal pneumonia. NPO except meds / ice chips. Started ampicillin-sulbactam and SLP consulted. PT/OT consulted given diffuse weakness. MBSS performed 10/31 showing severe esophageal dysphagia; started full liquids and GI  consulted for further evaluation recommendations. Void trial initiated from difficulty with urinating noted after procedure 10/25. EGD showed esophagitis, esophageal candidiasis and duodenal ulcer.      Interval History: No acute events, SOB better and coughing decreased, more comfortable swallowing.     Review of Systems   Constitutional:  Negative for chills and fever.   Respiratory:  Positive for cough and shortness of breath.    Gastrointestinal:  Negative for abdominal pain, nausea and vomiting.   Objective:     Vital Signs (Most Recent):  Temp: 97.8 °F (36.6 °C) (11/04/22 1233)  Pulse: 76 (11/04/22 1233)  Resp: 18 (11/04/22 1233)  BP: (!) 141/66 (11/04/22 1233)  SpO2: 97 % (11/04/22 1233) Vital Signs (24h Range):  Temp:  [97.8 °F (36.6 °C)-100 °F (37.8 °C)] 97.8 °F (36.6 °C)  Pulse:  [] 76  Resp:  [15-18] 18  SpO2:  [94 %-98 %] 97 %  BP: (141-178)/(66-82) 141/66     Weight: 93.9 kg (207 lb)  Body mass index is 30.57 kg/m².    Intake/Output Summary (Last 24 hours) at 11/4/2022 1514  Last data filed at 11/4/2022 1120  Gross per 24 hour   Intake 1953.1 ml   Output 1000 ml   Net 953.1 ml        Physical Exam  Vitals and nursing note reviewed.   Constitutional:       General: He is not in acute distress.     Appearance: He is well-developed. He is obese.   HENT:      Head: Normocephalic and atraumatic.      Nose: Nose normal.   Eyes:      General:         Right eye: No discharge.         Left eye: No discharge.      Extraocular Movements: Extraocular movements intact.      Conjunctiva/sclera: Conjunctivae normal.   Cardiovascular:      Rate and Rhythm: Normal rate.      Pulses: Normal pulses.   Pulmonary:      Effort: Pulmonary effort is normal. No respiratory distress.      Comments: R-sided crackles improved from prior.  Abdominal:      General: There is no distension.      Palpations: Abdomen is soft.      Tenderness: There is no abdominal tenderness.   Musculoskeletal:         General: Normal range of  "motion.      Right lower leg: No edema.      Left lower leg: No edema.   Skin:     General: Skin is warm and dry.   Neurological:      Mental Status: He is alert and oriented to person, place, and time.       Significant Labs:   CBC:  Recent Labs   Lab 11/02/22 0519 11/03/22  0510 11/04/22  0434   WBC 6.12 5.56 6.11   HGB 8.9* 8.8* 8.9*   HCT 28.6* 27.4* 27.7*    289 299   GRAN 77.7*  4.8 73.2*  4.1 76.2*  4.7   LYMPH 8.2*  0.5* 12.1*  0.7* 9.8*  0.6*   MONO 8.7  0.5 9.5  0.5 8.2  0.5   EOS 0.3 0.3 0.3   BASO 0.02 0.01 0.02     BMP:  Recent Labs   Lab 11/02/22 0519 11/03/22  0510 11/04/22  0434    143 142   K 3.5 3.8 3.6   * 110 110   CO2 26 26 22*   BUN 39* 31* 25*   CREATININE 1.8* 1.5* 1.2   * 167* 179*   CALCIUM 9.2 9.5 8.6*   MG 2.2 2.1 1.8   PHOS 2.1* 1.8* 1.6*       Significant Imaging:   All imaging reviewed.      Assessment/Plan:      * Multifocal pneumonia  Dysphagia, debility, esophageal candidiasis, esophagitis, duodenal ulcers  - Several days of worsening debility, weakness, decreased PO intake in the setting of recent procedure and multifocal pneumonia on CT with dysphagia.  - Continue ampicillin-sulbactam 3g IV q12hr.  - MBSS demonstrating severe esophageal dysmotility  - Full liquid diet for time being  - Consulted GI, EGD showed "esophageal candidiasis and ulcer on 11/2  - Added diflucan (x 2 weeks)  and continue PPI  - IVFs with LR at 50mL/hr, will stop today  - Clinically improving  - Plan for 7 days of treatment for PNA and 2 weeks of diflucan    Benign prostatic hyperplasia with urinary frequency  Urinary retention  - Continue finasteride 5mg PO daily, oxybutynin 10mg PO daily, tamsulosin 0.4mg PO as BID. Family brought mirabegron but did not have packaging with dosing information to confirm per pharmacy; resume if labeling can be brought from home.  - Rader in place since procedure 10/25; voiding trial done on 10/31  - Urinary retention overnight, placed " mondragon and consulted Urology on 11/1  - Patient to continue mondragon catheter for now    Chronic pain  Diabetic polyneuropathy  - Continue duloxetine 30mg PO daily, pramipexole 0.25mg PO qHS, pregabalin 225mg PO BID, oxycodone 10mg PO q4hr PRN.    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  - Continue low-dose sliding scale insulin aspart 0-5U subQ q6hr PRN.    Essential hypertension  - Continue metoprolol 100mg PO daily.    Hyperlipidemia  Carotid artery disease  - Continue fenofibrate 160mg PO daily, atorvastatin 10mg PO daily.      VTE Risk Mitigation (From admission, onward)         Ordered     heparin (porcine) injection 5,000 Units  Every 8 hours         10/29/22 1902     IP VTE HIGH RISK PATIENT  Once         10/29/22 1902                      Marie Benavides MD  Department of Hospital Medicine   [facility] ([place])

## 2022-11-05 LAB
ANION GAP SERPL CALC-SCNC: 7 MMOL/L (ref 8–16)
BASOPHILS # BLD AUTO: 0.02 K/UL (ref 0–0.2)
BASOPHILS NFR BLD: 0.3 % (ref 0–1.9)
BUN SERPL-MCNC: 23 MG/DL (ref 8–23)
CALCIUM SERPL-MCNC: 8.9 MG/DL (ref 8.7–10.5)
CHLORIDE SERPL-SCNC: 110 MMOL/L (ref 95–110)
CO2 SERPL-SCNC: 22 MMOL/L (ref 23–29)
CREAT SERPL-MCNC: 1.2 MG/DL (ref 0.5–1.4)
DIFFERENTIAL METHOD: ABNORMAL
EOSINOPHIL # BLD AUTO: 0.3 K/UL (ref 0–0.5)
EOSINOPHIL NFR BLD: 4.4 % (ref 0–8)
ERYTHROCYTE [DISTWIDTH] IN BLOOD BY AUTOMATED COUNT: 15.9 % (ref 11.5–14.5)
EST. GFR  (NO RACE VARIABLE): >60 ML/MIN/1.73 M^2
GLUCOSE SERPL-MCNC: 243 MG/DL (ref 70–110)
HCT VFR BLD AUTO: 27.5 % (ref 40–54)
HGB BLD-MCNC: 8.9 G/DL (ref 14–18)
IMM GRANULOCYTES # BLD AUTO: 0.07 K/UL (ref 0–0.04)
IMM GRANULOCYTES NFR BLD AUTO: 0.9 % (ref 0–0.5)
LYMPHOCYTES # BLD AUTO: 0.7 K/UL (ref 1–4.8)
LYMPHOCYTES NFR BLD: 9.1 % (ref 18–48)
MAGNESIUM SERPL-MCNC: 1.9 MG/DL (ref 1.6–2.6)
MCH RBC QN AUTO: 30.3 PG (ref 27–31)
MCHC RBC AUTO-ENTMCNC: 32.4 G/DL (ref 32–36)
MCV RBC AUTO: 94 FL (ref 82–98)
MONOCYTES # BLD AUTO: 0.6 K/UL (ref 0.3–1)
MONOCYTES NFR BLD: 7.3 % (ref 4–15)
NEUTROPHILS # BLD AUTO: 6 K/UL (ref 1.8–7.7)
NEUTROPHILS NFR BLD: 78 % (ref 38–73)
NRBC BLD-RTO: 0 /100 WBC
PHOSPHATE SERPL-MCNC: 2 MG/DL (ref 2.7–4.5)
PLATELET # BLD AUTO: 341 K/UL (ref 150–450)
PMV BLD AUTO: 10.1 FL (ref 9.2–12.9)
POCT GLUCOSE: 224 MG/DL (ref 70–110)
POCT GLUCOSE: 250 MG/DL (ref 70–110)
POCT GLUCOSE: 253 MG/DL (ref 70–110)
POCT GLUCOSE: 264 MG/DL (ref 70–110)
POTASSIUM SERPL-SCNC: 3.3 MMOL/L (ref 3.5–5.1)
RBC # BLD AUTO: 2.94 M/UL (ref 4.6–6.2)
SODIUM SERPL-SCNC: 139 MMOL/L (ref 136–145)
WBC # BLD AUTO: 7.69 K/UL (ref 3.9–12.7)

## 2022-11-05 PROCEDURE — 63600175 PHARM REV CODE 636 W HCPCS: Performed by: INTERNAL MEDICINE

## 2022-11-05 PROCEDURE — 99233 SBSQ HOSP IP/OBS HIGH 50: CPT | Mod: ,,, | Performed by: HOSPITALIST

## 2022-11-05 PROCEDURE — 63600175 PHARM REV CODE 636 W HCPCS: Performed by: NURSE PRACTITIONER

## 2022-11-05 PROCEDURE — 85025 COMPLETE CBC W/AUTO DIFF WBC: CPT | Performed by: INTERNAL MEDICINE

## 2022-11-05 PROCEDURE — 27000221 HC OXYGEN, UP TO 24 HOURS

## 2022-11-05 PROCEDURE — 80048 BASIC METABOLIC PNL TOTAL CA: CPT | Performed by: INTERNAL MEDICINE

## 2022-11-05 PROCEDURE — 25000242 PHARM REV CODE 250 ALT 637 W/ HCPCS: Performed by: INTERNAL MEDICINE

## 2022-11-05 PROCEDURE — 94761 N-INVAS EAR/PLS OXIMETRY MLT: CPT

## 2022-11-05 PROCEDURE — 11000001 HC ACUTE MED/SURG PRIVATE ROOM

## 2022-11-05 PROCEDURE — 25000003 PHARM REV CODE 250: Performed by: INTERNAL MEDICINE

## 2022-11-05 PROCEDURE — 99233 PR SUBSEQUENT HOSPITAL CARE,LEVL III: ICD-10-PCS | Mod: ,,, | Performed by: HOSPITALIST

## 2022-11-05 PROCEDURE — 36415 COLL VENOUS BLD VENIPUNCTURE: CPT | Performed by: INTERNAL MEDICINE

## 2022-11-05 PROCEDURE — 99900035 HC TECH TIME PER 15 MIN (STAT)

## 2022-11-05 PROCEDURE — 84100 ASSAY OF PHOSPHORUS: CPT | Performed by: INTERNAL MEDICINE

## 2022-11-05 PROCEDURE — 25000003 PHARM REV CODE 250: Performed by: HOSPITALIST

## 2022-11-05 PROCEDURE — 83735 ASSAY OF MAGNESIUM: CPT | Performed by: INTERNAL MEDICINE

## 2022-11-05 RX ADMIN — TAMSULOSIN HYDROCHLORIDE 0.4 MG: 0.4 CAPSULE ORAL at 10:11

## 2022-11-05 RX ADMIN — INSULIN ASPART 3 UNITS: 100 INJECTION, SOLUTION INTRAVENOUS; SUBCUTANEOUS at 02:11

## 2022-11-05 RX ADMIN — PREGABALIN 225 MG: 75 CAPSULE ORAL at 10:11

## 2022-11-05 RX ADMIN — POTASSIUM & SODIUM PHOSPHATES POWDER PACK 280-160-250 MG 1 PACKET: 280-160-250 PACK at 01:11

## 2022-11-05 RX ADMIN — AMPICILLIN SODIUM AND SULBACTAM SODIUM 3 G: 2; 1 INJECTION, POWDER, FOR SOLUTION INTRAMUSCULAR; INTRAVENOUS at 03:11

## 2022-11-05 RX ADMIN — POTASSIUM BICARBONATE 40 MEQ: 391 TABLET, EFFERVESCENT ORAL at 01:11

## 2022-11-05 RX ADMIN — AMPICILLIN SODIUM AND SULBACTAM SODIUM 3 G: 2; 1 INJECTION, POWDER, FOR SOLUTION INTRAMUSCULAR; INTRAVENOUS at 05:11

## 2022-11-05 RX ADMIN — HYDRALAZINE HYDROCHLORIDE 10 MG: 20 INJECTION INTRAMUSCULAR; INTRAVENOUS at 06:11

## 2022-11-05 RX ADMIN — POTASSIUM & SODIUM PHOSPHATES POWDER PACK 280-160-250 MG 1 PACKET: 280-160-250 PACK at 05:11

## 2022-11-05 RX ADMIN — HEPARIN SODIUM 5000 UNITS: 5000 INJECTION INTRAVENOUS; SUBCUTANEOUS at 10:11

## 2022-11-05 RX ADMIN — PANTOPRAZOLE SODIUM 40 MG: 40 TABLET, DELAYED RELEASE ORAL at 10:11

## 2022-11-05 RX ADMIN — AMPICILLIN SODIUM AND SULBACTAM SODIUM 3 G: 2; 1 INJECTION, POWDER, FOR SOLUTION INTRAMUSCULAR; INTRAVENOUS at 10:11

## 2022-11-05 RX ADMIN — DULOXETINE 30 MG: 30 CAPSULE, DELAYED RELEASE ORAL at 10:11

## 2022-11-05 RX ADMIN — POTASSIUM & SODIUM PHOSPHATES POWDER PACK 280-160-250 MG 1 PACKET: 280-160-250 PACK at 10:11

## 2022-11-05 RX ADMIN — HEPARIN SODIUM 5000 UNITS: 5000 INJECTION INTRAVENOUS; SUBCUTANEOUS at 05:11

## 2022-11-05 RX ADMIN — OXYCODONE 10 MG: 5 TABLET ORAL at 10:11

## 2022-11-05 RX ADMIN — FINASTERIDE 5 MG: 5 TABLET, FILM COATED ORAL at 10:11

## 2022-11-05 RX ADMIN — FENOFIBRATE 160 MG: 160 TABLET ORAL at 01:11

## 2022-11-05 RX ADMIN — FLUCONAZOLE 100 MG: 2 INJECTION, SOLUTION INTRAVENOUS at 02:11

## 2022-11-05 RX ADMIN — HEPARIN SODIUM 5000 UNITS: 5000 INJECTION INTRAVENOUS; SUBCUTANEOUS at 02:11

## 2022-11-05 RX ADMIN — ATORVASTATIN CALCIUM 10 MG: 10 TABLET, FILM COATED ORAL at 10:11

## 2022-11-05 RX ADMIN — INSULIN ASPART 3 UNITS: 100 INJECTION, SOLUTION INTRAVENOUS; SUBCUTANEOUS at 06:11

## 2022-11-05 RX ADMIN — METOPROLOL TARTRATE 100 MG: 50 TABLET, FILM COATED ORAL at 10:11

## 2022-11-05 NOTE — SUBJECTIVE & OBJECTIVE
Interval History: No acute events, SOB better and coughing much improved, more comfortable swallowing everyday.     Review of Systems   Constitutional:  Negative for chills and fever.   Respiratory:  Positive for cough and shortness of breath.    Gastrointestinal:  Negative for abdominal pain, nausea and vomiting.   Objective:     Vital Signs (Most Recent):  Temp: 98 °F (36.7 °C) (11/05/22 1628)  Pulse: 94 (11/05/22 1628)  Resp: 20 (11/05/22 1628)  BP: (!) 183/85 (11/05/22 1628)  SpO2: 97 % (11/05/22 1628) Vital Signs (24h Range):  Temp:  [97.5 °F (36.4 °C)-100.2 °F (37.9 °C)] 98 °F (36.7 °C)  Pulse:  [] 94  Resp:  [18-20] 20  SpO2:  [94 %-97 %] 97 %  BP: (137-183)/(66-88) 183/85     Weight: 93.9 kg (207 lb)  Body mass index is 30.57 kg/m².    Intake/Output Summary (Last 24 hours) at 11/5/2022 1640  Last data filed at 11/5/2022 1429  Gross per 24 hour   Intake --   Output 500 ml   Net -500 ml        Physical Exam  Vitals and nursing note reviewed.   Constitutional:       General: He is not in acute distress.     Appearance: He is well-developed. He is obese.   HENT:      Head: Normocephalic and atraumatic.      Nose: Nose normal.   Eyes:      General:         Right eye: No discharge.         Left eye: No discharge.      Extraocular Movements: Extraocular movements intact.      Conjunctiva/sclera: Conjunctivae normal.   Cardiovascular:      Rate and Rhythm: Normal rate.      Pulses: Normal pulses.   Pulmonary:      Effort: Pulmonary effort is normal. No respiratory distress.      Comments: R-sided rhonchi almost resolved  Abdominal:      General: There is no distension.      Palpations: Abdomen is soft.      Tenderness: There is no abdominal tenderness.   Musculoskeletal:         General: Normal range of motion.      Right lower leg: No edema.      Left lower leg: No edema.   Skin:     General: Skin is warm and dry.   Neurological:      Mental Status: He is alert and oriented to person, place, and time.        Significant Labs:   CBC:  Recent Labs   Lab 11/03/22  0510 11/04/22  0434 11/05/22  0404   WBC 5.56 6.11 7.69   HGB 8.8* 8.9* 8.9*   HCT 27.4* 27.7* 27.5*    299 341   GRAN 73.2*  4.1 76.2*  4.7 78.0*  6.0   LYMPH 12.1*  0.7* 9.8*  0.6* 9.1*  0.7*   MONO 9.5  0.5 8.2  0.5 7.3  0.6   EOS 0.3 0.3 0.3   BASO 0.01 0.02 0.02     BMP:  Recent Labs   Lab 11/03/22  0510 11/04/22  0434 11/05/22  0404    142 139   K 3.8 3.6 3.3*    110 110   CO2 26 22* 22*   BUN 31* 25* 23   CREATININE 1.5* 1.2 1.2   * 179* 243*   CALCIUM 9.5 8.6* 8.9   MG 2.1 1.8 1.9   PHOS 1.8* 1.6* 2.0*       Significant Imaging:   All imaging reviewed.

## 2022-11-05 NOTE — ASSESSMENT & PLAN NOTE
Urinary retention  - Continue finasteride 5mg PO daily, oxybutynin 10mg PO daily, tamsulosin 0.4mg PO as BID. Family brought mirabegron but did not have packaging with dosing information to confirm per pharmacy; resume if labeling can be brought from home.  - Mondragon in place since procedure 10/25; voiding trial done on 10/31  - Urinary retention overnight, placed mondragon and consulted Urology on 11/1  - Patient to continue mondragon catheter for 3 days and then reattempt voiding trial, will d/c catheter today and monitor

## 2022-11-05 NOTE — PROGRESS NOTES
[facility] Medicine  Progress Note    Patient Name: Frandy Mccarthy Jr.  MRN: [id number]  Patient Class: IP- Inpatient   Admission Date: 10/29/2022  Length of Stay: 6 days  Attending Physician: Marie Benavides MD  Primary Care Provider: Jose Mann MD        Subjective:     Principal Problem:Multifocal pneumonia        HPI:  Mr. Mccarthy is an 82/M with PMH HTN, HLD, DMII with neuropathy, BPH, chronic pain s/p spinal stimulator implantation 10/25 who presented to [facility] 10/29 with a 5 day history of progressively worsening progressively worsening cough, weakness, and dysphagia with associated constipation since his procedure. The patient has been unable to tolerate medications, liquids, or solids for the last 5 days.  Per his wife at bedside, he has no meaningful oral intake and he has become significantly weaker since his surgery.  He is now unable to rise out of bed without significant assistance.  The patient is able to briefly awaken during the interview and corroborate that he has cough, globus sensation with swallowing, and significant dysphagia, but denies odynophagia or neck swelling.  He denies hoarseness of voice, reflux, or fever.  Whenever the patient attempts to swallow liquids or medications, he has coughing and sputtering. ED evaluation was significant for SpO2 93-95% on RA, diffuse debility, and CT Chest/Abd/Pelvis showing multifocal pneumonia, large bowel distention and R-sided inguinal hernia with loops of small bowel without obstruction, and multiple low-attenuation lesions in the kidneys. He was started on ampicillin-sulbactam and hospital medicine was contacted for admission.      Overview/Hospital Course:  Admitted with dysphagia and multifocal pneumonia. NPO except meds / ice chips. Started ampicillin-sulbactam and SLP consulted. PT/OT consulted given diffuse weakness. MBSS performed 10/31 showing severe esophageal dysphagia; started full liquids and GI  consulted for further evaluation recommendations. Void trial initiated from difficulty with urinating noted after procedure 10/25. EGD showed esophagitis, esophageal candidiasis and duodenal ulcer.      Interval History: No acute events, SOB better and coughing much improved, more comfortable swallowing everyday.     Review of Systems   Constitutional:  Negative for chills and fever.   Respiratory:  Positive for cough and shortness of breath.    Gastrointestinal:  Negative for abdominal pain, nausea and vomiting.   Objective:     Vital Signs (Most Recent):  Temp: 98 °F (36.7 °C) (11/05/22 1628)  Pulse: 94 (11/05/22 1628)  Resp: 20 (11/05/22 1628)  BP: (!) 183/85 (11/05/22 1628)  SpO2: 97 % (11/05/22 1628) Vital Signs (24h Range):  Temp:  [97.5 °F (36.4 °C)-100.2 °F (37.9 °C)] 98 °F (36.7 °C)  Pulse:  [] 94  Resp:  [18-20] 20  SpO2:  [94 %-97 %] 97 %  BP: (137-183)/(66-88) 183/85     Weight: 93.9 kg (207 lb)  Body mass index is 30.57 kg/m².    Intake/Output Summary (Last 24 hours) at 11/5/2022 1640  Last data filed at 11/5/2022 1429  Gross per 24 hour   Intake --   Output 500 ml   Net -500 ml        Physical Exam  Vitals and nursing note reviewed.   Constitutional:       General: He is not in acute distress.     Appearance: He is well-developed. He is obese.   HENT:      Head: Normocephalic and atraumatic.      Nose: Nose normal.   Eyes:      General:         Right eye: No discharge.         Left eye: No discharge.      Extraocular Movements: Extraocular movements intact.      Conjunctiva/sclera: Conjunctivae normal.   Cardiovascular:      Rate and Rhythm: Normal rate.      Pulses: Normal pulses.   Pulmonary:      Effort: Pulmonary effort is normal. No respiratory distress.      Comments: R-sided rhonchi almost resolved  Abdominal:      General: There is no distension.      Palpations: Abdomen is soft.      Tenderness: There is no abdominal tenderness.   Musculoskeletal:         General: Normal range of motion.  "     Right lower leg: No edema.      Left lower leg: No edema.   Skin:     General: Skin is warm and dry.   Neurological:      Mental Status: He is alert and oriented to person, place, and time.       Significant Labs:   CBC:  Recent Labs   Lab 11/03/22  0510 11/04/22  0434 11/05/22  0404   WBC 5.56 6.11 7.69   HGB 8.8* 8.9* 8.9*   HCT 27.4* 27.7* 27.5*    299 341   GRAN 73.2*  4.1 76.2*  4.7 78.0*  6.0   LYMPH 12.1*  0.7* 9.8*  0.6* 9.1*  0.7*   MONO 9.5  0.5 8.2  0.5 7.3  0.6   EOS 0.3 0.3 0.3   BASO 0.01 0.02 0.02     BMP:  Recent Labs   Lab 11/03/22  0510 11/04/22  0434 11/05/22  0404    142 139   K 3.8 3.6 3.3*    110 110   CO2 26 22* 22*   BUN 31* 25* 23   CREATININE 1.5* 1.2 1.2   * 179* 243*   CALCIUM 9.5 8.6* 8.9   MG 2.1 1.8 1.9   PHOS 1.8* 1.6* 2.0*       Significant Imaging:   All imaging reviewed.      Assessment/Plan:      * Multifocal pneumonia  Dysphagia, debility, esophageal candidiasis, esophagitis, duodenal ulcers  - Several days of worsening debility, weakness, decreased PO intake in the setting of recent procedure and multifocal pneumonia on CT with dysphagia.  - Continue ampicillin-sulbactam 3g IV q12hr.  - MBSS demonstrating severe esophageal dysmotility  - Full liquid diet for time being  - Consulted GI, EGD showed "esophageal candidiasis and ulcer on 11/2  - Added diflucan (x 2 weeks)  and continue PPI  - IVFs with LR at 50mL/hr, stopped on 11/4  - Clinically improving  - Plan for 7 days of treatment for PNA and 2 weeks of diflucan    Benign prostatic hyperplasia with urinary frequency  Urinary retention  - Continue finasteride 5mg PO daily, oxybutynin 10mg PO daily, tamsulosin 0.4mg PO as BID. Family brought mirabegron but did not have packaging with dosing information to confirm per pharmacy; resume if labeling can be brought from home.  - Mondragon in place since procedure 10/25; voiding trial done on 10/31  - Urinary retention overnight, placed mondragon and " consulted Urology on 11/1  - Patient to continue mondragon catheter for 3 days and then reattempt voiding trial, will d/c catheter today and monitor    Chronic pain  Diabetic polyneuropathy  - Continue duloxetine 30mg PO daily, pramipexole 0.25mg PO qHS, pregabalin 225mg PO BID, oxycodone 10mg PO q4hr PRN.    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  - Continue low-dose sliding scale insulin aspart 0-5U subQ q6hr PRN.    Essential hypertension  - Continue metoprolol 100mg PO daily.    Hyperlipidemia  Carotid artery disease  - Continue fenofibrate 160mg PO daily, atorvastatin 10mg PO daily.    VTE Risk Mitigation (From admission, onward)         Ordered     heparin (porcine) injection 5,000 Units  Every 8 hours         10/29/22 1902     IP VTE HIGH RISK PATIENT  Once         10/29/22 1902                      Marie Benavides MD  Department of Hospital Medicine   [facility] ([place])

## 2022-11-05 NOTE — ASSESSMENT & PLAN NOTE
"Dysphagia, debility, esophageal candidiasis, esophagitis, duodenal ulcers  - Several days of worsening debility, weakness, decreased PO intake in the setting of recent procedure and multifocal pneumonia on CT with dysphagia.  - Continue ampicillin-sulbactam 3g IV q12hr.  - MBSS demonstrating severe esophageal dysmotility  - Full liquid diet for time being  - Consulted GI, EGD showed "esophageal candidiasis and ulcer on 11/2  - Added diflucan (x 2 weeks)  and continue PPI  - IVFs with LR at 50mL/hr, stopped on 11/4  - Clinically improving  - Plan for 7 days of treatment for PNA and 2 weeks of diflucan  "

## 2022-11-06 LAB
AMORPH CRY URNS QL MICRO: ABNORMAL
ANION GAP SERPL CALC-SCNC: 8 MMOL/L (ref 8–16)
BACTERIA #/AREA URNS HPF: ABNORMAL /HPF
BASOPHILS # BLD AUTO: 0.02 K/UL (ref 0–0.2)
BASOPHILS NFR BLD: 0.2 % (ref 0–1.9)
BILIRUB UR QL STRIP: NEGATIVE
BUN SERPL-MCNC: 28 MG/DL (ref 8–23)
CALCIUM SERPL-MCNC: 8.7 MG/DL (ref 8.7–10.5)
CHLORIDE SERPL-SCNC: 111 MMOL/L (ref 95–110)
CLARITY UR: CLEAR
CO2 SERPL-SCNC: 21 MMOL/L (ref 23–29)
COLOR UR: YELLOW
CREAT SERPL-MCNC: 1.7 MG/DL (ref 0.5–1.4)
DIFFERENTIAL METHOD: ABNORMAL
EOSINOPHIL # BLD AUTO: 0.4 K/UL (ref 0–0.5)
EOSINOPHIL NFR BLD: 3 % (ref 0–8)
ERYTHROCYTE [DISTWIDTH] IN BLOOD BY AUTOMATED COUNT: 16.1 % (ref 11.5–14.5)
EST. GFR  (NO RACE VARIABLE): 40 ML/MIN/1.73 M^2
GLUCOSE SERPL-MCNC: 235 MG/DL (ref 70–110)
GLUCOSE UR QL STRIP: NEGATIVE
HCT VFR BLD AUTO: 27.2 % (ref 40–54)
HGB BLD-MCNC: 8.7 G/DL (ref 14–18)
HGB UR QL STRIP: ABNORMAL
HYALINE CASTS #/AREA URNS LPF: 2 /LPF
IMM GRANULOCYTES # BLD AUTO: 0.11 K/UL (ref 0–0.04)
IMM GRANULOCYTES NFR BLD AUTO: 0.9 % (ref 0–0.5)
KETONES UR QL STRIP: ABNORMAL
LEUKOCYTE ESTERASE UR QL STRIP: ABNORMAL
LYMPHOCYTES # BLD AUTO: 0.8 K/UL (ref 1–4.8)
LYMPHOCYTES NFR BLD: 6.9 % (ref 18–48)
MAGNESIUM SERPL-MCNC: 1.7 MG/DL (ref 1.6–2.6)
MCH RBC QN AUTO: 30.2 PG (ref 27–31)
MCHC RBC AUTO-ENTMCNC: 32 G/DL (ref 32–36)
MCV RBC AUTO: 94 FL (ref 82–98)
MICROSCOPIC COMMENT: ABNORMAL
MONOCYTES # BLD AUTO: 0.8 K/UL (ref 0.3–1)
MONOCYTES NFR BLD: 6.5 % (ref 4–15)
NEUTROPHILS # BLD AUTO: 10.1 K/UL (ref 1.8–7.7)
NEUTROPHILS NFR BLD: 82.5 % (ref 38–73)
NITRITE UR QL STRIP: NEGATIVE
NRBC BLD-RTO: 0 /100 WBC
PH UR STRIP: 6 [PH] (ref 5–8)
PHOSPHATE SERPL-MCNC: 2.7 MG/DL (ref 2.7–4.5)
PLATELET # BLD AUTO: 348 K/UL (ref 150–450)
PMV BLD AUTO: 9.8 FL (ref 9.2–12.9)
POCT GLUCOSE: 181 MG/DL (ref 70–110)
POCT GLUCOSE: 205 MG/DL (ref 70–110)
POCT GLUCOSE: 279 MG/DL (ref 70–110)
POTASSIUM SERPL-SCNC: 3.3 MMOL/L (ref 3.5–5.1)
PROT UR QL STRIP: ABNORMAL
RBC # BLD AUTO: 2.88 M/UL (ref 4.6–6.2)
RBC #/AREA URNS HPF: 20 /HPF (ref 0–4)
SODIUM SERPL-SCNC: 140 MMOL/L (ref 136–145)
SP GR UR STRIP: >=1.03 (ref 1–1.03)
URN SPEC COLLECT METH UR: ABNORMAL
UROBILINOGEN UR STRIP-ACNC: NEGATIVE EU/DL
WBC # BLD AUTO: 12.24 K/UL (ref 3.9–12.7)
WBC #/AREA URNS HPF: 5 /HPF (ref 0–5)

## 2022-11-06 PROCEDURE — 63600175 PHARM REV CODE 636 W HCPCS: Performed by: INTERNAL MEDICINE

## 2022-11-06 PROCEDURE — 99233 PR SUBSEQUENT HOSPITAL CARE,LEVL III: ICD-10-PCS | Mod: ,,, | Performed by: HOSPITALIST

## 2022-11-06 PROCEDURE — 25000242 PHARM REV CODE 250 ALT 637 W/ HCPCS: Performed by: HOSPITALIST

## 2022-11-06 PROCEDURE — 25000003 PHARM REV CODE 250: Performed by: HOSPITALIST

## 2022-11-06 PROCEDURE — 25000003 PHARM REV CODE 250: Performed by: INTERNAL MEDICINE

## 2022-11-06 PROCEDURE — 11000001 HC ACUTE MED/SURG PRIVATE ROOM

## 2022-11-06 PROCEDURE — 84100 ASSAY OF PHOSPHORUS: CPT | Performed by: INTERNAL MEDICINE

## 2022-11-06 PROCEDURE — 92526 ORAL FUNCTION THERAPY: CPT

## 2022-11-06 PROCEDURE — 99900035 HC TECH TIME PER 15 MIN (STAT)

## 2022-11-06 PROCEDURE — 81000 URINALYSIS NONAUTO W/SCOPE: CPT | Performed by: HOSPITALIST

## 2022-11-06 PROCEDURE — 51798 US URINE CAPACITY MEASURE: CPT

## 2022-11-06 PROCEDURE — 83735 ASSAY OF MAGNESIUM: CPT | Performed by: INTERNAL MEDICINE

## 2022-11-06 PROCEDURE — 36415 COLL VENOUS BLD VENIPUNCTURE: CPT | Performed by: INTERNAL MEDICINE

## 2022-11-06 PROCEDURE — 97110 THERAPEUTIC EXERCISES: CPT

## 2022-11-06 PROCEDURE — 25000242 PHARM REV CODE 250 ALT 637 W/ HCPCS: Performed by: INTERNAL MEDICINE

## 2022-11-06 PROCEDURE — 27000221 HC OXYGEN, UP TO 24 HOURS

## 2022-11-06 PROCEDURE — 99233 SBSQ HOSP IP/OBS HIGH 50: CPT | Mod: ,,, | Performed by: HOSPITALIST

## 2022-11-06 PROCEDURE — 87040 BLOOD CULTURE FOR BACTERIA: CPT | Performed by: HOSPITALIST

## 2022-11-06 PROCEDURE — 80048 BASIC METABOLIC PNL TOTAL CA: CPT | Performed by: INTERNAL MEDICINE

## 2022-11-06 PROCEDURE — 94761 N-INVAS EAR/PLS OXIMETRY MLT: CPT

## 2022-11-06 PROCEDURE — 94640 AIRWAY INHALATION TREATMENT: CPT

## 2022-11-06 PROCEDURE — 85025 COMPLETE CBC W/AUTO DIFF WBC: CPT | Performed by: HOSPITALIST

## 2022-11-06 RX ORDER — SODIUM CHLORIDE 450 MG/100ML
INJECTION, SOLUTION INTRAVENOUS CONTINUOUS
Status: DISCONTINUED | OUTPATIENT
Start: 2022-11-06 | End: 2022-11-07

## 2022-11-06 RX ORDER — IPRATROPIUM BROMIDE AND ALBUTEROL SULFATE 2.5; .5 MG/3ML; MG/3ML
3 SOLUTION RESPIRATORY (INHALATION) EVERY 6 HOURS PRN
Status: DISCONTINUED | OUTPATIENT
Start: 2022-11-06 | End: 2022-11-09 | Stop reason: HOSPADM

## 2022-11-06 RX ADMIN — PANTOPRAZOLE SODIUM 40 MG: 40 TABLET, DELAYED RELEASE ORAL at 08:11

## 2022-11-06 RX ADMIN — OXYCODONE 10 MG: 5 TABLET ORAL at 09:11

## 2022-11-06 RX ADMIN — SODIUM CHLORIDE: 0.45 INJECTION, SOLUTION INTRAVENOUS at 07:11

## 2022-11-06 RX ADMIN — FENOFIBRATE 160 MG: 160 TABLET ORAL at 11:11

## 2022-11-06 RX ADMIN — POTASSIUM & SODIUM PHOSPHATES POWDER PACK 280-160-250 MG 1 PACKET: 280-160-250 PACK at 11:11

## 2022-11-06 RX ADMIN — AMPICILLIN SODIUM AND SULBACTAM SODIUM 3 G: 2; 1 INJECTION, POWDER, FOR SOLUTION INTRAMUSCULAR; INTRAVENOUS at 05:11

## 2022-11-06 RX ADMIN — HEPARIN SODIUM 5000 UNITS: 5000 INJECTION INTRAVENOUS; SUBCUTANEOUS at 02:11

## 2022-11-06 RX ADMIN — ACETAMINOPHEN 650 MG: 325 TABLET, FILM COATED ORAL at 01:11

## 2022-11-06 RX ADMIN — INSULIN DETEMIR 10 UNITS: 100 INJECTION, SOLUTION SUBCUTANEOUS at 04:11

## 2022-11-06 RX ADMIN — SODIUM CHLORIDE: 0.45 INJECTION, SOLUTION INTRAVENOUS at 05:11

## 2022-11-06 RX ADMIN — AMPICILLIN SODIUM AND SULBACTAM SODIUM 3 G: 2; 1 INJECTION, POWDER, FOR SOLUTION INTRAMUSCULAR; INTRAVENOUS at 09:11

## 2022-11-06 RX ADMIN — ACETAMINOPHEN 650 MG: 325 TABLET, FILM COATED ORAL at 05:11

## 2022-11-06 RX ADMIN — POTASSIUM & SODIUM PHOSPHATES POWDER PACK 280-160-250 MG 1 PACKET: 280-160-250 PACK at 04:11

## 2022-11-06 RX ADMIN — POTASSIUM & SODIUM PHOSPHATES POWDER PACK 280-160-250 MG 1 PACKET: 280-160-250 PACK at 08:11

## 2022-11-06 RX ADMIN — INSULIN ASPART 3 UNITS: 100 INJECTION, SOLUTION INTRAVENOUS; SUBCUTANEOUS at 01:11

## 2022-11-06 RX ADMIN — METOPROLOL TARTRATE 100 MG: 50 TABLET, FILM COATED ORAL at 08:11

## 2022-11-06 RX ADMIN — IPRATROPIUM BROMIDE AND ALBUTEROL SULFATE 3 ML: 2.5; .5 SOLUTION RESPIRATORY (INHALATION) at 02:11

## 2022-11-06 RX ADMIN — BISACODYL 10 MG: 10 SUPPOSITORY RECTAL at 08:11

## 2022-11-06 RX ADMIN — HEPARIN SODIUM 5000 UNITS: 5000 INJECTION INTRAVENOUS; SUBCUTANEOUS at 05:11

## 2022-11-06 RX ADMIN — POTASSIUM & SODIUM PHOSPHATES POWDER PACK 280-160-250 MG 1 PACKET: 280-160-250 PACK at 09:11

## 2022-11-06 RX ADMIN — POTASSIUM BICARBONATE 20 MEQ: 391 TABLET, EFFERVESCENT ORAL at 08:11

## 2022-11-06 RX ADMIN — HEPARIN SODIUM 5000 UNITS: 5000 INJECTION INTRAVENOUS; SUBCUTANEOUS at 09:11

## 2022-11-06 RX ADMIN — PRAMIPEXOLE DIHYDROCHLORIDE 0.25 MG: 0.25 TABLET ORAL at 09:11

## 2022-11-06 RX ADMIN — ATORVASTATIN CALCIUM 10 MG: 10 TABLET, FILM COATED ORAL at 08:11

## 2022-11-06 RX ADMIN — PREGABALIN 225 MG: 75 CAPSULE ORAL at 09:11

## 2022-11-06 RX ADMIN — AMPICILLIN SODIUM AND SULBACTAM SODIUM 3 G: 2; 1 INJECTION, POWDER, FOR SOLUTION INTRAMUSCULAR; INTRAVENOUS at 12:11

## 2022-11-06 RX ADMIN — INSULIN ASPART 2 UNITS: 100 INJECTION, SOLUTION INTRAVENOUS; SUBCUTANEOUS at 06:11

## 2022-11-06 RX ADMIN — DULOXETINE 30 MG: 30 CAPSULE, DELAYED RELEASE ORAL at 08:11

## 2022-11-06 RX ADMIN — PREGABALIN 225 MG: 75 CAPSULE ORAL at 08:11

## 2022-11-06 RX ADMIN — OXYCODONE 10 MG: 5 TABLET ORAL at 11:11

## 2022-11-06 RX ADMIN — TAMSULOSIN HYDROCHLORIDE 0.4 MG: 0.4 CAPSULE ORAL at 08:11

## 2022-11-06 RX ADMIN — FINASTERIDE 5 MG: 5 TABLET, FILM COATED ORAL at 08:11

## 2022-11-06 RX ADMIN — FLUCONAZOLE 100 MG: 2 INJECTION, SOLUTION INTRAVENOUS at 11:11

## 2022-11-06 NOTE — PT/OT/SLP PROGRESS
"Speech Language Pathology Treatment    Patient Name:  Frandy Mccarthy Jr.   MRN:  [id number]  Admitting Diagnosis: Multifocal pneumonia    Recommendations:                 General Recommendations:    Speech pathology to follow 4-6x/week for ongoing assessment and treatment of pharyngoesophageal dysphagia.  Recommend GI consult 2/2 significant esophageal dysmotility.      Diet recommendations:   , Full liquids, Thin liquids - IDDSI Level 0      Aspiration Precautions:   1. 1:1 SUPERVISION   2. Sit FULLY upright at 90 degrees   3. Remain upright for 30 minutes before and after meal   4. Slight NECK EXTENSION when swallowing   5. SMALL SIPS (2-5ml)   6. NO STRAWS   7. Re-swallow x2-3 per sip   8. Frequent oral care to reduce oral bacteria   9. Meds whole with small sips of water     General Precautions: Standard, aspiration     Communication strategies:  n/a    Subjective     Pt reclined in bed.  Pt agreeable to SLP session.     Per Dr. Galvan, s/p EGD 11/2/22:  "- Suspicious for candidiasis  - Esophagitis in distal esophagus  - Normal stomach  - Non-bleeding duodenal ulcers     Recs:  - Start fluconazole and treat for 14 days.  - Start PPI  - Avoid NSAIDs  - Await path results  - Follow up in clinic in 3-4 weeks"    MBSS Impressions 10/31/22:  "Impressions  Pt presents with moderate pharyngoesophageal dysphagia characterized by:  Delayed initiation of the pharyngeal swallow  Dcr laryngeal elevation and anterior hyoid excursion  Dcr tongue base retraction  Dcr cricopharyngeal distension/duration  Suspected CP bar, impacting ability for clearance of material. Resulting in retrograde of material through the UES, worsening with incr in volume and consistency  Significant esophageal dysmotility noted"    Respiratory Status: Nasal Cannula    Objective:     Has the patient been evaluated by SLP for swallowing?   Yes  Keep patient NPO? No   Current Respiratory Status:    NC    Swallowing:   Pt reclined in bed. Reporting " ongoing coughing with po intake. Stated he is doing well with thin liquids, however, incr in consistency can result in coughing. Reviewed MBSS results and aspiration precautions. Educated on importance of sitting fully upright, slight neck extension, and swallowing twice.     Dysphagia Based Exercise Program:  Given trials of ice, pt completed x20 effortful swallows with use of neck extension. No overt s/s of aspiration or airway threat during 100% of trials- no coughing, choking, changes in breath stream or vocal quality.     Completed x5 rounds of 10-15-second Isometric CTAR hold with resistance placed by clinician's hand.  Patient completed 2 rounds of 10 second hold with towel placed below mandible as resistance.    Completed x30 reps of Dynamic CTAR.     Education: Discussed with pt. Pt able to recall aspiration precautions IND. Continue current plan of care.     Assessment:     Frandy Mccarthy Jr. is a 82 y.o. male with an SLP diagnosis of Dysphagia.      Goals:   Multidisciplinary Problems       SLP Goals          Problem: SLP    Goal Priority Disciplines Outcome   SLP Goal     SLP Ongoing, Progressing   Description: 1) Patient will participate in modified barium swallow study to further assess oropharyngeal swallow function and to best guide tx -Met 10/31    UPDATED GOALS:  2. Pt will consume a full liquid diet with reduced overt s/s of aspiration or airway threat during 100% of po intake given mod assist for utilizations for swallow strategies.   3. Pt will participate in dysphagia based exercise program targeting tongue base retraction, laryngeal elevation, and CP distension/duration with 100% acc given mod assist.                          Plan:     Patient to be seen:  4 x/week, 6 x/week   Plan of Care expires:     Plan of Care reviewed with:  patient, spouse   SLP Follow-Up:  Yes       Discharge recommendations:  nursing facility, skilled       Time Tracking:     SLP Treatment Date:   10/30/22    Speech  Start Time:  1144  Speech Stop Time:  1212       Speech Total Time (min):  28 min    Billable Minutes:Treatment Swallowing dysfunction 28 min    11/06/2022

## 2022-11-06 NOTE — NURSING
0030, Late entry, Pt had temp of 101.8, rechecked and temp was 100.2, administered tylenol 650 mg for temp and pain in neck.

## 2022-11-06 NOTE — ASSESSMENT & PLAN NOTE
Urinary retention  - Continue finasteride 5mg PO daily, oxybutynin 10mg PO daily, tamsulosin 0.4mg PO as BID. Family brought mirabegron but did not have packaging with dosing information to confirm per pharmacy; resume if labeling can be brought from home.  - Mondragon in place since procedure 10/25; voiding trial done on 10/31  - Urinary retention overnight, placed mondragon and consulted Urology on 11/1  - Patient to continue mondragon catheter for 3 days, another voiding trial done with removal of Mondragon catheter on 11/05  - Patient with fever overnight and UA with reflex to culture sent, but patient able to void this morning  - Will follow-up on studies and continue monitor urine output

## 2022-11-06 NOTE — SUBJECTIVE & OBJECTIVE
Interval History: Noted fever overnight. Rader catheter removed yesterday and he voided urine this mornning. SOB better, more comfortable swallowing everyday.     Review of Systems   Constitutional:  Negative for chills and fever.   Respiratory:  Positive for shortness of breath.    Gastrointestinal:  Negative for abdominal pain, nausea and vomiting.   Objective:     Vital Signs (Most Recent):  Temp: 98.1 °F (36.7 °C) (11/06/22 1133)  Pulse: 79 (11/06/22 1450)  Resp: 18 (11/06/22 1450)  BP: 134/65 (11/06/22 1133)  SpO2: 97 % (11/06/22 1450) Vital Signs (24h Range):  Temp:  [97.5 °F (36.4 °C)-101.8 °F (38.8 °C)] 98.1 °F (36.7 °C)  Pulse:  [] 79  Resp:  [17-20] 18  SpO2:  [95 %-100 %] 97 %  BP: (111-183)/(55-85) 134/65     Weight: 93.9 kg (207 lb)  Body mass index is 30.57 kg/m².    Intake/Output Summary (Last 24 hours) at 11/6/2022 1529  Last data filed at 11/6/2022 0941  Gross per 24 hour   Intake 120 ml   Output 500 ml   Net -380 ml        Physical Exam  Vitals and nursing note reviewed.   Constitutional:       General: He is not in acute distress.     Appearance: He is well-developed. He is obese.   HENT:      Head: Normocephalic and atraumatic.      Nose: Nose normal.   Eyes:      General:         Right eye: No discharge.         Left eye: No discharge.      Extraocular Movements: Extraocular movements intact.      Conjunctiva/sclera: Conjunctivae normal.   Cardiovascular:      Rate and Rhythm: Normal rate.      Pulses: Normal pulses.   Pulmonary:      Effort: Pulmonary effort is normal. No respiratory distress.      Comments: R-sided rhonchi almost resolved  Abdominal:      General: There is no distension.      Palpations: Abdomen is soft.      Tenderness: There is no abdominal tenderness.   Musculoskeletal:         General: Normal range of motion.      Right lower leg: No edema.      Left lower leg: No edema.   Skin:     General: Skin is warm and dry.   Neurological:      Mental Status: He is alert and  oriented to person, place, and time.       Significant Labs:   CBC:  Recent Labs   Lab 11/04/22  0434 11/05/22  0404 11/06/22  0730   WBC 6.11 7.69 12.24   HGB 8.9* 8.9* 8.7*   HCT 27.7* 27.5* 27.2*    341 348   GRAN 76.2*  4.7 78.0*  6.0 82.5*  10.1*   LYMPH 9.8*  0.6* 9.1*  0.7* 6.9*  0.8*   MONO 8.2  0.5 7.3  0.6 6.5  0.8   EOS 0.3 0.3 0.4   BASO 0.02 0.02 0.02     BMP:  Recent Labs   Lab 11/04/22 0434 11/05/22  0404 11/06/22  0556    139 140   K 3.6 3.3* 3.3*    110 111*   CO2 22* 22* 21*   BUN 25* 23 28*   CREATININE 1.2 1.2 1.7*   * 243* 235*   CALCIUM 8.6* 8.9 8.7   MG 1.8 1.9 1.7   PHOS 1.6* 2.0* 2.7       Significant Imaging:   All imaging reviewed.

## 2022-11-06 NOTE — PLAN OF CARE
Problem: Physical Therapy  Goal: Physical Therapy Goal  Description: Goals to be met by: 2022    Patient will increase functional independence with mobility by performin. Sit<>stand with min assist with RW.  2. Gait x 50 feet with RW with SBA.  3. Ascend/descend 5 step(s) with least restrictive assistive device and CGA.      2022 1334 by Cleve Song, PT  Outcome: Ongoing, Progressing

## 2022-11-06 NOTE — ASSESSMENT & PLAN NOTE
- Last hemoglobin A1c was 7.5 on October 18, 2022  - On V-Go at home; on hold  - Glucose level reviewed, now higher than target  - Add detemir 10 unit daily and increase sliding scale insulin to moderate scale  - Continue to adjust insulin with target glucose between 140-180

## 2022-11-06 NOTE — PLAN OF CARE
Problem: Physical Therapy  Goal: Physical Therapy Goal  Description: Goals to be met by: 2022    Patient will increase functional independence with mobility by performin. Sit<>stand with min assist with RW.  2. Gait x 50 feet with RW with SBA.  3. Ascend/descend 5 step(s) with least restrictive assistive device and CGA.               2022 1334 by Cleve Song, PT  Outcome: Ongoing, Progressing   Pt refused to attempt to sit at the side of the bed secondary to c/o L LE muscle cramping.  Pt received manual stretching on his L calf and hamstring muscles followed by L LE AAROM and R LE AROM exercises.

## 2022-11-06 NOTE — PT/OT/SLP PROGRESS
Physical Therapy Treatment    Patient Name:  Frandy Mccarthy Jr.   MRN:  [id number]    Recommendations:     Discharge Recommendations:  nursing facility, skilled   Discharge Equipment Recommendations: bedside commode (may need regular RW, will defer to SNF)   Barriers to discharge: Inaccessible home and Decreased caregiver support    Assessment:     Frandy Mccarthy Jr. is a 82 y.o. male admitted with a medical diagnosis of Multifocal pneumonia.  He presents with the following impairments/functional limitations:  weakness, impaired endurance, impaired self care skills, impaired functional mobility, gait instability, impaired balance, decreased lower extremity function, pain. Pt refused to attempt to sit at the side of the bed secondary to c/o L LE muscle cramping.  Pt received manual stretching on his L calf and hamstring muscles followed by L LE AAROM and R LE AROM exercises..    Rehab Prognosis: Good; patient would benefit from acute skilled PT services to address these deficits and reach maximum level of function.    Recent Surgery: Procedure(s) (LRB):  EGD (ESOPHAGOGASTRODUODENOSCOPY) (N/A) 4 Days Post-Op    Plan:     During this hospitalization, patient to be seen 5 x/week to address the identified rehab impairments via gait training, therapeutic activities, therapeutic exercises, neuromuscular re-education and progress toward the following goals:    Plan of Care Expires:  11/30/22    Subjective     Chief Complaint: Pain in L LE  Patient/Family Comments/goals: Pt's wife reported that he will be going to a SNF tomorrow  Pain/Comfort:  Pain Rating 1: 10/10  Location - Side 1: Left  Location - Orientation 1: posterior  Location 1: leg  Pain Addressed 1: Distraction  Location - Side 2: Left  Location - Orientation 2: posterior  Location 2: leg  Pain Addressed 2: Distraction      Objective:     Communicated with Nurse prior to session.  Patient found HOB elevated with oxygen, peripheral IV upon PT entry to room.      General Precautions: Standard, fall   Orthopedic Precautions:N/A   Braces: N/A  Respiratory Status: Nasal cannula, flow 2 L/min     Functional Mobility:  Pt did not attempt bed mobility today secondary to L LE pain and cramping      AM-PAC 6 CLICK MOBILITY  Turning over in bed (including adjusting bedclothes, sheets and blankets)?: 3  Sitting down on and standing up from a chair with arms (e.g., wheelchair, bedside commode, etc.): 2  Moving from lying on back to sitting on the side of the bed?: 2  Moving to and from a bed to a chair (including a wheelchair)?: 2  Need to walk in hospital room?: 1  Climbing 3-5 steps with a railing?: 1  Basic Mobility Total Score: 11       Treatment & Education:   Pt received manual stretching on his L calf and hamstring muscles followed by L LE AAROM and R LE AROM exercises.    Patient left HOB elevated with all lines intact, call button in reach, nurse notified, and wife present..    GOALS:   Multidisciplinary Problems       Physical Therapy Goals          Problem: Physical Therapy    Goal Priority Disciplines Outcome Goal Variances Interventions   Physical Therapy Goal     PT, PT/OT Ongoing, Progressing     Description: Goals to be met by: 2022    Patient will increase functional independence with mobility by performin. Sit<>stand with min assist with RW.  2. Gait x 50 feet with RW with SBA.  3. Ascend/descend 5 step(s) with least restrictive assistive device and CGA.                           Time Tracking:     PT Received On: 22  PT Start Time: 1023     PT Stop Time: 1042  PT Total Time (min): 19 min     Billable Minutes: Therapeutic Exercise 19    Treatment Type: Treatment  PT/PTA: PT     PTA Visit Number: 5     2022

## 2022-11-06 NOTE — PLAN OF CARE
POC reviewed with patient. AAOx4. VS stable on room air. Complaints of neck pain verbalized during shift, received tylenol 650 mg po, moderate relief.  Received IV antibiotics according to MAR. Condom cath, no urine. Bladder scan at 0200, 249 ml; then again at 0400, 271 ml. Pt denied urge to urinate. Encourage patient to drink fluids. Had large loose incontinent BM. No injuries, falls, or trauma occurred during shift. Purposeful rounding completed. Bed low and locked with side rails up x 3 and call light within reach. Will continue to monitor.

## 2022-11-06 NOTE — ASSESSMENT & PLAN NOTE
"Dysphagia, debility, esophageal candidiasis, esophagitis, duodenal ulcers, fever  - Several days of worsening debility, weakness, decreased PO intake in the setting of recent procedure and multifocal pneumonia on CT with dysphagia.  - Continue ampicillin-sulbactam 3g IV q12hr.  - MBSS demonstrating severe esophageal dysmotility  - Full liquid diet for time being  - Consulted GI, EGD showed "esophageal candidiasis and ulcer on 11/2  - Added diflucan (x 2 weeks)  and continue PPI  - IVFs with LR at 50mL/hr, stopped on 11/4  - Clinically improving but with a fever overnight (after midnight)  - Initially planned for 7 days of IV ampicillin, but will continue for now given returning fever  - Repeat chest x-ray, UA with reflex to culture and blood cultures ordered for today  - Continue with plans to treat with 2 weeks of diflucan  - Will follow-up on studies  "

## 2022-11-06 NOTE — PROGRESS NOTES
[facility] Medicine  Progress Note    Patient Name: Frandy Mccarthy Jr.  MRN: [id number]  Patient Class: IP- Inpatient   Admission Date: 10/29/2022  Length of Stay: 7 days  Attending Physician: Marie Benavides MD  Primary Care Provider: Jose Mann MD        Subjective:     Principal Problem:Multifocal pneumonia        HPI:  Mr. Mccarthy is an 82/M with PMH HTN, HLD, DMII with neuropathy, BPH, chronic pain s/p spinal stimulator implantation 10/25 who presented to [facility] 10/29 with a 5 day history of progressively worsening progressively worsening cough, weakness, and dysphagia with associated constipation since his procedure. The patient has been unable to tolerate medications, liquids, or solids for the last 5 days.  Per his wife at bedside, he has no meaningful oral intake and he has become significantly weaker since his surgery.  He is now unable to rise out of bed without significant assistance.  The patient is able to briefly awaken during the interview and corroborate that he has cough, globus sensation with swallowing, and significant dysphagia, but denies odynophagia or neck swelling.  He denies hoarseness of voice, reflux, or fever.  Whenever the patient attempts to swallow liquids or medications, he has coughing and sputtering. ED evaluation was significant for SpO2 93-95% on RA, diffuse debility, and CT Chest/Abd/Pelvis showing multifocal pneumonia, large bowel distention and R-sided inguinal hernia with loops of small bowel without obstruction, and multiple low-attenuation lesions in the kidneys. He was started on ampicillin-sulbactam and hospital medicine was contacted for admission.      Overview/Hospital Course:  Admitted with dysphagia and multifocal pneumonia. NPO except meds / ice chips. Started ampicillin-sulbactam and SLP consulted. PT/OT consulted given diffuse weakness. MBSS performed 10/31 showing severe esophageal dysphagia; started full liquids and GI  consulted for further evaluation recommendations. Void trial initiated from difficulty with urinating noted after procedure 10/25. EGD showed esophagitis, esophageal candidiasis and duodenal ulcer.      Interval History: Noted fever overnight. Rader catheter removed yesterday and he voided urine this mornning. SOB better, more comfortable swallowing everyday.     Review of Systems   Constitutional:  Negative for chills and fever.   Respiratory:  Positive for shortness of breath.    Gastrointestinal:  Negative for abdominal pain, nausea and vomiting.   Objective:     Vital Signs (Most Recent):  Temp: 98.1 °F (36.7 °C) (11/06/22 1133)  Pulse: 79 (11/06/22 1450)  Resp: 18 (11/06/22 1450)  BP: 134/65 (11/06/22 1133)  SpO2: 97 % (11/06/22 1450) Vital Signs (24h Range):  Temp:  [97.5 °F (36.4 °C)-101.8 °F (38.8 °C)] 98.1 °F (36.7 °C)  Pulse:  [] 79  Resp:  [17-20] 18  SpO2:  [95 %-100 %] 97 %  BP: (111-183)/(55-85) 134/65     Weight: 93.9 kg (207 lb)  Body mass index is 30.57 kg/m².    Intake/Output Summary (Last 24 hours) at 11/6/2022 1529  Last data filed at 11/6/2022 0941  Gross per 24 hour   Intake 120 ml   Output 500 ml   Net -380 ml        Physical Exam  Vitals and nursing note reviewed.   Constitutional:       General: He is not in acute distress.     Appearance: He is well-developed. He is obese.   HENT:      Head: Normocephalic and atraumatic.      Nose: Nose normal.   Eyes:      General:         Right eye: No discharge.         Left eye: No discharge.      Extraocular Movements: Extraocular movements intact.      Conjunctiva/sclera: Conjunctivae normal.   Cardiovascular:      Rate and Rhythm: Normal rate.      Pulses: Normal pulses.   Pulmonary:      Effort: Pulmonary effort is normal. No respiratory distress.      Comments: R-sided rhonchi almost resolved  Abdominal:      General: There is no distension.      Palpations: Abdomen is soft.      Tenderness: There is no abdominal tenderness.  "  Musculoskeletal:         General: Normal range of motion.      Right lower leg: No edema.      Left lower leg: No edema.   Skin:     General: Skin is warm and dry.   Neurological:      Mental Status: He is alert and oriented to person, place, and time.       Significant Labs:   CBC:  Recent Labs   Lab 11/04/22 0434 11/05/22  0404 11/06/22  0730   WBC 6.11 7.69 12.24   HGB 8.9* 8.9* 8.7*   HCT 27.7* 27.5* 27.2*    341 348   GRAN 76.2*  4.7 78.0*  6.0 82.5*  10.1*   LYMPH 9.8*  0.6* 9.1*  0.7* 6.9*  0.8*   MONO 8.2  0.5 7.3  0.6 6.5  0.8   EOS 0.3 0.3 0.4   BASO 0.02 0.02 0.02     BMP:  Recent Labs   Lab 11/04/22 0434 11/05/22  0404 11/06/22  0556    139 140   K 3.6 3.3* 3.3*    110 111*   CO2 22* 22* 21*   BUN 25* 23 28*   CREATININE 1.2 1.2 1.7*   * 243* 235*   CALCIUM 8.6* 8.9 8.7   MG 1.8 1.9 1.7   PHOS 1.6* 2.0* 2.7       Significant Imaging:   All imaging reviewed.      Assessment/Plan:      * Multifocal pneumonia  Dysphagia, debility, esophageal candidiasis, esophagitis, duodenal ulcers, fever  - Several days of worsening debility, weakness, decreased PO intake in the setting of recent procedure and multifocal pneumonia on CT with dysphagia.  - Continue ampicillin-sulbactam 3g IV q12hr.  - MBSS demonstrating severe esophageal dysmotility  - Full liquid diet for time being  - Consulted GI, EGD showed "esophageal candidiasis and ulcer on 11/2  - Added diflucan (x 2 weeks)  and continue PPI  - IVFs with LR at 50mL/hr, stopped on 11/4  - Clinically improving but with a fever overnight (after midnight)  - Initially planned for 7 days of IV ampicillin, but will continue for now given returning fever  - Repeat chest x-ray, UA with reflex to culture and blood cultures ordered for today  - Continue with plans to treat with 2 weeks of diflucan  - Will follow-up on studies    Benign prostatic hyperplasia with urinary frequency  Urinary retention  - Continue finasteride 5mg PO daily, " oxybutynin 10mg PO daily, tamsulosin 0.4mg PO as BID. Family brought mirabegron but did not have packaging with dosing information to confirm per pharmacy; resume if labeling can be brought from home.  - Mondragon in place since procedure 10/25; voiding trial done on 10/31  - Urinary retention overnight, placed mondragon and consulted Urology on 11/1  - Patient to continue mondragon catheter for 3 days, another voiding trial done with removal of Mondragon catheter on 11/05  - Patient with fever overnight and UA with reflex to culture sent, but patient able to void this morning  - Will follow-up on studies and continue monitor urine output    Chronic pain  Diabetic polyneuropathy  - Continue duloxetine 30mg PO daily, pramipexole 0.25mg PO qHS, pregabalin 225mg PO BID, oxycodone 10mg PO q4hr PRN.    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    Hyperlipidemia  Carotid artery disease  - Continue fenofibrate 160mg PO daily, atorvastatin 10mg PO daily.      VTE Risk Mitigation (From admission, onward)         Ordered     heparin (porcine) injection 5,000 Units  Every 8 hours         10/29/22 1902     IP VTE HIGH RISK PATIENT  Once         10/29/22 1902                        Marie Benavides MD  Department of Hospital Medicine   [facility])

## 2022-11-06 NOTE — PLAN OF CARE
Problem: Physical Therapy  Goal: Physical Therapy Goal  Description: Goals to be met by: 2022    Patient will increase functional independence with mobility by performin. Sit<>stand with min assist with RW.  2. Gait x 50 feet with RW with SBA.  3. Ascend/descend 5 step(s) with least restrictive assistive device and CGA.      Outcome: Ongoing, Progressing

## 2022-11-07 ENCOUNTER — PATIENT OUTREACH (OUTPATIENT)
Dept: ADMINISTRATIVE | Facility: OTHER | Age: 82
End: 2022-11-07
Payer: MEDICARE

## 2022-11-07 PROBLEM — N18.30 CKD (CHRONIC KIDNEY DISEASE) STAGE 3, GFR 30-59 ML/MIN: Status: ACTIVE | Noted: 2022-11-07

## 2022-11-07 LAB
ANION GAP SERPL CALC-SCNC: 11 MMOL/L (ref 8–16)
BUN SERPL-MCNC: 28 MG/DL (ref 8–23)
CALCIUM SERPL-MCNC: 7.9 MG/DL (ref 8.7–10.5)
CHLORIDE SERPL-SCNC: 110 MMOL/L (ref 95–110)
CO2 SERPL-SCNC: 19 MMOL/L (ref 23–29)
CREAT SERPL-MCNC: 1.7 MG/DL (ref 0.5–1.4)
EST. GFR  (NO RACE VARIABLE): 40 ML/MIN/1.73 M^2
GLUCOSE SERPL-MCNC: 160 MG/DL (ref 70–110)
MAGNESIUM SERPL-MCNC: 1.6 MG/DL (ref 1.6–2.6)
PHOSPHATE SERPL-MCNC: 3.7 MG/DL (ref 2.7–4.5)
POCT GLUCOSE: 160 MG/DL (ref 70–110)
POCT GLUCOSE: 160 MG/DL (ref 70–110)
POCT GLUCOSE: 180 MG/DL (ref 70–110)
POTASSIUM SERPL-SCNC: 3.1 MMOL/L (ref 3.5–5.1)
SODIUM SERPL-SCNC: 140 MMOL/L (ref 136–145)

## 2022-11-07 PROCEDURE — 97110 THERAPEUTIC EXERCISES: CPT

## 2022-11-07 PROCEDURE — 80048 BASIC METABOLIC PNL TOTAL CA: CPT | Performed by: INTERNAL MEDICINE

## 2022-11-07 PROCEDURE — 83735 ASSAY OF MAGNESIUM: CPT | Performed by: INTERNAL MEDICINE

## 2022-11-07 PROCEDURE — 99232 PR SUBSEQUENT HOSPITAL CARE,LEVL II: ICD-10-PCS | Mod: ,,, | Performed by: HOSPITALIST

## 2022-11-07 PROCEDURE — 25000003 PHARM REV CODE 250: Performed by: INTERNAL MEDICINE

## 2022-11-07 PROCEDURE — 25000003 PHARM REV CODE 250: Performed by: HOSPITALIST

## 2022-11-07 PROCEDURE — 11000001 HC ACUTE MED/SURG PRIVATE ROOM

## 2022-11-07 PROCEDURE — 92526 ORAL FUNCTION THERAPY: CPT

## 2022-11-07 PROCEDURE — 36415 COLL VENOUS BLD VENIPUNCTURE: CPT | Performed by: INTERNAL MEDICINE

## 2022-11-07 PROCEDURE — 84100 ASSAY OF PHOSPHORUS: CPT | Performed by: INTERNAL MEDICINE

## 2022-11-07 PROCEDURE — 99232 SBSQ HOSP IP/OBS MODERATE 35: CPT | Mod: ,,, | Performed by: HOSPITALIST

## 2022-11-07 PROCEDURE — 63600175 PHARM REV CODE 636 W HCPCS: Performed by: INTERNAL MEDICINE

## 2022-11-07 PROCEDURE — 97112 NEUROMUSCULAR REEDUCATION: CPT

## 2022-11-07 PROCEDURE — 25000242 PHARM REV CODE 250 ALT 637 W/ HCPCS: Performed by: INTERNAL MEDICINE

## 2022-11-07 PROCEDURE — 63700000 PHARM REV CODE 250 ALT 637 W/O HCPCS: Performed by: HOSPITALIST

## 2022-11-07 RX ORDER — HEPARIN SODIUM 5000 [USP'U]/ML
5000 INJECTION, SOLUTION INTRAVENOUS; SUBCUTANEOUS EVERY 8 HOURS
Start: 2022-11-07

## 2022-11-07 RX ORDER — AMOXICILLIN AND CLAVULANATE POTASSIUM 875; 125 MG/1; MG/1
1 TABLET, FILM COATED ORAL EVERY 12 HOURS
Status: DISCONTINUED | OUTPATIENT
Start: 2022-11-07 | End: 2022-11-09 | Stop reason: HOSPADM

## 2022-11-07 RX ORDER — ACETAMINOPHEN 325 MG/1
650 TABLET ORAL EVERY 4 HOURS PRN
Refills: 0
Start: 2022-11-07

## 2022-11-07 RX ORDER — IPRATROPIUM BROMIDE AND ALBUTEROL SULFATE 2.5; .5 MG/3ML; MG/3ML
3 SOLUTION RESPIRATORY (INHALATION) EVERY 6 HOURS PRN
Qty: 75 ML | Refills: 0 | Status: SHIPPED | OUTPATIENT
Start: 2022-11-07 | End: 2023-11-07

## 2022-11-07 RX ORDER — INSULIN ASPART 100 [IU]/ML
0-5 INJECTION, SOLUTION INTRAVENOUS; SUBCUTANEOUS EVERY 6 HOURS PRN
Refills: 0
Start: 2022-11-07 | End: 2023-11-07

## 2022-11-07 RX ORDER — FLUCONAZOLE 100 MG/1
100 TABLET ORAL DAILY
Status: DISCONTINUED | OUTPATIENT
Start: 2022-11-07 | End: 2022-11-09 | Stop reason: HOSPADM

## 2022-11-07 RX ORDER — PANTOPRAZOLE SODIUM 40 MG/1
40 TABLET, DELAYED RELEASE ORAL DAILY
Qty: 30 TABLET
Start: 2022-11-07 | End: 2024-03-02

## 2022-11-07 RX ORDER — FUROSEMIDE 40 MG/1
40 TABLET ORAL DAILY
Status: DISCONTINUED | OUTPATIENT
Start: 2022-11-07 | End: 2022-11-09 | Stop reason: HOSPADM

## 2022-11-07 RX ORDER — TALC
6 POWDER (GRAM) TOPICAL NIGHTLY PRN
Refills: 0
Start: 2022-11-07

## 2022-11-07 RX ORDER — AMOXICILLIN AND CLAVULANATE POTASSIUM 875; 125 MG/1; MG/1
1 TABLET, FILM COATED ORAL EVERY 12 HOURS
Qty: 8 TABLET | Refills: 0
Start: 2022-11-07 | End: 2022-11-11

## 2022-11-07 RX ADMIN — POTASSIUM & SODIUM PHOSPHATES POWDER PACK 280-160-250 MG 1 PACKET: 280-160-250 PACK at 05:11

## 2022-11-07 RX ADMIN — HEPARIN SODIUM 5000 UNITS: 5000 INJECTION INTRAVENOUS; SUBCUTANEOUS at 05:11

## 2022-11-07 RX ADMIN — FLUCONAZOLE 100 MG: 100 TABLET ORAL at 09:11

## 2022-11-07 RX ADMIN — POTASSIUM & SODIUM PHOSPHATES POWDER PACK 280-160-250 MG 1 PACKET: 280-160-250 PACK at 09:11

## 2022-11-07 RX ADMIN — AMOXICILLIN AND CLAVULANATE POTASSIUM 1 TABLET: 875; 125 TABLET, FILM COATED ORAL at 09:11

## 2022-11-07 RX ADMIN — TAMSULOSIN HYDROCHLORIDE 0.4 MG: 0.4 CAPSULE ORAL at 09:11

## 2022-11-07 RX ADMIN — PRAMIPEXOLE DIHYDROCHLORIDE 0.25 MG: 0.25 TABLET ORAL at 10:11

## 2022-11-07 RX ADMIN — SODIUM CHLORIDE: 0.45 INJECTION, SOLUTION INTRAVENOUS at 03:11

## 2022-11-07 RX ADMIN — METOPROLOL TARTRATE 100 MG: 50 TABLET, FILM COATED ORAL at 09:11

## 2022-11-07 RX ADMIN — HEPARIN SODIUM 5000 UNITS: 5000 INJECTION INTRAVENOUS; SUBCUTANEOUS at 09:11

## 2022-11-07 RX ADMIN — POTASSIUM BICARBONATE 40 MEQ: 391 TABLET, EFFERVESCENT ORAL at 09:11

## 2022-11-07 RX ADMIN — POTASSIUM & SODIUM PHOSPHATES POWDER PACK 280-160-250 MG 1 PACKET: 280-160-250 PACK at 12:11

## 2022-11-07 RX ADMIN — AMPICILLIN SODIUM AND SULBACTAM SODIUM 3 G: 2; 1 INJECTION, POWDER, FOR SOLUTION INTRAMUSCULAR; INTRAVENOUS at 05:11

## 2022-11-07 RX ADMIN — PREGABALIN 225 MG: 75 CAPSULE ORAL at 09:11

## 2022-11-07 RX ADMIN — FINASTERIDE 5 MG: 5 TABLET, FILM COATED ORAL at 09:11

## 2022-11-07 RX ADMIN — OXYCODONE 10 MG: 5 TABLET ORAL at 10:11

## 2022-11-07 RX ADMIN — ATORVASTATIN CALCIUM 10 MG: 10 TABLET, FILM COATED ORAL at 09:11

## 2022-11-07 RX ADMIN — FENOFIBRATE 160 MG: 160 TABLET ORAL at 12:11

## 2022-11-07 RX ADMIN — FUROSEMIDE 40 MG: 40 TABLET ORAL at 09:11

## 2022-11-07 RX ADMIN — DULOXETINE 30 MG: 30 CAPSULE, DELAYED RELEASE ORAL at 09:11

## 2022-11-07 RX ADMIN — OXYCODONE 10 MG: 5 TABLET ORAL at 05:11

## 2022-11-07 RX ADMIN — PANTOPRAZOLE SODIUM 40 MG: 40 TABLET, DELAYED RELEASE ORAL at 09:11

## 2022-11-07 RX ADMIN — HEPARIN SODIUM 5000 UNITS: 5000 INJECTION INTRAVENOUS; SUBCUTANEOUS at 03:11

## 2022-11-07 NOTE — PLAN OF CARE
Problem: Occupational Therapy  Goal: Occupational Therapy Goal  Description: Goals to be met by: 11/10/2022    Patient will increase functional independence with ADLs by performing:    UE Dressing with Minimal Assistance.  Grooming while EOB with Contact Guard Assistance.  Upper body Bathing from  edge of bed with Minimal Assistance.  Sitting at edge of bed x15 minutes with Contact Guard Assistance.  Rolling to Bilateral with Stand-by Assistance and use of bedrail as needed.   Supine to sit with Minimal Assistance using log roll tech.  Toileting with Min A from [facility].  Toilet transfer to [facility] with CGA.    Outcome: Ongoing, Progressing  Impaired sitting balance, despite (B) UE support (significant (L) lateral and posterior lean).  Upon standing, unable to take steps along EOB with RW.  Continue OT services to maximize patient functioning.

## 2022-11-07 NOTE — PLAN OF CARE
Patient SNF acceptance at [facility] is pending medical clearance. SNF would like patient to stay an additional day inpatient to confirm resolution of fever. Will be able to accept patient on tomorrow pending medical status over course of day. Wife at bedside and CM updated both.   11/07/22 3015   Post-Acute Status   Post-Acute Authorization Placement   Post-Acute Placement Status Pending medical clearance/testing   Discharge Delays (!) Post-Acute Set-up   Discharge Plan   Discharge Plan A Skilled Nursing Facility   Discharge Plan B Home Health

## 2022-11-07 NOTE — PT/OT/SLP PROGRESS
Occupational Therapy   Treatment    Name: Frandy Mccarthy Jr.  MRN: [id number]  Admitting Diagnosis:  Multifocal pneumonia  5 Days Post-Op    Recommendations:     Discharge Recommendations: nursing facility, skilled  Discharge Equipment Recommendations:  bedside commode  Barriers to discharge:  Inaccessible home environment, Decreased caregiver support (at present functioning level)    Assessment:     Frandy Mccarthy Jr. is a 82 y.o. male with a medical diagnosis of Multifocal pneumonia.  He presents with the following performance deficits affecting function: weakness, gait instability, decreased upper extremity function, decreased ROM, impaired cardiopulmonary response to activity, impaired endurance, impaired balance, decreased lower extremity function, decreased safety awareness, impaired self care skills, pain, impaired skin, impaired functional mobility.     Impaired sitting balance, despite (B) UE support (significant (L) lateral and posterior lean).  Upon standing, unable to take steps along EOB with RW.  Continue OT services to maximize patient functioning.     Rehab Prognosis:  Good; patient would benefit from acute skilled OT services to address these deficits and reach maximum level of function.       Plan:     Patient to be seen 5 x/week to address the above listed problems via self-care/home management, therapeutic activities, therapeutic exercises  Plan of Care Expires: 11/13/22  Plan of Care Reviewed with: patient    Subjective     Pain/Comfort:  Pain Rating 1: 0/10 (but with sitting EOB)  Location - Side 1: Left  Location - Orientation 1: generalized  Location 1: knee  Pain Addressed 1: Reposition, Distraction, Cessation of Activity  Pain Rating Post-Intervention 1: other (see comments) (did not rate, appeared comfortable at rest)    Objective:     Communicated with: FRED Nascimento prior to session.  Patient found HOB elevated with oxygen, PureWick, peripheral IV, telemetry upon OT entry to room.    General  Precautions: Standard, anti-coagulation medicine, fall, diabetic, aspiration, other (see comments) (liquid diet)   Orthopedic Precautions:spinal precautions   Braces: N/A  Respiratory Status: Nasal cannula, flow 2 L/min     Occupational Performance:     Bed Mobility:    Patient completed Rolling/Turning to Left with  minimum assistance and with side rail  Patient completed Rolling/Turning to Right with minimum assistance and with side rail  Patient completed Scooting/Bridging with moderate assistance (to EOB; Total x 2 for drawsheet to HOB)  Patient completed Supine to Sit with maximal assistance  Patient completed Sit to Supine with maximal assistance     Functional Mobility/Transfers:  Patient completed Sit <> Stand Transfer with moderate assistance  with  rolling walker   Functional Mobility: Unable to take side steps and EOB with RW    Activities of Daily Living:  Lower Body Dressing: total assistance non skid socks      AMPAC 6 Click ADL: 12    Treatment & Education:  Sitting Balance:  Patient sat at EOB x approx 15 min with Mod assist and (B) UE support secondary (L) lateral and posterior lean.  Engaged in reaching tasks to (R) to assist with posture.    Patient and spouse educated on role of OT, POC, posture control tasks, bed mobility using log rolling and siderails.  Patient and spouse verbalized understanding.    Patient left HOB elevated with all lines intact, call button in reach, bed alarm on, RN, MD notified, and MD and spouse present    GOALS:   Multidisciplinary Problems       Occupational Therapy Goals          Problem: Occupational Therapy    Goal Priority Disciplines Outcome Interventions   Occupational Therapy Goal     OT, PT/OT Ongoing, Progressing    Description: Goals to be met by: 11/10/2022    Patient will increase functional independence with ADLs by performing:    UE Dressing with Minimal Assistance.  Grooming while EOB with Contact Guard Assistance.  Upper body Bathing from  edge of bed  with Minimal Assistance.  Sitting at edge of bed x15 minutes with Contact Guard Assistance.  Rolling to Bilateral with Stand-by Assistance and use of bedrail as needed.   Supine to sit with Minimal Assistance using log roll tech.  Toileting with Min A from [facility].  Toilet transfer to [facility] with CGA.                         Time Tracking:     OT Date of Treatment: 11/07/22  OT Start Time: 0822  OT Stop Time: 0848  OT Total Time (min): 26 min    Billable Minutes:Neuromuscular Re-education 26    OT/KARLA: OT          11/7/2022   [Abdominal Pain] : abdominal pain [Negative] : Skin [Penile Tenderness] : penile tenderness [Headache] : no headache [Nasal Discharge] : no nasal discharge [Nasal Congestion] : no nasal congestion [Sore Throat] : no sore throat [Appetite Changes] : no appetite changes [PO Intolerance] : PO tolerance [Vomiting] : no vomiting [Diarrhea] : no diarrhea [Constipation] : no constipation [Dysuria] : no dysuria [Polyuria] : no polyuria [Hematuria] : no hematuria [Urethral Discharge] : no urethral discharge

## 2022-11-07 NOTE — PT/OT/SLP PROGRESS
"Physical Therapy Treatment    Patient Name:  Frandy Mccarthy Jr.   MRN:  [id number]    Recommendations:     Discharge Recommendations:  nursing facility, skilled   Discharge Equipment Recommendations: bedside commode (may need regular RW, will defer to SNF)   Barriers to discharge: Inaccessible home and Decreased caregiver support    Assessment:     Frandy Mccarthy Jr. is a 82 y.o. male admitted with a medical diagnosis of Multifocal pneumonia.  He presents with the following impairments/functional limitations:  weakness, impaired endurance, impaired self care skills, impaired functional mobility, gait instability, impaired balance, decreased lower extremity function, decreased upper extremity function, pain .    Patient tolerated today's session fairly. The patient refuses to attempt upright sitting due to significant back pain that worsens when sitting EOB. The patient reports that he feels very fatigued today due to not sleeping and that OT worked on sitting up earlier today and he was "unable". Session focused primarily on supine LE strength exercises.     Rehab Prognosis: Good; patient would benefit from acute skilled PT services to address these deficits and reach maximum level of function.    Recent Surgery: Procedure(s) (LRB):  EGD (ESOPHAGOGASTRODUODENOSCOPY) (N/A) 5 Days Post-Op    Plan:     During this hospitalization, patient to be seen 5 x/week to address the identified rehab impairments via gait training, therapeutic exercises, therapeutic activities, neuromuscular re-education and progress toward the following goals:    Plan of Care Expires:  11/30/22    Subjective     Chief Complaint: "I haven't been sleeping and I couldn't sit up earlier today, why would I be able to sit up now" "You people don't understand that pain I am in"   Patient/Family Comments/goals: Patient agrees to participate in PT intervention   Pain/Comfort:  Pain Rating 1: 7/10  Location - Side 1: Bilateral  Location 1: back  Pain " Addressed 1: Distraction, Reposition, Cessation of Activity      Objective:     Communicated with nursing prior to session.  Patient found supine with   upon PT entry to room.     General Precautions: Standard, fall   Orthopedic Precautions:N/A   Braces: N/A  Respiratory Status: Nasal cannula, flow 2 L/min     Functional Mobility:  Bed Mobility:     Bridging: minimum assistance      AM-PAC 6 CLICK MOBILITY  Turning over in bed (including adjusting bedclothes, sheets and blankets)?: 3  Sitting down on and standing up from a chair with arms (e.g., wheelchair, bedside commode, etc.): 2  Moving from lying on back to sitting on the side of the bed?: 2  Moving to and from a bed to a chair (including a wheelchair)?: 2  Need to walk in hospital room?: 2  Climbing 3-5 steps with a railing?: 2  Basic Mobility Total Score: 13       Treatment & Education:  Patient performed the following LE strength exercises while supine in bes:   30 reps ankle pumps  15 reps bridges  Minimal assist to keep feet from sliding   Minimal range of motion, notable muscle activation   2 x 10 reps SLR   Minimal assist   Hip abduction  Exercise attempted but patient reports too painful to complete   15 reps quad sets   15 reps glute sets     Patient left supine with all lines intact and call button in reach..    GOALS:   Multidisciplinary Problems       Physical Therapy Goals          Problem: Physical Therapy    Goal Priority Disciplines Outcome Goal Variances Interventions   Physical Therapy Goal     PT, PT/OT Ongoing, Progressing     Description: Goals to be met by: 2022    Patient will increase functional independence with mobility by performin. Sit<>stand with min assist with RW.  2. Gait x 50 feet with RW with SBA.  3. Ascend/descend 5 step(s) with least restrictive assistive device and CGA.                           Time Tracking:     PT Received On: 22  PT Start Time: 1225     PT Stop Time: 1248  PT Total Time (min): 23 min      Billable Minutes: Therapeutic Exercise 23    Treatment Type: Treatment  PT/PTA: PT     PTA Visit Number: 0     11/07/2022

## 2022-11-07 NOTE — PT/OT/SLP PROGRESS
"Speech Language Pathology Treatment    Patient Name:  Frandy Mccarthy Jr.   MRN:  [id number]  Admitting Diagnosis: Multifocal pneumonia    Recommendations:                 General Recommendations:    Speech pathology to follow 4-6x/week for ongoing assessment and treatment of pharyngoesophageal dysphagia.  Recommend GI consult 2/2 significant esophageal dysmotility.      Diet recommendations:   Full liquids, Thin liquids - IDDSI Level 0      Aspiration Precautions:   1. 1:1 SUPERVISION   2. Sit FULLY upright at 90 degrees   3. Remain upright for 30 minutes before and after meal   4. Slight NECK EXTENSION when swallowing   5. SMALL SIPS (2-5ml)   6. NO STRAWS   7. Re-swallow x2-3 per sip   8. Frequent oral care to reduce oral bacteria   9. Meds whole with small sips of water     General Precautions: Standard, aspiration     Communication strategies:  n/a    Subjective     Pt reclined in bed.  Pt agreeable to SLP session.     Per Dr. Galvan, s/p EGD 11/2/22:  "- Suspicious for candidiasis  - Esophagitis in distal esophagus  - Normal stomach  - Non-bleeding duodenal ulcers     Recs:  - Start fluconazole and treat for 14 days.  - Start PPI  - Avoid NSAIDs  - Await path results  - Follow up in clinic in 3-4 weeks"    MBSS Impressions 10/31/22:  "Impressions  Pt presents with moderate pharyngoesophageal dysphagia characterized by:  Delayed initiation of the pharyngeal swallow  Dcr laryngeal elevation and anterior hyoid excursion  Dcr tongue base retraction  Dcr cricopharyngeal distension/duration  Suspected CP bar, impacting ability for clearance of material. Resulting in retrograde of material through the UES, worsening with incr in volume and consistency  Significant esophageal dysmotility noted"    Respiratory Status: Nasal Cannula    Objective:     Has the patient been evaluated by SLP for swallowing?   Yes  Keep patient NPO? No   Current Respiratory Status:    NC    Swallowing:   Reviewed MBSS results and aspiration " precautions. Educated on importance of sitting fully upright, slight neck extension, and swallowing twice. Patient with adequate recall of deficits and compensatory strategies. Patient seen with breakfast tray of full liquids, including transitional pureed solids (jello). Patient without s/s aspiration during intake. Pt with use of strategies during 100% observed PO trials. Patient reports improved globus sensation. Patient reports less coughing during PO intake. Patient for d/c today per wife and medical record (d/c order). Will review with medical team patient with improving oropharyngeal status, though, unclear whether patient is able to advance consistency of PO diet per GI team as there was esophageal dysfunction noted.     Dysphagia Based Exercise Program:  Given trials of ice, pt completed x10 effortful swallows with use of neck extension. No overt s/s of aspiration or airway threat during 100% of trials- no coughing, choking, changes in breath stream or vocal quality.     Completed x5 rounds of 10-second Isometric CTAR hold with resistance placed by clinician's hand.      Completed x20 reps of Dynamic CTAR.     Education: Discussed with pt. Pt able to recall aspiration precautions IND. Continue current plan of care.     Assessment:     Frandy Mccarthy Jr. is a 82 y.o. male with an SLP diagnosis of Dysphagia.      Goals:   Multidisciplinary Problems       SLP Goals          Problem: SLP    Goal Priority Disciplines Outcome   SLP Goal     SLP Ongoing, Progressing   Description: 1) Patient will participate in modified barium swallow study to further assess oropharyngeal swallow function and to best guide tx -Met 10/31    UPDATED GOALS:  2. Pt will consume a full liquid diet with reduced overt s/s of aspiration or airway threat during 100% of po intake given mod assist for utilizations for swallow strategies.   3. Pt will participate in dysphagia based exercise program targeting tongue base retraction, laryngeal  elevation, and CP distension/duration with 100% acc given mod assist.                          Plan:     Patient to be seen:  4 x/week, 6 x/week   Plan of Care expires:     Plan of Care reviewed with:  patient, spouse   SLP Follow-Up:  Yes       Discharge recommendations:  nursing facility, skilled       Time Tracking:     SLP Treatment Date:   11/07/22    Speech Start Time:  0921  Speech Stop Time:  0941       Speech Total Time (min):  20 min    Billable Minutes:Treatment Swallowing dysfunction 20 min    11/07/2022

## 2022-11-07 NOTE — PLAN OF CARE
POC reviewed with pt and spouse; both verbalized understanding. Denies pain/discomfort. 2L NC. Participated with speech and physical therapy. Frequent weight shifting assistance provided - pt educated on skin breakdown. Purewick in place - changed twice this shift. Pt free from falls this shift. Safety precautions in place. Will continue with plan of care.     Problem: Adult Inpatient Plan of Care  Goal: Plan of Care Review  Outcome: Ongoing, Progressing  Goal: Patient-Specific Goal (Individualized)  Outcome: Ongoing, Progressing  Goal: Absence of Hospital-Acquired Illness or Injury  Outcome: Ongoing, Progressing  Goal: Optimal Comfort and Wellbeing  Outcome: Ongoing, Progressing  Goal: Readiness for Transition of Care  Outcome: Ongoing, Progressing     Problem: Diabetes Comorbidity  Goal: Blood Glucose Level Within Targeted Range  Outcome: Ongoing, Progressing     Problem: Fluid Imbalance (Pneumonia)  Goal: Fluid Balance  Outcome: Ongoing, Progressing     Problem: Infection (Pneumonia)  Goal: Resolution of Infection Signs and Symptoms  Outcome: Ongoing, Progressing     Problem: Respiratory Compromise (Pneumonia)  Goal: Effective Oxygenation and Ventilation  Outcome: Ongoing, Progressing     Problem: Skin Injury Risk Increased  Goal: Skin Health and Integrity  Outcome: Ongoing, Progressing     Problem: Infection  Goal: Absence of Infection Signs and Symptoms  Outcome: Ongoing, Progressing     Problem: Pain Acute  Goal: Acceptable Pain Control and Functional Ability  Outcome: Ongoing, Progressing     Problem: Impaired Wound Healing  Goal: Optimal Wound Healing  Outcome: Ongoing, Progressing     Problem: Fall Injury Risk  Goal: Absence of Fall and Fall-Related Injury  Outcome: Ongoing, Progressing

## 2022-11-08 ENCOUNTER — PATIENT OUTREACH (OUTPATIENT)
Dept: ADMINISTRATIVE | Facility: OTHER | Age: 82
End: 2022-11-08
Payer: MEDICARE

## 2022-11-08 ENCOUNTER — PATIENT MESSAGE (OUTPATIENT)
Dept: NEUROSURGERY | Facility: CLINIC | Age: 82
End: 2022-11-08
Payer: MEDICARE

## 2022-11-08 PROBLEM — E87.5 HYPERKALEMIA: Status: RESOLVED | Noted: 2022-10-21 | Resolved: 2022-11-08

## 2022-11-08 LAB
ANION GAP SERPL CALC-SCNC: 8 MMOL/L (ref 8–16)
BUN SERPL-MCNC: 30 MG/DL (ref 8–23)
CALCIUM SERPL-MCNC: 8.4 MG/DL (ref 8.7–10.5)
CHLORIDE SERPL-SCNC: 110 MMOL/L (ref 95–110)
CO2 SERPL-SCNC: 21 MMOL/L (ref 23–29)
COMMENT: NORMAL
CREAT SERPL-MCNC: 1.8 MG/DL (ref 0.5–1.4)
EST. GFR  (NO RACE VARIABLE): 37 ML/MIN/1.73 M^2
FINAL PATHOLOGIC DIAGNOSIS: NORMAL
GLUCOSE SERPL-MCNC: 151 MG/DL (ref 70–110)
GROSS: NORMAL
Lab: NORMAL
MAGNESIUM SERPL-MCNC: 1.7 MG/DL (ref 1.6–2.6)
PHOSPHATE SERPL-MCNC: 3.4 MG/DL (ref 2.7–4.5)
POCT GLUCOSE: 145 MG/DL (ref 70–110)
POCT GLUCOSE: 154 MG/DL (ref 70–110)
POCT GLUCOSE: 189 MG/DL (ref 70–110)
POCT GLUCOSE: 195 MG/DL (ref 70–110)
POCT GLUCOSE: 205 MG/DL (ref 70–110)
POTASSIUM SERPL-SCNC: 2.9 MMOL/L (ref 3.5–5.1)
SODIUM SERPL-SCNC: 139 MMOL/L (ref 136–145)

## 2022-11-08 PROCEDURE — 25000242 PHARM REV CODE 250 ALT 637 W/ HCPCS: Performed by: INTERNAL MEDICINE

## 2022-11-08 PROCEDURE — 83735 ASSAY OF MAGNESIUM: CPT | Performed by: INTERNAL MEDICINE

## 2022-11-08 PROCEDURE — 94799 UNLISTED PULMONARY SVC/PX: CPT

## 2022-11-08 PROCEDURE — 99232 SBSQ HOSP IP/OBS MODERATE 35: CPT | Mod: ,,, | Performed by: INTERNAL MEDICINE

## 2022-11-08 PROCEDURE — 80048 BASIC METABOLIC PNL TOTAL CA: CPT | Performed by: INTERNAL MEDICINE

## 2022-11-08 PROCEDURE — 99232 PR SUBSEQUENT HOSPITAL CARE,LEVL II: ICD-10-PCS | Mod: ,,, | Performed by: INTERNAL MEDICINE

## 2022-11-08 PROCEDURE — 25000003 PHARM REV CODE 250: Performed by: HOSPITALIST

## 2022-11-08 PROCEDURE — 63700000 PHARM REV CODE 250 ALT 637 W/O HCPCS: Performed by: HOSPITALIST

## 2022-11-08 PROCEDURE — 36415 COLL VENOUS BLD VENIPUNCTURE: CPT | Performed by: INTERNAL MEDICINE

## 2022-11-08 PROCEDURE — 84100 ASSAY OF PHOSPHORUS: CPT | Performed by: INTERNAL MEDICINE

## 2022-11-08 PROCEDURE — 25000003 PHARM REV CODE 250: Performed by: INTERNAL MEDICINE

## 2022-11-08 PROCEDURE — 63600175 PHARM REV CODE 636 W HCPCS: Performed by: INTERNAL MEDICINE

## 2022-11-08 PROCEDURE — 92526 ORAL FUNCTION THERAPY: CPT

## 2022-11-08 PROCEDURE — 11000001 HC ACUTE MED/SURG PRIVATE ROOM

## 2022-11-08 RX ORDER — FLUCONAZOLE 100 MG/1
100 TABLET ORAL DAILY
Qty: 7 TABLET | Refills: 0 | Status: SHIPPED | OUTPATIENT
Start: 2022-11-09 | End: 2022-11-16

## 2022-11-08 RX ORDER — POTASSIUM CHLORIDE 20 MEQ/1
40 TABLET, EXTENDED RELEASE ORAL
Status: COMPLETED | OUTPATIENT
Start: 2022-11-08 | End: 2022-11-08

## 2022-11-08 RX ADMIN — POTASSIUM & SODIUM PHOSPHATES POWDER PACK 280-160-250 MG 1 PACKET: 280-160-250 PACK at 05:11

## 2022-11-08 RX ADMIN — POTASSIUM CHLORIDE 40 MEQ: 1500 TABLET, EXTENDED RELEASE ORAL at 10:11

## 2022-11-08 RX ADMIN — POTASSIUM CHLORIDE 40 MEQ: 1500 TABLET, EXTENDED RELEASE ORAL at 08:11

## 2022-11-08 RX ADMIN — HEPARIN SODIUM 5000 UNITS: 5000 INJECTION INTRAVENOUS; SUBCUTANEOUS at 06:11

## 2022-11-08 RX ADMIN — OXYCODONE 10 MG: 5 TABLET ORAL at 08:11

## 2022-11-08 RX ADMIN — OXYCODONE 10 MG: 5 TABLET ORAL at 12:11

## 2022-11-08 RX ADMIN — METOPROLOL TARTRATE 100 MG: 50 TABLET, FILM COATED ORAL at 08:11

## 2022-11-08 RX ADMIN — AMOXICILLIN AND CLAVULANATE POTASSIUM 1 TABLET: 875; 125 TABLET, FILM COATED ORAL at 08:11

## 2022-11-08 RX ADMIN — TAMSULOSIN HYDROCHLORIDE 0.4 MG: 0.4 CAPSULE ORAL at 08:11

## 2022-11-08 RX ADMIN — HEPARIN SODIUM 5000 UNITS: 5000 INJECTION INTRAVENOUS; SUBCUTANEOUS at 01:11

## 2022-11-08 RX ADMIN — POTASSIUM CHLORIDE 40 MEQ: 1500 TABLET, EXTENDED RELEASE ORAL at 06:11

## 2022-11-08 RX ADMIN — POTASSIUM & SODIUM PHOSPHATES POWDER PACK 280-160-250 MG 1 PACKET: 280-160-250 PACK at 09:11

## 2022-11-08 RX ADMIN — FINASTERIDE 5 MG: 5 TABLET, FILM COATED ORAL at 08:11

## 2022-11-08 RX ADMIN — INSULIN DETEMIR 10 UNITS: 100 INJECTION, SOLUTION SUBCUTANEOUS at 08:11

## 2022-11-08 RX ADMIN — FENOFIBRATE 160 MG: 160 TABLET ORAL at 12:11

## 2022-11-08 RX ADMIN — FLUCONAZOLE 100 MG: 100 TABLET ORAL at 08:11

## 2022-11-08 RX ADMIN — PREGABALIN 225 MG: 75 CAPSULE ORAL at 08:11

## 2022-11-08 RX ADMIN — POTASSIUM & SODIUM PHOSPHATES POWDER PACK 280-160-250 MG 1 PACKET: 280-160-250 PACK at 08:11

## 2022-11-08 RX ADMIN — OXYCODONE 10 MG: 5 TABLET ORAL at 04:11

## 2022-11-08 RX ADMIN — PRAMIPEXOLE DIHYDROCHLORIDE 0.25 MG: 0.25 TABLET ORAL at 08:11

## 2022-11-08 RX ADMIN — HEPARIN SODIUM 5000 UNITS: 5000 INJECTION INTRAVENOUS; SUBCUTANEOUS at 09:11

## 2022-11-08 RX ADMIN — DULOXETINE 30 MG: 30 CAPSULE, DELAYED RELEASE ORAL at 08:11

## 2022-11-08 RX ADMIN — POTASSIUM & SODIUM PHOSPHATES POWDER PACK 280-160-250 MG 1 PACKET: 280-160-250 PACK at 12:11

## 2022-11-08 RX ADMIN — ATORVASTATIN CALCIUM 10 MG: 10 TABLET, FILM COATED ORAL at 08:11

## 2022-11-08 RX ADMIN — FUROSEMIDE 40 MG: 40 TABLET ORAL at 08:11

## 2022-11-08 RX ADMIN — PANTOPRAZOLE SODIUM 40 MG: 40 TABLET, DELAYED RELEASE ORAL at 08:11

## 2022-11-08 NOTE — PLAN OF CARE
POC reviewed. Purposeful rounding completed. No significant events this shift. No complaints of pain. Bed low and locked, side rails up x3, call light within reach.      Problem: Adult Inpatient Plan of Care  Goal: Plan of Care Review  Outcome: Ongoing, Progressing  Goal: Patient-Specific Goal (Individualized)  Outcome: Ongoing, Progressing  Goal: Absence of Hospital-Acquired Illness or Injury  Outcome: Ongoing, Progressing  Goal: Optimal Comfort and Wellbeing  Outcome: Ongoing, Progressing     Problem: Infection (Pneumonia)  Goal: Resolution of Infection Signs and Symptoms  Outcome: Ongoing, Progressing

## 2022-11-08 NOTE — ASSESSMENT & PLAN NOTE
NOBLE with CKD4  - Review of medical records in Care everywhere reveal creatine that has fluctuated from 1.32- 1.86 overall in the past 2 years  - Recent increase just few weeks prior to admission in 2.5 range  - Creatinnet 3.3 on admission with gradual improvement   - Creatinine increased to 1.7 and has stayed at that level the past 2 days, reflecting return overall to has baseline   - Will continue to monitor

## 2022-11-08 NOTE — PLAN OF CARE
Nutrition Plan of Care:    Recommendations  1) Continue diet per speech therapy   2) Increase Boost Glucose Control to BID  3) Add Laureano BID for wound healing     Goals: Patient to consume > 50% of EEN prior to RD follow up.  Nutrition Goal Status: progressing towards goal  Communication of RD Recs:  (POC)     Assessment and Plan  Nutrition Problem  Inability to consume po intake      Related to (etiology):   Chewing and swallowing issues      Signs and Symptoms (as evidenced by):   Need to texture modified diet      Interventions  Full liquid diet  Commercial beverage  Collaboration with other providers     Nutrition Diagnosis Status:   Continues  Luisana Rinaldi, MS, RDN, LDN  s

## 2022-11-08 NOTE — SUBJECTIVE & OBJECTIVE
Interval History: No acute events. Voiding urine. SOB slightly better. Patient more comfortable swallowing again today, interested in eating more solid food.    Review of Systems   Constitutional:  Negative for chills and fever.   Respiratory:  Positive for shortness of breath.    Gastrointestinal:  Negative for abdominal pain, nausea and vomiting.   Objective:     Vital Signs (Most Recent):  Temp: 98.6 °F (37 °C) (11/07/22 2305)  Pulse: 88 (11/07/22 2305)  Resp: 18 (11/07/22 2305)  BP: (!) 124/59 (11/07/22 2305)  SpO2: (!) 92 % (11/07/22 2305) Vital Signs (24h Range):  Temp:  [97.9 °F (36.6 °C)-99 °F (37.2 °C)] 98.6 °F (37 °C)  Pulse:  [79-93] 88  Resp:  [16-20] 18  SpO2:  [92 %-97 %] 92 %  BP: (107-140)/(55-60) 124/59     Weight: 93.9 kg (207 lb)  Body mass index is 30.57 kg/m².    Intake/Output Summary (Last 24 hours) at 11/7/2022 2321  Last data filed at 11/7/2022 1727  Gross per 24 hour   Intake 600 ml   Output 450 ml   Net 150 ml        Physical Exam  Vitals and nursing note reviewed.   Constitutional:       General: He is not in acute distress.     Appearance: He is well-developed. He is obese.   HENT:      Head: Normocephalic and atraumatic.      Nose: Nose normal.   Eyes:      General:         Right eye: No discharge.         Left eye: No discharge.      Extraocular Movements: Extraocular movements intact.      Conjunctiva/sclera: Conjunctivae normal.   Cardiovascular:      Rate and Rhythm: Normal rate.      Pulses: Normal pulses.   Pulmonary:      Effort: Pulmonary effort is normal. No respiratory distress.      Comments: Slightly course and diminished but improved  Abdominal:      General: There is no distension.      Palpations: Abdomen is soft.      Tenderness: There is no abdominal tenderness.   Musculoskeletal:         General: Normal range of motion.      Right lower leg: No edema.      Left lower leg: No edema.   Skin:     General: Skin is warm and dry.   Neurological:      Mental Status: He is  alert and oriented to person, place, and time.       Significant Labs:   CBC:  Recent Labs   Lab 11/05/22  0404 11/06/22  0730   WBC 7.69 12.24   HGB 8.9* 8.7*   HCT 27.5* 27.2*    348   GRAN 78.0*  6.0 82.5*  10.1*   LYMPH 9.1*  0.7* 6.9*  0.8*   MONO 7.3  0.6 6.5  0.8   EOS 0.3 0.4   BASO 0.02 0.02     BMP:  Recent Labs   Lab 11/05/22  0404 11/06/22  0556 11/07/22  0539    140 140   K 3.3* 3.3* 3.1*    111* 110   CO2 22* 21* 19*   BUN 23 28* 28*   CREATININE 1.2 1.7* 1.7*   * 235* 160*   CALCIUM 8.9 8.7 7.9*   MG 1.9 1.7 1.6   PHOS 2.0* 2.7 3.7       Significant Imaging:   All imaging reviewed.

## 2022-11-08 NOTE — PLAN OF CARE
POC reviewed w/ pt and wife. AAOx4. VSS on RA. C/o pain treated w/  PRN pain medication per MAR. Turn Q2/frequent weight shift assistance provided. Instructed to call for help ambulating. No injuries, falls, or trauma occurred during shift. Purposeful rounding completed. Bed alarm set, bed low and locked, side rails up x3, call light within reach.

## 2022-11-08 NOTE — PROGRESS NOTES
Patient refuses to participate in physical therapy intervention. Patient reports back pain and that he has been on/ off the bed pan all day.

## 2022-11-08 NOTE — PLAN OF CARE
NURSING HOME ORDERS    11/09/2022  [facility] ([facility]  [facility])  [address]  [place] [address]  Dept: [phone]  Loc: [phone]     Admit to Nursing Home:  Skilled Nursing Facility    Diagnoses:  Active Hospital Problems    Diagnosis  POA    *Multifocal pneumonia [J18.9]  Yes    Urinary retention [R33.9]  Yes    Pressure injury of right buttock, stage 3 [L89.313]  Yes    Esophageal dysphagia [R13.19]  Yes    Carotid arterial disease [I77.9]  Yes     Chronic    CKD (chronic kidney disease) stage 4, GFR 15-29 ml/min [N18.4]  Yes     Chronic    Diabetic polyneuropathy associated with type 2 diabetes mellitus [E11.42]  Yes     Chronic    Benign prostatic hyperplasia with urinary frequency [N40.1, R35.0]  Yes     Chronic    Chronic pain [G89.29]  Yes     Chronic    Controlled type 2 diabetes mellitus with diabetic autonomic neuropathy, with long-term current use of insulin [E11.43, Z79.4]  Not Applicable     Chronic    Essential hypertension [I10]  Yes     Chronic    Hyperlipidemia [E78.5]  Yes     Chronic      Resolved Hospital Problems   No resolved problems to display.       Patient is homebound due to:  Multifocal pneumonia    Allergies:Review of patient's allergies indicates:  No Known Allergies    Vitals:  Every shift    Diet: full liquid    Activities:   Activity as tolerated    Goals of Care Treatment Preferences:  Code Status: Full Code      Labs:  Per unit routine    Nursing Precautions:  Aspiration , Fall, and Pressure ulcer prevention    Consults:   PT to evaluate and treat, OT to evaluate and treat, ST to evaluate and treat, Wound Care, and Nutrition to evaluate and recommend diet     Miscellaneous Care: Routine Skin for Bedridden Patients: Instruct patient/caregiver to apply moisture barrier cream to all skin folds and wet areas in perineal area daily and after baths and all bowel movements.                   Diabetes Care:  SN to perform and educate Diabetic  management with blood glucose monitoring:, Fingerstick blood sugar AC and HS, and Report CBG < 60 or > 350 to physician.      Medications: Discontinue all previous medication orders, if any. See new list below.     Medication List        START taking these medications      acetaminophen 325 MG tablet  Commonly known as: TYLENOL  Take 2 tablets (650 mg total) by mouth every 4 (four) hours as needed.     albuterol-ipratropium 2.5 mg-0.5 mg/3 mL nebulizer solution  Commonly known as: DUO-NEB  Take 3 mLs by nebulization every 6 (six) hours as needed for Wheezing. Rescue     amoxicillin-clavulanate 875-125mg 875-125 mg per tablet  Commonly known as: AUGMENTIN  Take 1 tablet by mouth every 12 (twelve) hours. for 4 days     fluconazole 100 MG tablet  Commonly known as: DIFLUCAN  Take 1 tablet (100 mg total) by mouth once daily. for 7 days     heparin (porcine) 5,000 unit/mL injection  Inject 1 mL (5,000 Units total) into the skin every 8 (eight) hours.     insulin aspart U-100 100 unit/mL (3 mL) Inpn pen  Commonly known as: NovoLOG  Inject 0-5 Units into the skin every 6 (six) hours as needed (Hyperglycemia).     insulin detemir U-100 100 unit/mL (3 mL) Inpn pen  Commonly known as: Levemir FLEXTOUCH  Inject 10 Units into the skin once daily.     melatonin 3 mg tablet  Commonly known as: MELATIN  Take 2 tablets (6 mg total) by mouth nightly as needed for Insomnia.     pantoprazole 40 MG tablet  Commonly known as: PROTONIX  Take 1 tablet (40 mg total) by mouth once daily.            CONTINUE taking these medications      cyanocobalamin 1000 MCG tablet  Commonly known as: VITAMIN B-12  Take 100 mcg by mouth.     DULoxetine 30 MG capsule  Commonly known as: CYMBALTA  Take 30 mg by mouth once daily.     fenofibrate 160 MG Tab  Take 1 tablet (160 mg total) by mouth once daily.     finasteride 5 mg tablet  Commonly known as: PROSCAR  Take 5 mg by mouth.     folic acid 800 MCG Tab  Commonly known as: FOLVITE  Take 800 mcg by mouth  once daily.     furosemide 40 MG tablet  Commonly known as: LASIX  Take 40 mg by mouth 2 (two) times daily.     metoprolol tartrate 100 MG tablet  Commonly known as: LOPRESSOR  Take 100 mg by mouth.     oxyCODONE 10 mg Tab immediate release tablet  Commonly known as: ROXICODONE  Take 1 tablet (10 mg total) by mouth every 6 (six) hours as needed for Pain. Do not take at the same time as your Percocet.     pramipexole 0.25 MG tablet  Commonly known as: MIRAPEX  Take by mouth every evening.     pregabalin 225 MG Cap  Commonly known as: LYRICA  Take 225 mg by mouth 2 (two) times daily.     simvastatin 20 MG tablet  Commonly known as: ZOCOR  Take 20 mg by mouth once daily.     tamsulosin 0.4 mg Cap  Commonly known as: FLOMAX  Take 0.4 mg by mouth once daily.            STOP taking these medications      HumaLOG U-100 Insulin 100 unit/mL injection  Generic drug: insulin lispro     MYRBETRIQ 25 mg Tb24 ER tablet  Generic drug: mirabegron     oxybutynin 10 MG 24 hr tablet  Commonly known as: DITROPAN-XL     oxyCODONE-acetaminophen 5-325 mg per tablet  Commonly known as: PERCOCET     sulfamethoxazole-trimethoprim 800-160mg 800-160 mg Tab  Commonly known as: BACTRIM DS       Hold these medications for time being; inquire on discharge from SNF regarding resuming to take:     UNABLE TO FIND  Take by mouth 2 (two) times a day. medication name: Super Beta Prostate Advanced     V-GO 30 Isabel  Generic drug: sub-q insulin device, 30 unit  AS DIRECTED , APPLY 1 V-GO DEVICE DAILY            Immunizations Administered as of 11/9/2022       Name Date Dose VIS Date Route Exp Date    COVID-19, MRNA, LN-S, PF (Pfizer) (Purple Cap) 2/10/2021 0.3 mL -- Intramuscular --    Site: Right arm     : [employer]     Lot: [id number]     COVID-19, MRNA, LN-S, PF (Pfizer) (Purple Cap) 1/17/2021 0.3 mL -- Intramuscular --    Site: Left arm     : [employer]     Lot: [id number]           Need ambulance transportation; unable to tolerate  seated position for duration of transfer.  _________________________________  D Kian Chang MD  11/09/2022

## 2022-11-08 NOTE — PROGRESS NOTES
[facility] Medicine  Progress Note    Patient Name: Frandy Mccarthy Jr.  MRN: [id number]  Patient Class: IP- Inpatient   Admission Date: 10/29/2022  Length of Stay: 8 days  Attending Physician: Marie Benavides MD  Primary Care Provider: Jose Mann MD        Subjective:     Principal Problem:Multifocal pneumonia        HPI:  Mr. Mccarthy is an 82/M with PMH HTN, HLD, DMII with neuropathy, BPH, chronic pain s/p spinal stimulator implantation 10/25 who presented to [facility] 10/29 with a 5 day history of progressively worsening progressively worsening cough, weakness, and dysphagia with associated constipation since his procedure. The patient has been unable to tolerate medications, liquids, or solids for the last 5 days.  Per his wife at bedside, he has no meaningful oral intake and he has become significantly weaker since his surgery.  He is now unable to rise out of bed without significant assistance.  The patient is able to briefly awaken during the interview and corroborate that he has cough, globus sensation with swallowing, and significant dysphagia, but denies odynophagia or neck swelling.  He denies hoarseness of voice, reflux, or fever.  Whenever the patient attempts to swallow liquids or medications, he has coughing and sputtering. ED evaluation was significant for SpO2 93-95% on RA, diffuse debility, and CT Chest/Abd/Pelvis showing multifocal pneumonia, large bowel distention and R-sided inguinal hernia with loops of small bowel without obstruction, and multiple low-attenuation lesions in the kidneys. He was started on ampicillin-sulbactam and hospital medicine was contacted for admission.      Overview/Hospital Course:  Admitted with dysphagia and multifocal pneumonia. NPO except meds / ice chips. Started ampicillin-sulbactam and SLP consulted. PT/OT consulted given diffuse weakness. MBSS performed 10/31 showing severe esophageal dysphagia; started full liquids and GI  consulted for further evaluation recommendations. Void trial initiated from difficulty with urinating noted after procedure 10/25. EGD showed esophagitis, esophageal candidiasis and duodenal ulcer. Diflucan started and PPI. Voiding trial done done twice, last time successful on 11/5. Fever spiked on evening of 11/5 and workup initiated. Blood culture NGTD, UA abnormal but not unexpected given recent removal of mondragon catheter, and CXR without acute changes. PT and OT following, and awaiting SNF placement.      Interval History: No acute events. Voiding urine. SOB slightly better. Patient more comfortable swallowing again today, interested in eating more solid food.    Review of Systems   Constitutional:  Negative for chills and fever.   Respiratory:  Positive for shortness of breath.    Gastrointestinal:  Negative for abdominal pain, nausea and vomiting.   Objective:     Vital Signs (Most Recent):  Temp: 98.6 °F (37 °C) (11/07/22 2305)  Pulse: 88 (11/07/22 2305)  Resp: 18 (11/07/22 2305)  BP: (!) 124/59 (11/07/22 2305)  SpO2: (!) 92 % (11/07/22 2305) Vital Signs (24h Range):  Temp:  [97.9 °F (36.6 °C)-99 °F (37.2 °C)] 98.6 °F (37 °C)  Pulse:  [79-93] 88  Resp:  [16-20] 18  SpO2:  [92 %-97 %] 92 %  BP: (107-140)/(55-60) 124/59     Weight: 93.9 kg (207 lb)  Body mass index is 30.57 kg/m².    Intake/Output Summary (Last 24 hours) at 11/7/2022 2321  Last data filed at 11/7/2022 1727  Gross per 24 hour   Intake 600 ml   Output 450 ml   Net 150 ml        Physical Exam  Vitals and nursing note reviewed.   Constitutional:       General: He is not in acute distress.     Appearance: He is well-developed. He is obese.   HENT:      Head: Normocephalic and atraumatic.      Nose: Nose normal.   Eyes:      General:         Right eye: No discharge.         Left eye: No discharge.      Extraocular Movements: Extraocular movements intact.      Conjunctiva/sclera: Conjunctivae normal.   Cardiovascular:      Rate and Rhythm: Normal  "rate.      Pulses: Normal pulses.   Pulmonary:      Effort: Pulmonary effort is normal. No respiratory distress.      Comments: Slightly course and diminished but improved  Abdominal:      General: There is no distension.      Palpations: Abdomen is soft.      Tenderness: There is no abdominal tenderness.   Musculoskeletal:         General: Normal range of motion.      Right lower leg: No edema.      Left lower leg: No edema.   Skin:     General: Skin is warm and dry.   Neurological:      Mental Status: He is alert and oriented to person, place, and time.       Significant Labs:   CBC:  Recent Labs   Lab 11/05/22  0404 11/06/22  0730   WBC 7.69 12.24   HGB 8.9* 8.7*   HCT 27.5* 27.2*    348   GRAN 78.0*  6.0 82.5*  10.1*   LYMPH 9.1*  0.7* 6.9*  0.8*   MONO 7.3  0.6 6.5  0.8   EOS 0.3 0.4   BASO 0.02 0.02     BMP:  Recent Labs   Lab 11/05/22  0404 11/06/22  0556 11/07/22  0539    140 140   K 3.3* 3.3* 3.1*    111* 110   CO2 22* 21* 19*   BUN 23 28* 28*   CREATININE 1.2 1.7* 1.7*   * 235* 160*   CALCIUM 8.9 8.7 7.9*   MG 1.9 1.7 1.6   PHOS 2.0* 2.7 3.7       Significant Imaging:   All imaging reviewed.      Assessment/Plan:      * Multifocal pneumonia  Dysphagia, debility, esophageal candidiasis, esophagitis, duodenal ulcers, fever  - Several days of worsening debility, weakness, decreased PO intake in the setting of recent procedure and multifocal pneumonia on CT with dysphagia.  - Continue ampicillin-sulbactam 3g IV q12hr.  - MBSS demonstrating severe esophageal dysmotility  - Full liquid diet for time being  - Consulted GI, EGD showed "esophageal candidiasis and ulcer on 11/2  - Added diflucan (x 2 weeks)  and continue PPI  - IVFs with LR at 50mL/hr, stopped on 11/4  - Clinically improving but with a fever overnight (around MN) on 11/5   - Repeat chest x-ray witout acute changes, UA showed 1+ leukocytes, 28 RBC, 5 WBC and bacteria which was not unexpected given mondragon catheter was " removed that same day.(reflexed to culture but not done) and blood cultures NGTD  - Initially planned for 7 days of IV ampicillin and discontinue, but continued given fever  - No return of fever, will change IV ampicillin to PO Augmentin   - Continue with plans to treat with 2 weeks of diflucan  - Will follow-up on studies  - Will discharge to SNF today if accepted    Benign prostatic hyperplasia with urinary frequency  Urinary retention  - Continue finasteride 5mg PO daily, oxybutynin 10mg PO daily, tamsulosin 0.4mg PO as BID. Family brought mirabegron but did not have packaging with dosing information to confirm per pharmacy; resume if labeling can be brought from home.  - Mondragon in place since procedure 10/25; voiding trial done on 10/31  - Urinary retention overnight, placed mondragon and consulted Urology on 11/1  - Patient to continue mondragon catheter for 3 days, another voiding trial done with removal of Mondragon catheter on 11/05  - Patient with fever overnight and UA with reflex to culture sent as discussed above  - Patient able to void and bladder scans with low volumes  - Continue to monitor with I/Os and bladder scans    CKD (chronic kidney disease) stage 4, GFR 15-29 ml/min  NOBLE with CKD4  - Review of medical records in Care everywhere reveal creatine that has fluctuated from 1.32- 1.86 overall in the past 2 years  - Recent increase just few weeks prior to admission in 2.5 range  - Creatinnet 3.3 on admission with gradual improvement   - Creatinine increased to 1.7 and has stayed at that level the past 2 days, reflecting return overall to has baseline   - Will continue to monitor    Chronic pain  Diabetic polyneuropathy  - Continue duloxetine 30mg PO daily, pramipexole 0.25mg PO qHS, pregabalin 225mg PO BID, oxycodone 10mg PO q4hr PRN.    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    Hyperlipidemia  Carotid artery disease  - Continue fenofibrate 160mg PO daily, atorvastatin 10mg PO daily.    VTE Risk Mitigation (From admission, onward)         Ordered     heparin (porcine) injection 5,000 Units  Every 8 hours         10/29/22 1902     IP VTE HIGH RISK PATIENT  Once         10/29/22 1902                      Marie Benavides MD  Department of Hospital Medicine   [facility], [place], operated by [facility] ([place])

## 2022-11-08 NOTE — ASSESSMENT & PLAN NOTE
NOBLE with CKD3  - Review of medical records in Care everywhere reveal creatine that has fluctuated from 1.32- 1.86 overall in the past 2 years  - Recent increase just few weeks prior to admission in 2.5 range  - Creatinnet 3.3 on admission with gradual improvement   - Creatinine increased to 1.7 and has stayed at that level the past 2 days, reflecting return overall to has baseline   - Will continue to monitor

## 2022-11-08 NOTE — PLAN OF CARE
[facility] accepting facility, awaiting insurance auth. Wife at facility and will be completing admission paperwork. [facility] no bed available until possibly Thursday.   11/08/22 1332   Post-Acute Status   Post-Acute Authorization Placement   Post-Acute Placement Status Pending payor review/awaiting authorization (if required)   Patient choice form signed by patient/caregiver List with quality metrics by geographic area provided;List from CMS Compare;List from System Post-Acute Care   Discharge Delays (!) Post-Acute Set-up   Discharge Plan   Discharge Plan A Skilled Nursing Facility

## 2022-11-08 NOTE — ASSESSMENT & PLAN NOTE
"Dysphagia, debility, esophageal candidiasis, esophagitis, duodenal ulcers, fever  - Several days of worsening debility, weakness, decreased PO intake in the setting of recent procedure and multifocal pneumonia on CT with dysphagia.  - Continue ampicillin-sulbactam 3g IV q12hr.  - MBSS demonstrating severe esophageal dysmotility  - Full liquid diet for time being  - Consulted GI, EGD showed "esophageal candidiasis and ulcer on 11/2  - Added diflucan (x 2 weeks)  and continue PPI  - IVFs with LR at 50mL/hr, stopped on 11/4  - Clinically improving but with a fever overnight (around MN) on 11/5   - Repeat chest x-ray witout acute changes, UA showed 1+ leukocytes, 28 RBC, 5 WBC and bacteria which was not unexpected given mondragon catheter was removed that same day.(reflexed to culture but not done) and blood cultures NGTD  - Initially planned for 7 days of IV ampicillin and discontinue, but continued given fever  - No return of fever, will change IV ampicillin to PO Augmentin   - Continue with plans to treat with 2 weeks of diflucan  - Will follow-up on studies  - Will discharge to SNF today if accepted  "

## 2022-11-08 NOTE — PT/OT/SLP PROGRESS
Occupational Therapy      Patient Name:  Frandy Mccarthy Jr.   MRN:  [id number]    Patient not seen today secondary to Patient fatigue, Pain. Patient reports numerous BMs on this date, with fatigue post bed mobility for use of bed pan and hygiene.  Also reports associated anal pain.  Educated on importance of daily OT sessions.  Verbalized understanding but fearful of having BM during session.  Will follow-up 11/09/2022.    11/8/2022

## 2022-11-08 NOTE — ASSESSMENT & PLAN NOTE
Urinary retention  - Continue finasteride 5mg PO daily, oxybutynin 10mg PO daily, tamsulosin 0.4mg PO as BID. Family brought mirabegron but did not have packaging with dosing information to confirm per pharmacy; resume if labeling can be brought from home.  - Mondragon in place since procedure 10/25; voiding trial done on 10/31  - Urinary retention overnight, placed mondragon and consulted Urology on 11/1  - Patient to continue mondragon catheter for 3 days, another voiding trial done with removal of Mondragon catheter on 11/05  - Patient with fever overnight and UA with reflex to culture sent as discussed above  - Patient able to void and bladder scans with low volumes  - Continue to monitor with I/Os and bladder scans

## 2022-11-08 NOTE — ASSESSMENT & PLAN NOTE
- Last hemoglobin A1c was 7.5 on October 18, 2022  - On V-Go at home; on hold  - Glucose level reviewed, now higher than target  - Added detemir 10 unit daily and increase sliding scale insulin to moderate scale  - Continue to adjust insulin with target glucose between 140-180

## 2022-11-08 NOTE — PROGRESS NOTES
"[facility] ([place])  Adult Nutrition  Progress Note    SUMMARY     Recommendations  1) Continue diet per speech therapy   2) Increase Boost Glucose Control to BID  3) Add Laureano BID for wound healing    Goals: Patient to consume > 50% of EEN prior to RD follow up.  Nutrition Goal Status: progressing towards goal  Communication of RD Recs:  (POC)    Assessment and Plan  Nutrition Problem  Inability to consume po intake      Related to (etiology):   Chewing and swallowing issues      Signs and Symptoms (as evidenced by):   Need to texture modified diet      Interventions  Full liquid diet  Commercial beverage  Collaboration with other providers     Nutrition Diagnosis Status:   Continues    Malnutrition Assessment   LOIDA NFPE due to remote assessment    Reason for Assessment  Reason For Assessment: RD follow-up  Diagnosis: infection/sepsis, diabetes diagnosis/complications, renal disease, cardiac disease  Past Medical History:   Diagnosis Date    Anemia 10/18/2022    Arthritis     Diabetes mellitus, type 2     Disorder of kidney and ureter     History of hyperparathyroidism     History of hyperparathyroidism 10/6/2020    Formatting of this note might be different from the original. 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 Denies N/V/D/C, reports "my stomach feels empty." Encouraged use of ONS while on full liquid diet. LBM 11/3. Incision to neck and spine, altered skin to buttocks. -210 lbs x 3 years per chart. EGD showed esophagitis, esophageal candidus, duodenal ulcer. Speech therapy following and recs to continue full liquid diet at this time  Follow up 11/8:  Patient continues with diabetic diet at this time.  PO intake improving.  50-75% po intake documented.  Patient with nursing " "home ordered.  Labs reviewed.  NKFA.  LBM:11/8/22.  RD to continue to monitor.  (RD remote)    Nutrition Discharge Planning: Patient to continue on texture appropriate diet prior and post discharge.    Nutrition Risk Screen  Nutrition Risk Screen: dysphagia or difficulty swallowing, large or nonhealing wound, burn or pressure injury    Nutrition/Diet History  Spiritual, Cultural Beliefs, [employer]ism Practices, Values that Affect Care: no  Food Allergies: NKFA  Factors Affecting Nutritional Intake: chewing difficulties/inability to chew food, difficulty/impaired swallowing    Anthropometrics  Temp: 97.7 °F (36.5 °C)  Height Method: Stated  Height: 5' 9" (175.3 cm)  Height (inches): 69 in  Weight Method: Stated  Weight: 93.9 kg (207 lb)  Weight (lb): 207 lb  Ideal Body Weight (IBW), Male: 160 lb  % Ideal Body Weight, Male (lb): 129.38 %  BMI (Calculated): 30.6  BMI Grade: 30 - 34.9- obesity - grade I       Lab/Procedures/Meds  Pertinent Labs Reviewed: reviewed  Pertinent Labs Comments: BUN 25, glu 179, Ca 8.6, Phos 1.6  Pertinent Medications Reviewed: reviewed  Pertinent Medications Comments: atorvastatin, bisacodyl, heparin, pantoprazole, Potassium sodium phosphates    Physical Findings/Assessment  Incision to neck, spine. Full thickness tissue loss to buttocks       Estimated/Assessed Needs  Weight Used For Calorie Calculations: 93.9 kg (207 lb 0.2 oz)  Energy Calorie Requirements (kcal): 20-25kcals/kg (1878-2348kcals/day)  Energy Need Method: Kcal/kg  Protein Requirements: 0.8g/kg (CKD)  Weight Used For Protein Calculations: 93.9 kg (207 lb 0.2 oz)  Fluid Requirements (mL): 1000+output  Estimated Fluid Requirement Method: other (see comments)  RDA Method (mL): 20  CHO Requirement: 250      Nutrition Prescription Ordered  Current Diet Order: diabetic   Oral Nutrition Supplement: Boost Glucose Control BID    Evaluation of Received Nutrient/Fluid Intake    Intake/Output Summary (Last 24 hours) at 11/8/2022 1632  Last " data filed at 11/8/2022 1304  Gross per 24 hour   Intake 1420 ml   Output 151 ml   Net 1269 ml     % Kcal Needs: <50-75%  % Protein Needs: 50-75%  Energy Calories Required: meeting needs  Protein Required: meeting needs  Fluid Required: meeting needs  Comments: LBM 11/8  Tolerance:  (fair)  % Intake of Estimated Energy Needs: 50 - 75 %  % Meal Intake: 50-75%    Nutrition Risk  Level of Risk/Frequency of Follow-up:  (2x weekly)     Monitor and Evaluation  Food and Nutrient Intake: energy intake, food and beverage intake  Food and Nutrient Adminstration: diet order  Knowledge/Beliefs/Attitudes: food and nutrition knowledge/skill, beliefs and attitudes  Physical Activity and Function: nutrition-related ADLs and IADLs, factors affecting access to physical activity  Anthropometric Measurements: height/length, weight, weight change, body mass index  Biochemical Data, Medical Tests and Procedures: inflammatory profile, glucose/endocrine profile, gastrointestinal profile, electrolyte and renal panel, lipid profile     Nutrition Follow-Up  RD Follow-up?: Yes    Luisana Rinaldi, MS, RDN, LDN

## 2022-11-09 ENCOUNTER — PATIENT OUTREACH (OUTPATIENT)
Dept: ADMINISTRATIVE | Facility: OTHER | Age: 82
End: 2022-11-09
Payer: MEDICARE

## 2022-11-09 VITALS
DIASTOLIC BLOOD PRESSURE: 81 MMHG | HEART RATE: 80 BPM | SYSTOLIC BLOOD PRESSURE: 156 MMHG | BODY MASS INDEX: 30.66 KG/M2 | RESPIRATION RATE: 20 BRPM | TEMPERATURE: 98 F | OXYGEN SATURATION: 99 % | WEIGHT: 207 LBS | HEIGHT: 69 IN

## 2022-11-09 LAB
ANION GAP SERPL CALC-SCNC: 6 MMOL/L (ref 8–16)
BUN SERPL-MCNC: 25 MG/DL (ref 8–23)
CALCIUM SERPL-MCNC: 8.7 MG/DL (ref 8.7–10.5)
CHLORIDE SERPL-SCNC: 111 MMOL/L (ref 95–110)
CO2 SERPL-SCNC: 22 MMOL/L (ref 23–29)
CREAT SERPL-MCNC: 1.5 MG/DL (ref 0.5–1.4)
EST. GFR  (NO RACE VARIABLE): 46 ML/MIN/1.73 M^2
GLUCOSE SERPL-MCNC: 158 MG/DL (ref 70–110)
MAGNESIUM SERPL-MCNC: 1.7 MG/DL (ref 1.6–2.6)
PHOSPHATE SERPL-MCNC: 2.7 MG/DL (ref 2.7–4.5)
POCT GLUCOSE: 160 MG/DL (ref 70–110)
POCT GLUCOSE: 166 MG/DL (ref 70–110)
POCT GLUCOSE: 168 MG/DL (ref 70–110)
POCT GLUCOSE: 190 MG/DL (ref 70–110)
POTASSIUM SERPL-SCNC: 3.6 MMOL/L (ref 3.5–5.1)
SODIUM SERPL-SCNC: 139 MMOL/L (ref 136–145)

## 2022-11-09 PROCEDURE — G0008 ADMIN INFLUENZA VIRUS VAC: HCPCS | Performed by: INTERNAL MEDICINE

## 2022-11-09 PROCEDURE — 97110 THERAPEUTIC EXERCISES: CPT | Mod: CQ

## 2022-11-09 PROCEDURE — 25000003 PHARM REV CODE 250: Performed by: HOSPITALIST

## 2022-11-09 PROCEDURE — 36415 COLL VENOUS BLD VENIPUNCTURE: CPT | Performed by: INTERNAL MEDICINE

## 2022-11-09 PROCEDURE — 80048 BASIC METABOLIC PNL TOTAL CA: CPT | Performed by: INTERNAL MEDICINE

## 2022-11-09 PROCEDURE — 99900035 HC TECH TIME PER 15 MIN (STAT)

## 2022-11-09 PROCEDURE — 90694 VACC AIIV4 NO PRSRV 0.5ML IM: CPT | Performed by: INTERNAL MEDICINE

## 2022-11-09 PROCEDURE — 94761 N-INVAS EAR/PLS OXIMETRY MLT: CPT

## 2022-11-09 PROCEDURE — 25000003 PHARM REV CODE 250: Performed by: INTERNAL MEDICINE

## 2022-11-09 PROCEDURE — 99239 PR HOSPITAL DISCHARGE DAY,>30 MIN: ICD-10-PCS | Mod: ,,, | Performed by: INTERNAL MEDICINE

## 2022-11-09 PROCEDURE — 83735 ASSAY OF MAGNESIUM: CPT | Performed by: INTERNAL MEDICINE

## 2022-11-09 PROCEDURE — 63700000 PHARM REV CODE 250 ALT 637 W/O HCPCS: Performed by: HOSPITALIST

## 2022-11-09 PROCEDURE — 25000242 PHARM REV CODE 250 ALT 637 W/ HCPCS: Performed by: INTERNAL MEDICINE

## 2022-11-09 PROCEDURE — 84100 ASSAY OF PHOSPHORUS: CPT | Performed by: INTERNAL MEDICINE

## 2022-11-09 PROCEDURE — 63600175 PHARM REV CODE 636 W HCPCS: Performed by: INTERNAL MEDICINE

## 2022-11-09 PROCEDURE — 99239 HOSP IP/OBS DSCHRG MGMT >30: CPT | Mod: ,,, | Performed by: INTERNAL MEDICINE

## 2022-11-09 PROCEDURE — 90471 IMMUNIZATION ADMIN: CPT | Performed by: INTERNAL MEDICINE

## 2022-11-09 RX ORDER — PREGABALIN 225 MG/1
225 CAPSULE ORAL 2 TIMES DAILY
Qty: 28 CAPSULE | Refills: 0 | Status: SHIPPED | OUTPATIENT
Start: 2022-11-09

## 2022-11-09 RX ORDER — OXYCODONE HYDROCHLORIDE 10 MG/1
10 TABLET ORAL EVERY 6 HOURS PRN
Qty: 18 TABLET | Refills: 0 | Status: SHIPPED | OUTPATIENT
Start: 2022-11-09

## 2022-11-09 RX ADMIN — OXYCODONE 10 MG: 5 TABLET ORAL at 07:11

## 2022-11-09 RX ADMIN — PANTOPRAZOLE SODIUM 40 MG: 40 TABLET, DELAYED RELEASE ORAL at 08:11

## 2022-11-09 RX ADMIN — METOPROLOL TARTRATE 100 MG: 50 TABLET, FILM COATED ORAL at 08:11

## 2022-11-09 RX ADMIN — FUROSEMIDE 40 MG: 40 TABLET ORAL at 08:11

## 2022-11-09 RX ADMIN — HEPARIN SODIUM 5000 UNITS: 5000 INJECTION INTRAVENOUS; SUBCUTANEOUS at 05:11

## 2022-11-09 RX ADMIN — OXYCODONE 10 MG: 5 TABLET ORAL at 12:11

## 2022-11-09 RX ADMIN — POTASSIUM & SODIUM PHOSPHATES POWDER PACK 280-160-250 MG 1 PACKET: 280-160-250 PACK at 12:11

## 2022-11-09 RX ADMIN — TAMSULOSIN HYDROCHLORIDE 0.4 MG: 0.4 CAPSULE ORAL at 08:11

## 2022-11-09 RX ADMIN — POTASSIUM & SODIUM PHOSPHATES POWDER PACK 280-160-250 MG 1 PACKET: 280-160-250 PACK at 07:11

## 2022-11-09 RX ADMIN — HEPARIN SODIUM 5000 UNITS: 5000 INJECTION INTRAVENOUS; SUBCUTANEOUS at 01:11

## 2022-11-09 RX ADMIN — PREGABALIN 225 MG: 75 CAPSULE ORAL at 08:11

## 2022-11-09 RX ADMIN — ATORVASTATIN CALCIUM 10 MG: 10 TABLET, FILM COATED ORAL at 08:11

## 2022-11-09 RX ADMIN — FINASTERIDE 5 MG: 5 TABLET, FILM COATED ORAL at 08:11

## 2022-11-09 RX ADMIN — INFLUENZA A VIRUS A/VICTORIA/2570/2019 IVR-215 (H1N1) ANTIGEN (FORMALDEHYDE INACTIVATED), INFLUENZA A VIRUS A/DARWIN/6/2021 IVR-227 (H3N2) ANTIGEN (FORMALDEHYDE INACTIVATED), INFLUENZA B VIRUS B/AUSTRIA/1359417/2021 BVR-26 ANTIGEN (FORMALDEHYDE INACTIVATED), INFLUENZA B VIRUS B/PHUKET/3073/2013 BVR-1B ANTIGEN (FORMALDEHYDE INACTIVATED) 0.5 ML: 15; 15; 15; 15 INJECTION, SUSPENSION INTRAMUSCULAR at 02:11

## 2022-11-09 RX ADMIN — AMOXICILLIN AND CLAVULANATE POTASSIUM 1 TABLET: 875; 125 TABLET, FILM COATED ORAL at 08:11

## 2022-11-09 RX ADMIN — FENOFIBRATE 160 MG: 160 TABLET ORAL at 12:11

## 2022-11-09 RX ADMIN — FLUCONAZOLE 100 MG: 100 TABLET ORAL at 08:11

## 2022-11-09 RX ADMIN — INSULIN DETEMIR 10 UNITS: 100 INJECTION, SOLUTION SUBCUTANEOUS at 08:11

## 2022-11-09 NOTE — PLAN OF CARE
Patient has been accepted to [facility] for SNF. [facility] PCP office will notify patient with follow up appointment date and time. Wife at bedside and updated. Awaiting ambulance transportation.   11/09/22 1442   Final Note   Assessment Type Final Discharge Note   Anticipated Discharge Disposition SNF   Hospital Resources/Appts/Education Provided Provided patient/caregiver with written discharge plan information   Post-Acute Status   Post-Acute Authorization Placement   Post-Acute Placement Status Set-up Complete/Auth obtained   Discharge Delays (!) Ambulance Transport/Facility Transport   [facility]  Discharge Final Note    Primary Care Provider: Jose Mann MD    Expected Discharge Date: 11/9/2022    Final Discharge Note (most recent)       Final Note - 11/09/22 1442          Final Note    Assessment Type Final Discharge Note (P)      Anticipated Discharge Disposition Skilled Nursing Facility (P)      Hospital Resources/Appts/Education Provided Provided patient/caregiver with written discharge plan information (P)         Post-Acute Status    Post-Acute Authorization Placement (P)      Post-Acute Placement Status Set-up Complete/Auth obtained (P)      Discharge Delays Ambulance Transport/Facility Transport (P)                      Important Message from Medicare  Important Message from Medicare regarding Discharge Appeal Rights: Given to patient/caregiver, Explained to patient/caregiver, Signed/date by patient/caregiver     Date IMM was signed: 11/04/22  Time IMM was signed: 0958    Contact Info       Jose Mann MD   Specialty: Nephrology   Relationship: PCP - General    [address] [place], [place] [id number]   Phone: [phone]       Next Steps: Schedule an appointment as soon as possible for a visit in 2 week(s)    Instructions: Clinic will call to set up an appointment.

## 2022-11-09 NOTE — PLAN OF CARE
Patient educated on discharge instructions and verbalized understanding.  All questions answered to patient's satisfaction.  IV removed without complication.  Wife at bedside.  Patient to be transported off unit via EMS.

## 2022-11-09 NOTE — PROGRESS NOTES
[facility] Medicine  Progress Note    Patient Name: Frandy Mccarthy Jr.  MRN: [id number]  Patient Class: IP- Inpatient   Admission Date: 10/29/2022  Length of Stay: 9 days  Attending Physician: SHLOMO Chang MD  Primary Care Provider: Jose Mann MD        Subjective:     Principal Problem:Multifocal pneumonia        HPI:  Mr. Mccarthy is an 82/M with PMH HTN, HLD, DMII with neuropathy, BPH, chronic pain s/p spinal stimulator implantation 10/25 who presented to [facility] 10/29 with a 5 day history of progressively worsening progressively worsening cough, weakness, and dysphagia with associated constipation since his procedure. The patient has been unable to tolerate medications, liquids, or solids for the last 5 days.  Per his wife at bedside, he has no meaningful oral intake and he has become significantly weaker since his surgery.  He is now unable to rise out of bed without significant assistance.  The patient is able to briefly awaken during the interview and corroborate that he has cough, globus sensation with swallowing, and significant dysphagia, but denies odynophagia or neck swelling.  He denies hoarseness of voice, reflux, or fever.  Whenever the patient attempts to swallow liquids or medications, he has coughing and sputtering. ED evaluation was significant for SpO2 93-95% on RA, diffuse debility, and CT Chest/Abd/Pelvis showing multifocal pneumonia, large bowel distention and R-sided inguinal hernia with loops of small bowel without obstruction, and multiple low-attenuation lesions in the kidneys. He was started on ampicillin-sulbactam and hospital medicine was contacted for admission.      Overview/Hospital Course:  Admitted with dysphagia and multifocal pneumonia. NPO except meds / ice chips. Started ampicillin-sulbactam and SLP consulted. PT/OT consulted given diffuse weakness. MBSS performed 10/31 showing severe esophageal dysphagia; started full liquids and GI  consulted for further evaluation recommendations. Void trial initiated from difficulty with urinating noted after procedure 10/25. EGD showed esophagitis, esophageal candidiasis and duodenal ulcer. Diflucan started and PPI. Voiding trial done done twice, last time successful on 11/5. Fever spiked on evening of 11/5 and workup initiated. Blood culture NGTD, UA abnormal but not unexpected given recent removal of mondragon catheter, and CXR without acute changes. PT and OT following, and awaiting SNF placement.      Interval History: No acute events overnight. Feeling well; loose stool 2/2 bowel regimen. No other concerns at this time.    Review of Systems   Constitutional:  Negative for chills and fever.   Respiratory:  Negative for cough and shortness of breath.    Cardiovascular:  Negative for chest pain and palpitations.   Gastrointestinal:  Negative for abdominal pain, nausea and vomiting.   Objective:     Vital Signs (Most Recent):  Temp: 98.8 °F (37.1 °C) (11/08/22 1641)  Pulse: 85 (11/08/22 1641)  Resp: 18 (11/08/22 1657)  BP: 130/60 (11/08/22 1641)  SpO2: (!) 92 % (11/08/22 1700)   Vital Signs (24h Range):  Temp:  [97.7 °F (36.5 °C)-98.9 °F (37.2 °C)] 98.8 °F (37.1 °C)  Pulse:  [83-99] 85  Resp:  [18-20] 18  SpO2:  [89 %-100 %] 92 %  BP: (109-146)/(54-74) 130/60     Weight: 93.9 kg (207 lb)  Body mass index is 30.57 kg/m².    Intake/Output Summary (Last 24 hours) at 11/8/2022 1925  Last data filed at 11/8/2022 1729  Gross per 24 hour   Intake 1300 ml   Output 152 ml   Net 1148 ml      Physical Exam  Vitals and nursing note reviewed.   Constitutional:       General: He is not in acute distress.     Appearance: He is well-developed. He is obese.   HENT:      Head: Normocephalic and atraumatic.   Eyes:      General:         Right eye: No discharge.         Left eye: No discharge.      Conjunctiva/sclera: Conjunctivae normal.   Cardiovascular:      Rate and Rhythm: Normal rate.      Pulses: Normal pulses.   Pulmonary:  "     Effort: Pulmonary effort is normal. No respiratory distress.   Abdominal:      Palpations: Abdomen is soft.      Tenderness: There is no abdominal tenderness.   Musculoskeletal:         General: Normal range of motion.      Right lower leg: No edema.      Left lower leg: No edema.   Skin:     General: Skin is warm and dry.   Neurological:      Mental Status: He is alert and oriented to person, place, and time.       Significant Labs:   CBC:  Recent Labs   Lab 11/06/22  0730   WBC 12.24   HGB 8.7*   HCT 27.2*      GRAN 82.5*  10.1*   LYMPH 6.9*  0.8*   MONO 6.5  0.8   EOS 0.4   BASO 0.02   BMP:  Recent Labs   Lab 11/06/22  0556 11/07/22  0539 11/08/22  0436    140 139   K 3.3* 3.1* 2.9*   * 110 110   CO2 21* 19* 21*   BUN 28* 28* 30*   CREATININE 1.7* 1.7* 1.8*   * 160* 151*   CALCIUM 8.7 7.9* 8.4*   MG 1.7 1.6 1.7   PHOS 2.7 3.7 3.4     Significant Imaging:   No new imaging this morning.      Assessment/Plan:      * Multifocal pneumonia  Dysphagia, debility, esophageal candidiasis, esophagitis, duodenal ulcers, fever  - Several days of worsening debility, weakness, decreased PO intake in the setting of recent procedure and multifocal pneumonia on CT with dysphagia.  - Continue ampicillin-sulbactam 3g IV q12hr.  - MBSS demonstrating severe esophageal dysmotility  - Full liquid diet for time being  - Consulted GI, EGD showed "esophageal candidiasis and ulcer on 11/2  - Added diflucan (x 2 weeks)  and continue PPI  - IVFs with LR at 50mL/hr, stopped on 11/4  - Clinically improving but with a fever overnight (around MN) on 11/5   - Repeat chest x-ray witout acute changes, UA showed 1+ leukocytes, 28 RBC, 5 WBC and bacteria which was not unexpected given mondragon catheter was removed that same day.(reflexed to culture but not done) and blood cultures NGTD  - Initially planned for 7 days of IV ampicillin and discontinue, but continued given fever  - No return of fever, will change IV " ampicillin to PO Augmentin   - Continue with plans to treat with 2 weeks of diflucan    CKD (chronic kidney disease) stage 4, GFR 15-29 ml/min  NOBLE with CKD4  - Review of medical records in Care everywhere reveal creatine that has fluctuated from 1.32- 1.86 overall in the past 2 years  - Recent increase just few weeks prior to admission in 2.5 range  - Creatinnet 3.3 on admission with gradual improvement   - Creatinine increased to 1.7 and has stayed at that level the past 2 days, reflecting return overall to has baseline   - Will continue to monitor    Benign prostatic hyperplasia with urinary frequency  Urinary retention  - Continue finasteride 5mg PO daily, oxybutynin 10mg PO daily, tamsulosin 0.4mg PO as BID. Family brought mirabegron but did not have packaging with dosing information to confirm per pharmacy; resume if labeling can be brought from home.  - Mondragon in place since procedure 10/25; voiding trial done on 10/31  - Urinary retention overnight, placed mondragon and consulted Urology on 11/1  - Patient to continue mondragon catheter for 3 days, another voiding trial done with removal of Mondragon catheter on 11/05  - Patient with fever overnight and UA with reflex to culture sent as discussed above  - Patient able to void and bladder scans with low volumes  - Continue to monitor with I/Os and bladder scans    Chronic pain  Diabetic polyneuropathy  - Continue duloxetine 30mg PO daily, pramipexole 0.25mg PO qHS, pregabalin 225mg PO BID, oxycodone 10mg PO q4hr PRN.    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    Hyperlipidemia  Carotid artery disease  - Continue fenofibrate 160mg PO daily, atorvastatin 10mg PO daily.      VTE Risk Mitigation (From admission, onward)         Ordered     heparin (porcine) injection 5,000 Units  Every 8 hours         10/29/22 1902     IP VTE HIGH RISK PATIENT  Once         10/29/22 1902                Discharge Planning   MARYAM: 11/7/2022     Code Status: Full Code   Is the patient medically ready for discharge?:     Reason for patient still in hospital (select all that apply): Pending disposition  Discharge Plan A: Skilled Nursing Facility   Discharge Delays: (!) Post-Acute Set-up              D Kian Chang MD  Department of Hospital Medicine   [facility] - [facility] ([place])

## 2022-11-09 NOTE — PT/OT/SLP PROGRESS
Physical Therapy Treatment    Patient Name:  Frandy Mccarthy Jr.   MRN:  [id number]    Recommendations:     Discharge Recommendations:  nursing facility, skilled   Discharge Equipment Recommendations: bedside commode (may need regular RW, will defer to SNF)   Barriers to discharge: Inaccessible home and Decreased caregiver support    Assessment:     Frandy Mccarthy Jr. is a 82 y.o. male admitted with a medical diagnosis of Multifocal pneumonia.  He presents with the following impairments/functional limitations:  weakness, pain, gait instability, impaired balance, impaired endurance, impaired functional mobility, impaired self care skills, decreased lower extremity function, decreased upper extremity function.    Supine<>sit with maxA  Scoot toward HOB with CGA to stabilize feet, bed in trendelenburg, use of LUE on bed rail  Static/dynamic sitting EOB x 11 mins with CGA - maxA (depending on pt's UE support / grasping bed rails)  Pt performed lateral weight shifting and therex at EOB with Dominic for balance  Pt's mobility limited by pain, though he's eager for therapy  Rec SNF    Rehab Prognosis: Good; patient would benefit from acute skilled PT services to address these deficits and reach maximum level of function.    Recent Surgery: Procedure(s) (LRB):  EGD (ESOPHAGOGASTRODUODENOSCOPY) (N/A) 7 Days Post-Op    Plan:     During this hospitalization, patient to be seen 5 x/week to address the identified rehab impairments via gait training, therapeutic activities, therapeutic exercises, neuromuscular re-education and progress toward the following goals:    Plan of Care Expires:  11/30/22    Subjective     Chief Complaint: it's been so frustrating trying to get to a facility  Patient/Family Comments/goals: pt wishes he had a therapist to work with all day to progress faster  Pain/Comfort:  Pain Rating 1: other (see comments) (Pt reported in L ankle, L knee, B wrists, R hand/arm/elbow/shoulder, and back (not rated))  Pain  Addressed 1: Reposition, Distraction, Cessation of Activity  Pain Rating Post-Intervention 1: other (see comments) (pt expressed some relief while seated EOB)      Objective:     Communicated with nurse Jason prior to session.  Patient found HOB elevated with oxygen, PureWick, peripheral IV, telemetry, bed alarm upon PT entry to room.     General Precautions: Standard, fall   Orthopedic Precautions:N/A   Braces:    Respiratory Status: Nasal cannula, flow 1 L/min     Functional Mobility:  Bed Mobility:     Scooting: minimum assistance  Supine to Sit: maximal assistance  Sit to Supine: maximal assistance      AM-PAC 6 CLICK MOBILITY  Turning over in bed (including adjusting bedclothes, sheets and blankets)?: 3  Sitting down on and standing up from a chair with arms (e.g., wheelchair, bedside commode, etc.): 2  Moving from lying on back to sitting on the side of the bed?: 2  Moving to and from a bed to a chair (including a wheelchair)?: 2  Need to walk in hospital room?: 2  Climbing 3-5 steps with a railing?: 1  Basic Mobility Total Score: 12       Treatment & Education:  Static/dynamic sitting EOB x 11 mins with CGA - maxA (depending on pt's UE support / grasping bed rails)  Seated therex: LAQ 2 x 5; scapular retraction x 5 (with maxA for balance)    Patient left HOB elevated with all lines intact, call button in reach, bed alarm on, nurse Jason notified, and wife present..    GOALS:   Multidisciplinary Problems       Physical Therapy Goals          Problem: Physical Therapy    Goal Priority Disciplines Outcome Goal Variances Interventions   Physical Therapy Goal     PT, PT/OT Ongoing, Progressing     Description: Goals to be met by: 2022    Patient will increase functional independence with mobility by performin. Sit<>stand with min assist with RW.  2. Gait x 50 feet with RW with SBA.  3. Ascend/descend 5 step(s) with least restrictive assistive device and CGA.                           Time Tracking:      PT Received On: 11/09/22  PT Start Time: 1253     PT Stop Time: 1326  PT Total Time (min): 33 min     Billable Minutes: Therapeutic Activity 22 and Therapeutic Exercise 11    Treatment Type: Treatment  PT/PTA: PTA     PTA Visit Number: 1     11/09/2022

## 2022-11-09 NOTE — PLAN OF CARE
POC reviewed. AAOX4. Purposeful rounding completed.  LPM O2 NC noted. No complaints of pain. Turn Q2/frequent weight shift encouraged by RN. Bed low and locked, side rails up x 2, call light within reach.    Problem: Adult Inpatient Plan of Care  Goal: Plan of Care Review  Outcome: Ongoing, Progressing  Goal: Patient-Specific Goal (Individualized)  Outcome: Ongoing, Progressing  Goal: Absence of Hospital-Acquired Illness or Injury  Outcome: Ongoing, Progressing     Problem: Diabetes Comorbidity  Goal: Blood Glucose Level Within Targeted Range  Outcome: Ongoing, Progressing     Problem: Infection (Pneumonia)  Goal: Resolution of Infection Signs and Symptoms  Outcome: Ongoing, Progressing     Problem: Infection  Goal: Absence of Infection Signs and Symptoms  Outcome: Ongoing, Progressing     Problem: Fall Injury Risk  Goal: Absence of Fall and Fall-Related Injury  Outcome: Ongoing, Progressing

## 2022-11-09 NOTE — SUBJECTIVE & OBJECTIVE
Interval History: No acute events overnight. Feeling well; loose stool 2/2 bowel regimen. No other concerns at this time.    Review of Systems   Constitutional:  Negative for chills and fever.   Respiratory:  Negative for cough and shortness of breath.    Cardiovascular:  Negative for chest pain and palpitations.   Gastrointestinal:  Negative for abdominal pain, nausea and vomiting.   Objective:     Vital Signs (Most Recent):  Temp: 98.8 °F (37.1 °C) (11/08/22 1641)  Pulse: 85 (11/08/22 1641)  Resp: 18 (11/08/22 1657)  BP: 130/60 (11/08/22 1641)  SpO2: (!) 92 % (11/08/22 1700)   Vital Signs (24h Range):  Temp:  [97.7 °F (36.5 °C)-98.9 °F (37.2 °C)] 98.8 °F (37.1 °C)  Pulse:  [83-99] 85  Resp:  [18-20] 18  SpO2:  [89 %-100 %] 92 %  BP: (109-146)/(54-74) 130/60     Weight: 93.9 kg (207 lb)  Body mass index is 30.57 kg/m².    Intake/Output Summary (Last 24 hours) at 11/8/2022 1925  Last data filed at 11/8/2022 1729  Gross per 24 hour   Intake 1300 ml   Output 152 ml   Net 1148 ml      Physical Exam  Vitals and nursing note reviewed.   Constitutional:       General: He is not in acute distress.     Appearance: He is well-developed. He is obese.   HENT:      Head: Normocephalic and atraumatic.   Eyes:      General:         Right eye: No discharge.         Left eye: No discharge.      Conjunctiva/sclera: Conjunctivae normal.   Cardiovascular:      Rate and Rhythm: Normal rate.      Pulses: Normal pulses.   Pulmonary:      Effort: Pulmonary effort is normal. No respiratory distress.   Abdominal:      Palpations: Abdomen is soft.      Tenderness: There is no abdominal tenderness.   Musculoskeletal:         General: Normal range of motion.      Right lower leg: No edema.      Left lower leg: No edema.   Skin:     General: Skin is warm and dry.   Neurological:      Mental Status: He is alert and oriented to person, place, and time.       Significant Labs:   CBC:  Recent Labs   Lab 11/06/22  0730   WBC 12.24   HGB 8.7*   HCT  27.2*      GRAN 82.5*  10.1*   LYMPH 6.9*  0.8*   MONO 6.5  0.8   EOS 0.4   BASO 0.02   BMP:  Recent Labs   Lab 11/06/22  0556 11/07/22  0539 11/08/22  0436    140 139   K 3.3* 3.1* 2.9*   * 110 110   CO2 21* 19* 21*   BUN 28* 28* 30*   CREATININE 1.7* 1.7* 1.8*   * 160* 151*   CALCIUM 8.7 7.9* 8.4*   MG 1.7 1.6 1.7   PHOS 2.7 3.7 3.4     Significant Imaging:   No new imaging this morning.

## 2022-11-09 NOTE — ASSESSMENT & PLAN NOTE
"Dysphagia, debility, esophageal candidiasis, esophagitis, duodenal ulcers, fever  - Several days of worsening debility, weakness, decreased PO intake in the setting of recent procedure and multifocal pneumonia on CT with dysphagia.  - Continue ampicillin-sulbactam 3g IV q12hr.  - MBSS demonstrating severe esophageal dysmotility  - Full liquid diet for time being  - Consulted GI, EGD showed "esophageal candidiasis and ulcer on 11/2  - Added diflucan (x 2 weeks)  and continue PPI  - IVFs with LR at 50mL/hr, stopped on 11/4  - Clinically improving but with a fever overnight (around MN) on 11/5   - Repeat chest x-ray witout acute changes, UA showed 1+ leukocytes, 28 RBC, 5 WBC and bacteria which was not unexpected given mondragon catheter was removed that same day.(reflexed to culture but not done) and blood cultures NGTD  - Initially planned for 7 days of IV ampicillin and discontinue, but continued given fever  - No return of fever, will change IV ampicillin to PO Augmentin   - Continue with plans to treat with 2 weeks of diflucan  "

## 2022-11-09 NOTE — PROGRESS NOTES
IP Liaison - Final Visit Note    Patient: Frandy Mccarthy Jr.  MRN:  [id number]  Date of Service:  11/9/2022  Completed by:  JOSÉ LUIS Stephenson    Reason for Visit   Patient presents with    IP Liaison Follow-up Visit       Patient has been accepted into [facility] for skilled nursing.       Patient Summary     Discharge Date: 11/9/2022  Discharge telephone number/address:  [phone] [place] [address]  Follow up provider: Amanda Pulido NP  Follow up appointments: 11/14/2022, 10:00am  Home Health agency & telephone number: n/a  DME ordered &  name: n/a  Assigned OPCM RN/SW: n/a  Report sent to follow up team (PCP/OPCM) via in basket message: n/a  Community Resources arranged including agency name & contact info: none    JOSÉ LUIS Stephenson

## 2022-11-09 NOTE — DISCHARGE SUMMARY
[facility] Medicine  Discharge Summary      Patient Name: Frandy Mccarthy Jr.  MRN: [id number]  JANELL: [id number]  Patient Class: IP- Inpatient  Admission Date: 10/29/2022  Hospital Length of Stay: 10 days  Discharge Date and Time: 11/9/2022  3:21 PM  Attending Physician: No att. providers found   Discharging Provider: DANIELLE Chang MD  Primary Care Provider: Jose Mann MD    Primary Care Team: Networked reference to record PCT     HPI:   Mr. Mccarthy is an 82/M with PMH HTN, HLD, DMII with neuropathy, BPH, chronic pain s/p spinal stimulator implantation 10/25 who presented to [facility] 10/29 with a 5 day history of progressively worsening progressively worsening cough, weakness, and dysphagia with associated constipation since his procedure. The patient has been unable to tolerate medications, liquids, or solids for the last 5 days.  Per his wife at bedside, he has no meaningful oral intake and he has become significantly weaker since his surgery.  He is now unable to rise out of bed without significant assistance.  The patient is able to briefly awaken during the interview and corroborate that he has cough, globus sensation with swallowing, and significant dysphagia, but denies odynophagia or neck swelling.  He denies hoarseness of voice, reflux, or fever.  Whenever the patient attempts to swallow liquids or medications, he has coughing and sputtering. ED evaluation was significant for SpO2 93-95% on RA, diffuse debility, and CT Chest/Abd/Pelvis showing multifocal pneumonia, large bowel distention and R-sided inguinal hernia with loops of small bowel without obstruction, and multiple low-attenuation lesions in the kidneys. He was started on ampicillin-sulbactam and hospital medicine was contacted for admission.      Procedure(s) (LRB):  EGD (ESOPHAGOGASTRODUODENOSCOPY) (N/A)      Hospital Course:   Admitted with dysphagia and multifocal pneumonia. NPO except meds / ice chips.  Started ampicillin-sulbactam and SLP consulted. PT/OT consulted given diffuse weakness. MBSS performed 10/31 showing severe esophageal dysphagia; started full liquids and GI consulted for further evaluation recommendations. Void trial initiated from difficulty with urinating noted after procedure 10/25. EGD showed esophagitis, esophageal candidiasis and duodenal ulcer. Diflucan started and PPI. Voiding trial done done twice, last time successful on 11/5. Fever spiked on evening of 11/5 and workup initiated. Blood culture NGTD, UA abnormal but not unexpected given recent removal of mondragon catheter, and CXR without acute changes. PT/OT recommended SNF placement. Continued course of amoxicillin-clavulanate and decreased bowel regimen given loose stools. With clinical improvement and vital stability, he was prepared for discharge to SNF.       Goals of Care Treatment Preferences:  Code Status: Full Code      Consults:   Consults (From admission, onward)          Status Ordering Provider     Inpatient consult to Urology  Once        Provider:  Jose Vaughan MD    Completed JENNIFER GARCIA     Inpatient consult to Gastroenterology  Once        Provider:  Satish Galvan MD    Completed SHLOMO ALBRECHT     Inpatient consult to Registered Dietitian/Nutritionist  Once        Provider:  (Not yet assigned)    Completed ANNA DINERO     IP consult to case management  Once        Provider:  (Not yet assigned)    Completed ANNA DINERO            No new Assessment & Plan notes have been filed under this hospital service since the last note was generated.  Service: Hospital Medicine    Final Active Diagnoses:    Diagnosis Date Noted POA    PRINCIPAL PROBLEM:  Multifocal pneumonia [J18.9] 10/29/2022 Yes    Urinary retention [R33.9] 11/01/2022 Yes    Pressure injury of right buttock, stage 3 [L89.313] 10/31/2022 Yes    Esophageal dysphagia [R13.19] 10/29/2022 Yes    Carotid arterial disease [I77.9] 12/02/2021  Yes     Chronic    CKD (chronic kidney disease) stage 4, GFR 15-29 ml/min [N18.4] 12/19/2020 Yes     Chronic    Diabetic polyneuropathy associated with type 2 diabetes mellitus [E11.42] 10/06/2020 Yes     Chronic    Benign prostatic hyperplasia with urinary frequency [N40.1, R35.0] 10/06/2020 Yes     Chronic    Chronic pain [G89.29] 03/23/2018 Yes     Chronic    Controlled type 2 diabetes mellitus with diabetic autonomic neuropathy, with long-term current use of insulin [E11.43, Z79.4] 06/15/2016 Not Applicable     Chronic    Essential hypertension [I10] 12/16/2015 Yes     Chronic    Hyperlipidemia [E78.5] 12/16/2015 Yes     Chronic      Problems Resolved During this Admission:       Discharged Condition: fair    Disposition: Skilled Nursing Facility    Follow Up:   Follow-up Information       Jose Mann MD. Schedule an appointment as soon as possible for a visit in 2 week(s).    Specialty: Nephrology  Why: Clinic will call to set up an appointment.  Contact information:  [address] [place] [address]  [phone]                           Patient Instructions:      Diet diabetic     Diet full liquid     Diet full liquid     Notify your health care provider if you experience any of the following:  increased confusion or weakness     Notify your health care provider if you experience any of the following:  difficulty breathing or increased cough     Notify your health care provider if you experience any of the following:  persistent dizziness, light-headedness, or visual disturbances     Notify your health care provider if you experience any of the following:  temperature >100.4     Notify your health care provider if you experience any of the following:  severe uncontrolled pain     Activity as tolerated       Significant Diagnostic Studies:   CBC:  Recent Labs   Lab 11/04/22  0434 11/05/22  0404 11/06/22  0730   WBC 6.11 7.69 12.24   HGB 8.9* 8.9* 8.7*   HCT 27.7* 27.5* 27.2*   PLT  299 341 348   GRAN 76.2*  4.7 78.0*  6.0 82.5*  10.1*   LYMPH 9.8*  0.6* 9.1*  0.7* 6.9*  0.8*   MONO 8.2  0.5 7.3  0.6 6.5  0.8   EOS 0.3 0.3 0.4   BASO 0.02 0.02 0.02     CMP:  Recent Labs   Lab 10/18/22  1058 10/25/22  0716 10/29/22  1221 10/30/22  0426 11/07/22  0539 11/08/22  0436 11/09/22  0424      < > 139   < > 140 139 139   K 5.3*   < > 4.8   < > 3.1* 2.9* 3.6      < > 100   < > 110 110 111*   CO2 30*   < > 26   < > 19* 21* 22*   BUN 51*   < > 59*   < > 28* 30* 25*   CREATININE 2.6*   < > 3.3*   < > 1.7* 1.8* 1.5*   *   < > 169*   < > 160* 151* 158*   CALCIUM 10.2   < > 9.2   < > 7.9* 8.4* 8.7   MG  --   --   --    < > 1.6 1.7 1.7   PHOS  --   --   --    < > 3.7 3.4 2.7   ALKPHOS 61  --  50*  --   --   --   --    AST 14  --  17  --   --   --   --    ALT 11  --  13  --   --   --   --    BILITOT 0.3  --  0.5  --   --   --   --    PROT 8.1  --  7.3  --   --   --   --    ALBUMIN 3.8  --  3.1*  --   --   --   --    ANIONGAP 11   < > 13   < > 11 8 6*    < > = values in this interval not displayed.     Imaging Results              CT Chest Abdomen Without Contrast (XPD) (Final result)  Result time 10/29/22 16:16:20      Final result by Abbe Daley MD (10/29/22 16:16:20)                   Impression:      CT chest:    1.  Multifocal airspace opacities, consistent with multifocal pneumonia.  Follow-up to resolution is recommended.    2.  Trace bilateral pleural effusions.    3.  4-5 mm pulmonary nodules in the right hemithorax.  For multiple solid nodules all <6 mm, Fleischner Society 2017 guidelines recommend no routine follow up for a low risk patient, or follow up with non-contrast chest CT at 12 months after discovery in a high risk patient.    4.  Changes of recent intrathecal catheter insertion.    CT abdomen pelvis:    1.  Gaseous distention of the large bowel.  No CT evidence of bowel obstruction    2.  Right-sided inguinal hernia containing loops of small bowel.  No obstruction  identified.    3.  Multiple low-attenuation lesions in the kidneys.  Outpatient follow-up with dedicated renal ultrasound may be obtained for further evaluation.    4.  Additional findings as above.      Electronically signed by: Abbe Daley MD  Date:    10/29/2022  Time:    16:16               Narrative:    EXAMINATION:  CT CHEST ABDOMEN WITHOUT CONTRAST (XPD)    CLINICAL HISTORY:  new onset hypoxia post-op, abdominal distension constipation. plain films not great due to habitus and inability to stand;    TECHNIQUE:  Axial CT scan of the chest, abdomen, and pelvis was performed without intravenous contrast.  Coronal and sagittal reformats were obtained.    COMPARISON:  10/29/2022.    FINDINGS:  CT chest:    The base of the neck is within normal limits. The supraclavicular regions are unremarkable.  The thyroid gland is within normal limits.    The trachea is unremarkable.  The central airways are within normal limits.  There is no evidence of bronchiectasis.  No endobronchial lesion is identified.    The heart is unremarkable.  There are no pericardial effusions.  There are coronary artery calcifications present.    The thoracic aorta is normal in caliber.  There are calcifications of the thoracic aorta.  The pulmonary trunk does not appear enlarged.    There are subcentimeter lymph nodes within the mediastinum.  There is no obvious hilar lymphadenopathy.  The axillary regions are within normal limits.    There are trace bilateral pleural effusions.  There is no evidence of a pneumothorax.  There is no evidence of pneumomediastinum.    There are changes of centrilobular and paraseptal emphysema.  There are airspace opacities within the bilateral lower lobes and in the lingula.  There is a 5 mm pulmonary nodule in the right lung apex.  There is a 4 mm pulmonary nodule in the right middle lobe.    The esophagus is unremarkable.  Please see the abdomen and pelvis examination for findings of the upper  abdomen.    There are postoperative changes of recent insertion of intrathecal catheter.  There is expected foci of air along the course of the catheter.  The catheter terminates within the dorsal thecal sac at the level of T7.    There are degenerative changes in the osseous structures.  There is a synovial cyst in the right shoulder.  There is no evidence of a fracture.    CT abdomen/pelvis:    There is normal tapering of the abdominal aorta.  There are extensive calcifications along the course of the abdominal aorta and its branch vessels.    There is no evidence of lymphadenopathy in the abdomen or pelvis.    The esophagus, stomach, and duodenum are within normal limits.  The small bowel loops are unremarkable.  The appendix is unremarkable.  There is gaseous distention of the large bowel.  No transition point is identified.  There is colonic diverticula without evidence of acute diverticulitis.    The liver is unremarkable.  The gallbladder is within normal limits.  The biliary tree is unremarkable.  The spleen is unremarkable.  The pancreas is within normal limits.  The adrenal glands are unremarkable.    There is a 3 cm simple cyst in the right kidney.  There is a 2.1 cm low-attenuation lesion in the lower pole the right kidney with Hounsfield of 33.  There is no evidence of nephrolithiasis.    There is a bilobed 7.7 cm cystic lesion in the lower pole of the left kidney.  There is an additional cystic lesion lower pole left kidney with increased attenuation.  These are incompletely characterized.    The ureters are unremarkable.  There is a Rader catheter within the lumen of the urinary bladder.  The urinary bladder is collapsed.  The prostate gland is unremarkable.    There is no evidence of free fluid in the abdomen or pelvis.  There is no evidence of free air.  There is no evidence of pneumatosis.  No portal venous air is identified.    The psoas margins are unremarkable.  There are bilateral inguinal  hernias.  There is component of small bowel within the right-sided inguinal hernia.  No evidence of obstruction is identified.    There are postoperative changes in the lumbar spine with adjacent level disease at the T12-L1 level.  No perihardware lucency is identified.                                       X-Ray Chest AP Portable (Final result)  Result time 10/29/22 13:25:13      Final result by Vikas Christine Jr., MD (10/29/22 13:25:13)                   Impression:      Low volume chest with bibasilar atelectasis      Electronically signed by: Vikas Solano Jr  Date:    10/29/2022  Time:    13:25               Narrative:    EXAMINATION:  XR CHEST AP PORTABLE    CLINICAL HISTORY:  Hypoxemia    TECHNIQUE:  Single frontal view of the chest was performed.    COMPARISON:  None    FINDINGS:  Cardiomediastinal silhouettewithin normal limits for age.    Lung volumes are very low with resultant crowding of the pulmonary vascular markings.  Bibasilar dependent and or subsegmental atelectasis suspected.    Small pleural effusions not excluded.                                       X-Ray Neck Soft Tissue (Final result)  Result time 10/29/22 13:40:47      Final result by Zafar Watson MD (10/29/22 13:40:47)                   Impression:      1. No definite radiopaque foreign body within the visualized soft tissues of the neck or airway however there is limited evaluation given habitus.  Correlation is advised.      Electronically signed by: Zafar Watson MD  Date:    10/29/2022  Time:    13:40               Narrative:    EXAMINATION:  XR NECK SOFT TISSUE    CLINICAL HISTORY:  Other specified symptoms and signs involving the circulatory and respiratory systems    TECHNIQUE:  AP and lateral soft tissue views the neck were performed.    COMPARISON:  None.    FINDINGS:  Two views soft tissues neck.    Please note, C5, C6 and C7 are obscured by soft tissues.  Allowing for this, spinal alignment appears maintained  on AP and lateral views noting multilevel facet arthropathy.  There is suboptimal evaluation of the soft tissues of the neck given positioning.  No definite radiopaque foreign body although evaluation is limited.                                       X-Ray Abdomen AP 1 View (KUB) (Final result)  Result time 10/29/22 13:34:30      Final result by Zafar Watson MD (10/29/22 13:34:30)                   Impression:      1. Slow flow through it appears to be large bowel suggesting constipation.  Further evaluation could be performed with upright and erect radiography to exclude developing small bowel obstruction.      Electronically signed by: Zafar Watson MD  Date:    10/29/2022  Time:    13:34               Narrative:    EXAMINATION:  XR ABDOMEN AP 1 VIEW    CLINICAL HISTORY:  Constipation, unspecified    TECHNIQUE:  AP View(s) of the abdomen was performed.    COMPARISON:  None    FINDINGS:  Two views abdomen supine.    Multilevel spinal fusion hardware noted.  There are several prominent gas-filled large bowel loops noting prominent gas-filled loops project over the right lower quadrant.  Findings may reflect constipation.  There are a few scattered air-filled small bowel loops, upper limit of normal in caliber.  There is a stimulator device projected over the right lower quadrant.  No convincing pneumatosis.                                      Pending Diagnostic Studies:       None           Medications:  Reconciled Home Medications:      Medication List        START taking these medications      acetaminophen 325 MG tablet  Commonly known as: TYLENOL  Take 2 tablets (650 mg total) by mouth every 4 (four) hours as needed.     albuterol-ipratropium 2.5 mg-0.5 mg/3 mL nebulizer solution  Commonly known as: DUO-NEB  Take 3 mLs by nebulization every 6 (six) hours as needed for Wheezing. Rescue     amoxicillin-clavulanate 875-125mg 875-125 mg per tablet  Commonly known as: AUGMENTIN  Take 1 tablet by mouth every  12 (twelve) hours. for 4 days     fluconazole 100 MG tablet  Commonly known as: DIFLUCAN  Take 1 tablet (100 mg total) by mouth once daily. for 7 days     heparin (porcine) 5,000 unit/mL injection  Inject 1 mL (5,000 Units total) into the skin every 8 (eight) hours.     insulin aspart U-100 100 unit/mL (3 mL) Inpn pen  Commonly known as: NovoLOG  Inject 0-5 Units into the skin every 6 (six) hours as needed (Hyperglycemia).     insulin detemir U-100 100 unit/mL (3 mL) Inpn pen  Commonly known as: Levemir FLEXTOUCH  Inject 10 Units into the skin once daily.     melatonin 3 mg tablet  Commonly known as: MELATIN  Take 2 tablets (6 mg total) by mouth nightly as needed for Insomnia.     pantoprazole 40 MG tablet  Commonly known as: PROTONIX  Take 1 tablet (40 mg total) by mouth once daily.            CONTINUE taking these medications      cyanocobalamin 1000 MCG tablet  Commonly known as: VITAMIN B-12  Take 100 mcg by mouth.     DULoxetine 30 MG capsule  Commonly known as: CYMBALTA  Take 30 mg by mouth once daily.     fenofibrate 160 MG Tab  Take 1 tablet (160 mg total) by mouth once daily.     finasteride 5 mg tablet  Commonly known as: PROSCAR  Take 5 mg by mouth.     folic acid 800 MCG Tab  Commonly known as: FOLVITE  Take 800 mcg by mouth once daily.     furosemide 40 MG tablet  Commonly known as: LASIX  Take 40 mg by mouth 2 (two) times daily.     metoprolol tartrate 100 MG tablet  Commonly known as: LOPRESSOR  Take 100 mg by mouth.     oxyCODONE 10 mg Tab immediate release tablet  Commonly known as: ROXICODONE  Take 1 tablet (10 mg total) by mouth every 6 (six) hours as needed for Pain. Do not take at the same time as your Percocet.     pramipexole 0.25 MG tablet  Commonly known as: MIRAPEX  Take by mouth every evening.     pregabalin 225 MG Cap  Commonly known as: LYRICA  Take 1 capsule (225 mg total) by mouth 2 (two) times daily.     simvastatin 20 MG tablet  Commonly known as: ZOCOR  Take 20 mg by mouth once  daily.     tamsulosin 0.4 mg Cap  Commonly known as: FLOMAX  Take 0.4 mg by mouth once daily.            STOP taking these medications      HumaLOG U-100 Insulin 100 unit/mL injection  Generic drug: insulin lispro     MYRBETRIQ 25 mg Tb24 ER tablet  Generic drug: mirabegron     oxybutynin 10 MG 24 hr tablet  Commonly known as: DITROPAN-XL     oxyCODONE-acetaminophen 5-325 mg per tablet  Commonly known as: PERCOCET     sulfamethoxazole-trimethoprim 800-160mg 800-160 mg Tab  Commonly known as: BACTRIM DS            ASK your doctor about these medications      UNABLE TO FIND  Take by mouth 2 (two) times a day. medication name: Super Beta Prostate Advanced     V-GO 30 Isabel  Generic drug: sub-q insulin device, 30 unit  AS DIRECTED , APPLY 1 V-GO DEVICE DAILY              Indwelling Lines/Drains at time of discharge:   Lines/Drains/Airways       Drain  Duration             Male External Urinary Catheter 11/05/22 1815 3 days                    Time spent on the discharge of patient: 35 minutes         D Kian Chang MD  Department of Hospital Medicine  [facility] ([place])

## 2022-11-10 ENCOUNTER — PATIENT MESSAGE (OUTPATIENT)
Dept: NEUROSURGERY | Facility: CLINIC | Age: 82
End: 2022-11-10
Payer: MEDICARE

## 2022-11-10 NOTE — PHYSICIAN QUERY
PT Name: Frandy Mccarthy Jr.  MR #: [id number]     DOCUMENTATION CLARIFICATION   CDS: Blanca Poon RN      Contact information:[email or web address]   This form is a permanent document in the medical record.     Query Date: November 10, 2022    By submitting this query, we are merely seeking further clarification of documentation.  Please utilize your independent clinical judgment when addressing the question(s) below.    The Medical Record contains the following:   Indicators   Supporting Clinical Findings Location in Medical Record    Non-blanchable erythema/redness     x Ulcer/Injury/Skin Breakdown Altered Skin Integrity 10/31/22 1400 Right medial Buttocks Full thickness tissue loss. Subcutaneous fat may be visible but bone, tendon or muscle are not exposed       10/31 Wound Care RN Note    Deep Tissue Injury     x Wound care consult Drainage Amount None   Drainage Characteristics/Odor No odor   Appearance Pink;Yellow;Slough;Moist   Tissue loss description Full thickness   Red (%), Wound Tissue Color 50 %   Yellow (%), Wound Tissue Color 50 %   Periwound Area Macerated   Wound Edges Open   Wound Length (cm) 1.5 cm   Wound Width (cm) 0.8 cm   Wound Depth (cm) 0.2 cm    10/31 Wound Care RN Note    Acute/Chronic Illness      Medication/Treatment     x Other Noted right medial buttock ulceration with some slough in full thickness wound base. States nursing has been applying foam band-aids. Feel site may benefit from Triad hydrophilic wound dressing as it would be a difficult place to maintain a dressing to the site. Pt relates he has had the wound for some time.   10/31 Wound Care RN Note     The clinical guidelines noted are only a system guideline. It does not replace the providers clinical judgment.    Per the National Pressure Injury Advisory Panel:   A pressure injury is localized damage to the skin and underlying soft tissue usually over a bony prominence or related to a medical or other device. The  injury can present as intact skin or an open ulcer and may be painful. The injury occurs as a result of intense and/or prolonged pressure or pressure in combination with shear. The tolerance of soft tissue for pressure and shear may also be affected by microclimate, nutrition, perfusion, co-morbidities and condition of the soft tissue.       Stage 1 Pressure Injury:  Intact skin with a localized area of non-blanchable erythema, which may appear differently in darkly pigmented skin. Color changes do not include purple or maroon discoloration; these may indicate deep tissue pressure injury.    Stage 2 Pressure Injury:  Partial-thickness loss of skin with exposed dermis. The wound bed is viable, pink or red, moist, and may also present as an intact or ruptured serum-filled blister.    Stage 3 Pressure Injury:  Full-thickness loss of skin, in which adipose (fat) is visible in the ulcer and granulation tissue and epibole (rolled wound edges) are often present. Slough and/or eschar may be visible. Undermining and tunneling may occur.    Stage 4 Pressure Injury:  Full-thickness skin and tissue loss with exposed or directly palpable fascia, muscle, tendon, ligament, cartilage or bone in the ulcer. Slough and/or eschar may be visible. Epibole (rolled edges), undermining and/or tunneling often occur.    Unstageable Pressure Injury:  Full-thickness skin and tissue loss in which the extent of tissue damage within the ulcer cannot be confirmed because it is obscured by slough or eschar. If slough or eschar is removed, a Stage 3 or Stage 4 pressure injury will be revealed.    Deep Tissue Pressure Injury:  Intact or non-intact skin with localized area of persistent non-blanchable deep red, maroon, purple discoloration or epidermal separation revealing a dark wound bed or blood filled blister. This injury results from intense and/or prolonged pressure and shear forces at the bone-muscle interface. The wound may evolve rapidly to  reveal the actual extent of tissue injury, or may resolve without tissue loss. If necrotic tissue, subcutaneous tissue, granulation tissue, fascia, muscle or other underlying structures are visible, this indicates a full thickness pressure injury (Unstageable, Stage 3 or Stage 4). Do not use DTPI to describe vascular, traumatic, neuropathic, or dermatologic conditions.   Medical Device Related Pressure Injury: This describes an etiology. Medical device related pressure injuries result from the use of devices designed and applied for diagnostic or therapeutic purposes. The resultant pressure injury generally conforms to the pattern or shape of the device. The injury should be staged using the staging system.    Mucosal Membrane Pressure Injury: Mucosal membrane pressure injury is found on mucous membranes with a history of a medical device in use at the location of the injury. Due to the anatomy of the tissue these ulcers cannot be staged.       Provider, please provide the integumentary diagnosis related to the documentation of (Right Medial Buttock):     [ X ] Pressure Injury/Decubitus Ulcer, Stage 3     [   ] Other Integumentary Diagnosis (please specify):______________     [  ] Clinically Undetermined       Please document in your progress notes daily for the duration of treatment until resolved and include in your discharge summary.    Reference:    RILEY Rice., CHELO Alamo., Goldberg, M., JUSTIN Biggs., JUSTIN Roach., & LAITH Jean. (2016). Revised National Pressure Ulcer Advisory Panel Pressure Injury Staging System: Revised Pressure Injury Staging System. J Wound Ostomy Continence Nurs, 43(6), 585-597. doi:10.1097/won.5768234993773258    Form No.03673

## 2022-11-11 LAB
BACTERIA BLD CULT: NORMAL
BACTERIA BLD CULT: NORMAL

## 2022-11-21 ENCOUNTER — PATIENT MESSAGE (OUTPATIENT)
Dept: NEUROSURGERY | Facility: CLINIC | Age: 82
End: 2022-11-21
Payer: MEDICARE

## 2022-12-07 ENCOUNTER — PATIENT MESSAGE (OUTPATIENT)
Dept: NEUROSURGERY | Facility: CLINIC | Age: 82
End: 2022-12-07
Payer: MEDICARE

## 2022-12-08 ENCOUNTER — TELEPHONE (OUTPATIENT)
Dept: NEUROSURGERY | Facility: CLINIC | Age: 82
End: 2022-12-08
Payer: MEDICARE

## 2022-12-08 NOTE — TELEPHONE ENCOUNTER
LVM - Called pt to arrange another appt with Dr. Dalton as today we are not available when pt is. I will attempt to call later to see if he has any questions or concerns in the meantime. I'm also replying to pt portal mssg

## 2023-01-17 ENCOUNTER — PATIENT MESSAGE (OUTPATIENT)
Dept: NEUROSURGERY | Facility: CLINIC | Age: 83
End: 2023-01-17
Payer: MEDICARE

## 2023-01-31 ENCOUNTER — TELEPHONE (OUTPATIENT)
Dept: NEUROSURGERY | Facility: CLINIC | Age: 83
End: 2023-01-31

## 2023-01-31 ENCOUNTER — OFFICE VISIT (OUTPATIENT)
Dept: NEUROSURGERY | Facility: CLINIC | Age: 83
End: 2023-01-31
Payer: MEDICARE

## 2023-01-31 ENCOUNTER — PATIENT MESSAGE (OUTPATIENT)
Dept: NEUROSURGERY | Facility: CLINIC | Age: 83
End: 2023-01-31

## 2023-01-31 DIAGNOSIS — Z96.89 S/P INSERTION OF SPINAL CORD STIMULATOR: Primary | ICD-10-CM

## 2023-01-31 PROCEDURE — 99024 PR POST-OP FOLLOW-UP VISIT: ICD-10-PCS | Mod: 95,,, | Performed by: NEUROLOGICAL SURGERY

## 2023-01-31 PROCEDURE — 99024 POSTOP FOLLOW-UP VISIT: CPT | Mod: 95,,, | Performed by: NEUROLOGICAL SURGERY

## 2023-01-31 NOTE — PROGRESS NOTES
Neurosurgery  Follow up    SUBJECTIVE:     Chief Complaint: chronic pain, postlaminectomy syndrome     History of Present Illness:  Frandy Mccarthy Jr. is a 82 y.o. male referred by Dr. Laguerre for consideration of laminectomy for spinal cord stimulator placement after successful trial with [employer]. The patient reports that, at baseline, he has pain in the lower back and down both legs. The pain is 8/10 in his legs, 8/10 in the back, and 8/10 in the arms. He has no neck pain.     The pain is constant. It has been present for 7 years. It is made worse by standing and walking. It improves with sitting and leaning on a shopping cart. There has been no change in bowel/bladder habits. He has tried physical therapy and injections in the past. He has had 5 previous neck and back surgeries.     He experienced 100% improvement in both back and leg pain with the trial. Goal mid T7-T8. He also endorses some arm improvement with his trial.     He has multiple medical problems, DM, HTN, CKDIII, scoliosis.     The patient denies any bleeding or anesthetic complications with any previous surgery. He had MRSA with a previous back surgery, which required HHIV antibiotics.     As of 1/31/23, the patient returns virtually after SCS placement 10/26/22. He had C. Diff postoperatively, which required an extended hospitalization. He has been in a motorized wheelchair since a few weeks after surgery--he reports that he lost all control of his arms and legs and has been in the chair since. He has no problems passing his water. He denies any sensation problems in his legs. He has been doing PT.     The pain is well controlled and he reports good efficacy of the device. They deny any fever, chills, or drainage from the incision.     Review of patient's allergies indicates:  No Known Allergies    Current Outpatient Medications   Medication Sig Dispense Refill    acetaminophen (TYLENOL) 325 MG tablet Take 2 tablets (650 mg total) by mouth every 4  (four) hours as needed.  0    albuterol-ipratropium (DUO-NEB) 2.5 mg-0.5 mg/3 mL nebulizer solution Take 3 mLs by nebulization every 6 (six) hours as needed for Wheezing. Rescue 75 mL 0    cyanocobalamin (VITAMIN B-12) 1000 MCG tablet Take 100 mcg by mouth.      DULoxetine (CYMBALTA) 30 MG capsule Take 30 mg by mouth once daily.       fenofibrate 160 MG Tab Take 1 tablet (160 mg total) by mouth once daily. 90 tablet 3    finasteride (PROSCAR) 5 mg tablet Take 5 mg by mouth.      folic acid (FOLVITE) 800 MCG Tab Take 800 mcg by mouth once daily.      furosemide (LASIX) 40 MG tablet Take 40 mg by mouth 2 (two) times daily.      heparin sodium,porcine (HEPARIN, PORCINE,) 5,000 unit/mL injection Inject 1 mL (5,000 Units total) into the skin every 8 (eight) hours.      insulin aspart U-100 (NOVOLOG) 100 unit/mL (3 mL) InPn pen Inject 0-5 Units into the skin every 6 (six) hours as needed (Hyperglycemia).  0    insulin detemir U-100 (LEVEMIR FLEXTOUCH) 100 unit/mL (3 mL) SubQ InPn pen Inject 10 Units into the skin once daily.  0    melatonin (MELATIN) 3 mg tablet Take 2 tablets (6 mg total) by mouth nightly as needed for Insomnia.  0    metoprolol tartrate (LOPRESSOR) 100 MG tablet Take 100 mg by mouth.      oxyCODONE (ROXICODONE) 10 mg Tab immediate release tablet Take 1 tablet (10 mg total) by mouth every 6 (six) hours as needed for Pain. Do not take at the same time as your Percocet. 18 tablet 0    pantoprazole (PROTONIX) 40 MG tablet Take 1 tablet (40 mg total) by mouth once daily. 30 tablet     pramipexole (MIRAPEX) 0.25 MG tablet Take by mouth every evening.       pregabalin (LYRICA) 225 MG Cap Take 1 capsule (225 mg total) by mouth 2 (two) times daily. 28 capsule 0    simvastatin (ZOCOR) 20 MG tablet Take 20 mg by mouth once daily.      tamsulosin (FLOMAX) 0.4 mg Cap Take 0.4 mg by mouth once daily.      UNABLE TO FIND Take by mouth 2 (two) times a day. medication name: Super Beta Prostate Advanced      V-GO 30 Isabel  AS DIRECTED , APPLY 1 V-GO DEVICE DAILY  6     No current facility-administered medications for this visit.       Past Medical History:   Diagnosis Date    Anemia 10/18/2022    Arthritis     Diabetes mellitus, type 2     Disorder of kidney and ureter     History of hyperparathyroidism     History of hyperparathyroidism 10/6/2020    Formatting of this note might be different from the original. Adenoma LL s/p resection 2017    Hyperlipidemia     Hypertension     Thyroid disease 2017    parathyroid removed     Past Surgical History:   Procedure Laterality Date    BACK SURGERY      x 4    ESOPHAGOGASTRODUODENOSCOPY N/A 2022    Procedure: EGD (ESOPHAGOGASTRODUODENOSCOPY);  Surgeon: Satish Galvan MD;  Location: [facility] ENDO;  Service: Gastroenterology;  Laterality: N/A;    FRACTURE SURGERY      right thigh    HERNIA REPAIR      LAMINECTOMY N/A 10/26/2022    Procedure: LAMINECTOMY, SPINE;  Surgeon: Daphnie Dalton MD;  Location: [facility] OR;  Service: Neurosurgery;  Laterality: N/A;  T8-T9 Laminectomy  T7-T8 Paddle    SPINE SURGERY      TOTAL KNEE ARTHROPLASTY Right 2020    TRIAL OF SPINAL CORD NERVE STIMULATOR N/A 2022    Procedure: TRIAL, SPINAL CORD STIMULATOR TRIAL PEOPLES REP;  Surgeon: Siobhan Laguerre MD;  Location: [facility] PAIN MGT;  Service: Pain Management;  Laterality: N/A;   RESCHEDULE     Family History       Problem Relation (Age of Onset)    Cancer Mother, Father    Diabetes Mother, Father          Social History     Socioeconomic History    Marital status:      Spouse name: Kevin    Number of children: 4   Tobacco Use    Smoking status: Former     Types: Cigarettes     Quit date: 1973     Years since quittin.9    Smokeless tobacco: Never   Substance and Sexual Activity    Alcohol use: Not Currently     Comment: occasional    Drug use: No    Sexual activity: Yes     Partners: Female   Social History Narrative    Stairs- 4     Social Determinants of Health     Financial  Resource Strain: Low Risk     Difficulty of Paying Living Expenses: Not hard at all   Food Insecurity: No Food Insecurity    Worried About Running Out of Food in the Last Year: Never true    Ran Out of Food in the Last Year: Never true   Transportation Needs: No Transportation Needs    Lack of Transportation (Medical): No    Lack of Transportation (Non-Medical): No   Physical Activity: Insufficiently Active    Days of Exercise per Week: 7 days    Minutes of Exercise per Session: 10 min   Stress: Stress Concern Present    Feeling of Stress : To some extent   Social Connections: Socially Isolated    Frequency of Communication with Friends and Family: Never    Frequency of Social Gatherings with Friends and Family: Never    Attends Zoroastrianism Services: Never    Active Member of Clubs or Organizations: No    Attends Club or Organization Meetings: Never    Marital Status:    Housing Stability: Low Risk     Unable to Pay for Housing in the Last Year: No    Number of Places Lived in the Last Year: 1    Unstable Housing in the Last Year: No       Review of Systems   Constitutional:  Negative for fever.   HENT:  Negative for nosebleeds.    Eyes:  Negative for visual disturbance.   Respiratory:  Positive for cough.    Cardiovascular:  Negative for chest pain.   Musculoskeletal:  Positive for back pain and gait problem.   Hematological:  Does not bruise/bleed easily.     OBJECTIVE:     Vital Signs     There is no height or weight on file to calculate BMI.      Physical Exam:    Constitutional: He appears well-developed and well-nourished. No distress.     Eyes: EOM are normal.     Abdominal: Soft.     Skin:   Back incision well healed      Psych/Behavior: He is alert. He is oriented to person, place, and time.     Musculoskeletal:      Comments: No focal weakness, but generally 4/5       Neurological:        Coordination: He has normal finger to nose coordination.     Pulmonary: nonlabored respirations     Hematologic:  no bruising noted     Incisions appear well healed on video     Diagnostic Results:  No new imaging     ASSESSMENT/PLAN:   Frandy Mccarthy Jr. is a 82 y.o. male who is referred by Dr. Laguerre and JACK Epstein for SCS placement after successful trial. He reports 100% improvement with his trial. He returns today virtually after having been lost to follow up. He is now in a power wheelchair but reports significant improvement in pain.     We will obtain a CT scan of the thoracic spine and have him follow up in person given the weakness since a week or two after the procedure. He does report deconditioning 2/2 C. Diff infection.     His incisions appear well healed on video.     I would like to see him back in person after the CT is completed     I have encouraged them to contact the clinic in interim with any questions, concerns, or adverse clinical change.     The patient location is: at home  The chief complaint leading to consultation is: postop    Visit type: audiovisual    Face to Face time with patient: 10  17 minutes of total time spent on the encounter, which includes face to face time and non-face to face time preparing to see the patient (eg, review of tests), Obtaining and/or reviewing separately obtained history, Documenting clinical information in the electronic or other health record, Independently interpreting results (not separately reported) and communicating results to the patient/family/caregiver, or Care coordination (not separately reported).         Each patient to whom he or she provides medical services by telemedicine is:  (1) informed of the relationship between the physician and patient and the respective role of any other health care provider with respect to management of the patient; and (2) notified that he or she may decline to receive medical services by telemedicine and may withdraw from such care at any time.    Notes: Note generated with voice recognition software; please excuse any  typographical errors.

## 2023-01-31 NOTE — TELEPHONE ENCOUNTER
Called pt to schedule CT. Pt would like to schedule via his wife who was not available. Yaz Mccarthy will call back to schedule. I am unable to schedule imaging at [facility] so I will provide the departments phone number. I will schedule the barry quiñones

## 2023-02-06 PROBLEM — J18.9 MULTIFOCAL PNEUMONIA: Status: RESOLVED | Noted: 2022-10-29 | Resolved: 2023-02-06

## 2023-02-07 ENCOUNTER — HOSPITAL ENCOUNTER (OUTPATIENT)
Dept: RADIOLOGY | Facility: OTHER | Age: 83
Discharge: HOME | End: 2023-02-07
Attending: NEUROLOGICAL SURGERY
Payer: MEDICARE

## 2023-02-07 DIAGNOSIS — Z96.89 S/P INSERTION OF SPINAL CORD STIMULATOR: ICD-10-CM

## 2023-02-07 PROCEDURE — 72128 CT THORACIC SPINE WITHOUT CONTRAST: ICD-10-PCS | Mod: 26,,, | Performed by: RADIOLOGY

## 2023-02-07 PROCEDURE — 72128 CT CHEST SPINE W/O DYE: CPT | Mod: 26,,, | Performed by: RADIOLOGY

## 2023-02-07 PROCEDURE — 72128 CT CHEST SPINE W/O DYE: CPT | Mod: TC

## 2023-03-01 ENCOUNTER — PATIENT MESSAGE (OUTPATIENT)
Dept: NEUROSURGERY | Facility: CLINIC | Age: 83
End: 2023-03-01
Payer: MEDICARE

## 2023-03-09 ENCOUNTER — PATIENT MESSAGE (OUTPATIENT)
Dept: NEUROSURGERY | Facility: CLINIC | Age: 83
End: 2023-03-09

## 2023-03-09 ENCOUNTER — OFFICE VISIT (OUTPATIENT)
Dept: NEUROSURGERY | Facility: CLINIC | Age: 83
End: 2023-03-09
Payer: MEDICARE

## 2023-03-09 DIAGNOSIS — Z96.89 S/P INSERTION OF SPINAL CORD STIMULATOR: Primary | ICD-10-CM

## 2023-03-09 PROCEDURE — 99999 PR PBB SHADOW E&M-EST. PATIENT-LVL I: ICD-10-PCS | Mod: PBBFAC,,, | Performed by: NEUROLOGICAL SURGERY

## 2023-03-09 PROCEDURE — 99214 PR OFFICE/OUTPT VISIT, EST, LEVL IV, 30-39 MIN: ICD-10-PCS | Mod: S$GLB,,, | Performed by: NEUROLOGICAL SURGERY

## 2023-03-09 PROCEDURE — 3288F PR FALLS RISK ASSESSMENT DOCUMENTED: ICD-10-PCS | Mod: CPTII,S$GLB,, | Performed by: NEUROLOGICAL SURGERY

## 2023-03-09 PROCEDURE — 1100F PTFALLS ASSESS-DOCD GE2>/YR: CPT | Mod: CPTII,S$GLB,, | Performed by: NEUROLOGICAL SURGERY

## 2023-03-09 PROCEDURE — 3288F FALL RISK ASSESSMENT DOCD: CPT | Mod: CPTII,S$GLB,, | Performed by: NEUROLOGICAL SURGERY

## 2023-03-09 PROCEDURE — 1100F PR PT FALLS ASSESS DOC 2+ FALLS/FALL W/INJURY/YR: ICD-10-PCS | Mod: CPTII,S$GLB,, | Performed by: NEUROLOGICAL SURGERY

## 2023-03-09 PROCEDURE — 99999 PR PBB SHADOW E&M-EST. PATIENT-LVL I: CPT | Mod: PBBFAC,,, | Performed by: NEUROLOGICAL SURGERY

## 2023-03-09 PROCEDURE — 99214 OFFICE O/P EST MOD 30 MIN: CPT | Mod: S$GLB,,, | Performed by: NEUROLOGICAL SURGERY

## 2023-03-09 RX ORDER — ATORVASTATIN CALCIUM 10 MG/1
10 TABLET, FILM COATED ORAL NIGHTLY
COMMUNITY
Start: 2023-01-10

## 2023-03-09 RX ORDER — METHOCARBAMOL 500 MG/1
500 TABLET, FILM COATED ORAL 3 TIMES DAILY PRN
COMMUNITY
Start: 2023-01-09 | End: 2023-04-09

## 2023-03-09 NOTE — PROGRESS NOTES
Neurosurgery  Follow up    SUBJECTIVE:     Chief Complaint: chronic pain, postlaminectomy syndrome     History of Present Illness:  Frandy Mccarthy Jr. is a 83 y.o. male referred by Dr. Laguerre for consideration of laminectomy for spinal cord stimulator placement after successful trial with [employer]. The patient reports that, at baseline, he has pain in the lower back and down both legs. The pain is 8/10 in his legs, 8/10 in the back, and 8/10 in the arms. He has no neck pain.     The pain is constant. It has been present for 7 years. It is made worse by standing and walking. It improves with sitting and leaning on a shopping cart. There has been no change in bowel/bladder habits. He has tried physical therapy and injections in the past. He has had 5 previous neck and back surgeries.     He experienced 100% improvement in both back and leg pain with the trial. Goal mid T7-T8. He also endorses some arm improvement with his trial.     He has multiple medical problems, DM, HTN, CKDIII, scoliosis.     The patient denies any bleeding or anesthetic complications with any previous surgery. He had MRSA with a previous back surgery, which required HHIV antibiotics.     As of 1/31/23, the patient returns virtually after SCS placement 10/26/22. He had C. Diff postoperatively, which required an extended hospitalization. He has been in a motorized wheelchair since a few weeks after surgery--he reports that he lost all control of his arms and legs and has been in the chair since. He has no problems passing his water. He denies any sensation problems in his legs. He has been doing PT.     The pain is well controlled and he reports good efficacy of the device. They deny any fever, chills, or drainage from the incision.     As of 3/9/23, the patient returns in scheduled follow up. He is accompanied by his wife today. They recount that he was transferred to [facility] on 11/9/23; he spent 20 days there and had C. diff  and a sacral ulcer. The episode of loss of arm and leg control was Saturday after the surgery, at which point they presented to [facility].     He then developed chest pain and dysphagia, for which he was admitted at [facility] until January 10. He also participated in PT/OT/ST at [facility]/inpatient rehab. Because of his extended stay at [facility], his follow up with me was delayed.     After the last virtual visit, we obtained CT scan of the thoracic spine to assess for any evidence of spinal cord compression from the paddle, given the history recounted then. Happily, this demonstrates appropriate placement without any compression or evidence of associated calcification. He presents in person today so I am able to examine him.      He is very pleased with his pain improvement, but understandably, he would like to see gait improvement now. He remains in a motorized wheelchair.     He specifically denies any bowel/bladder dysfunction. He endorses normal sensation in the saddle region.     Review of patient's allergies indicates:  No Known Allergies    Current Outpatient Medications   Medication Sig Dispense Refill    acetaminophen (TYLENOL) 325 MG tablet Take 2 tablets (650 mg total) by mouth every 4 (four) hours as needed.  0    albuterol-ipratropium (DUO-NEB) 2.5 mg-0.5 mg/3 mL nebulizer solution Take 3 mLs by nebulization every 6 (six) hours as needed for Wheezing. Rescue 75 mL 0    DULoxetine (CYMBALTA) 30 MG capsule Take 30 mg by mouth once daily.       finasteride (PROSCAR) 5 mg tablet Take 5 mg by mouth.      insulin detemir U-100 (LEVEMIR FLEXTOUCH) 100 unit/mL (3 mL) SubQ InPn pen Inject 10 Units into the skin once daily.  0    methocarbamoL (ROBAXIN) 500 MG Tab Take 500 mg by mouth 3 (three) times daily as needed.      oxyCODONE (ROXICODONE) 10 mg Tab immediate release tablet Take 1 tablet (10 mg total) by mouth every 6 (six) hours as needed for Pain. Do not take at the same time as your Percocet. 18 tablet 0     pantoprazole (PROTONIX) 40 MG tablet Take 1 tablet (40 mg total) by mouth once daily. 30 tablet     pregabalin (LYRICA) 225 MG Cap Take 1 capsule (225 mg total) by mouth 2 (two) times daily. 28 capsule 0    simvastatin (ZOCOR) 20 MG tablet Take 20 mg by mouth once daily.      tamsulosin (FLOMAX) 0.4 mg Cap Take 0.4 mg by mouth once daily.      V-GO 30 Isabel AS DIRECTED , APPLY 1 V-GO DEVICE DAILY  6    atorvastatin (LIPITOR) 10 MG tablet Take 10 mg by mouth every evening.      cyanocobalamin (VITAMIN B-12) 1000 MCG tablet Take 100 mcg by mouth.      fenofibrate 160 MG Tab Take 1 tablet (160 mg total) by mouth once daily. (Patient not taking: Reported on 3/9/2023) 90 tablet 3    folic acid (FOLVITE) 800 MCG Tab Take 800 mcg by mouth once daily.      furosemide (LASIX) 40 MG tablet Take 40 mg by mouth 2 (two) times daily.      heparin sodium,porcine (HEPARIN, PORCINE,) 5,000 unit/mL injection Inject 1 mL (5,000 Units total) into the skin every 8 (eight) hours. (Patient not taking: Reported on 3/9/2023)      insulin aspart U-100 (NOVOLOG) 100 unit/mL (3 mL) InPn pen Inject 0-5 Units into the skin every 6 (six) hours as needed (Hyperglycemia). (Patient not taking: Reported on 3/9/2023)  0    melatonin (MELATIN) 3 mg tablet Take 2 tablets (6 mg total) by mouth nightly as needed for Insomnia. (Patient not taking: Reported on 3/9/2023)  0    metoprolol tartrate (LOPRESSOR) 100 MG tablet Take 100 mg by mouth.      pramipexole (MIRAPEX) 0.25 MG tablet Take by mouth every evening.       UNABLE TO FIND Take by mouth 2 (two) times a day. medication name: Super Beta Prostate Advanced       No current facility-administered medications for this visit.       Past Medical History:   Diagnosis Date    Anemia 10/18/2022    Arthritis     Diabetes mellitus, type 2     Disorder of kidney and ureter     History of hyperparathyroidism     History of hyperparathyroidism 10/6/2020    Formatting of this note might be different from the  original. Adenoma LL s/p resection 2017    Hyperlipidemia     Hypertension     Thyroid disease 2017    parathyroid removed     Past Surgical History:   Procedure Laterality Date    BACK SURGERY      x 4    ESOPHAGOGASTRODUODENOSCOPY N/A 2022    Procedure: EGD (ESOPHAGOGASTRODUODENOSCOPY);  Surgeon: Satish Galvan MD;  Location: [facility] ENDO;  Service: Gastroenterology;  Laterality: N/A;    FRACTURE SURGERY      right thigh    HERNIA REPAIR      LAMINECTOMY N/A 10/26/2022    Procedure: LAMINECTOMY, SPINE;  Surgeon: Daphnie Dalton MD;  Location: [facility] OR;  Service: Neurosurgery;  Laterality: N/A;  T8-T9 Laminectomy  T7-T8 Paddle    SPINE SURGERY      TOTAL KNEE ARTHROPLASTY Right 2020    TRIAL OF SPINAL CORD NERVE STIMULATOR N/A 2022    Procedure: TRIAL, SPINAL CORD STIMULATOR TRIAL PEOPLES REP;  Surgeon: Siobhan Laguerre MD;  Location: [facility] PAIN MGT;  Service: Pain Management;  Laterality: N/A;   RESCHEDULE     Family History       Problem Relation (Age of Onset)    Cancer Mother, Father    Diabetes Mother, Father          Social History     Socioeconomic History    Marital status:      Spouse name: Kevin    Number of children: 4   Tobacco Use    Smoking status: Former     Types: Cigarettes     Quit date: 1973     Years since quittin.0    Smokeless tobacco: Never   Substance and Sexual Activity    Alcohol use: Not Currently     Comment: occasional    Drug use: No    Sexual activity: Yes     Partners: Female   Social History Narrative    Stairs- 4     Social Determinants of Health     Financial Resource Strain: Low Risk     Difficulty of Paying Living Expenses: Not hard at all   Food Insecurity: No Food Insecurity    Worried About Running Out of Food in the Last Year: Never true    Ran Out of Food in the Last Year: Never true   Transportation Needs: No Transportation Needs    Lack of Transportation (Medical): No    Lack of Transportation (Non-Medical): No   Physical Activity:  Insufficiently Active    Days of Exercise per Week: 7 days    Minutes of Exercise per Session: 10 min   Stress: Stress Concern Present    Feeling of Stress : To some extent   Social Connections: Socially Isolated    Frequency of Communication with Friends and Family: Never    Frequency of Social Gatherings with Friends and Family: Never    Attends [employer] Services: Never    Active Member of Clubs or Organizations: No    Attends Club or Organization Meetings: Never    Marital Status:    Housing Stability: Low Risk     Unable to Pay for Housing in the Last Year: No    Number of Places Lived in the Last Year: 1    Unstable Housing in the Last Year: No       Review of Systems   Constitutional:  Negative for fever.   HENT:  Negative for nosebleeds.    Eyes:  Negative for visual disturbance.   Respiratory:  Positive for cough.    Cardiovascular:  Negative for chest pain.   Genitourinary:  Negative for difficulty urinating.   Musculoskeletal:  Positive for back pain and gait problem.   Hematological:  Does not bruise/bleed easily.     OBJECTIVE:     Vital Signs     There is no height or weight on file to calculate BMI.      Physical Exam:    Constitutional: He appears well-developed and well-nourished. No distress.     Eyes: EOM are normal.     Abdominal: Soft.     Skin:   Mid-thoracic and generator incisions well healed      Psych/Behavior: He is alert. He is oriented to person, place, and time.     Musculoskeletal:      Comments: 5/5 proximal lower extremities   Weakness with dorsiflexion on right foot   Weakness of plantar and dorsiflexion of left foot     Neurological:        Coordination: He has normal finger to nose coordination.     Pulmonary: nonlabored respirations     Hematologic: no bruising noted     Diagnostic Results:  CT thoracic personally reviewed   As above     ASSESSMENT/PLAN:   Frandy Mccarthy JrChava is a 83 y.o. male who is referred by Dr. Laguerre and JACK Epstein for SCS placement after successful  "trial. He reports 100% improvement with his trial. He returns today virtually after having been lost to follow up. He is now in a power wheelchair but reports significant improvement in pain. He reports that his pain is "almost completely gone." He occasionally has some restless leg.     We discussed that his current exam and imaging findings would be atypical for presentation of thoracic myelopathy/cord compression 2/2 spinal cord stimulator paddle placement as he has well preserved strength proximally in the BLE but weakness in the feet only. He does not endorse any sensory change. He has no bowel/bladder changes.     We discussed the option of CT myelogram, as it would provide the clearest indication (gold standard) of whether any cord compression is present; however, he declines as he has kidney disaese and prefers not to undergo invasive procedures. We also discussed the option to remove the device altogether; he would prefer not to because his pain relief is excellent; moreover, I do not really believe this is the issue for the reasons discussed above.     I did advise him to get kidney ultrasound and CT chest in a year based on the non-spine imaging findings. He and his wife expressed understanding; I encouraged them to discuss these recommendations with Dr. Mann.     He has been working out with ankle weights and has improved his strength. I would like him to do outpatient PT to see if he is able to progress to walking now that his overall strength is improved. He and his wife are in agreement.     I would like to see them back in about 4 months to see how he is progressing.     I have encouraged them to contact the clinic in interim with any questions, concerns, or adverse clinical change.   .  "

## 2023-03-11 ENCOUNTER — HOSPITAL ENCOUNTER (OUTPATIENT)
Dept: RADIOLOGY | Facility: OTHER | Age: 83
Discharge: HOME | End: 2023-03-11
Attending: ANESTHESIOLOGY
Payer: MEDICARE

## 2023-03-11 DIAGNOSIS — M96.1 POST LAMINECTOMY SYNDROME: ICD-10-CM

## 2023-03-11 DIAGNOSIS — G89.4 CHRONIC PAIN SYNDROME: ICD-10-CM

## 2023-03-11 PROCEDURE — 72100 X-RAY EXAM L-S SPINE 2/3 VWS: CPT | Mod: 26,,, | Performed by: RADIOLOGY

## 2023-03-11 PROCEDURE — 72100 X-RAY EXAM L-S SPINE 2/3 VWS: CPT | Mod: TC,FY

## 2023-03-11 PROCEDURE — 72100 XR LUMBAR SPINE AP AND LATERAL: ICD-10-PCS | Mod: 26,,, | Performed by: RADIOLOGY

## 2023-03-20 PROBLEM — Z96.89 S/P INSERTION OF SPINAL CORD STIMULATOR: Status: ACTIVE | Noted: 2023-03-20

## 2023-04-04 ENCOUNTER — CLINICAL SUPPORT (OUTPATIENT)
Dept: REHABILITATION | Facility: OTHER | Age: 83
End: 2023-04-04
Attending: NEUROLOGICAL SURGERY
Payer: MEDICARE

## 2023-04-04 DIAGNOSIS — Z96.89 S/P INSERTION OF SPINAL CORD STIMULATOR: ICD-10-CM

## 2023-04-04 NOTE — PROGRESS NOTES
[facility]  Physical Therapy Initial Evaluation    Name: Frandy Mccarthy Jr.  Clinic Number: [id number]    Therapy Diagnosis:   Encounter Diagnosis   Name Primary?    S/P insertion of spinal cord stimulator      Physician: Daphnie Dalton MD    Physician Orders: Physical Therapy Evaluate and Treat  Medical Diagnosis from Referral: ***  Evaluation Date: ***  Authorization Period Expiration: ***  Plan of Care Expiration: 4/4/2023 to ***  Visit # / Visits authorized: 1/1 (pending additional authorization following initial evaluation)   FOTO: 0/3  on ***      Time In: ***  Time Out: ***  Total Billable Time: *** minutes    Precautions: standard    Subjective     History of current condition - Frandy reports: 9/2022 woke up and couldn't use legs    Spinal stimulator placed in 9/2022 - 3 days later lost function of his arms/legs    He was at [facility] for approximately 30 days - spent two days at home then went back to [facility] for approximately 30 days in rehab    [facility]    Currently he is able to perform sit to stand transfer with Max assist    Goal is to get back to stand/pivot transfer and walking 10 steps with AD     Right shoulder pain which limits transfers     Medical History:   Past Medical History:   Diagnosis Date    Anemia 10/18/2022    Arthritis     Diabetes mellitus, type 2     Disorder of kidney and ureter     History of hyperparathyroidism     History of hyperparathyroidism 10/6/2020    Formatting of this note might be different from the original. 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    Hyperlipidemia     Hypertension     Thyroid disease 06/2017    parathyroid removed       Surgical History:   Frandy Mccarthy Jr.  has a past surgical history that includes Spine surgery; Hernia repair; Back surgery; Fracture surgery; Total knee arthroplasty (Right, 07/06/2020); Trial of spinal cord nerve stimulator (N/A, 09/12/2022); Laminectomy (N/A, 10/26/2022); and  "Esophagogastroduodenoscopy (N/A, 11/2/2022).    Medications:   Frandy has a current medication list which includes the following prescription(s): acetaminophen, albuterol-ipratropium, atorvastatin, cyanocobalamin, duloxetine, fenofibrate, finasteride, folic acid, furosemide, heparin (porcine), insulin aspart u-100, insulin detemir u-100 (levemir), melatonin, methocarbamol, metoprolol tartrate, oxycodone, pantoprazole, pramipexole, pregabalin, simvastatin, tamsulosin, UNABLE TO FIND, and v-go 30.    Allergies:   Review of patient's allergies indicates:  No Known Allergies     Imaging: ***    Prior Therapy: ***  Social History: ***  Occupation: ***  Prior Level of Function: ***  Current Level of Function: ***    Pain:  Current {0-10:31519::"0"}/10, worst {0-10:89319::"0"}/10, best {0-10:81634::"0"}/10   Location: {RIGHT LEFT BILATERAL:05886} {LOCATION ON BODY:56398}  Description: {Pain Description:61219}  Aggravating Factors: {Causes; Pain:68735}  Easing Factors: {Pain (activities that relieve):48987}    Pts goals: Pt would like to return to *** with no increase in *** pain.    Objective     WNL=within normal limits  WFL=within functional limits  NT=not tested  *=pain    Posture: ***  Palpation: ***  Sensation: ***  Deep tendon reflexes: ***    ***    CMS Impairment/Limitation/Restriction for FOTO Survey    Therapist reviewed FOTO scores for Frandy Mccarthy Jr. on 4/4/2023.   FOTO documents entered into [employer] - see Media section.    Limitation Score: ***%  Predicted Goal: ***%    Category: Mobility     TREATMENT     Treatment Time In: ***  Treatment Time Out: ***  Total Treatment time separate from Evaluation: *** minutes    Therapeutic Exercises were provided for *** minutes to improve strength and AROM including:        Home Exercises and Patient Education Provided:    Education provided:   - Findings; prognosis and plan of care (POC)  - Home exercise program (HEP)  - Modality options  - Therapist contact " information    Written Home Exercises Provided: Yes  Exercises were reviewed and Frandy was able to demonstrate them prior to the end of the session.  Frandy demonstrated good understanding of the education provided.       Assessment     Frandy is a 83 y.o. male referred to outpatient Physical Therapy with a medical diagnosis of ***. Pt presents to PT with pain, decreased *** ROM, decreased strength and flexibility, poor posture, and functional deficits with ***. These deficits are negatively impacting this patient's ability to complete their work duties and activities of daily living.     Pt prognosis is {REHAB PROGNOSIS OHS:09330}.   Pt will benefit from skilled outpatient Physical Therapy to address the deficits stated above and in the chart below, provide pt/family education, and to maximize pt's level of independence.     Plan of care discussed with patient: Yes  Pt's spiritual, cultural and educational needs considered and pt agreeable to plan of care and goals as stated below:     Anticipated Barriers for therapy: None      Medical Necessity is demonstrated by the following  History  Co-morbidities and personal factors that may impact the plan of care Co-morbidities:   {Co-morbidities:39549}    Personal Factors:   {Personal Factors:45412}     low   Examination  Body Structures and Functions, activity limitations and participation restrictions that may impact the plan of care Body Regions:   {Body Regions:08368}    Body Systems:    {Body Systems:45708}    Participation Restrictions:   Walking    Activity limitations:   Learning and applying knowledge  no deficits    General Tasks and Commands  No Deficits    Communication  No Deficits    Mobility  {Mobility:53262}    Self care  {Self Care:67784}    Domestic Life  No Deficits    Interactions/Relationships  No Deficits    Life Areas  No Deficits    Community and Social Life  No Deficits         low   Clinical Presentation stable and uncomplicated low    Decision Making/ Complexity Score: low     GOALS:  ***      Plan     Plan of care Certification: 4/4/2023 to ***    Outpatient Physical Therapy {NUMBERS 1-5:49067} times weekly for 12 weeks to include the following interventions: Therapeutic Exercises, Manual Therapeutic Technique, Neuromuscular Re Education, Therapeutic Activities. Modalities, Kinesiotape prn, and Functional Dry Needling as needed.    Carlitos Mercado, PT,  DPT, OCS

## 2023-04-06 ENCOUNTER — DOCUMENTATION ONLY (OUTPATIENT)
Dept: REHABILITATION | Facility: OTHER | Age: 83
End: 2023-04-06
Payer: MEDICARE

## 2023-04-06 NOTE — PROGRESS NOTES
Patient was seen on 4/4/23. Physical therapy evaluation was not performed. It was determined that he would be more appropriate for physical therapy treatment at [facility]. He is scheduled to be evaluated there on 4/11/23.    Carlitos Mercado PT

## 2023-04-11 ENCOUNTER — CLINICAL SUPPORT (OUTPATIENT)
Dept: REHABILITATION | Facility: HOSPITAL | Age: 83
End: 2023-04-11
Attending: NEUROLOGICAL SURGERY
Payer: MEDICARE

## 2023-04-11 DIAGNOSIS — R29.898 DECREASED STRENGTH OF LOWER EXTREMITY: ICD-10-CM

## 2023-04-11 DIAGNOSIS — Z74.09 IMPAIRED FUNCTIONAL MOBILITY, BALANCE, GAIT, AND ENDURANCE: Primary | ICD-10-CM

## 2023-04-11 PROCEDURE — 97162 PT EVAL MOD COMPLEX 30 MIN: CPT | Mod: PO

## 2023-04-11 PROCEDURE — 97110 THERAPEUTIC EXERCISES: CPT | Mod: PO

## 2023-04-11 NOTE — PLAN OF CARE
"[facility]  Physical Therapy Neurological Rehabilitation Initial Evaluation    Name: Frandy Mccarthy Jr.  Clinic Number: [id number]    Therapy Diagnosis:   Encounter Diagnoses   Name Primary?    Impaired functional mobility, balance, gait, and endurance Yes    Decreased strength of lower extremity      Physician: Daphnie Dalton MD    Physician Orders: PT Eval and Treat   Medical Diagnosis from Referral: Z96.89 (ICD-10-CM) - S/P insertion of spinal cord stimulator   Evaluation Date: 4/11/2023  Progress Note Due: 5/11/2023  Authorization Period Expiration: 5/2/2023  Plan of Care Expiration: 6/30/2023  Visit # / Visits authorized: 1/ 1    Time In: 09:02  Time Out: 10:00  Total Billable Time: 58 minutes    Precautions: Standard, Diabetes, Fall, and spinal cord stimulator    Subjective   Date of onset: about 2 months ago  History of current condition - Frandy reports: he has had 5 back surgery over the years. He had leg pain with it and had to use assistive devices for walking. Eventually, he got a back stimulator for his legs. Two days later he collapsed when getting up out of his bed. He lost "everything: hands, legs, everything" - so he had to go to the hospital. He went to a rehabilitation facility ([facility]) to make some progress but was not walking there. He is now moving his legs better and can walk short distances in his home.      Medical History:   Past Medical History:   Diagnosis Date    Anemia 10/18/2022    Arthritis     Diabetes mellitus, type 2     Disorder of kidney and ureter     History of hyperparathyroidism     History of hyperparathyroidism 10/6/2020    Formatting of this note might be different from the original. 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  has a past surgical history that includes Spine surgery; Hernia repair; Back surgery; Fracture " "surgery; Total knee arthroplasty (Right, 07/06/2020); Trial of spinal cord nerve stimulator (N/A, 09/12/2022); Laminectomy (N/A, 10/26/2022); and Esophagogastroduodenoscopy (N/A, 11/2/2022).    Medications:   Frandy has a current medication list which includes the following prescription(s): acetaminophen, albuterol-ipratropium, atorvastatin, cyanocobalamin, duloxetine, fenofibrate, finasteride, folic acid, furosemide, heparin (porcine), insulin aspart u-100, insulin detemir u-100 (levemir), melatonin, metoprolol tartrate, oxycodone, pantoprazole, pramipexole, pregabalin, simvastatin, tamsulosin, UNABLE TO FIND, and v-go 30.    Allergies:   Review of patient's allergies indicates:  No Known Allergies     Prior Therapy: Yes - for his back, aquatic therapy  Social History: lives with their spouse  Falls: none since the surgery; possibly 1 or 2 falls prior to the surgery but "nothing serious"   DME: Manual wheelchair, Power wheelchair, Walker, BSC, Hospital bed, neil lift, and rollator; patient reports having a built-in bench in the shower   Home Environment: 1 story home, ramped entrance   Exercise Routine / History: simple therex from the chair   Family Present at time of Eval: wife waiting in the lobby   Occupation: retired  Prior Level of Function: Modified Independent - Supervision  Current Level of Function: min-mod A     Pain:  Current 0/10, worst 5/10, best 0/10   Location: bilateral low back   Description: Aching  Aggravating Factors: Standing and Getting out of bed/chair  Easing Factors: relaxation, lying down, rest, and sitting    Patient's goals: to walk from your bedroom to the bathroom and take a shower    Objective     Patient's mobility presenting to therapy evaluation: power wheelchair    Mental status: alert, oriented to person, place, and time, normal mood, behavior, speech, dress, motor activity, and thought processes  Appearance: Casually dressed  Behavior:  calm, cooperative, and adequate rapport " can be established  Attention Span and Concentration:  Normal  Follows commands:100% of time   Speech: no deficits     Dominant hand:  right     Posture Alignment in sitting:   Head: forward head   Scapulae: slouched posture   Trunk: midline awareness   Pelvis: sacral sitting   Legs: abducted     Posture Alignment in standing:   Head: forward head   Scapulae: slouched posture   Trunk: increased kyphosis     Sensation: Light Touch: Intact  Edema observed: No - but patient reports that he has swelling only sometimes, reduced with positioning         Tone: normal  Limbs/muscles affected: N/A    Coordination:   - fine motor:  Intact  - UE coordination: Intact  - LE coordination: Intact    ROM:   UPPER EXTREMITY--AROM/PROM  (R) UE: limited as follows: shoulder flexion and abduction, unable to achieve full finger extension  (L) UE: limited as follows: shoulder flexion and abduction, unable to achieve full finger extension         RANGE OF MOTION--LOWER EXTREMITIES  (R) LE Hip: normal   Knee: normal   Ankle: normal    (L) LE: Hip: normal   Knee: normal   Ankle: normal    Strength: manual muscle test grades below   Upper Extremity Strength  (R) UE  (L) UE    Shoulder Flexion: 3/5 Shoulder Flexion: 4/5   Shoulder Abduction: 3/5 Shoulder Abduction: 3/5   Shoulder Extension: 3/5 Shoulder Extension:   3/5   Elbow Flexion: 5/5 Elbow Flexion: 5/5   Elbow Extension: 5/5 Elbow Extension: 5/5   Wrist Flexion: 4/5 Wrist Flexion: 4/5   Wrist Extension: 4/5 Wrist Extension: 4/5   : 4+/5 : 4-/5     Lower Extremity Strength  Right LE  Left LE    Hip Flexion: 3+/5 Hip Flexion: 3/5   Hip Extension:  3/5 Hip Extension: 3/5   Hip Abduction: 3+/5 Hip Abduction: 3+/5   Hip Adduction: 3/5 Hip Adduction 3/5   Knee Extension: 4+/5 Knee Extension: 4+/5   Knee Flexion: 4/5 Knee Flexion: 3+/5   Ankle Dorsiflexion: 3-/5 Ankle Dorsiflexion: 4/5   Ankle Plantarflexion: 4/5 Ankle Plantarflexion: 3-/5     Abdominal Strength: 4/5    Flexibility:  impaired - minimum (hamstring bilaterally)        Evaluation 04/11/2023   30 second Chair Rise   3 completed with arms from PWC using rolling walker and upper extremity push-off, PT providing CGA         Balance Testing   Sitting balance     GAIT ASSESSMENT  - AD used: Rolling Walker  - Assistance: contact guard assistance  - Distance: home distances (<15 ft)    Observed Gait Deviations:  Frandy displays the following deviations with ambulation:  Abnormal, decreased saima, increased time in double stance, decreased velocity of limb motion, decreased step length, decreased toe-to-floor clearance, and decreased weight-shifting ability     Impairments contributing to deviations/impairments: impaired balance, impaired coordination, decreased flexibility, impaired motor control, pain, decreased ROM, and decreased strength     Evaluation 04/11/2023   Timed Up and Go  (< 20 sec safe for independent transfers, < 30 sec safe for dependent transfers/assist required) Unable to test 2/2 patient unable to walk full distance   Self Selected Walking Speed Unable to test 2/2 patient unable to walk full distance   Fast Walking Speed Unable to test 2/2 patient unable to walk full distance     Endurance Deficit: moderate    Functional Mobility (Bed mobility, transfers)  Bed mobility: Max A  Supine to sit: Mod I  Sit to supine: Mod I  Sit to stand:  Min A  Stand pivot: Min A with RW  Transfers to bed:  Min A with RW  Transfers to toilet: Min A with RW  Transfers to shower / tub: Min A with RW  Car transfers: N/A - uses a wheelchair van  Wheelchair mobility: Mod I (uses PWC)    ADL's:  Feeding: I  Grooming: I  Hygiene: Mod I  UB Dressing: I  LB Dressing: Mod A  Toileting: Mod A  Bathing: Min A      CMS Impairment/Limitation/Restriction for FOTO Lumbar Spine Survey    Therapist reviewed FOTO scores for Frandy Mccarthy Jr. on 4/11/2023.   FOTO documents entered into "[employer] - see Media section.    Limitation Score: 84%         TREATMENT    Treatment Time In: 09:48  Treatment Time Out: 09:57  Total Treatment time separate from Evaluation: 9 minutes    Frandy received therapeutic exercises to develop strength, endurance, and core stabilization for 9 minutes including:  - seated hip abduction with red theraband, 10x  - seated hip flexion with 1 sec hold, 10x  - seated hip adduction with ball squeeze, 10x      Home Exercises and Patient Education Provided    Education provided:   - home exercise program issued and reviewed    Written Home Exercises Provided: yes.  Exercises were reviewed and Frandy was able to demonstrate them prior to the end of the session.  Frandy demonstrated good  understanding of the education provided.     See EMR under Patient Instructions for exercises provided 4/11/2023.    Assessment   Frandy is a 83 y.o. male referred to outpatient Physical Therapy with a medical diagnosis of s/p insertion of spinal cord stimulator. Patient presents with decreased strength throughout all extremities, decreased endurance, and decreased functional mobility independence. He currently relies on use of a power wheelchair for mobility and is able to stand and walk for very short distances. His gait speed, though not officially measured, places him in a high fall risk category. He would benefit from practice using a rolling walker as well as gait training to maximize his mobility and decrease caregiver burden. However, he is extremely motivated to improve and is a great candidate for outpatient neuro physical therapy.     Patient prognosis is Good.   Patient will benefit from skilled outpatient Physical Therapy to address the deficits stated above and in the chart below, provide patient/family education, and to maximize patient's level of independence.     Plan of care discussed with patient: Yes  Patient's spiritual, cultural and educational needs considered and patient is agreeable to the plan of care and goals as stated below:      Anticipated Barriers for therapy: transportation    Medical Necessity is demonstrated by the following  History  Co-morbidities and personal factors that may impact the plan of care Co-morbidities:   advanced age, diabetes, HTN, prior lumbar surgery, and transportation assistance required    Personal Factors:   age     moderate   Examination  Body Structures and Functions, activity limitations and participation restrictions that may impact the plan of care Body Regions:   back  lower extremities  upper extremities  trunk    Body Systems:    ROM  strength  gross coordinated movement  balance  gait  transfers  transitions  motor control  motor learning  blood pressure  skin integrity    Participation Restrictions:   driving    Activity limitations:   Learning and applying knowledge  no deficits    General Tasks and Commands  no deficits    Communication  no deficits    Mobility  lifting and carrying objects  fine hand use (grasping/picking up)  walking  driving (bike, car, motorcycle)    Self care  washing oneself (bathing, drying, washing hands)  dressing    Domestic Life  cooking  doing house work (cleaning house, washing dishes, laundry)    Interactions/Relationships  no deficits    Life Areas  no deficits    Community and Social Life  no deficits         moderate   Clinical Presentation evolving clinical presentation with changing clinical characteristics moderate   Decision Making/ Complexity Score: moderate     Goals:  Short Term Goals: 5 weeks   Pt will improve left LE ankle plantarflexion MMT scores to 4/5 for improved stability with stance and functional mobility.   Pt will improve right LE hip flexion MMT scores to 4/5 for improved stability with stance and functional mobility.   Pt will improve left LE hip abduction MMT scores to 4/5 for improved stability with stance and functional mobility.   Pt will improve 30s chair rise score to at least 5 reps with \UE assist for improved muscular endurance.   Pt  will improve FOTO limitation score to </= 78% for improved self perception of functional mobility.  Pt will improve activity tolerance by standing for >5 min with no rest breaks.  Pt will improve sit<>stand performance to SBA or less.  Pt will improve transfer performance to CGA assist or less.  Pt will be able to ambulate 20 feet with rolling walker and CGA + w/c follow without loss of balance or standing rest break for increased independence in the home with mobility.     Long Term Goals: 10 weeks   Pt will improve bilaterally LE knee extension MMT scores to 5/5 for improved stability with stance and functional mobility.   Pt will improve bilateral lower extremity hip abduction MMT scores to 4+/5 for improved stability with stance and functional mobility.   Pt will improve bilateral LE hip extension MMT scores to 4/5 for improved stability with stance and functional mobility.   Pt will improve 30s chair rise score to at least 5 reps with \UE assist for improved muscular endurance.   Pt will improve FOTO limitation score to </= 72% for improved self perception of functional mobility.  Pt will improve activity tolerance by participating in gait training for >/= 15 min with minimal rest breaks.  Pt will improve sit<>stand performance to mod I.  Pt will improve transfer performance to CGA assist or less.  Pt will be able to ambulate 50 feet with rolling walker and CGA without loss of balance or standing rest break for increased independence in the home with mobility.     Plan   Plan of care Certification: 4/11/2023 to 6/30/2023.    Outpatient Physical Therapy 2 times weekly for 10 weeks to include the following interventions: Electrical Stimulation  , Gait Training, Manual Therapy, Moist Heat/ Ice, Neuromuscular Re-ed, Orthotic Management and Training, Patient Education, Therapeutic Activities, and Therapeutic Exercise.     Shanon Zeng, PT

## 2023-04-26 ENCOUNTER — CLINICAL SUPPORT (OUTPATIENT)
Dept: REHABILITATION | Facility: HOSPITAL | Age: 83
End: 2023-04-26
Payer: MEDICARE

## 2023-04-26 DIAGNOSIS — R29.898 DECREASED STRENGTH OF LOWER EXTREMITY: ICD-10-CM

## 2023-04-26 DIAGNOSIS — Z74.09 IMPAIRED FUNCTIONAL MOBILITY, BALANCE, GAIT, AND ENDURANCE: Primary | ICD-10-CM

## 2023-04-26 PROCEDURE — 97116 GAIT TRAINING THERAPY: CPT | Mod: PO

## 2023-04-26 PROCEDURE — 97110 THERAPEUTIC EXERCISES: CPT | Mod: PO

## 2023-04-26 NOTE — PROGRESS NOTES
[facility]   Physical Therapy Treatment Note      Name: Frandy Mccarthy Jr.  Clinic Number: [id number]    Therapy Diagnosis:   Encounter Diagnoses   Name Primary?    Impaired functional mobility, balance, gait, and endurance Yes    Decreased strength of lower extremity      Physician: Daphnie Dalton MD    Visit Date: 4/26/2023    Physician Orders: PT Eval and Treat   Medical Diagnosis from Referral: Z96.89 (ICD-10-CM) - S/P insertion of spinal cord stimulator   Evaluation Date: 4/11/2023  Progress Note Due: 5/11/2023  Authorization Period Expiration: 5/2/2024  Plan of Care Expiration: 6/30/2023  Visit # / Visits authorized: 1/ 11 (+eval)  PTA Visit #: 0/5       Precautions: Standard, Diabetes, Fall, and spinal cord stimulator    Time In: 1253  Time Out: 1346  Total Billable Time: 53 minutes    Subjective     Pt reports: Reporting low back pain (chronic) today. Walking at home about 20 feet at a time with Rolling Walker (RW).   He was compliant with home exercise program.  Response to previous treatment: no adverse reaction  Functional change: ongoing - see goals    Pain: unrated   Location:  aching pain - worst during transitions (sit<>supine)     Objective      Objective Measures updated at progress report unless specified.     Treatment     Frandy received the treatments listed below:     Frandy received therapeutic exercises to develop strength, endurance, and core stabilization for 43 minutes including:  - seated hip abduction with green theraband, 3x10  - seated hip flexion with 1 sec hold, green theraband, 3x10  - seated hip adduction with ball squeeze, 3s holds 3x10    - sit<>stands to ballet bar - heavy upper extremity reliance  - static standing trials 30s unsupported - intermittent touch down on bar  -squats x 10 over chair    (After gait training)  Seated abdominal facilitation with anterior lean holding red 2.2# ball in left hand reaching 6-8 inches forward  Seated mini  oblique twists - very limited movement - more focused on activation x 10   LAQ 3# ankle weights 3 x 10     Patient participated in gait training activities to normalize gait pattern for 10 minutes. The following activities were included:    Gait belt used for safety. Assistive device: Rolling Walker (RW)   Power wheelchair backup with tech:  x32 feet   x26 feet   Each trial to fatigue. Verbal cues for continuous gait pattern and gaze upwards.       Patient Education and Home Exercises       Education provided: plan of care (POC)   Written Home Exercises Provided: Patient instructed to cont prior HEP. Exercises were reviewed and Frandy was able to demonstrate them prior to the end of the session.  Frandy demonstrated good  understanding of the education provided. See EMR under Patient Instructions for exercises provided during therapy sessions    Assessment     Mr. Mccarthy tolerated initial follow up session well. He reported compliance with home exercise program and only had one question about the hip adduction move which was answered. He has very forward flexed posture in standing and sitting with cervical flexion needing frequent cues to correct. He is able to partially correct volitionally. He was able to walk up to 32 feet continuously before reporting he had to sit due to lower extremity fatigue. He walked just over 50 feet combined for the session. He would benefit from progressive endurance training to increase functional gait. His core strength and stability is significantly deminished which is affecting posture and balance and would benefit from further core strengthening. Continue established plan of care (POC).     Frandy Is progressing well towards his goals.   Pt prognosis is Good.     Pt will continue to benefit from skilled outpatient physical therapy to address the deficits listed in the problem list box on initial evaluation, provide pt/family education and to maximize pt's level of independence in  the home and community environment.     Pt's spiritual, cultural and educational needs considered and pt agreeable to plan of care and goals.     Anticipated barriers to physical therapy: transportation     Goals: Goals:  Short Term Goals: 5 weeks   Pt will improve left LE ankle plantarflexion MMT scores to 4/5 for improved stability with stance and functional mobility. ONGOING  Pt will improve right LE hip flexion MMT scores to 4/5 for improved stability with stance and functional mobility. ONGOING  Pt will improve left LE hip abduction MMT scores to 4/5 for improved stability with stance and functional mobility. ONGOING  Pt will improve 30s chair rise score to at least 5 reps with \UE assist for improved muscular endurance. ONGOING  Pt will improve FOTO limitation score to </= 78% for improved self perception of functional mobility.ONGOING  Pt will improve activity tolerance by standing for >5 min with no rest breaks.ONGOING  Pt will improve sit<>stand performance to SBA or less.ONGOING  Pt will improve transfer performance to CGA assist or less.ONGOING  Pt will be able to ambulate 20 feet with rolling walker and CGA + w/c follow without loss of balance or standing rest break for increased independence in the home with mobility. ONGOING     Long Term Goals: 10 weeks   Pt will improve bilaterally LE knee extension MMT scores to 5/5 for improved stability with stance and functional mobility. ONGOING  Pt will improve bilateral lower extremity hip abduction MMT scores to 4+/5 for improved stability with stance and functional mobility. ONGOING  Pt will improve bilateral LE hip extension MMT scores to 4/5 for improved stability with stance and functional mobility. ONGOING  Pt will improve 30s chair rise score to at least 5 reps with \UE assist for improved muscular endurance.  ONGOING  Pt will improve FOTO limitation score to </= 72% for improved self perception of functional mobility. ONGOING  Pt will improve activity  tolerance by participating in gait training for >/= 15 min with minimal rest breaks. ONGOING  Pt will improve sit<>stand performance to mod I. ONGOING  Pt will improve transfer performance to CGA assist or less. ONGOING  Pt will be able to ambulate 50 feet with rolling walker and CGA without loss of balance or standing rest break for increased independence in the home with mobility. ONGOING    Plan     Continue plan of care (POC). Progress gait and endurance as tolerated.     Kenya Thorne, PT

## 2023-04-28 ENCOUNTER — PATIENT MESSAGE (OUTPATIENT)
Dept: NEUROSURGERY | Facility: CLINIC | Age: 83
End: 2023-04-28
Payer: MEDICARE

## 2023-05-01 ENCOUNTER — CLINICAL SUPPORT (OUTPATIENT)
Dept: REHABILITATION | Facility: HOSPITAL | Age: 83
End: 2023-05-01
Payer: MEDICARE

## 2023-05-01 DIAGNOSIS — Z74.09 IMPAIRED FUNCTIONAL MOBILITY, BALANCE, GAIT, AND ENDURANCE: Primary | ICD-10-CM

## 2023-05-01 DIAGNOSIS — R29.898 DECREASED STRENGTH OF LOWER EXTREMITY: ICD-10-CM

## 2023-05-01 PROCEDURE — 97116 GAIT TRAINING THERAPY: CPT | Mod: PO

## 2023-05-01 PROCEDURE — 97530 THERAPEUTIC ACTIVITIES: CPT | Mod: PO

## 2023-05-01 PROCEDURE — 97110 THERAPEUTIC EXERCISES: CPT | Mod: PO

## 2023-05-01 NOTE — PROGRESS NOTES
"  [facility]   Physical Therapy Treatment Note      Name: Frandy Mccarthy Jr.  Clinic Number: [id number]    Therapy Diagnosis:   Encounter Diagnoses   Name Primary?    Impaired functional mobility, balance, gait, and endurance Yes    Decreased strength of lower extremity        Physician: Daphnie Dalton MD    Visit Date: 5/1/2023    Physician Orders: PT Eval and Treat   Medical Diagnosis from Referral: Z96.89 (ICD-10-CM) - S/P insertion of spinal cord stimulator   Evaluation Date: 4/11/2023  Progress Note Due: 5/11/2023  Authorization Period Expiration: 5/2/2024  Plan of Care Expiration: 6/30/2023  Visit # / Visits authorized: 2/ 11 (+eval)  PTA Visit #: 0/5       Precautions: Standard, Diabetes, Fall, and spinal cord stimulator    Time In: 10:05  Time Out: 11:00  Total Billable Time: 55 minutes    Subjective     Pt reports: he is not feeling as great today with his back. He went to the doctor and got imaging done and his back was hurting from that. 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  Response to previous treatment: no adverse reaction  Functional change: ongoing - see goals    Pain: 3/10  Location:  low back    Objective      Objective Measures updated at progress report unless specified.     Treatment     Frandy received the treatments listed below:      Frandy received therapeutic exercises to develop strength, endurance, and core stabilization for 12 minutes including:  - Supine LTR, 10x total  - Supine hip flexor stretch with lower extremity off side of mat, x2 minutes/side  - Seated hip abduction with green theraband, 3x10  - Seated resisted hip flexion with green theraband, 3x10    Patient participated in gait training activities to normalize gait pattern for 20 minutes. The following activities were included:      Gait belt used for safety. Assistive device: Rolling Walker (RW)   Power wheelchair follow for " safety:  Trial 1: 12 ft  Trial 2: 10 ft  Trial 3: 18 ft  Each trial to fatigue. Verbal cues for continuous upright trunk positioning.     Patient participated in therapeutic activities to improve functional performance for 23 minutes:  - Patient t/f'ed via stand pivot PWC <> gym mat with CGA-min A using rolling walker   - Sit EOM <> supine on mat with supervision  - Sit <> stand from PWC prior to each gait trial using rolling walker     Patient worked on rolling towards the left/right side-lying from supine. PT providing VC on technique including pushing through leg and reaching across midline at same time to initiate roll. Patient performed roll to side-lying 3x on each side. All 3x to the left were with supervision. Patient required min A initially when rolling towards right side but last attempt was able to complete with CGA-SBA.      Patient moved sit <> stand 5x from slightly raised gym mat using rolling walker. Patient focusing on achieving anterior trunk lean prior to rising to stand and using 1 upper extremity to push-off from mat surface while other was on rolling walker. PT providing CGA-min A and slight tactile cueing to encourage further anterior trunk lean.     Patient Education and Home Exercises       Education provided: plan of care (POC)   Written Home Exercises Provided: Patient instructed to cont prior HEP. Exercises were reviewed and Frandy was able to demonstrate them prior to the end of the session.  Frandy demonstrated good  understanding of the education provided. See EMR under Patient Instructions for exercises provided during therapy sessions    Assessment     Mr. Mccarthy was limited in today's session by low back pain but still participated well, providing best effort. Even with back pain, patient ambulated a total of 40 ft over 3 gait trials, demonstrating slightly improved upright trunk positioning when cued but overall with a tendency towards flexion. When rising to stand, patient had  difficulty achieving anterior trunk lean for weight shift forward into feet leading to trouble maintaining balance once standing. He does have tightness in bilateral hip flexors which further limits standing posture and efficiency with gait. He however is making slow but steady gains and remains a good candidate for outpatient neuro PT.     Frandy Is progressing well towards his goals.   Pt prognosis is Good.     Pt will continue to benefit from skilled outpatient physical therapy to address the deficits listed in the problem list box on initial evaluation, provide pt/family education and to maximize pt's level of independence in the home and community environment.     Pt's spiritual, cultural and educational needs considered and pt agreeable to plan of care and goals.     Anticipated barriers to physical therapy: transportation     Goals: Goals:  Short Term Goals: 5 weeks   Pt will improve left LE ankle plantarflexion MMT scores to 4/5 for improved stability with stance and functional mobility. ONGOING  Pt will improve right LE hip flexion MMT scores to 4/5 for improved stability with stance and functional mobility. ONGOING  Pt will improve left LE hip abduction MMT scores to 4/5 for improved stability with stance and functional mobility. ONGOING  Pt will improve 30s chair rise score to at least 5 reps with \UE assist for improved muscular endurance. ONGOING  Pt will improve FOTO limitation score to </= 78% for improved self perception of functional mobility.ONGOING  Pt will improve activity tolerance by standing for >5 min with no rest breaks.ONGOING  Pt will improve sit<>stand performance to SBA or less.ONGOING  Pt will improve transfer performance to CGA assist or less.ONGOING  Pt will be able to ambulate 20 feet with rolling walker and CGA + w/c follow without loss of balance or standing rest break for increased independence in the home with mobility. ONGOING     Long Term Goals: 10 weeks   Pt will improve  bilaterally LE knee extension MMT scores to 5/5 for improved stability with stance and functional mobility. ONGOING  Pt will improve bilateral lower extremity hip abduction MMT scores to 4+/5 for improved stability with stance and functional mobility. ONGOING  Pt will improve bilateral LE hip extension MMT scores to 4/5 for improved stability with stance and functional mobility. ONGOING  Pt will improve 30s chair rise score to at least 5 reps with \UE assist for improved muscular endurance.  ONGOING  Pt will improve FOTO limitation score to </= 72% for improved self perception of functional mobility. ONGOING  Pt will improve activity tolerance by participating in gait training for >/= 15 min with minimal rest breaks. ONGOING  Pt will improve sit<>stand performance to mod I. ONGOING  Pt will improve transfer performance to CGA assist or less. ONGOING  Pt will be able to ambulate 50 feet with rolling walker and CGA without loss of balance or standing rest break for increased independence in the home with mobility. ONGOING    Plan     Continue PT plan of care with a focus on gait training and endurance as tolerated.     Shanon Zeng, PT

## 2023-05-02 NOTE — PROGRESS NOTES
[facility]   Physical Therapy Treatment Note      Name: Frandy Mccarthy Jr.  Clinic Number: [id number]    Therapy Diagnosis:   Encounter Diagnoses   Name Primary?    Impaired functional mobility, balance, gait, and endurance Yes    Decreased strength of lower extremity          Physician: Daphnie Dalton MD    Visit Date: 5/3/2023    Physician Orders: PT Eval and Treat   Medical Diagnosis from Referral: Z96.89 (ICD-10-CM) - S/P insertion of spinal cord stimulator   Evaluation Date: 4/11/2023  Progress Note Due: 5/11/2023  Authorization Period Expiration: 5/2/2024  Plan of Care Expiration: 6/30/2023  Visit # / Visits authorized: 3/ 11 (+eval)  PTA Visit #: 0/5       Precautions: Standard, Diabetes, Fall, and spinal cord stimulator    Time In: 10:30  Time Out: 11:15  Total Billable Time: 45 minutes    Subjective     Pt reports: that he feels fine and has no new complaints   He was compliant with home exercise program.  Response to previous treatment: no adverse reaction  Functional change: ongoing - see goals    Pain: 3/10  Location:  low back    Objective      Objective Measures updated at progress report unless specified.     Treatment     Frandy received the treatments listed below:      Frandy received therapeutic exercises to develop strength, endurance, and core stabilization for 15 minutes including:  - Supine LTR, 10x total  - Supine hip flexor stretch with lower extremity off side of mat, x2 minutes/side  - supine bridge 2 x 10 reps with minimal assist to maintain foot placement on mat   - 10 reps sit to stands with verbal cues for upright posture, tucking feet and anterior trunk lean/ weight shifting when standing     Patient participated in gait training activities to normalize gait pattern for 20 minutes. The following activities were included:      CGA, Gait belt used for safety. Assistive device: Rolling Walker (RW)   Power wheelchair follow for safety:  Trial 1: 22 ft  Trial  2: 17 ft  Trial 3: 30 ft  Each trial to fatigue. Verbal cues for continuous upright trunk positioning.     Patient participated in therapeutic activities to improve functional performance for 10  minutes:  - Patient t/f'ed via stand pivot PWC <> gym mat with CGA-min A using rolling walker   - Sit EOM <> supine on mat with supervision  - Sit <> stand from PW prior to each gait trial using rolling walker     Patient Education and Home Exercises       Education provided: plan of care (POC)   Written Home Exercises Provided: Patient instructed to cont prior HEP. Exercises were reviewed and Frandy was able to demonstrate them prior to the end of the session.  Frandy demonstrated good  understanding of the education provided. See EMR under Patient Instructions for exercises provided during therapy sessions    Assessment     Mr. Mccarthy tolerated session very well today and was very motivated throughout session. Sit to stand's focused primarily on anterior trunk lean and posture. Gait trials performed to fatigue with improved endurance noted compared to previous session. The patient will benefit from continued physical therapy intervention to address remaining functional mobility deficits.       Frandy Is progressing well towards his goals.   Pt prognosis is Good.     Pt will continue to benefit from skilled outpatient physical therapy to address the deficits listed in the problem list box on initial evaluation, provide pt/family education and to maximize pt's level of independence in the home and community environment.     Pt's spiritual, cultural and educational needs considered and pt agreeable to plan of care and goals.     Anticipated barriers to physical therapy: transportation     Goals: Goals:  Short Term Goals: 5 weeks   Pt will improve left LE ankle plantarflexion MMT scores to 4/5 for improved stability with stance and functional mobility. ONGOING  Pt will improve right LE hip flexion MMT scores to 4/5 for  improved stability with stance and functional mobility. ONGOING  Pt will improve left LE hip abduction MMT scores to 4/5 for improved stability with stance and functional mobility. ONGOING  Pt will improve 30s chair rise score to at least 5 reps with \UE assist for improved muscular endurance. ONGOING  Pt will improve FOTO limitation score to </= 78% for improved self perception of functional mobility.ONGOING  Pt will improve activity tolerance by standing for >5 min with no rest breaks.ONGOING  Pt will improve sit<>stand performance to SBA or less.ONGOING  Pt will improve transfer performance to CGA assist or less.ONGOING  Pt will be able to ambulate 20 feet with rolling walker and CGA + w/c follow without loss of balance or standing rest break for increased independence in the home with mobility. ONGOING     Long Term Goals: 10 weeks   Pt will improve bilaterally LE knee extension MMT scores to 5/5 for improved stability with stance and functional mobility. ONGOING  Pt will improve bilateral lower extremity hip abduction MMT scores to 4+/5 for improved stability with stance and functional mobility. ONGOING  Pt will improve bilateral LE hip extension MMT scores to 4/5 for improved stability with stance and functional mobility. ONGOING  Pt will improve 30s chair rise score to at least 5 reps with \UE assist for improved muscular endurance.  ONGOING  Pt will improve FOTO limitation score to </= 72% for improved self perception of functional mobility. ONGOING  Pt will improve activity tolerance by participating in gait training for >/= 15 min with minimal rest breaks. ONGOING  Pt will improve sit<>stand performance to mod I. ONGOING  Pt will improve transfer performance to CGA assist or less. ONGOING  Pt will be able to ambulate 50 feet with rolling walker and CGA without loss of balance or standing rest break for increased independence in the home with mobility. ONGOING    Plan     Continue PT plan of care with a  focus on gait training and endurance as tolerated.     Sally Virgen, PT

## 2023-05-03 ENCOUNTER — CLINICAL SUPPORT (OUTPATIENT)
Dept: REHABILITATION | Facility: HOSPITAL | Age: 83
End: 2023-05-03
Payer: MEDICARE

## 2023-05-03 DIAGNOSIS — R29.898 DECREASED STRENGTH OF LOWER EXTREMITY: ICD-10-CM

## 2023-05-03 DIAGNOSIS — Z74.09 IMPAIRED FUNCTIONAL MOBILITY, BALANCE, GAIT, AND ENDURANCE: Primary | ICD-10-CM

## 2023-05-03 PROCEDURE — 97110 THERAPEUTIC EXERCISES: CPT | Mod: PO

## 2023-05-03 PROCEDURE — 97530 THERAPEUTIC ACTIVITIES: CPT | Mod: PO

## 2023-05-03 PROCEDURE — 97116 GAIT TRAINING THERAPY: CPT | Mod: PO

## 2023-05-11 ENCOUNTER — CLINICAL SUPPORT (OUTPATIENT)
Dept: REHABILITATION | Facility: HOSPITAL | Age: 83
End: 2023-05-11
Payer: MEDICARE

## 2023-05-11 DIAGNOSIS — R29.898 DECREASED STRENGTH OF LOWER EXTREMITY: ICD-10-CM

## 2023-05-11 DIAGNOSIS — Z74.09 IMPAIRED FUNCTIONAL MOBILITY, BALANCE, GAIT, AND ENDURANCE: Primary | ICD-10-CM

## 2023-05-11 PROCEDURE — 97110 THERAPEUTIC EXERCISES: CPT | Mod: PO

## 2023-05-11 PROCEDURE — 97530 THERAPEUTIC ACTIVITIES: CPT | Mod: PO

## 2023-05-11 PROCEDURE — 97116 GAIT TRAINING THERAPY: CPT | Mod: PO

## 2023-05-11 NOTE — PROGRESS NOTES
" [facility]   Physical Therapy Treatment Note / Re-Assessment Note     Name: Frandy Mccarthy Jr.  Clinic Number: [id number]    Therapy Diagnosis:   Encounter Diagnoses   Name Primary?    Impaired functional mobility, balance, gait, and endurance Yes    Decreased strength of lower extremity        Physician: Daphnie Dalton MD    Visit Date: 5/11/2023    Physician Orders: PT Eval and Treat   Medical Diagnosis from Referral: Z96.89 (ICD-10-CM) - S/P insertion of spinal cord stimulator   Evaluation Date: 4/11/2023  Progress Note Due: 6/11/2023  Authorization Period Expiration: 5/2/2024  Plan of Care Expiration: 6/30/2023  Visit # / Visits authorized: 4/ 11 (+eval)  PTA Visit #: 0/5       Precautions: Standard, Diabetes, Fall, and spinal cord stimulator    Time In: 11:00  Time Out: 11:47  Total Billable Time: 47 minutes    Subjective     Pt reports: "I'm feeling pretty good today."   He was compliant with home exercise program.  Response to previous treatment: no adverse reaction  Functional change: ongoing - see goals    Pain: 3/10  Location:  low back    Objective      Objective Measures updated at progress report unless specified.     Lower Extremity Strength  Right LE   Left LE     Hip Flexion: 4/5 Hip Flexion: 4/5   Hip Extension:  3+/5 Hip Extension: 3+/5   Hip Abduction: 4-/5 Hip Abduction: 4-/5   Hip Adduction: 3/5 Hip Adduction 3/5   Knee Extension: 5/5 Knee Extension: 5/5   Knee Flexion: 4+/5 Knee Flexion: 4-/5   Ankle Dorsiflexion: 3-/5 Ankle Dorsiflexion: 4/5   Ankle Plantarflexion: 4/5 Ankle Plantarflexion: 3-/5           Evaluation 04/11/2023 Progress Note 5/11/23   30 second Chair Rise    3 completed with arms from PWC using rolling walker and upper extremity push-off, PT providing CGA    4 completed with arms from standard height mat using rolling walker, PT providing SBA-CGA        Evaluation 04/11/2023 Progress Note 5/11/23   Timed Up and Go  (< 20 sec safe for independent " transfers, < 30 sec safe for dependent transfers/assist required) Unable to test 2/2 patient unable to walk full distance NOT TESTED   Self Selected Walking Speed Unable to test 2/2 patient unable to walk full distance 0.17 m/s (6 m/36sec) using rolling walker with PT providing SBA-CGA + w/c follow   Fast Walking Speed Unable to test 2/2 patient unable to walk full distance NOT TESTED         Functional Mobility (Bed mobility, transfers)  Bed mobility: Mod I  Supine to sit: Mod I  Sit to supine: Mod I  Sit to stand:  CGA with RW  Stand pivot: CGA with RW  Transfers to bed: scooting mod I      CMS Impairment/Limitation/Restriction for FOTO Lumbar Spine Survey     Therapist reviewed FOTO scores for Frandy Mccarthy  on 5/11/2023.   FOTO documents entered into [employer] - see Media section.     Current Limitation Score: 40%  Eval Limitation Score: 84%             Treatment     Frandy received the treatments listed below:      Frandy received therapeutic exercises to develop strength, endurance, and core stabilization for 10 minutes including:  - Supine LTR, 10x total  - Supine glute bridging, 3x5 - pt reports pain in low back at a 6/10 during exercise   - SciFit stepper while seated in PWC @ Level 1, went to fatigue (x4 minutes)    Patient participated in gait training activities to normalize gait pattern for 20 minutes. The following activities were included:      CGA, Gait belt used for safety. Assistive device: Rolling Walker (RW)   Power wheelchair follow for safety:  Trial 1: 28 ft  Trial 2: 44 ft  Trial 3: 55 ft  Trial 4: 47 ft  Each trial to fatigue. Verbal cues for continuous upright trunk positioning.     Patient participated in therapeutic activities to improve functional performance for 17 minutes:  - Patient t/f'ed via stand pivot PWC > gym mat with CGA using rolling walker   - Patient t/f'ed via scooting gym mat > PWC with supervision  - Sit EOM <> supine on mat mod I   - Sit <> stand from PW prior to each  gait trial using rolling walker with SBA-CGA    Time above includes objective testing.     Patient Education and Home Exercises       Education provided: plan of care (POC)   Written Home Exercises Provided: Patient instructed to cont prior HEP. Exercises were reviewed and Frandy was able to demonstrate them prior to the end of the session.  Frandy demonstrated good  understanding of the education provided. See EMR under Patient Instructions for exercises provided during therapy sessions    Assessment     Mr. Mccarthy demonstrates significant progress since initial evaluation, with mild increases in his leg strength and endurance which have led to improvements in functional mobility independence. He also is able to ambulate further distances, a total of >150 ft which is a drastic improvement. Based on his self-selected gait speed, he is placed a household ambulator category and is at a greater risk for falls. While he still requires external assist for transfers at this time, he has a self-perceived improvement in his QOL. He remains an excellent outpatient neuro PT candidate.     Frandy Is progressing well towards his goals.   Pt prognosis is Good.     Pt will continue to benefit from skilled outpatient physical therapy to address the deficits listed in the problem list box on initial evaluation, provide pt/family education and to maximize pt's level of independence in the home and community environment.     Pt's spiritual, cultural and educational needs considered and pt agreeable to plan of care and goals.     Anticipated barriers to physical therapy: transportation     Goals: Goals:  Short Term Goals: 5 weeks   Pt will improve left LE ankle plantarflexion MMT scores to 4/5 for improved stability with stance and functional mobility. ONGOING  Pt will improve right LE hip flexion MMT scores to 4/5 for improved stability with stance and functional mobility. MET 5/11/23  Pt will improve left LE hip abduction MMT  scores to 4/5 for improved stability with stance and functional mobility. ONGOING  Pt will improve 30s chair rise score to at least 5 reps with UE assist for improved muscular endurance. ONGOING  Pt will improve FOTO limitation score to </= 78% for improved self perception of functional mobility. MET 5/11/23  Pt will improve activity tolerance by standing for >5 min with no rest breaks.ONGOING  Pt will improve sit<>stand performance to SBA or less.ONGOING  Pt will improve transfer performance to CGA assist or less. MET 5/11/23  Pt will be able to ambulate 20 feet with rolling walker and CGA + w/c follow without loss of balance or standing rest break for increased independence in the home with mobility. MET 5/11/23     Long Term Goals: 10 weeks   Pt will improve bilaterally LE knee extension MMT scores to 5/5 for improved stability with stance and functional mobility. MET 5/11/23  Pt will improve bilateral lower extremity hip abduction MMT scores to 4+/5 for improved stability with stance and functional mobility. ONGOING  Pt will improve bilateral LE hip extension MMT scores to 4/5 for improved stability with stance and functional mobility. ONGOING  Pt will improve 30s chair rise score to at least 8 reps with UE assist for improved muscular endurance.  Updated 5/11/23  Pt will improve FOTO limitation score to </= 72% for improved self perception of functional mobility. MET 5/11/23  Pt will improve activity tolerance by participating in gait training for >/= 15 min with minimal rest breaks. MET 5/11/23  Pt will improve sit<>stand performance to mod I. ONGOING  Pt will improve transfer performance to supervision assist or less. Updated 5/11/23  Pt will be able to ambulate 150 feet with rolling walker and SBA without loss of balance or standing rest break for increased independence in the home with mobility. Updated 5/11/23    Plan     Continue PT plan of care with a focus on gait training and endurance as tolerated.      Shanon Zeng, PT

## 2023-05-16 ENCOUNTER — CLINICAL SUPPORT (OUTPATIENT)
Dept: REHABILITATION | Facility: HOSPITAL | Age: 83
End: 2023-05-16
Payer: MEDICARE

## 2023-05-16 DIAGNOSIS — R29.898 DECREASED STRENGTH OF LOWER EXTREMITY: ICD-10-CM

## 2023-05-16 DIAGNOSIS — Z74.09 IMPAIRED FUNCTIONAL MOBILITY, BALANCE, GAIT, AND ENDURANCE: Primary | ICD-10-CM

## 2023-05-16 PROCEDURE — 97530 THERAPEUTIC ACTIVITIES: CPT | Mod: PO

## 2023-05-16 PROCEDURE — 97110 THERAPEUTIC EXERCISES: CPT | Mod: PO

## 2023-05-16 PROCEDURE — 97116 GAIT TRAINING THERAPY: CPT | Mod: PO

## 2023-05-16 NOTE — PROGRESS NOTES
"  [facility]   Physical Therapy Treatment Note  Note     Name: Frandy Mccarthy Jr.  Clinic Number: [id number]    Therapy Diagnosis:   Encounter Diagnoses   Name Primary?    Impaired functional mobility, balance, gait, and endurance Yes    Decreased strength of lower extremity          Physician: Daphnie Dalton MD    Visit Date: 5/16/2023    Physician Orders: PT Eval and Treat   Medical Diagnosis from Referral: Z96.89 (ICD-10-CM) - S/P insertion of spinal cord stimulator   Evaluation Date: 4/11/2023  Progress Note Due: 6/11/2023  Authorization Period Expiration: 5/2/2024  Plan of Care Expiration: 6/30/2023  Visit # / Visits authorized: 5/ 11 (+eval)  PTA Visit #: 0/5       Precautions: Standard, Diabetes, Fall, and spinal cord stimulator    Time In: 1035  Time Out: 11:15  Total Billable Time: 40 minutes    Subjective     Pt reports: "I feel fine, we couldn't find a parking spot"   He was compliant with home exercise program.  Response to previous treatment: no adverse reaction  Functional change: ongoing - see goals    Pain: 3/10  Location:  low back    Objective      Objective Measures updated at progress report unless specified.       Treatment     Frandy received the treatments listed below:      Frandy received therapeutic exercises to develop strength, endurance, and core stabilization for 10 minutes including:  - Supine LTR, 10x total  - Supine glute bridging, 2 x10 reps   - 10 reps sit to stands with verbal cues for upright posture, tucking feet and anterior trunk lean/ weight shifting when standing     Patient participated in gait training activities to normalize gait pattern for 20 minutes. The following activities were included:      CGA, Gait belt used for safety. Assistive device: Rolling Walker (RW)   Power wheelchair follow for safety:  Trial 1: 26 ft  Trial 2: 79 ft  Trial 3: 24 ft  Trial 4: 35 ft  Each trial to fatigue. Verbal cues for continuous upright trunk positioning. "     Patient participated in therapeutic activities to improve functional performance for 10 minutes:  - Patient t/f'ed via stand pivot PWC > gym mat with CGA using rolling walker   - Patient t/f'ed via scooting gym mat > PWC with supervision  - Sit EOM <> supine on mat mod I   - Sit <> stand from PWC prior to each gait trial using rolling walker with SBA-CGA      Patient Education and Home Exercises       Education provided: plan of care (POC)   Written Home Exercises Provided: Patient instructed to cont prior HEP. Exercises were reviewed and Frandy was able to demonstrate them prior to the end of the session.  Frandy demonstrated good  understanding of the education provided. See EMR under Patient Instructions for exercises provided during therapy sessions    Assessment     Mr. Mccarthy tolerated today's session very well this afternoon. Improved endurance noted today with ambulation. Improved gait mechanics noted with ambulation as well. Patient requires less frequent verbal cues when performing sit to stand transfer. The patient will benefit from continued physical therapy intervention to address remaining functional mobility deficits.     Frandy Is progressing well towards his goals.   Pt prognosis is Good.     Pt will continue to benefit from skilled outpatient physical therapy to address the deficits listed in the problem list box on initial evaluation, provide pt/family education and to maximize pt's level of independence in the home and community environment.     Pt's spiritual, cultural and educational needs considered and pt agreeable to plan of care and goals.     Anticipated barriers to physical therapy: transportation     Goals: Goals:  Short Term Goals: 5 weeks   Pt will improve left LE ankle plantarflexion MMT scores to 4/5 for improved stability with stance and functional mobility. ONGOING  Pt will improve right LE hip flexion MMT scores to 4/5 for improved stability with stance and functional  mobility. MET 5/11/23  Pt will improve left LE hip abduction MMT scores to 4/5 for improved stability with stance and functional mobility. ONGOING  Pt will improve 30s chair rise score to at least 5 reps with UE assist for improved muscular endurance. ONGOING  Pt will improve FOTO limitation score to </= 78% for improved self perception of functional mobility. MET 5/11/23  Pt will improve activity tolerance by standing for >5 min with no rest breaks.ONGOING  Pt will improve sit<>stand performance to SBA or less.ONGOING  Pt will improve transfer performance to CGA assist or less. MET 5/11/23  Pt will be able to ambulate 20 feet with rolling walker and CGA + w/c follow without loss of balance or standing rest break for increased independence in the home with mobility. MET 5/11/23     Long Term Goals: 10 weeks   Pt will improve bilaterally LE knee extension MMT scores to 5/5 for improved stability with stance and functional mobility. MET 5/11/23  Pt will improve bilateral lower extremity hip abduction MMT scores to 4+/5 for improved stability with stance and functional mobility. ONGOING  Pt will improve bilateral LE hip extension MMT scores to 4/5 for improved stability with stance and functional mobility. ONGOING  Pt will improve 30s chair rise score to at least 8 reps with UE assist for improved muscular endurance.  Updated 5/11/23  Pt will improve FOTO limitation score to </= 72% for improved self perception of functional mobility. MET 5/11/23  Pt will improve activity tolerance by participating in gait training for >/= 15 min with minimal rest breaks. MET 5/11/23  Pt will improve sit<>stand performance to mod I. ONGOING  Pt will improve transfer performance to supervision assist or less. Updated 5/11/23  Pt will be able to ambulate 150 feet with rolling walker and SBA without loss of balance or standing rest break for increased independence in the home with mobility. Updated 5/11/23    Plan     Continue PT plan of  care with a focus on gait training and endurance as tolerated.     Sally Virgen, PT

## 2023-05-25 ENCOUNTER — CLINICAL SUPPORT (OUTPATIENT)
Dept: REHABILITATION | Facility: HOSPITAL | Age: 83
End: 2023-05-25
Payer: MEDICARE

## 2023-05-25 DIAGNOSIS — R29.898 DECREASED STRENGTH OF LOWER EXTREMITY: ICD-10-CM

## 2023-05-25 DIAGNOSIS — Z74.09 IMPAIRED FUNCTIONAL MOBILITY, BALANCE, GAIT, AND ENDURANCE: Primary | ICD-10-CM

## 2023-05-25 PROCEDURE — 97116 GAIT TRAINING THERAPY: CPT | Mod: PO

## 2023-05-25 PROCEDURE — 97110 THERAPEUTIC EXERCISES: CPT | Mod: PO

## 2023-05-25 PROCEDURE — 97530 THERAPEUTIC ACTIVITIES: CPT | Mod: PO

## 2023-05-25 NOTE — PROGRESS NOTES
[facility]   Physical Therapy Treatment Note  Note     Name: Frandy Mccarthy Jr.  Clinic Number: [id number]    Therapy Diagnosis:   Encounter Diagnoses   Name Primary?    Impaired functional mobility, balance, gait, and endurance Yes    Decreased strength of lower extremity        Physician: Daphnie Dalton MD    Visit Date: 5/25/2023    Physician Orders: PT Eval and Treat   Medical Diagnosis from Referral: Z96.89 (ICD-10-CM) - S/P insertion of spinal cord stimulator   Evaluation Date: 4/11/2023  Progress Note Due: 6/11/2023  Authorization Period Expiration: 5/2/2024  Plan of Care Expiration: 6/30/2023  Visit # / Visits authorized: 6/ 11 (+eval)  PTA Visit #: 0/5       Precautions: Standard, Diabetes, Fall, and spinal cord stimulator    Time In: 11:45  Time Out: 12:27  Total Billable Time: 42 minutes    Subjective     Pt reports: he is feeling pretty bad today. His back hurts a lot but he's going to try his best.    He was compliant with home exercise program.  Response to previous treatment: no adverse reaction  Functional change: ongoing - see goals    Pain: 9/10  Location:  low back    Objective      Objective Measures updated at progress report unless specified.       Treatment     Frandy received the treatments listed below:      Frandy received therapeutic exercises to develop strength, endurance, and core stabilization for 15 minutes including:  - Supine LTR, 10x total  - Supine glute bridging, 2 x10 reps - not performed today due to pain   - Hook-lying marches, 10x total     Patient participated in gait training activities to normalize gait pattern for 10 minutes. The following activities were included:      CGA, Gait belt used for safety. Assistive device: Rolling Walker (RW)   Power wheelchair follow for safety:  Trial 1: 34 ft  Trial 2: 37 ft  Trial 3: 13 ft  Each trial to fatigue. Verbal cues for continuous upright trunk positioning.     Patient participated in therapeutic  activities to improve functional performance for 17 minutes:  - Patient t/f'ed via stand pivot PWC > gym mat with CGA using rolling walker   - Sit EOM <> supine on mat mod I   - 2x5 reps sit to stands with verbal cues for upright posture, tucking feet and anterior trunk lean/ weight shifting when standing   - Sit <> stand from PWC prior to each gait trial using rolling walker with SBA-CGA      Patient Education and Home Exercises       Education provided: plan of care (POC)   Written Home Exercises Provided: Patient instructed to cont prior HEP. Exercises were reviewed and Frandy was able to demonstrate them prior to the end of the session.  Frandy demonstrated good  understanding of the education provided. See EMR under Patient Instructions for exercises provided during therapy sessions    Assessment     Mr. Mccarthy was limited this session by significant low back pain, requiring increased time to complete all functional mobility tasks and transfers. However, he was able to tolerate short distance gait training with improved step length. He still demonstrated a significantly flexed trunk but overall improved since initial visit. He is participating well to best of his ability and remains appropriate for outpatient neuro PT.      Frandy Is progressing well towards his goals.   Pt prognosis is Good.     Pt will continue to benefit from skilled outpatient physical therapy to address the deficits listed in the problem list box on initial evaluation, provide pt/family education and to maximize pt's level of independence in the home and community environment.     Pt's spiritual, cultural and educational needs considered and pt agreeable to plan of care and goals.     Anticipated barriers to physical therapy: transportation     Goals: Goals:  Short Term Goals: 5 weeks   Pt will improve left LE ankle plantarflexion MMT scores to 4/5 for improved stability with stance and functional mobility. ONGOING  Pt will improve right  LE hip flexion MMT scores to 4/5 for improved stability with stance and functional mobility. MET 5/11/23  Pt will improve left LE hip abduction MMT scores to 4/5 for improved stability with stance and functional mobility. ONGOING  Pt will improve 30s chair rise score to at least 5 reps with UE assist for improved muscular endurance. ONGOING  Pt will improve FOTO limitation score to </= 78% for improved self perception of functional mobility. MET 5/11/23  Pt will improve activity tolerance by standing for >5 min with no rest breaks.ONGOING  Pt will improve sit<>stand performance to SBA or less.ONGOING  Pt will improve transfer performance to CGA assist or less. MET 5/11/23  Pt will be able to ambulate 20 feet with rolling walker and CGA + w/c follow without loss of balance or standing rest break for increased independence in the home with mobility. MET 5/11/23     Long Term Goals: 10 weeks   Pt will improve bilaterally LE knee extension MMT scores to 5/5 for improved stability with stance and functional mobility. MET 5/11/23  Pt will improve bilateral lower extremity hip abduction MMT scores to 4+/5 for improved stability with stance and functional mobility. ONGOING  Pt will improve bilateral LE hip extension MMT scores to 4/5 for improved stability with stance and functional mobility. ONGOING  Pt will improve 30s chair rise score to at least 8 reps with UE assist for improved muscular endurance.  Updated 5/11/23  Pt will improve FOTO limitation score to </= 72% for improved self perception of functional mobility. MET 5/11/23  Pt will improve activity tolerance by participating in gait training for >/= 15 min with minimal rest breaks. MET 5/11/23  Pt will improve sit<>stand performance to mod I. ONGOING  Pt will improve transfer performance to supervision assist or less. Updated 5/11/23  Pt will be able to ambulate 150 feet with rolling walker and SBA without loss of balance or standing rest break for increased  independence in the home with mobility. Updated 5/11/23    Plan     Continue PT plan of care with a focus on gait training and endurance as tolerated.     Shanon Zeng, PT

## 2023-06-08 ENCOUNTER — OFFICE VISIT (OUTPATIENT)
Dept: OPTOMETRY | Facility: CLINIC | Age: 83
End: 2023-06-08
Payer: MEDICARE

## 2023-06-08 DIAGNOSIS — H52.13 MYOPIA OF BOTH EYES WITH ASTIGMATISM AND PRESBYOPIA: ICD-10-CM

## 2023-06-08 DIAGNOSIS — H52.4 MYOPIA OF BOTH EYES WITH ASTIGMATISM AND PRESBYOPIA: ICD-10-CM

## 2023-06-08 DIAGNOSIS — E11.9 DIABETES MELLITUS TYPE 2 WITHOUT RETINOPATHY: Primary | ICD-10-CM

## 2023-06-08 DIAGNOSIS — Z96.1 PSEUDOPHAKIA OF BOTH EYES: ICD-10-CM

## 2023-06-08 DIAGNOSIS — Z13.5 GLAUCOMA SCREENING: ICD-10-CM

## 2023-06-08 DIAGNOSIS — H52.203 MYOPIA OF BOTH EYES WITH ASTIGMATISM AND PRESBYOPIA: ICD-10-CM

## 2023-06-08 PROCEDURE — 99999 PR PBB SHADOW E&M-EST. PATIENT-LVL III: ICD-10-PCS | Mod: PBBFAC,,, | Performed by: OPTOMETRIST

## 2023-06-08 PROCEDURE — 92004 PR EYE EXAM, NEW PATIENT,COMPREHESV: ICD-10-PCS | Mod: S$GLB,,, | Performed by: OPTOMETRIST

## 2023-06-08 PROCEDURE — 92015 PR REFRACTION: ICD-10-PCS | Mod: S$GLB,,, | Performed by: OPTOMETRIST

## 2023-06-08 PROCEDURE — 1126F PR PAIN SEVERITY QUANTIFIED, NO PAIN PRESENT: ICD-10-PCS | Mod: CPTII,S$GLB,, | Performed by: OPTOMETRIST

## 2023-06-08 PROCEDURE — 92015 DETERMINE REFRACTIVE STATE: CPT | Mod: S$GLB,,, | Performed by: OPTOMETRIST

## 2023-06-08 PROCEDURE — 1159F MED LIST DOCD IN RCRD: CPT | Mod: CPTII,S$GLB,, | Performed by: OPTOMETRIST

## 2023-06-08 PROCEDURE — 1101F PT FALLS ASSESS-DOCD LE1/YR: CPT | Mod: CPTII,S$GLB,, | Performed by: OPTOMETRIST

## 2023-06-08 PROCEDURE — 2023F DILAT RTA XM W/O RTNOPTHY: CPT | Mod: CPTII,S$GLB,, | Performed by: OPTOMETRIST

## 2023-06-08 PROCEDURE — 1101F PR PT FALLS ASSESS DOC 0-1 FALLS W/OUT INJ PAST YR: ICD-10-PCS | Mod: CPTII,S$GLB,, | Performed by: OPTOMETRIST

## 2023-06-08 PROCEDURE — 3288F FALL RISK ASSESSMENT DOCD: CPT | Mod: CPTII,S$GLB,, | Performed by: OPTOMETRIST

## 2023-06-08 PROCEDURE — 3288F PR FALLS RISK ASSESSMENT DOCUMENTED: ICD-10-PCS | Mod: CPTII,S$GLB,, | Performed by: OPTOMETRIST

## 2023-06-08 PROCEDURE — 2023F PR DILATED RETINAL EXAM W/O EVID OF RETINOPATHY: ICD-10-PCS | Mod: CPTII,S$GLB,, | Performed by: OPTOMETRIST

## 2023-06-08 PROCEDURE — 99999 PR PBB SHADOW E&M-EST. PATIENT-LVL III: CPT | Mod: PBBFAC,,, | Performed by: OPTOMETRIST

## 2023-06-08 PROCEDURE — 1126F AMNT PAIN NOTED NONE PRSNT: CPT | Mod: CPTII,S$GLB,, | Performed by: OPTOMETRIST

## 2023-06-08 PROCEDURE — 1159F PR MEDICATION LIST DOCUMENTED IN MEDICAL RECORD: ICD-10-PCS | Mod: CPTII,S$GLB,, | Performed by: OPTOMETRIST

## 2023-06-08 PROCEDURE — 92004 COMPRE OPH EXAM NEW PT 1/>: CPT | Mod: S$GLB,,, | Performed by: OPTOMETRIST

## 2023-06-08 NOTE — PROGRESS NOTES
HPI    ELIANE: 1 year  Chief complaint (CC): Diabetic Eye Exam  Glasses? +  Contacts? -  H/o eye surgery, injections or laser: Cataracts OU  H/o eye injury: -  Known eye conditions? -  Family h/o eye conditions? -  Eye gtts? -      (-) Flashes (-)  Floaters (-) Mucous   (+)  Tearing (-) Itching (-) Burning   (-) Headaches (-) Eye Pain/discomfort (-) Irritation   (-)  Redness (-) Double vision (-) Blurry vision    Diabetic? +  A1c? Hemoglobin A1C       Date                     Value               Ref Range             Status                02/07/2023               5.9 (H)             4.0 - 5.6 %           Final                 12/01/2022               8.0 (H)             4.0 - 6.0 %           Final                 10/18/2022               7.5 (H)             4.0 - 5.6 %           Final               Last edited by Alf Dominguez, RAUDEL on 6/8/2023  3:26 PM.            Assessment /Plan     For exam results, see Encounter Report.    Diabetes mellitus type 2 without retinopathy  -No retinopathy noted today.  Continued control with primary care physician and annual comprehensive eye exam.    Pseudophakia of both eyes  -clear, centered    Glaucoma screening  -Monitor with annual eye exam and IOP check    Myopia of both eyes with astigmatism and presbyopia  Eyeglass Final Rx       Eyeglass Final Rx         Sphere Cylinder Axis Dist VA Add    Right -2.00 +2.25 010 20/25 +2.50    Left -0.75 +2.25 180 20/20 +2.50      Type: PAL    Expiration Date: 6/8/2024                      RTC 1 yr

## 2023-06-18 ENCOUNTER — PATIENT MESSAGE (OUTPATIENT)
Dept: REHABILITATION | Facility: HOSPITAL | Age: 83
End: 2023-06-18
Payer: MEDICARE

## 2024-01-29 NOTE — PT/OT/SLP PROGRESS
"Speech Language Pathology Treatment    Patient Name:  Frandy Mccarthy Jr.   MRN:  [id number]  Admitting Diagnosis: Multifocal pneumonia    Recommendations:                 General Recommendations:    Speech pathology to follow 4-6x/week for ongoing assessment and treatment of pharyngoesophageal dysphagia.  Recommend GI consult 2/2 significant esophageal dysmotility.      Diet recommendations:   , Full liquids, Thin liquids - IDDSI Level 0      Aspiration Precautions:   1. 1:1 SUPERVISION   2. Sit FULLY upright at 90 degrees   3. Remain upright for 30 minutes before and after meal   4. Slight NECK EXTENSION when swallowing   5. SMALL SIPS (2-5ml)   6. NO STRAWS   7. Re-swallow x2-3 per sip   8. Frequent oral care to reduce oral bacteria   9. Meds whole with small sips of water     General Precautions: Standard, aspiration     Communication strategies:  n/a    Subjective     Pt reclined in bed.  Reports improving globus sensation, however, ongoing coughing with po intake.  Pt agreeable to SLP session.     Per Dr. Galvan, s/p EGD 11/2/22:  "- Suspicious for candidiasis  - Esophagitis in distal esophagus  - Normal stomach  - Non-bleeding duodenal ulcers     Recs:  - Start fluconazole and treat for 14 days.  - Start PPI  - Avoid NSAIDs  - Await path results  - Follow up in clinic in 3-4 weeks"    MBSS Impressions 10/31/22:  "Impressions  Pt presents with moderate pharyngoesophageal dysphagia characterized by:  Delayed initiation of the pharyngeal swallow  Dcr laryngeal elevation and anterior hyoid excursion  Dcr tongue base retraction  Dcr cricopharyngeal distension/duration  Suspected CP bar, impacting ability for clearance of material. Resulting in retrograde of material through the UES, worsening with incr in volume and consistency  Significant esophageal dysmotility noted"    Respiratory Status: Nasal Cannula    Objective:     Has the patient been evaluated by SLP for swallowing?   Yes  Keep patient NPO? No "   Current Respiratory Status:    NC    Swallowing:   Pt reclined in bed. Reporting ongoing coughing with po intake. Stated he is doing well with thin liquids, however, incr in consistency can result in coughing. Improved globus sensation. Reviewed MBSS results and aspiration precautions. Educated on importance of sitting fully upright, slight neck extension, and swallowing twice.     Dysphagia Based Exercise Program:  Given trials of cold water and ice, pt completed x15 effortful swallows with use of neck extension. Pt with spontaneous re-swallows. No overt s/s of aspiration or airway threat during 100% of trials- no coughing, choking, changes in breath stream or vocal quality.     Pt completed x6 reps of Katina Maneuver. Required mod-MAX verbal cues for proper completion of exercise.     Completed x2 rounds of 60-second Isometric CTAR hold. Completed x30 reps of Dynamic CTAR. Required min assist.     Education: Discussed with pt. Pt able to recall aspiration precautions IND. Continue current plan of care.     Assessment:     Frandy Mccarthy Jr. is a 82 y.o. male with an SLP diagnosis of Dysphagia.      Goals:   Multidisciplinary Problems       SLP Goals          Problem: SLP    Goal Priority Disciplines Outcome   SLP Goal     SLP Ongoing, Progressing   Description: 1) Patient will participate in modified barium swallow study to further assess oropharyngeal swallow function and to best guide tx -Met 10/31    UPDATED GOALS:  2. Pt will consume a full liquid diet with reduced overt s/s of aspiration or airway threat during 100% of po intake given mod assist for utilizations for swallow strategies.   3. Pt will participate in dysphagia based exercise program targeting tongue base retraction, laryngeal elevation, and CP distension/duration with 100% acc given mod assist.                          Plan:     Patient to be seen:  4 x/week, 6 x/week   Plan of Care expires:     Plan of Care reviewed with:  patient   SLP  Follow-Up:  Yes       Discharge recommendations:  nursing facility, skilled       Time Tracking:     SLP Treatment Date:   11/03/22    Speech Start Time:  1004  Speech Stop Time:  1030       Speech Total Time (min):  26 min    Billable Minutes:Treatment Swallowing dysfunction 26 min    11/03/2022   Male

## 2024-03-02 ENCOUNTER — OFFICE VISIT (OUTPATIENT)
Dept: URGENT CARE | Facility: CLINIC | Age: 84
End: 2024-03-02
Payer: MEDICARE

## 2024-03-02 VITALS
WEIGHT: 207 LBS | OXYGEN SATURATION: 95 % | TEMPERATURE: 98 F | SYSTOLIC BLOOD PRESSURE: 136 MMHG | HEIGHT: 69 IN | DIASTOLIC BLOOD PRESSURE: 67 MMHG | RESPIRATION RATE: 16 BRPM | BODY MASS INDEX: 30.66 KG/M2 | HEART RATE: 70 BPM

## 2024-03-02 DIAGNOSIS — S29.011A MUSCLE STRAIN OF CHEST WALL, INITIAL ENCOUNTER: Primary | ICD-10-CM

## 2024-03-02 DIAGNOSIS — M62.838 MUSCLE SPASM: ICD-10-CM

## 2024-03-02 PROCEDURE — 99213 OFFICE O/P EST LOW 20 MIN: CPT | Mod: S$GLB,,,

## 2024-03-02 RX ORDER — METHOCARBAMOL 500 MG/1
500 TABLET, FILM COATED ORAL 4 TIMES DAILY
Qty: 40 TABLET | Refills: 0 | Status: SHIPPED | OUTPATIENT
Start: 2024-03-02 | End: 2024-03-12

## 2024-03-02 NOTE — PATIENT INSTRUCTIONS
- Take the muscle relaxer as needed for muscle spasms or muscle tightness    - Follow up with your PCP or specialty clinic as directed in the next 1-2 weeks if not improved or as needed.  You can call [phone] to schedule an appointment with the appropriate provider.    - Go to the ER or seek medical attention immediately if you develop new or worsening symptoms.    - You must understand that you have received an Urgent Care treatment only and that you may be released before all of your medical problems are known or treated.   - You, the patient, will arrange for follow up care as instructed.   - If your condition worsens or fails to improve we recommend that you receive another evaluation at the ER immediately or contact your PCP to discuss your concerns or return here.

## 2024-03-02 NOTE — PROGRESS NOTES
"Subjective:      Patient ID: Frandy Mccarthy Jr. is a 84 y.o. male.    Vitals:  height is 5' 9" (1.753 m) and weight is 93.9 kg (207 lb). His oral temperature is 98.2 °F (36.8 °C). His blood pressure is 136/67 and his pulse is 70. His respiration is 16 and oxygen saturation is 95%.     Chief Complaint: Spasms    Pt is an 83 y/o M who presents with chest soreness x2 weeks. Feels like muscle spasms. Uses wheelchair. States that he injured his chest 2 weeks ago when trying to push himself up from his wheelchair with both of his arms. Tried taking percocet without relief. States he saw his PCP 1 week ago for this, but pain is still present. Only having pain with movements or when he has to push himself up. Denies SoB, fever, chills, coughing, numbness or tingling.    Spasms  This is a new problem. The current episode started 1 to 4 weeks ago. The problem occurs rarely. The problem has been gradually worsening. Associated symptoms include arthralgias, chest pain and neck pain. Pertinent negatives include no abdominal pain, anorexia, change in bowel habit, chills, congestion, coughing, diaphoresis, fatigue, fever, headaches, joint swelling, myalgias, nausea, numbness, rash, sore throat, swollen glands, urinary symptoms, vertigo, visual change, vomiting or weakness. The symptoms are aggravated by twisting, standing and bending. Treatments tried: Percocet. The treatment provided no relief.       Constitution: Negative for chills, sweating, fatigue and fever.   HENT:  Negative for congestion and sore throat.    Neck: Positive for neck pain. Negative for neck stiffness.   Cardiovascular:  Positive for chest pain. Negative for leg swelling, palpitations and sob on exertion.   Respiratory:  Negative for cough and shortness of breath.    Gastrointestinal:  Negative for abdominal pain, nausea and vomiting.   Musculoskeletal:  Positive for joint pain and muscle cramps. Negative for joint swelling and muscle ache.   Skin:  Negative " for rash.   Neurological:  Negative for dizziness, history of vertigo, headaches, numbness and tingling.      Objective:     Physical Exam   Constitutional: He is oriented to person, place, and time. He appears well-developed.   HENT:   Head: Normocephalic and atraumatic.   Ears:   Right Ear: External ear normal.   Left Ear: External ear normal.   Nose: Nose normal.   Mouth/Throat: Oropharynx is clear and moist.   Eyes: Conjunctivae, EOM and lids are normal. Pupils are equal, round, and reactive to light.   Neck: Trachea normal and phonation normal. Neck supple.   Cardiovascular: Normal rate, regular rhythm, normal heart sounds and normal pulses.   Pulmonary/Chest: Effort normal and breath sounds normal. No respiratory distress. He exhibits no tenderness.         Comments: Chest wall pain with movements of RUEs and when trying to push himself up from wheelchair.    Musculoskeletal: Normal range of motion.         General: Normal range of motion.   Neurological: He is alert and oriented to person, place, and time.   Skin: Skin is warm, dry, intact and no rash.   Psychiatric: His speech is normal and behavior is normal. Judgment and thought content normal.   Nursing note and vitals reviewed.    Assessment:     1. Muscle strain of chest wall, initial encounter    2. Muscle spasm      Plan:     Muscle strain of chest wall, initial encounter  -     methocarbamoL (ROBAXIN) 500 MG Tab; Take 1 tablet (500 mg total) by mouth 4 (four) times daily. for 10 days  Dispense: 40 tablet; Refill: 0    Muscle spasm  -     methocarbamoL (ROBAXIN) 500 MG Tab; Take 1 tablet (500 mg total) by mouth 4 (four) times daily. for 10 days  Dispense: 40 tablet; Refill: 0            Patient Instructions   - Take the muscle relaxer as needed for muscle spasms or muscle tightness    - Follow up with your PCP or specialty clinic as directed in the next 1-2 weeks if not improved or as needed.  You can call [phone] to schedule an appointment  with the appropriate provider.    - Go to the ER or seek medical attention immediately if you develop new or worsening symptoms.    - You must understand that you have received an Urgent Care treatment only and that you may be released before all of your medical problems are known or treated.   - You, the patient, will arrange for follow up care as instructed.   - If your condition worsens or fails to improve we recommend that you receive another evaluation at the ER immediately or contact your PCP to discuss your concerns or return here.

## 2024-05-03 ENCOUNTER — HOSPITAL ENCOUNTER (OUTPATIENT)
Dept: RADIOLOGY | Facility: OTHER | Age: 84
Discharge: HOME | End: 2024-05-03
Attending: INTERNAL MEDICINE
Payer: MEDICARE

## 2024-05-03 DIAGNOSIS — R11.0 NAUSEA: ICD-10-CM

## 2024-05-03 DIAGNOSIS — R68.81 EARLY SATIETY: ICD-10-CM

## 2024-05-03 DIAGNOSIS — E11.9 TYPE II DIABETES MELLITUS: ICD-10-CM

## 2024-05-03 DIAGNOSIS — K57.90 DIVERTICULOSIS: ICD-10-CM

## 2024-05-03 PROCEDURE — A9541 TC99M SULFUR COLLOID: HCPCS | Performed by: INTERNAL MEDICINE

## 2024-05-03 PROCEDURE — 78264 GASTRIC EMPTYING IMG STUDY: CPT | Mod: 26,,, | Performed by: RADIOLOGY

## 2024-05-03 PROCEDURE — 78264 GASTRIC EMPTYING IMG STUDY: CPT | Mod: TC

## 2024-05-03 RX ORDER — TECHNETIUM TC 99M SULFUR COLLOID 2 MG
1 KIT MISCELLANEOUS
Status: COMPLETED | OUTPATIENT
Start: 2024-05-03 | End: 2024-05-03

## 2024-05-03 RX ADMIN — TECHNETIUM TC 99M SULFUR COLLOID 1.1 MILLICURIE: KIT at 07:05

## (undated) DEVICE — SEALANT VISTASEAL FIBRIN 4ML

## (undated) DEVICE — DRAPE CAMERA/VIDEO 5 X 96

## (undated) DEVICE — DRAPE T THYROID STERILE

## (undated) DEVICE — BUR BONE CUT MICRO TPS 3X3.8MM

## (undated) DEVICE — ELECTRODE REM PLYHSV RETURN 9

## (undated) DEVICE — UNDERGLOVES BIOGEL PI SZ 7 LF

## (undated) DEVICE — KIT SURGIFLO HEMOSTATIC MATRIX

## (undated) DEVICE — SUT VICRYL PLUS 3-0 SH 18IN

## (undated) DEVICE — CORD BIPOLAR 12 FOOT

## (undated) DEVICE — DRAPE STERI INSTRUMENT 1018

## (undated) DEVICE — TRAY SKIN SCRUB WET PREMIUM

## (undated) DEVICE — IPOD APP PATIENT CONTROLLER

## (undated) DEVICE — DRAPE INCISE IOBAN 2 23X17IN

## (undated) DEVICE — SKIN MARKER DEVON 160

## (undated) DEVICE — ELECTRODE BLADE INSULATED 1 IN

## (undated) DEVICE — SUT BONE WAX 2.5 GRMS 12/BX

## (undated) DEVICE — POSITIONER IV ARMBOARD FOAM

## (undated) DEVICE — SPONGE KITTNER 1/4X 5/8 L STRL

## (undated) DEVICE — DRESSING LEUKOPLAST FLEX 1X3IN

## (undated) DEVICE — Device

## (undated) DEVICE — GLOVE BIOGEL SKINSENSE PI 6.5

## (undated) DEVICE — SUT MCRYL PLUS 4-0 PS2 27IN

## (undated) DEVICE — SYR 10CC LUER LOCK

## (undated) DEVICE — SUT 4/0 18IN NUROLON BLK B

## (undated) DEVICE — DURAPREP SURG SCRUB 26ML

## (undated) DEVICE — SEE L#120831

## (undated) DEVICE — DRAPE SURG W/TWL 17 5/8X23

## (undated) DEVICE — UNDERGLOVE BIOGEL PI SZ 6.5 LF

## (undated) DEVICE — GOWN POLY REINF BRTH SLV XL

## (undated) DEVICE — CLOSURE SKIN STERI STRIP 1/2X4

## (undated) DEVICE — NDL HYPO REG 25G X 1 1/2

## (undated) DEVICE — ADHESIVE MASTISOL VIAL 48/BX